# Patient Record
Sex: MALE | Race: WHITE
[De-identification: names, ages, dates, MRNs, and addresses within clinical notes are randomized per-mention and may not be internally consistent; named-entity substitution may affect disease eponyms.]

---

## 2017-01-12 ENCOUNTER — PRIOR ORIGINAL RECORDS (OUTPATIENT)
Dept: OTHER | Age: 72
End: 2017-01-12

## 2017-02-16 ENCOUNTER — PRIOR ORIGINAL RECORDS (OUTPATIENT)
Dept: OTHER | Age: 72
End: 2017-02-16

## 2017-07-06 ENCOUNTER — PRIOR ORIGINAL RECORDS (OUTPATIENT)
Dept: OTHER | Age: 72
End: 2017-07-06

## 2017-07-06 ENCOUNTER — LAB ENCOUNTER (OUTPATIENT)
Dept: LAB | Age: 72
End: 2017-07-06
Attending: INTERNAL MEDICINE
Payer: MEDICARE

## 2017-07-06 DIAGNOSIS — I72.4 ANEURYSM OF ARTERY OF LOWER EXTREMITY (HCC): ICD-10-CM

## 2017-07-06 DIAGNOSIS — I65.23 BILATERAL CAROTID ARTERY STENOSIS: ICD-10-CM

## 2017-07-06 DIAGNOSIS — R42 DIZZINESS AND GIDDINESS: ICD-10-CM

## 2017-07-06 DIAGNOSIS — I10 ESSENTIAL HYPERTENSION, MALIGNANT: Primary | ICD-10-CM

## 2017-07-06 LAB
ALT SERPL-CCNC: 24 U/L (ref 17–63)
AST SERPL-CCNC: 16 U/L (ref 15–41)
BUN BLD-MCNC: 21 MG/DL (ref 8–20)
CALCIUM BLD-MCNC: 8.7 MG/DL (ref 8.3–10.3)
CHLORIDE: 110 MMOL/L (ref 101–111)
CHOLEST SMN-MCNC: 144 MG/DL (ref ?–200)
CO2: 28 MMOL/L (ref 22–32)
CREAT BLD-MCNC: 1.2 MG/DL (ref 0.7–1.3)
GLUCOSE BLD-MCNC: 103 MG/DL (ref 70–99)
HDLC SERPL-MCNC: 60 MG/DL (ref 45–?)
HDLC SERPL: 2.4 {RATIO} (ref ?–4.97)
LDLC SERPL CALC-MCNC: 58 MG/DL (ref ?–130)
NONHDLC SERPL-MCNC: 84 MG/DL (ref ?–130)
POTASSIUM SERPL-SCNC: 3.6 MMOL/L (ref 3.6–5.1)
SODIUM SERPL-SCNC: 144 MMOL/L (ref 136–144)
TRIGLYCERIDES: 128 MG/DL (ref ?–150)
VLDL: 26 MG/DL (ref 5–40)

## 2017-07-06 PROCEDURE — 84450 TRANSFERASE (AST) (SGOT): CPT

## 2017-07-06 PROCEDURE — 84460 ALANINE AMINO (ALT) (SGPT): CPT

## 2017-07-06 PROCEDURE — 80048 BASIC METABOLIC PNL TOTAL CA: CPT

## 2017-07-06 PROCEDURE — 36415 COLL VENOUS BLD VENIPUNCTURE: CPT

## 2017-07-06 PROCEDURE — 80061 LIPID PANEL: CPT

## 2017-07-07 LAB
BUN: 21 MG/DL
CHOLESTEROL, TOTAL: 144 MG/DL
CREATININE, SERUM: 1.2 MG/DL
GLUCOSE: 103 MG/DL
HDL CHOLESTEROL: 60 MG/DL
LDL CHOLESTEROL: 58 MG/DL
POTASSIUM, SERUM: 3.6 MEQ/L
SGOT (AST): 16 IU/L
SGPT (ALT): 24 IU/L
SODIUM: 144 MEQ/L
TRIGLYCERIDES: 128 MG/DL

## 2017-07-13 ENCOUNTER — PRIOR ORIGINAL RECORDS (OUTPATIENT)
Dept: OTHER | Age: 72
End: 2017-07-13

## 2017-07-23 ENCOUNTER — OFFICE VISIT (OUTPATIENT)
Dept: FAMILY MEDICINE CLINIC | Facility: CLINIC | Age: 72
End: 2017-07-23

## 2017-07-23 VITALS
WEIGHT: 224 LBS | DIASTOLIC BLOOD PRESSURE: 64 MMHG | SYSTOLIC BLOOD PRESSURE: 122 MMHG | HEIGHT: 69 IN | RESPIRATION RATE: 16 BRPM | HEART RATE: 86 BPM | BODY MASS INDEX: 33.18 KG/M2 | TEMPERATURE: 98 F
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
## 2017-07-23 DIAGNOSIS — L08.9 SKIN INFECTION: ICD-10-CM

## 2017-07-23 PROCEDURE — 99202 OFFICE O/P NEW SF 15 MIN: CPT | Performed by: NURSE PRACTITIONER

## 2017-07-23 RX ORDER — ASPIRIN 325 MG
325 TABLET, DELAYED RELEASE (ENTERIC COATED) ORAL
COMMUNITY
Start: 2014-05-09 | End: 2017-08-22 | Stop reason: DRUGHIGH

## 2017-07-23 RX ORDER — LOSARTAN POTASSIUM 100 MG/1
100 TABLET ORAL EVERY MORNING
COMMUNITY

## 2017-07-23 RX ORDER — LOSARTAN POTASSIUM 100 MG/1
TABLET ORAL
COMMUNITY
Start: 2017-06-30 | End: 2017-07-23

## 2017-07-23 RX ORDER — HYDRALAZINE HYDROCHLORIDE 50 MG/1
TABLET, FILM COATED ORAL
COMMUNITY
Start: 2017-05-01 | End: 2017-08-22 | Stop reason: DRUGHIGH

## 2017-07-23 RX ORDER — MELATONIN 10 MG
CAPSULE ORAL
COMMUNITY
End: 2017-08-22 | Stop reason: DRUGHIGH

## 2017-07-23 RX ORDER — AMOXICILLIN AND CLAVULANATE POTASSIUM 875; 125 MG/1; MG/1
1 TABLET, FILM COATED ORAL 2 TIMES DAILY
Qty: 20 TABLET | Refills: 0 | Status: SHIPPED | OUTPATIENT
Start: 2017-07-23 | End: 2017-08-02

## 2017-07-23 RX ORDER — OMEGA-3/DHA/EPA/FISH OIL 60 MG-90MG
CAPSULE ORAL
COMMUNITY
End: 2017-08-22 | Stop reason: DRUGHIGH

## 2017-07-23 RX ORDER — HYDROCHLOROTHIAZIDE 25 MG/1
25 TABLET ORAL DAILY
COMMUNITY

## 2017-07-23 RX ORDER — DICLOFENAC SODIUM 75 MG/1
75 TABLET, DELAYED RELEASE ORAL
COMMUNITY
End: 2017-08-22 | Stop reason: ALTCHOICE

## 2017-07-23 RX ORDER — CARVEDILOL 12.5 MG/1
12.5 TABLET ORAL 2 TIMES DAILY WITH MEALS
COMMUNITY
Start: 2017-07-07

## 2017-07-23 RX ORDER — AMLODIPINE BESYLATE 5 MG/1
5 TABLET ORAL
COMMUNITY
End: 2017-08-22

## 2017-07-23 RX ORDER — METOCLOPRAMIDE 10 MG/1
10 TABLET ORAL
COMMUNITY
Start: 2014-05-09 | End: 2017-08-22 | Stop reason: ALTCHOICE

## 2017-07-23 RX ORDER — CEFADROXIL 500 MG/1
500 CAPSULE ORAL
COMMUNITY
Start: 2014-05-09 | End: 2017-08-22 | Stop reason: ALTCHOICE

## 2017-07-23 NOTE — PATIENT INSTRUCTIONS
Discharge Instructions for Cellulitis  You have been diagnosed with cellulitis. This is an infection in the deepest layer of the skin. In some cases, the infection also affects the muscle. Cellulitis is caused by bacteria.  The bacteria can enter the body Date Last Reviewed: 8/1/2016  © 4652-7608 The [address] [place] [address], [address] [place], [place] [address] [employer]. All rights reserved. This information is not intended as a substitute for professional medical care.  Always follow your healthcare professional'

## 2017-07-23 NOTE — PROGRESS NOTES
CHIEF COMPLAINT:   Patient presents with: Other: Right side chest, X 1 day, tender by touch, warm, pt had Cdiff 7-8 years ago         HPI:   Naheed Dumont is a 70year old male who presents for evaluation of a bug bite.  Per patient the other night he was sle tobacco: Never Used                      Alcohol use: No                  REVIEW OF SYSTEMS:   GENERAL: feels well otherwise, no fever, no chills. SKIN: Per HPI. No edema. No ulcerations. HEENT: Denies rhinorrhea, edema of the lips or swelling of throat. Visit:    Signed Prescriptions Disp Refills    Amoxicillin-Pot Clavulanate 875-125 MG Oral Tab 20 tablet 0      Sig: Take 1 tablet by mouth 2 (two) times daily. Risk and benefits of medication discussed.      The patient indicates understanding

## 2017-08-22 ENCOUNTER — OFFICE VISIT (OUTPATIENT)
Dept: NEUROLOGY | Facility: CLINIC | Age: 72
End: 2017-08-22

## 2017-08-22 VITALS
BODY MASS INDEX: 33 KG/M2 | HEART RATE: 60 BPM | WEIGHT: 221 LBS | DIASTOLIC BLOOD PRESSURE: 76 MMHG | RESPIRATION RATE: 16 BRPM | SYSTOLIC BLOOD PRESSURE: 118 MMHG

## 2017-08-22 DIAGNOSIS — R41.3 MEMORY DEFICIT: Primary | ICD-10-CM

## 2017-08-22 PROCEDURE — 99204 OFFICE O/P NEW MOD 45 MIN: CPT | Performed by: OTHER

## 2017-08-22 RX ORDER — MELATONIN
1000 WEEKLY
COMMUNITY

## 2017-08-22 RX ORDER — CHOLECALCIFEROL (VITAMIN D3) 125 MCG
5 CAPSULE ORAL NIGHTLY
COMMUNITY

## 2017-08-22 RX ORDER — MULTIVITAMIN WITH IRON
100 TABLET ORAL WEEKLY
COMMUNITY

## 2017-08-22 RX ORDER — MELATONIN
400 WEEKLY
COMMUNITY

## 2017-08-22 RX ORDER — CHLORAL HYDRATE 500 MG
1000 CAPSULE ORAL DAILY
COMMUNITY

## 2017-08-22 RX ORDER — HYDRALAZINE HYDROCHLORIDE 25 MG/1
25 TABLET, FILM COATED ORAL 2 TIMES DAILY
COMMUNITY

## 2017-08-22 RX ORDER — DOCUSATE SODIUM 100 MG/1
100 CAPSULE, LIQUID FILLED ORAL NIGHTLY
COMMUNITY

## 2017-08-22 RX ORDER — FEXOFENADINE HCL 180 MG/1
180 TABLET ORAL AS NEEDED
COMMUNITY

## 2017-08-22 RX ORDER — SIMVASTATIN 20 MG
20 TABLET ORAL NIGHTLY
COMMUNITY
End: 2021-08-16

## 2017-08-22 NOTE — PATIENT INSTRUCTIONS
Refill policies:    • Allow 2-3 business days for refills; controlled substances may take longer.   • Contact your pharmacy at least 5 days prior to running out of medication and have them send an electronic request or submit request through the [facility] have a procedure or additional testing performed. [facility]) will contact your insurance carrier to obtain pre-certification or prior authorization.     Unfortunately, [facility] has seen an increase in denial of payment even though the p

## 2017-08-22 NOTE — PROGRESS NOTES
[facility]SULTATION    Date of consult: 8/22/2017    CC: memory deficit    HPI: Rachid Lerner is a 70year old male with past medical history as listed below presents here for initial evaluation of memory deficit in the past 1-2 years, Rolando mcg by mouth daily. , Disp: , Rfl:   •  Vitamin B-12 1000 MCG Oral Tab, Take 1,000 mcg by mouth daily. , Disp: , Rfl:   •  Pyridoxine HCl (VITAMIN B-6) 100 MG Oral Tab, Take 100 mg by mouth daily. , Disp: , Rfl:   •  hydrochlorothiazide 25 MG Oral Tab, Take 2 symmetric  Plantar response: bilateral flexor  Coordination: Normal FTN  Sensory: symmetric  Gait: some shuffling gait noted  Romberg: nl  Neck: supple    Data Reviewed on 8/22/2017  Notes Reviewed on 8/22/2017  Labs Reviewed  on 8/22/2017    Assessment &

## 2017-08-22 NOTE — PROGRESS NOTES
Patient here for evaluation of feeling \"sluggish\" with movement and with speech for the last year. Here to discuss the next steps.

## 2017-08-29 ENCOUNTER — OFFICE VISIT (OUTPATIENT)
Dept: FAMILY MEDICINE CLINIC | Facility: CLINIC | Age: 72
End: 2017-08-29

## 2017-08-29 VITALS
SYSTOLIC BLOOD PRESSURE: 126 MMHG | RESPIRATION RATE: 16 BRPM | DIASTOLIC BLOOD PRESSURE: 74 MMHG | BODY MASS INDEX: 33.03 KG/M2 | WEIGHT: 223 LBS | HEART RATE: 70 BPM | TEMPERATURE: 98 F | HEIGHT: 69 IN

## 2017-08-29 DIAGNOSIS — J38.3 VOCAL CORD DYSFUNCTION: ICD-10-CM

## 2017-08-29 DIAGNOSIS — E56.9 MULTIPLE VITAMIN DEFICIENCY DISEASE: Primary | ICD-10-CM

## 2017-08-29 DIAGNOSIS — E66.9 OBESITY (BMI 30-39.9): ICD-10-CM

## 2017-08-29 DIAGNOSIS — I10 ESSENTIAL HYPERTENSION: ICD-10-CM

## 2017-08-29 DIAGNOSIS — H40.89 OTHER GLAUCOMA OF BOTH EYES: ICD-10-CM

## 2017-08-29 DIAGNOSIS — R41.3 MEMORY LOSS: ICD-10-CM

## 2017-08-29 DIAGNOSIS — N39.490 OVERFLOW INCONTINENCE OF URINE: ICD-10-CM

## 2017-08-29 DIAGNOSIS — E78.49 OTHER HYPERLIPIDEMIA: ICD-10-CM

## 2017-08-29 DIAGNOSIS — R41.3 MEMORY DEFICIT: ICD-10-CM

## 2017-08-29 DIAGNOSIS — C61 PROSTATE CA (HCC): ICD-10-CM

## 2017-08-29 DIAGNOSIS — G47.33 OSA (OBSTRUCTIVE SLEEP APNEA): ICD-10-CM

## 2017-08-29 PROCEDURE — G0439 PPPS, SUBSEQ VISIT: HCPCS | Performed by: INTERNAL MEDICINE

## 2017-08-29 PROCEDURE — 96160 PT-FOCUSED HLTH RISK ASSMT: CPT | Performed by: INTERNAL MEDICINE

## 2017-08-29 PROCEDURE — 99397 PER PM REEVAL EST PAT 65+ YR: CPT | Performed by: INTERNAL MEDICINE

## 2017-08-29 NOTE — PATIENT INSTRUCTIONS
Thank you for choosing Rolly Domingo MD at [place] [address]  To Do: Boyd Tobin  1. Have your tests  Have the EEG brain test, MRI test, and see the neuropsychologist for memory testing  Licensed Psychology/Neuropsychiatriing/Dementia Testing/c Test  1.  E example: CT scans, X rays, Ultrasound, MRI)  •Cardiac Testing in ER building Building A second floor Cardiac Testing [phone] (For example: Holter Monitor, Cardiac Stress tests,Event Monitor, or 2D Echocardiograms)  •[facility] call [phone] follow-up appointment. We cannot refill your maintenance medications at a preventative wellness visit. To best provide you care, patients receiving maintenance medications need to be seen at least twice a year.

## 2017-08-29 NOTE — PROGRESS NOTES
Soledadjoshua Alonso is a 70year old male who presents for a MA Supervisit.     Health Maintenance Topics with due status: Overdue       Topic Date Due    Annual Depression Screen 09/03/1945    Annual Physical 09/03/1947    FIT Colorectal Screening 09/03/1995    Col Influenza, Hepatitis B, Tetanus, or Pneumococcal?: No     Functional Ability     Bathing or Showering: Able without help    Toileting: Able without help    Dressing: Able without help    Eating: Able without help    Driving: Able without help    Preparing Assessment     What day of the week is this?: Correct    What month is it?: Correct    What year is it?: Correct    Recall \"Ball\": Correct    Recall \"Flag\": Correct    Recall \"Tree\": Correct         PREVENTATIVE SERVICES  INDICATIONS AND SCHEDULE Int Monitoring of Persistent     Medications (ACE/ARB, digoxin, diuretics)    Potassium  Annually Potassium (mmol/L)   Date Value   07/06/2017 3.6    No flowsheet data found.     Creatinine  Annually Creatinine (mg/dL)   Date Value   07/06/2017 1.20    No flows Disp:  Rfl:    Vitamin B-12 1000 MCG Oral Tab Take 1,000 mcg by mouth daily. Disp:  Rfl:    Pyridoxine HCl (VITAMIN B-6) 100 MG Oral Tab Take 100 mg by mouth daily. Disp:  Rfl:    hydrochlorothiazide 25 MG Oral Tab Take 25 mg by mouth daily.    Disp:  Rfl: Musculoskeletal: Negative for arthralgias and no gait problem. Skin: Negative for color change and rash. Neurological: Negative for tremors, weakness and numbness. +memory loss and shuffling gait  Hematological: Negative for adenopathy.  Does not brui CHRONIC CONDITIONS:   Hemant Myles is a 70year old male who presents for a Medicare Assessment. PLAN SUMMARY:   Patient/Caregiver Education: Patient/Caregiver Education: There are no barriers to learning. Medical education done.  Outcome: Patient [facility]/Psychiatric/Psychosocial Risk to Health we discussed as noted above and below.   Diet counseling perfomed  Exercise counseling perfomed  5 year plan includes:  Annual Depression Screen due on 09/03/1945  Annual Physical due on 09/03/1947  FIT Col

## 2017-09-01 ENCOUNTER — LAB ENCOUNTER (OUTPATIENT)
Dept: LAB | Age: 72
End: 2017-09-01
Attending: INTERNAL MEDICINE
Payer: MEDICARE

## 2017-09-01 DIAGNOSIS — C61 PROSTATE CA (HCC): ICD-10-CM

## 2017-09-01 DIAGNOSIS — E56.9 MULTIPLE VITAMIN DEFICIENCY DISEASE: ICD-10-CM

## 2017-09-01 DIAGNOSIS — R41.3 MEMORY LOSS: ICD-10-CM

## 2017-09-01 LAB
BASOPHILS # BLD AUTO: 0.01 X10(3) UL (ref 0–0.1)
BASOPHILS NFR BLD AUTO: 0.2 %
COMPLEXED PSA SERPL-MCNC: 0.01 NG/ML (ref 0.01–4)
EOSINOPHIL # BLD AUTO: 0.11 X10(3) UL (ref 0–0.3)
EOSINOPHIL NFR BLD AUTO: 2.1 %
ERYTHROCYTE [DISTWIDTH] IN BLOOD BY AUTOMATED COUNT: 12.4 % (ref 11.5–16)
FREE T4: 1 NG/DL (ref 0.9–1.8)
HAV AB SERPL IA-ACNC: 1000 PG/ML (ref 193–986)
HCT VFR BLD AUTO: 39 % (ref 37–53)
HGB BLD-MCNC: 12.9 G/DL (ref 13–17)
IMMATURE GRANULOCYTE COUNT: 0.01 X10(3) UL (ref 0–1)
IMMATURE GRANULOCYTE RATIO %: 0.2 %
LYMPHOCYTES # BLD AUTO: 1.27 X10(3) UL (ref 0.9–4)
LYMPHOCYTES NFR BLD AUTO: 24.3 %
MCH RBC QN AUTO: 32.5 PG (ref 27–33.2)
MCHC RBC AUTO-ENTMCNC: 33.1 G/DL (ref 31–37)
MCV RBC AUTO: 98.2 FL (ref 80–99)
MONOCYTES # BLD AUTO: 0.48 X10(3) UL (ref 0.1–0.6)
MONOCYTES NFR BLD AUTO: 9.2 %
NEUTROPHIL ABS PRELIM: 3.34 X10 (3) UL (ref 1.3–6.7)
NEUTROPHILS # BLD AUTO: 3.34 X10(3) UL (ref 1.3–6.7)
NEUTROPHILS NFR BLD AUTO: 64 %
PLATELET # BLD AUTO: 161 10(3)UL (ref 150–450)
RBC # BLD AUTO: 3.97 X10(6)UL (ref 3.8–5.8)
RED CELL DISTRIBUTION WIDTH-SD: 44.7 FL (ref 35.1–46.3)
SED RATE-ML: 11 MM/HR (ref 0–12)
T3FREE SERPL-MCNC: 2.86 PG/ML (ref 2.3–4.2)
TSI SER-ACNC: 1.16 MIU/ML (ref 0.35–5.5)
WBC # BLD AUTO: 5.2 X10(3) UL (ref 4–13)

## 2017-09-01 PROCEDURE — 82607 VITAMIN B-12: CPT

## 2017-09-01 PROCEDURE — 84425 ASSAY OF VITAMIN B-1: CPT

## 2017-09-01 PROCEDURE — 85652 RBC SED RATE AUTOMATED: CPT

## 2017-09-01 PROCEDURE — 84207 ASSAY OF VITAMIN B-6: CPT

## 2017-09-01 PROCEDURE — 36415 COLL VENOUS BLD VENIPUNCTURE: CPT

## 2017-09-01 PROCEDURE — 84481 FREE ASSAY (FT-3): CPT

## 2017-09-01 PROCEDURE — 84443 ASSAY THYROID STIM HORMONE: CPT

## 2017-09-01 PROCEDURE — 84439 ASSAY OF FREE THYROXINE: CPT

## 2017-09-01 PROCEDURE — 85025 COMPLETE CBC W/AUTO DIFF WBC: CPT

## 2017-09-05 ENCOUNTER — TELEPHONE (OUTPATIENT)
Dept: FAMILY MEDICINE CLINIC | Facility: CLINIC | Age: 72
End: 2017-09-05

## 2017-09-05 LAB
VITAMIN B1 (THIAMINE), WHOLE B: 95 NMOL/L
VITAMIN B6: 359.2 NMOL/L

## 2017-09-05 NOTE — TELEPHONE ENCOUNTER
[facility] med records on patient from [address] [place] [address] dated 8/5/15. Placed in MD bin for review and/or signature.

## 2017-09-15 ENCOUNTER — HOSPITAL ENCOUNTER (OUTPATIENT)
Dept: MRI IMAGING | Facility: HOSPITAL | Age: 72
Discharge: HOME | End: 2017-09-15
Attending: Other
Payer: MEDICARE

## 2017-09-15 ENCOUNTER — NURSE ONLY (OUTPATIENT)
Dept: ELECTROPHYSIOLOGY | Facility: HOSPITAL | Age: 72
End: 2017-09-15
Attending: Other
Payer: MEDICARE

## 2017-09-15 DIAGNOSIS — R41.3 MEMORY DEFICIT: ICD-10-CM

## 2017-09-15 PROCEDURE — 95816 EEG AWAKE AND DROWSY: CPT | Performed by: OTHER

## 2017-09-15 PROCEDURE — 70551 MRI BRAIN STEM W/O DYE: CPT | Performed by: OTHER

## 2017-09-18 NOTE — PROCEDURES
Date of Procedure: 9/15/2017    Procedure: EEG (ELECTROENCEPHALOGRAM)     DX: MEMORY DEFICIT  HX: PT IS A 73 YO MALE WHO PRESENTS WITH MEMORY DEFICIT IN THE PAST 1-2 YEARS.  PT STATES HE IS \"SLOW IN PROCESSING\" AND SOMETIMES HAS DIFFICULTY IN FINDING WORD

## 2017-10-03 ENCOUNTER — TELEPHONE (OUTPATIENT)
Dept: NEUROLOGY | Facility: CLINIC | Age: 72
End: 2017-10-03

## 2017-10-23 ENCOUNTER — OFFICE VISIT (OUTPATIENT)
Dept: FAMILY MEDICINE CLINIC | Facility: CLINIC | Age: 72
End: 2017-10-23

## 2017-10-23 VITALS
SYSTOLIC BLOOD PRESSURE: 156 MMHG | WEIGHT: 220 LBS | HEART RATE: 66 BPM | DIASTOLIC BLOOD PRESSURE: 100 MMHG | RESPIRATION RATE: 14 BRPM | TEMPERATURE: 98 F | BODY MASS INDEX: 32.96 KG/M2 | HEIGHT: 68.5 IN

## 2017-10-23 DIAGNOSIS — E66.9 OBESITY (BMI 30-39.9): ICD-10-CM

## 2017-10-23 DIAGNOSIS — R41.3 MEMORY DEFICIT: Primary | ICD-10-CM

## 2017-10-23 DIAGNOSIS — I10 ESSENTIAL HYPERTENSION: ICD-10-CM

## 2017-10-23 DIAGNOSIS — C61 PROSTATE CA (HCC): ICD-10-CM

## 2017-10-23 DIAGNOSIS — G47.33 OSA (OBSTRUCTIVE SLEEP APNEA): ICD-10-CM

## 2017-10-23 DIAGNOSIS — E78.49 OTHER HYPERLIPIDEMIA: ICD-10-CM

## 2017-10-23 PROCEDURE — 90686 IIV4 VACC NO PRSV 0.5 ML IM: CPT | Performed by: INTERNAL MEDICINE

## 2017-10-23 PROCEDURE — 99214 OFFICE O/P EST MOD 30 MIN: CPT | Performed by: INTERNAL MEDICINE

## 2017-10-23 PROCEDURE — G0008 ADMIN INFLUENZA VIRUS VAC: HCPCS | Performed by: INTERNAL MEDICINE

## 2017-10-23 NOTE — PROGRESS NOTES
HPI:    Patient ID: Erica Ramirez is a 67year old male. Patient presents with:  Blood Pressure  Memory Loss: follow up   Cholesterol      HPI     Patient is here for follow up visit.  Saw neurologist and had EEG and MRI which he was told was normal. Also garo Take 1,000 mg by mouth daily. Disp:  Rfl:    Sennosides (SENEXON OR) Take 2 tablets by mouth nightly. Disp:  Rfl:    docusate sodium 100 MG Oral Cap Take 100 mg by mouth nightly. Disp:  Rfl:    NON FORMULARY daily.  [employer] Prime Anti-Aging supplement Disp: (bmi 30-39. 9)   Start Time: 111  End Time: 136  Therefore more than 25 minutes were spent face to face with the patient in which over half of that time was spent coordinating and counseling on  htn  dyslipid  And memory loss      Memory loss- sp neuro and

## 2018-01-11 ENCOUNTER — PRIOR ORIGINAL RECORDS (OUTPATIENT)
Dept: OTHER | Age: 73
End: 2018-01-11

## 2018-01-23 ENCOUNTER — HOSPITAL ENCOUNTER (OUTPATIENT)
Dept: CV DIAGNOSTICS | Age: 73
Discharge: HOME | End: 2018-01-23
Attending: INTERNAL MEDICINE
Payer: MEDICARE

## 2018-01-23 ENCOUNTER — PRIOR ORIGINAL RECORDS (OUTPATIENT)
Dept: OTHER | Age: 73
End: 2018-01-23

## 2018-01-23 ENCOUNTER — LAB ENCOUNTER (OUTPATIENT)
Dept: LAB | Age: 73
End: 2018-01-23
Attending: INTERNAL MEDICINE
Payer: MEDICARE

## 2018-01-23 DIAGNOSIS — E78.00 PURE HYPERCHOLESTEROLEMIA: ICD-10-CM

## 2018-01-23 DIAGNOSIS — E78.00 PURE HYPERCHOLESTEROLEMIA: Primary | ICD-10-CM

## 2018-01-23 DIAGNOSIS — I10 ESSENTIAL HYPERTENSION, MALIGNANT: ICD-10-CM

## 2018-01-23 DIAGNOSIS — E78.00 HIGH CHOLESTEROL: ICD-10-CM

## 2018-01-23 DIAGNOSIS — I10 HTN (HYPERTENSION): ICD-10-CM

## 2018-01-23 LAB
ALT SERPL-CCNC: 26 U/L (ref 17–63)
AST SERPL-CCNC: 20 U/L (ref 15–41)
BUN BLD-MCNC: 26 MG/DL (ref 8–20)
CALCIUM BLD-MCNC: 9.1 MG/DL (ref 8.3–10.3)
CHLORIDE: 105 MMOL/L (ref 101–111)
CHOLEST SMN-MCNC: 152 MG/DL (ref ?–200)
CO2: 29 MMOL/L (ref 22–32)
CREAT BLD-MCNC: 1.45 MG/DL (ref 0.7–1.3)
GLUCOSE BLD-MCNC: 99 MG/DL (ref 70–99)
HDLC SERPL-MCNC: 59 MG/DL (ref 45–?)
HDLC SERPL: 2.58 {RATIO} (ref ?–4.97)
LDLC SERPL CALC-MCNC: 68 MG/DL (ref ?–130)
NONHDLC SERPL-MCNC: 93 MG/DL (ref ?–130)
POTASSIUM SERPL-SCNC: 3.7 MMOL/L (ref 3.6–5.1)
SODIUM SERPL-SCNC: 142 MMOL/L (ref 136–144)
TRIGL SERPL-MCNC: 123 MG/DL (ref ?–150)
VLDLC SERPL CALC-MCNC: 25 MG/DL (ref 5–40)

## 2018-01-23 PROCEDURE — 93017 CV STRESS TEST TRACING ONLY: CPT | Performed by: INTERNAL MEDICINE

## 2018-01-23 PROCEDURE — 36415 COLL VENOUS BLD VENIPUNCTURE: CPT

## 2018-01-23 PROCEDURE — 84460 ALANINE AMINO (ALT) (SGPT): CPT

## 2018-01-23 PROCEDURE — 93018 CV STRESS TEST I&R ONLY: CPT | Performed by: INTERNAL MEDICINE

## 2018-01-23 PROCEDURE — 80061 LIPID PANEL: CPT

## 2018-01-23 PROCEDURE — 80048 BASIC METABOLIC PNL TOTAL CA: CPT

## 2018-01-23 PROCEDURE — 84450 TRANSFERASE (AST) (SGOT): CPT

## 2018-01-23 NOTE — PROGRESS NOTES
Pt had regular treadmill test. He could only get his heart rate up to 59 percent of APMHR. I left a message with Dr Tom Cody nurse.

## 2018-01-24 LAB
ALT (SGPT): 26 U/L
AST (SGOT): 20 U/L
BUN: 26 MG/DL
CALCIUM: 9.1 MG/DL
CHLORIDE: 105 MEQ/L
CHOLESTEROL, TOTAL: 152 MG/DL
CREATININE, SERUM: 1.45 MG/DL
GLUCOSE: 99 MG/DL
GLUCOSE: 99 MG/DL
HDL CHOLESTEROL: 59 MG/DL
LDL CHOLESTEROL: 68 MG/DL
NON-HDL CHOLESTEROL: 93 MG/DL
POTASSIUM, SERUM: 3.7 MEQ/L
SGOT (AST): 20 IU/L
SGPT (ALT): 26 IU/L
SODIUM: 142 MEQ/L
TRIGLYCERIDES: 123 MG/DL

## 2018-01-26 ENCOUNTER — PRIOR ORIGINAL RECORDS (OUTPATIENT)
Dept: OTHER | Age: 73
End: 2018-01-26

## 2018-01-30 ENCOUNTER — PRIOR ORIGINAL RECORDS (OUTPATIENT)
Dept: OTHER | Age: 73
End: 2018-01-30

## 2018-02-06 ENCOUNTER — HOSPITAL ENCOUNTER (OUTPATIENT)
Dept: CV DIAGNOSTICS | Age: 73
Discharge: HOME | End: 2018-02-06
Attending: INTERNAL MEDICINE
Payer: MEDICARE

## 2018-02-06 ENCOUNTER — PRIOR ORIGINAL RECORDS (OUTPATIENT)
Dept: OTHER | Age: 73
End: 2018-02-06

## 2018-02-06 DIAGNOSIS — I65.23 BILATERAL CAROTID ARTERY STENOSIS: ICD-10-CM

## 2018-02-08 ENCOUNTER — OFFICE VISIT (OUTPATIENT)
Dept: FAMILY MEDICINE CLINIC | Facility: CLINIC | Age: 73
End: 2018-02-08

## 2018-02-08 VITALS
WEIGHT: 221 LBS | BODY MASS INDEX: 33.49 KG/M2 | SYSTOLIC BLOOD PRESSURE: 124 MMHG | HEIGHT: 68 IN | DIASTOLIC BLOOD PRESSURE: 72 MMHG | HEART RATE: 60 BPM

## 2018-02-08 DIAGNOSIS — C61 PROSTATE CA (HCC): Primary | ICD-10-CM

## 2018-02-08 DIAGNOSIS — H40.9 GLAUCOMA OF BOTH EYES, UNSPECIFIED GLAUCOMA TYPE: ICD-10-CM

## 2018-02-08 DIAGNOSIS — N39.490 OVERFLOW INCONTINENCE OF URINE: ICD-10-CM

## 2018-02-08 DIAGNOSIS — I10 ESSENTIAL HYPERTENSION: ICD-10-CM

## 2018-02-08 DIAGNOSIS — E66.09 CLASS 1 OBESITY DUE TO EXCESS CALORIES WITHOUT SERIOUS COMORBIDITY WITH BODY MASS INDEX (BMI) OF 33.0 TO 33.9 IN ADULT: ICD-10-CM

## 2018-02-08 DIAGNOSIS — G47.33 OSA ON CPAP: ICD-10-CM

## 2018-02-08 DIAGNOSIS — E78.00 HYPERCHOLESTEROLEMIA: ICD-10-CM

## 2018-02-08 DIAGNOSIS — I77.9 BILATERAL CAROTID ARTERY DISEASE (HCC): ICD-10-CM

## 2018-02-08 DIAGNOSIS — Z99.89 OSA ON CPAP: ICD-10-CM

## 2018-02-08 DIAGNOSIS — J38.3 VOCAL CORD DYSFUNCTION: ICD-10-CM

## 2018-02-08 DIAGNOSIS — Z23 NEED FOR VACCINATION: ICD-10-CM

## 2018-02-08 PROBLEM — H40.89 OTHER SPECIFIED GLAUCOMA: Status: RESOLVED | Noted: 2017-08-29 | Resolved: 2018-02-08

## 2018-02-08 PROBLEM — E66.9 OBESITY (BMI 30-39.9): Status: RESOLVED | Noted: 2017-08-29 | Resolved: 2018-02-08

## 2018-02-08 PROCEDURE — G0009 ADMIN PNEUMOCOCCAL VACCINE: HCPCS | Performed by: FAMILY MEDICINE

## 2018-02-08 PROCEDURE — 90670 PCV13 VACCINE IM: CPT | Performed by: FAMILY MEDICINE

## 2018-02-08 PROCEDURE — 99214 OFFICE O/P EST MOD 30 MIN: CPT | Performed by: FAMILY MEDICINE

## 2018-02-08 NOTE — PROGRESS NOTES
Live Montelongo is a 67year old male. HPI:     HTN:    Stable. Severity is moderate, patient is on 4 agents to control his hypertension. Pt has been taking medications as instructed, no medication side effects.    Diet: tries to watch sodium intake somew NON FORMULARY daily. Green Tea Complex; 500mg green tea 125mg EGCG Disp:  Rfl:    LATANOPROST OP Apply 1 drop to eye nightly. Both eyes Disp:  Rfl:    folic acid 356 MCG Oral Tab Take 400 mcg by mouth once a week.    Disp:  Rfl:    Vitamin B-12 1000 MCG O Maternal Grandfather      REVIEW OF SYSTEMS:   GENERAL HEALTH: overall feels well, no fever, no change in weight  SKIN: denies any unusual skin lesions or rashes   RESPIRATORY: Denies: PALOMO/DWYER/Cough/Wheeze/Orthopnea/PND  CARDIOVASCULAR: Denies CP/palpitati diagnosis)  Bilateral carotid artery disease (hcc)  Essential hypertension  Que on cpap  Hypercholesterolemia  Vocal cord dysfunction  Overflow incontinence of urine  Glaucoma of both eyes, unspecified glaucoma type  Class 1 obesity due to excess calories

## 2018-02-09 ENCOUNTER — PRIOR ORIGINAL RECORDS (OUTPATIENT)
Dept: OTHER | Age: 73
End: 2018-02-09

## 2018-02-11 PROBLEM — H40.9 GLAUCOMA OF BOTH EYES: Status: ACTIVE | Noted: 2018-02-11

## 2018-02-11 PROBLEM — I77.9 BILATERAL CAROTID ARTERY DISEASE: Status: ACTIVE | Noted: 2018-02-11

## 2018-02-11 PROBLEM — I72.4 ANEURYSM OF ARTERY OF LOWER EXTREMITY (HCC): Status: ACTIVE | Noted: 2018-02-11

## 2018-02-11 PROBLEM — I77.9 BILATERAL CAROTID ARTERY DISEASE (HCC): Status: ACTIVE | Noted: 2018-02-11

## 2018-02-11 PROBLEM — E66.09 CLASS 1 OBESITY DUE TO EXCESS CALORIES WITHOUT SERIOUS COMORBIDITY WITH BODY MASS INDEX (BMI) OF 33.0 TO 33.9 IN ADULT: Status: ACTIVE | Noted: 2018-02-11

## 2018-02-11 PROBLEM — E66.811 CLASS 1 OBESITY DUE TO EXCESS CALORIES WITHOUT SERIOUS COMORBIDITY WITH BODY MASS INDEX (BMI) OF 33.0 TO 33.9 IN ADULT: Status: ACTIVE | Noted: 2018-02-11

## 2018-02-11 PROBLEM — I72.4 ANEURYSM OF ARTERY OF LOWER EXTREMITY: Status: ACTIVE | Noted: 2018-02-11

## 2018-02-20 ENCOUNTER — PRIOR ORIGINAL RECORDS (OUTPATIENT)
Dept: OTHER | Age: 73
End: 2018-02-20

## 2018-02-20 ENCOUNTER — LAB ENCOUNTER (OUTPATIENT)
Dept: LAB | Age: 73
End: 2018-02-20
Attending: INTERNAL MEDICINE
Payer: MEDICARE

## 2018-02-20 DIAGNOSIS — R79.89 HYPOURICEMIA: Primary | ICD-10-CM

## 2018-02-20 DIAGNOSIS — I10 ESSENTIAL HYPERTENSION, MALIGNANT: ICD-10-CM

## 2018-02-20 LAB
BUN BLD-MCNC: 24 MG/DL (ref 8–20)
CALCIUM BLD-MCNC: 9.1 MG/DL (ref 8.3–10.3)
CHLORIDE: 105 MMOL/L (ref 101–111)
CO2: 31 MMOL/L (ref 22–32)
CREAT BLD-MCNC: 1.2 MG/DL (ref 0.7–1.3)
GLUCOSE BLD-MCNC: 97 MG/DL (ref 70–99)
POTASSIUM SERPL-SCNC: 3.4 MMOL/L (ref 3.6–5.1)
SODIUM SERPL-SCNC: 142 MMOL/L (ref 136–144)

## 2018-02-20 PROCEDURE — 36415 COLL VENOUS BLD VENIPUNCTURE: CPT

## 2018-02-20 PROCEDURE — 80048 BASIC METABOLIC PNL TOTAL CA: CPT

## 2018-02-28 ENCOUNTER — PRIOR ORIGINAL RECORDS (OUTPATIENT)
Dept: OTHER | Age: 73
End: 2018-02-28

## 2018-03-01 LAB
BUN: 24 MG/DL
CALCIUM: 9.1 MG/DL
CHLORIDE: 105 MEQ/L
CREATININE, SERUM: 1.2 MG/DL
GLUCOSE: 97 MG/DL
POTASSIUM, SERUM: 3.4 MEQ/L
SODIUM: 142 MEQ/L

## 2018-06-07 ENCOUNTER — OFFICE VISIT (OUTPATIENT)
Dept: FAMILY MEDICINE CLINIC | Facility: CLINIC | Age: 73
End: 2018-06-07

## 2018-06-07 VITALS
HEART RATE: 60 BPM | BODY MASS INDEX: 33.49 KG/M2 | HEIGHT: 68 IN | SYSTOLIC BLOOD PRESSURE: 108 MMHG | WEIGHT: 221 LBS | DIASTOLIC BLOOD PRESSURE: 64 MMHG

## 2018-06-07 DIAGNOSIS — E66.09 CLASS 1 OBESITY DUE TO EXCESS CALORIES WITHOUT SERIOUS COMORBIDITY WITH BODY MASS INDEX (BMI) OF 33.0 TO 33.9 IN ADULT: ICD-10-CM

## 2018-06-07 DIAGNOSIS — I77.9 BILATERAL CAROTID ARTERY DISEASE (HCC): ICD-10-CM

## 2018-06-07 DIAGNOSIS — Z00.00 MEDICARE ANNUAL WELLNESS VISIT, SUBSEQUENT: Primary | ICD-10-CM

## 2018-06-07 DIAGNOSIS — D48.5 NEOPLASM OF UNCERTAIN BEHAVIOR OF SKIN: ICD-10-CM

## 2018-06-07 DIAGNOSIS — Z99.89 OSA ON CPAP: ICD-10-CM

## 2018-06-07 DIAGNOSIS — I10 ESSENTIAL HYPERTENSION: ICD-10-CM

## 2018-06-07 DIAGNOSIS — Z11.59 NEED FOR HEPATITIS C SCREENING TEST: ICD-10-CM

## 2018-06-07 DIAGNOSIS — I72.4 ANEURYSM OF POPLITEAL ARTERY (HCC): ICD-10-CM

## 2018-06-07 DIAGNOSIS — I77.9 ARTERIAL DISEASE (HCC): ICD-10-CM

## 2018-06-07 DIAGNOSIS — I65.23 BILATERAL CAROTID ARTERY STENOSIS: ICD-10-CM

## 2018-06-07 DIAGNOSIS — C61 PROSTATE CA (HCC): ICD-10-CM

## 2018-06-07 DIAGNOSIS — G47.33 OSA ON CPAP: ICD-10-CM

## 2018-06-07 DIAGNOSIS — H40.1131 PRIMARY OPEN ANGLE GLAUCOMA (POAG) OF BOTH EYES, MILD STAGE: ICD-10-CM

## 2018-06-07 DIAGNOSIS — R41.9 COGNITIVE COMPLAINTS WITH NORMAL NEUROPSYCHOLOGICAL EXAM: ICD-10-CM

## 2018-06-07 DIAGNOSIS — E78.00 HYPERCHOLESTEROLEMIA: ICD-10-CM

## 2018-06-07 DIAGNOSIS — N39.490 OVERFLOW INCONTINENCE OF URINE: ICD-10-CM

## 2018-06-07 PROCEDURE — G0439 PPPS, SUBSEQ VISIT: HCPCS | Performed by: FAMILY MEDICINE

## 2018-06-07 PROCEDURE — 96160 PT-FOCUSED HLTH RISK ASSMT: CPT | Performed by: FAMILY MEDICINE

## 2018-06-07 PROCEDURE — 99397 PER PM REEVAL EST PAT 65+ YR: CPT | Performed by: FAMILY MEDICINE

## 2018-06-07 RX ORDER — POTASSIUM CHLORIDE 750 MG/1
1 TABLET, EXTENDED RELEASE ORAL DAILY
COMMUNITY
Start: 2018-03-30

## 2018-06-07 NOTE — PATIENT INSTRUCTIONS
Check your blood pressure once a day, vary the time of day you check it. Please bring your blood pressure monitor and your blood pressure log sheet with you to your next appointment.               Piyush Daniels's SCREENING SCHEDULE   Tests on this lis smoked more than 100 cigarettes in their lifetime   • Anyone with a family history    Colorectal Cancer Screening Covered up to Age 76     Colonoscopy Screen   Covered every 10 years- more often if abnormal Colonoscopy,10 Years due on 09/03/1995 Update Lina Toxoid- Only covered with a cut with metal- TD and TDaP Not covered by Medicare Part B) No orders found for this or any previous visit.  This may be covered with your prescription benefits, but Medicare does not cover unless Medically needed    Zoster (Not

## 2018-06-07 NOTE — PROGRESS NOTES
HPI:   Soledad Alonso is a 67year old male who presents for a MA (Medicare Advantage) Supervisit (Once per calendar year). Patient is accompanied by his wife who is pleasant and supportive.     Per wife, patient has had shuffling gate for a year and a h at all  PHQ-2 SCORE: 0     Advanced Directive:   He does have a Living Will but we do NOT have it on file in [place].    The patient has this document but we do not have it in [facility], and patient is instructed to get our office a copy of it for scanning into Epi : 221 lb  02/08/18 : 221 lb  10/23/17 : 220 lb     Last Cholesterol Labs:     Lab Results  Component Value Date   CHOLEST 152 01/23/2018   HDL 59 01/23/2018   LDL 68 01/23/2018   TRIG 123 01/23/2018          Last Chemistry Labs:     Lab Results  Component daily.     carvedilol 12.5 MG Oral Tab Take 12.5 mg by mouth 2 (two) times daily with meals. MEDICAL INFORMATION:   He  has a past medical history of Aneurysm of popliteal artery ([facility] (6/7/2018); Arterial disease ([facility] (6/7/2018);  Bilateral carotid shortness of breath with exertion  CARDIOVASCULAR: denies chest pain on exertion  GI: denies abdominal pain, denies heartburn  : 2 per night nocturia, no complaint of urinary incontinence  MUSCULOSKELETAL: denies back pain  NEURO: denies headaches  PSYCH (hcc)  Arterial disease (hcc)  Prostate ca (hcc)  Essential hypertension  Bilateral carotid artery stenosis  Hypercholesterolemia  Que on cpap  Primary open angle glaucoma (poag) of both eyes, mild stage  Neoplasm of uncertain behavior of skin  Overflow in with ophthalmologist as instructed. 11. Neoplasm of uncertain behavior of skin  Patient due for full body skin exam, patient to make appointment. - DERM - INTERNAL    12.  Overflow incontinence of urine  Patient to make appointment to see urologist. results found for: A1C    No flowsheet data found.     Fasting Blood Sugar (FSB)Annually   Glucose (mg/dL)   Date Value   02/20/2018 97   ----------    Cardiovascular Disease Screening     LDL Annually LDL Cholesterol (mg/dL)   Date Value   01/23/2018 68 your pharmacy  prescription benefits      SPECIFIC DISEASE MONITORING Internal Lab or Procedure External Lab or Procedure      Annual Monitoring of Persistent     Medications (ACE/ARB, digoxin diuretics, anticonvulsants.)    Potassium  Annually Potassium (

## 2018-07-12 ENCOUNTER — OFFICE VISIT (OUTPATIENT)
Dept: FAMILY MEDICINE CLINIC | Facility: CLINIC | Age: 73
End: 2018-07-12

## 2018-07-12 ENCOUNTER — PRIOR ORIGINAL RECORDS (OUTPATIENT)
Dept: OTHER | Age: 73
End: 2018-07-12

## 2018-07-12 VITALS
WEIGHT: 220.63 LBS | SYSTOLIC BLOOD PRESSURE: 132 MMHG | DIASTOLIC BLOOD PRESSURE: 64 MMHG | HEIGHT: 68 IN | HEART RATE: 53 BPM | BODY MASS INDEX: 33.44 KG/M2

## 2018-07-12 DIAGNOSIS — D69.6 THROMBOCYTOPENIA (HCC): ICD-10-CM

## 2018-07-12 DIAGNOSIS — R29.898 COGWHEEL RIGIDITY: ICD-10-CM

## 2018-07-12 DIAGNOSIS — R26.9 ABNORMAL GAIT: ICD-10-CM

## 2018-07-12 DIAGNOSIS — I10 ESSENTIAL HYPERTENSION: Primary | ICD-10-CM

## 2018-07-12 DIAGNOSIS — E67.8 HYPERVITAMINOSIS: ICD-10-CM

## 2018-07-12 PROCEDURE — 99214 OFFICE O/P EST MOD 30 MIN: CPT | Performed by: FAMILY MEDICINE

## 2018-07-12 NOTE — PROGRESS NOTES
Hemant Myles is a 67year old male. HPI:     Patient presents with his wife who is pleasant and supportive. HTN:    Stable. Severity is moderate, patient is on 4 agents for his hypertension.   Pt has been taking medications as instructed, no medicati Fexofenadine HCl (ALLEGRA ALLERGY) 180 MG Oral Tab Take 180 mg by mouth daily. Disp:  Rfl:    NON FORMULARY daily. Green Tea Complex; 500mg green tea 125mg EGCG Disp:  Rfl:    LATANOPROST OP Apply 1 drop to eye nightly.  Both eyes Disp:  Rfl:    folic aci Thrombocytopenia ([facility] [address] 7/14/2018   • Torn ligament     right   • Vocal cord dysfunction     errosion      Past Surgical History:  No date: ANKLE FRACTURE SURGERY  2006: HDR ELECT BRACHYTHERAPY  2014: KNEE REPLACEMENT SURGERY Left   Social History:    Tita masses  LUNGS: CTA A/P no wheezes/ronchi/rales/crackles  CARDIO: RRR, +S1/S2, no mm/S3/S4  VASCULAR: Radial pulses 2+ b/l.   Trace lower extremity edema  GI: normal bowel sounds, NT/ND, no pulsations, no r/r/g, no masses, no HSM  EXTREMITIES: no cyanosis or INTERNAL    Return in about 6 months (around 1/12/2019) for Chronic Conditions and as needed.

## 2018-07-13 ENCOUNTER — LAB ENCOUNTER (OUTPATIENT)
Dept: LAB | Age: 73
End: 2018-07-13
Attending: FAMILY MEDICINE
Payer: MEDICARE

## 2018-07-13 DIAGNOSIS — I10 ESSENTIAL HYPERTENSION: ICD-10-CM

## 2018-07-13 DIAGNOSIS — E78.00 HYPERCHOLESTEROLEMIA: ICD-10-CM

## 2018-07-13 DIAGNOSIS — D64.9 ANEMIA, UNSPECIFIED TYPE: ICD-10-CM

## 2018-07-13 DIAGNOSIS — Z11.59 NEED FOR HEPATITIS C SCREENING TEST: ICD-10-CM

## 2018-07-13 DIAGNOSIS — E67.8 HYPERVITAMINOSIS: ICD-10-CM

## 2018-07-13 LAB
ALBUMIN SERPL-MCNC: 3.7 G/DL (ref 3.5–4.8)
ALP LIVER SERPL-CCNC: 45 U/L (ref 45–117)
ALT SERPL-CCNC: 25 U/L (ref 17–63)
AST SERPL-CCNC: 16 U/L (ref 15–41)
BASOPHILS # BLD AUTO: 0.01 X10(3) UL (ref 0–0.1)
BASOPHILS NFR BLD AUTO: 0.2 %
BILIRUB SERPL-MCNC: 0.6 MG/DL (ref 0.1–2)
BUN BLD-MCNC: 23 MG/DL (ref 8–20)
CALCIUM BLD-MCNC: 9.3 MG/DL (ref 8.3–10.3)
CHLORIDE: 105 MMOL/L (ref 101–111)
CO2: 27 MMOL/L (ref 22–32)
CREAT BLD-MCNC: 1.25 MG/DL (ref 0.7–1.3)
EOSINOPHIL # BLD AUTO: 0.12 X10(3) UL (ref 0–0.3)
EOSINOPHIL NFR BLD AUTO: 2.4 %
ERYTHROCYTE [DISTWIDTH] IN BLOOD BY AUTOMATED COUNT: 12.1 % (ref 11.5–16)
FOLATE (FOLIC ACID), SERUM: 41.4 NG/ML (ref 8.7–24)
FREE T4: 1 NG/DL (ref 0.9–1.8)
GLUCOSE BLD-MCNC: 100 MG/DL (ref 70–99)
HAV AB SERPL IA-ACNC: 726 PG/ML (ref 193–986)
HCT VFR BLD AUTO: 38.7 % (ref 37–53)
HEPATITIS C VIRUS AB INTERPRETATION: NONREACTIVE
HGB BLD-MCNC: 12.6 G/DL (ref 13–17)
IMMATURE GRANULOCYTE COUNT: 0.01 X10(3) UL (ref 0–1)
IMMATURE GRANULOCYTE RATIO %: 0.2 %
LYMPHOCYTES # BLD AUTO: 1.34 X10(3) UL (ref 0.9–4)
LYMPHOCYTES NFR BLD AUTO: 27.1 %
M PROTEIN MFR SERPL ELPH: 7.4 G/DL (ref 6.1–8.3)
MCH RBC QN AUTO: 31.8 PG (ref 27–33.2)
MCHC RBC AUTO-ENTMCNC: 32.6 G/DL (ref 31–37)
MCV RBC AUTO: 97.7 FL (ref 80–99)
MONOCYTES # BLD AUTO: 0.47 X10(3) UL (ref 0.1–1)
MONOCYTES NFR BLD AUTO: 9.5 %
NEUTROPHIL ABS PRELIM: 2.99 X10 (3) UL (ref 1.3–6.7)
NEUTROPHILS # BLD AUTO: 2.99 X10(3) UL (ref 1.3–6.7)
NEUTROPHILS NFR BLD AUTO: 60.6 %
PLATELET # BLD AUTO: 144 10(3)UL (ref 150–450)
POTASSIUM SERPL-SCNC: 3.6 MMOL/L (ref 3.6–5.1)
RBC # BLD AUTO: 3.96 X10(6)UL (ref 3.8–5.8)
RED CELL DISTRIBUTION WIDTH-SD: 43.7 FL (ref 35.1–46.3)
SODIUM SERPL-SCNC: 143 MMOL/L (ref 136–144)
TSI SER-ACNC: 1.23 MIU/ML (ref 0.35–5.5)
WBC # BLD AUTO: 4.9 X10(3) UL (ref 4–13)

## 2018-07-13 PROCEDURE — 86803 HEPATITIS C AB TEST: CPT

## 2018-07-13 PROCEDURE — 83540 ASSAY OF IRON: CPT

## 2018-07-13 PROCEDURE — 84439 ASSAY OF FREE THYROXINE: CPT

## 2018-07-13 PROCEDURE — 83550 IRON BINDING TEST: CPT

## 2018-07-13 PROCEDURE — 82746 ASSAY OF FOLIC ACID SERUM: CPT

## 2018-07-13 PROCEDURE — 84443 ASSAY THYROID STIM HORMONE: CPT

## 2018-07-13 PROCEDURE — 85025 COMPLETE CBC W/AUTO DIFF WBC: CPT

## 2018-07-13 PROCEDURE — 82728 ASSAY OF FERRITIN: CPT

## 2018-07-13 PROCEDURE — 80053 COMPREHEN METABOLIC PANEL: CPT

## 2018-07-13 PROCEDURE — 84207 ASSAY OF VITAMIN B-6: CPT

## 2018-07-13 PROCEDURE — 82607 VITAMIN B-12: CPT

## 2018-07-13 PROCEDURE — 36415 COLL VENOUS BLD VENIPUNCTURE: CPT

## 2018-07-14 PROBLEM — D69.6 THROMBOCYTOPENIA (HCC): Status: ACTIVE | Noted: 2018-07-14

## 2018-07-14 PROBLEM — R26.9 ABNORMAL GAIT: Status: ACTIVE | Noted: 2018-07-14

## 2018-07-14 PROBLEM — E67.8 HYPERVITAMINOSIS: Status: ACTIVE | Noted: 2018-07-14

## 2018-07-14 PROBLEM — R29.898 COGWHEEL RIGIDITY: Status: ACTIVE | Noted: 2018-07-14

## 2018-07-14 PROBLEM — D69.6 THROMBOCYTOPENIA: Status: ACTIVE | Noted: 2018-07-14

## 2018-07-14 LAB
DEPRECATED HBV CORE AB SER IA-ACNC: 114 NG/ML (ref 30–530)
IRON SATURATION: 24 % (ref 20–50)
IRON: 82 UG/DL (ref 45–182)
TOTAL IRON BINDING CAPACITY: 335 UG/DL (ref 240–450)
TRANSFERRIN: 225 MG/DL (ref 200–360)

## 2018-07-15 NOTE — PATIENT INSTRUCTIONS
Thrombocytopenia  Thrombocytopenia occurs when there are fewer platelets in the blood than normal. Platelets (also called thrombocytes) are blood cells that are needed for clotting. They help stop or control bleeding when you have a cut or wound.  Thrombo · A complete blood cell count (CBC). This test measures the amounts of the different types of cells in the blood. This includes the number of platelets in the blood (platelet count). · A blood smear.  This test checks for the different types of blood cells Thrombocytopenia may be a short-term (acute) problem that has no lasting effects. Or it may be an ongoing (chronic) problem. If your condition is chronic, you may need specific treatments to manage it.  You may also need to take certain steps daily to reduc

## 2018-07-17 DIAGNOSIS — R73.01 IMPAIRED FASTING GLUCOSE: Primary | ICD-10-CM

## 2018-07-17 LAB — VITAMIN B6: 228.6 NMOL/L

## 2018-07-24 ENCOUNTER — HOSPITAL ENCOUNTER (OUTPATIENT)
Dept: CT IMAGING | Age: 73
Discharge: HOME | End: 2018-07-24
Attending: INTERNAL MEDICINE

## 2018-07-24 ENCOUNTER — PRIOR ORIGINAL RECORDS (OUTPATIENT)
Dept: OTHER | Age: 73
End: 2018-07-24

## 2018-07-24 DIAGNOSIS — Z13.9 ENCOUNTER FOR SCREENING: ICD-10-CM

## 2018-07-30 LAB — UFCT: 149.49 CA SCORE

## 2018-07-31 ENCOUNTER — OFFICE VISIT (OUTPATIENT)
Dept: NEUROLOGY | Facility: CLINIC | Age: 73
End: 2018-07-31
Payer: COMMERCIAL

## 2018-07-31 VITALS
WEIGHT: 220 LBS | SYSTOLIC BLOOD PRESSURE: 128 MMHG | RESPIRATION RATE: 16 BRPM | DIASTOLIC BLOOD PRESSURE: 72 MMHG | BODY MASS INDEX: 33 KG/M2 | HEART RATE: 60 BPM

## 2018-07-31 DIAGNOSIS — G20 PARKINSONIAN TREMOR (HCC): Primary | ICD-10-CM

## 2018-07-31 PROBLEM — G20.C PARKINSONIAN TREMOR: Status: ACTIVE | Noted: 2018-07-31

## 2018-07-31 PROBLEM — G20.C PARKINSONIAN TREMOR (HCC): Status: ACTIVE | Noted: 2018-07-31

## 2018-07-31 PROCEDURE — 99215 OFFICE O/P EST HI 40 MIN: CPT | Performed by: OTHER

## 2018-07-31 RX ORDER — OXYBUTYNIN CHLORIDE 10 MG/1
TABLET, EXTENDED RELEASE ORAL DAILY
Refills: 3 | COMMUNITY
Start: 2018-07-19 | End: 2020-06-02 | Stop reason: ALTCHOICE

## 2018-07-31 NOTE — PROGRESS NOTES
[facility] Neurology outpatient progress note  Date of service: 7/31/2018    Patient here to discuss shuffling in his gait and tremors in hands (right worse than left) over the last year.   I saw him about 1 year ago for memory change but he has not followed up si 400 MCG Oral Tab, Take 400 mcg by mouth once a week.  , Disp: , Rfl:   •  Vitamin B-12 1000 MCG Oral Tab, Take 1,000 mcg by mouth once a week.  , Disp: , Rfl:   •  Pyridoxine HCl (VITAMIN B-6) 100 MG Oral Tab, Take 100 mg by mouth once a week.  , Disp: , R History:  No date: ANKLE FRACTURE SURGERY  2006: HDR ELECT BRACHYTHERAPY  2014: KNEE REPLACEMENT SURGERY Left  Social History:     Smoking status: Former Smoker     Types: Cigars    Quit date: 2/8/1986    Smokeless tobacco: Never Used    Comment: 3-4 Cigar issues and agrees with the plan. Discussed with patient in detail regarding the adverse and side effects of the medications.   RTC 2 months    I spent a total of 40 minutes with patient, 25 minutes was spent counseling patient regarding all studies' result

## 2018-07-31 NOTE — TELEPHONE ENCOUNTER
This is a new medication for patient. 90 day supply is not appropriate.            Medication: Carbidopa- Levodopa 10-100mg     Date of last refill: 7/31/18  [place]     Last office visit: 7/31/2018  Due back to clinic pe

## 2018-07-31 NOTE — PROGRESS NOTES
Patient here to discuss shuffling in his gait and tremors in hands (right worse than left) over the last year. Here to discuss the next steps.

## 2018-07-31 NOTE — PATIENT INSTRUCTIONS
Refill policies:    • Allow 2-3 business days for refills; controlled substances may take longer.   • Contact your pharmacy at least 5 days prior to running out of medication and have them send an electronic request or submit request through the “request re entire amount billed. Precertification and Prior Authorizations: If your physician has recommended that you have a procedure or additional testing performed.   [facility]) will contact your insurance carrier to obtain pre-certi

## 2018-08-09 ENCOUNTER — PRIOR ORIGINAL RECORDS (OUTPATIENT)
Dept: OTHER | Age: 73
End: 2018-08-09

## 2018-08-13 ENCOUNTER — PRIOR ORIGINAL RECORDS (OUTPATIENT)
Dept: OTHER | Age: 73
End: 2018-08-13

## 2018-08-15 ENCOUNTER — PRIOR ORIGINAL RECORDS (OUTPATIENT)
Dept: OTHER | Age: 73
End: 2018-08-15

## 2018-08-29 ENCOUNTER — TELEPHONE (OUTPATIENT)
Dept: NEUROLOGY | Facility: CLINIC | Age: 73
End: 2018-08-29

## 2018-08-29 NOTE — TELEPHONE ENCOUNTER
Patient calling with a condition update. States his wife thinks the shuffling gait/walking has not improved but Patient thinks the tremors in his right hand has been noticeably reduced.      Patient is currently taking carbidopa-levodopa  MG Oral Tab-

## 2018-08-30 NOTE — TELEPHONE ENCOUNTER
Thanks for update, can try sinemet  tid a slightly increased dosage. Follow up with me as instructed.

## 2018-08-30 NOTE — TELEPHONE ENCOUNTER
Patient notified of Dr. Nury Trent recommendations, informed him medication was ordered to preferred pharmacy, he verbalized understanding and had no questions or concerns.

## 2018-08-31 NOTE — TELEPHONE ENCOUNTER
Received request from pharmacy for 90 day supply, medication is a new increase in dosage, 90 day supply is not appropriate at this time, rx refused and faxed back to pharmacy.

## 2018-09-08 ENCOUNTER — TELEPHONE (OUTPATIENT)
Dept: FAMILY MEDICINE CLINIC | Facility: CLINIC | Age: 73
End: 2018-09-08

## 2018-09-08 NOTE — TELEPHONE ENCOUNTER
Recd authorization and request from [employer] on behalf of [address] [facility] for records dated 1-1-2017 to 12-. Fax completed request to [phone]. Faxed to [employer] for processing.

## 2018-09-14 ENCOUNTER — MED REC SCAN ONLY (OUTPATIENT)
Dept: FAMILY MEDICINE CLINIC | Facility: CLINIC | Age: 73
End: 2018-09-14

## 2018-10-02 ENCOUNTER — OFFICE VISIT (OUTPATIENT)
Dept: NEUROLOGY | Facility: CLINIC | Age: 73
End: 2018-10-02
Payer: COMMERCIAL

## 2018-10-02 VITALS
DIASTOLIC BLOOD PRESSURE: 62 MMHG | BODY MASS INDEX: 33 KG/M2 | WEIGHT: 219 LBS | HEART RATE: 60 BPM | SYSTOLIC BLOOD PRESSURE: 110 MMHG

## 2018-10-02 DIAGNOSIS — G20 PD (PARKINSON'S DISEASE) (HCC): Primary | ICD-10-CM

## 2018-10-02 PROCEDURE — 99214 OFFICE O/P EST MOD 30 MIN: CPT | Performed by: OTHER

## 2018-10-02 NOTE — PATIENT INSTRUCTIONS
Refill policies:    • Allow 2-3 business days for refills; controlled substances may take longer.   • Contact your pharmacy at least 5 days prior to running out of medication and have them send an electronic request or submit request through the “request re entire amount billed. Precertification and Prior Authorizations: If your physician has recommended that you have a procedure or additional testing performed.   [facility]) will contact your insurance carrier to obtain pre-certi

## 2018-10-02 NOTE — PROGRESS NOTES
[facility] Neurology outpatient progress note  Date of service: 10/2/2018    Patient here to follow up re: his gait and tremors in hands (right worse than left), tremors and gait are improved with sinemet, no side effect reported, pt and wife are happy with it. Oral Tab, Take 400 mcg by mouth once a week.  , Disp: , Rfl:   •  Vitamin B-12 1000 MCG Oral Tab, Take 1,000 mcg by mouth once a week.  , Disp: , Rfl:   •  Pyridoxine HCl (VITAMIN B-6) 100 MG Oral Tab, Take 100 mg by mouth once a week.  , Disp: , Rfl:   • ANKLE FRACTURE SURGERY  2006: HDR ELECT BRACHYTHERAPY  2014: KNEE REPLACEMENT SURGERY; Left  Social History:  Social History    Tobacco Use      Smoking status: Former Smoker        Types: Cigars        Quit date: 1986        Years since quittin. 6 Parkinson disease eduction, fall precaution, management, treatment options and care plan.     Chandana Spivey MD  Neurology  [facility]  10/2/2018, 10:51 AM  Christiano Begum DO

## 2019-01-21 ENCOUNTER — OFFICE VISIT (OUTPATIENT)
Dept: FAMILY MEDICINE CLINIC | Facility: CLINIC | Age: 74
End: 2019-01-21
Payer: COMMERCIAL

## 2019-01-21 VITALS
HEART RATE: 65 BPM | BODY MASS INDEX: 33.65 KG/M2 | HEIGHT: 68 IN | SYSTOLIC BLOOD PRESSURE: 120 MMHG | WEIGHT: 222 LBS | DIASTOLIC BLOOD PRESSURE: 84 MMHG

## 2019-01-21 DIAGNOSIS — Z12.11 SCREENING FOR MALIGNANT NEOPLASM OF COLON: ICD-10-CM

## 2019-01-21 DIAGNOSIS — D69.6 THROMBOCYTOPENIA (HCC): ICD-10-CM

## 2019-01-21 DIAGNOSIS — I72.4 ANEURYSM OF POPLITEAL ARTERY (HCC): ICD-10-CM

## 2019-01-21 DIAGNOSIS — G20 PD (PARKINSON'S DISEASE) (HCC): ICD-10-CM

## 2019-01-21 DIAGNOSIS — I10 ESSENTIAL HYPERTENSION: Primary | ICD-10-CM

## 2019-01-21 PROBLEM — G20.A1 PD (PARKINSON'S DISEASE): Status: ACTIVE | Noted: 2019-01-21

## 2019-01-21 PROBLEM — G20.A1 PD (PARKINSON'S DISEASE) (HCC): Status: ACTIVE | Noted: 2019-01-21

## 2019-01-21 PROCEDURE — 99213 OFFICE O/P EST LOW 20 MIN: CPT | Performed by: FAMILY MEDICINE

## 2019-01-21 NOTE — PROGRESS NOTES
Jean Paul Martin is a 68year old male. HPI:     Patient presents with his wife who is pleasant and supportive. HTN:    Stable. Severity is moderate, patient is on 4 agents for his hypertension.   Pt has been taking medications as instructed, no medicati Take 100 mg by mouth nightly. Disp:  Rfl:    NON FORMULARY daily. Doctor kinetic Prime Anti-Aging supplement Disp:  Rfl:    Fexofenadine HCl (ALLEGRA ALLERGY) 180 MG Oral Tab Take 180 mg by mouth daily. Disp:  Rfl:    NON FORMULARY daily.  Green Tea Complex; 500mg 8/29/2017   • Primary open angle glaucoma (POAG) of both eyes, mild stage 6/7/2018   • [facility] ([facility]    • Thrombocytopenia ([facility] 7/14/2018   • Torn ligament     right   • Vocal cord dysfunction     errosion      Past Surgical History:   Procedure Later Wt 222 lb   BMI 33.75 kg/m²   GENERAL: NAD, pleasant frail elderly obese  male   SKIN: no visible rashes  HEAD: NCAT  NECK: supple, no adenopathy, no thyromegaly, no masses  LUNGS: CTA A/P no wheezes/ronchi/rales/crackles  CARDIO: RRR, +S1/S2, no hypertension  Stable. Follow-up with Dr. Cookie Bourne as instructed. - CARDIO - INTERNAL    2. PD (Parkinson's disease) ([facility]  Newer diagnosis. Patient under the care of Dr. Earnest Baker.   Patient encouraged to make follow-up appointment to see Dr. Earnest Baker.    - N

## 2019-01-31 ENCOUNTER — TELEPHONE (OUTPATIENT)
Dept: FAMILY MEDICINE CLINIC | Facility: CLINIC | Age: 74
End: 2019-01-31

## 2019-01-31 ENCOUNTER — PRIOR ORIGINAL RECORDS (OUTPATIENT)
Dept: OTHER | Age: 74
End: 2019-01-31

## 2019-01-31 ENCOUNTER — MYAURORA ACCOUNT LINK (OUTPATIENT)
Dept: OTHER | Age: 74
End: 2019-01-31

## 2019-01-31 DIAGNOSIS — Z12.11 SCREENING FOR MALIGNANT NEOPLASM OF COLON: ICD-10-CM

## 2019-01-31 NOTE — TELEPHONE ENCOUNTER
Lala from [facility] is calling to follow up on a Letter of medical necessity for [employer] CPap supplies, they will be faxing another order today, she will follow up  In 5 days, Please call Lala at [address] with any questions or concerns at [phone]

## 2019-02-12 PROCEDURE — 82274 ASSAY TEST FOR BLOOD FECAL: CPT | Performed by: FAMILY MEDICINE

## 2019-02-28 VITALS
WEIGHT: 221 LBS | BODY MASS INDEX: 33.49 KG/M2 | DIASTOLIC BLOOD PRESSURE: 78 MMHG | HEIGHT: 68 IN | HEART RATE: 60 BPM | SYSTOLIC BLOOD PRESSURE: 130 MMHG

## 2019-02-28 VITALS
HEIGHT: 68 IN | WEIGHT: 224 LBS | BODY MASS INDEX: 33.95 KG/M2 | DIASTOLIC BLOOD PRESSURE: 78 MMHG | SYSTOLIC BLOOD PRESSURE: 124 MMHG | HEART RATE: 58 BPM

## 2019-02-28 VITALS
HEART RATE: 58 BPM | WEIGHT: 219 LBS | BODY MASS INDEX: 33.19 KG/M2 | DIASTOLIC BLOOD PRESSURE: 60 MMHG | SYSTOLIC BLOOD PRESSURE: 102 MMHG | HEIGHT: 68 IN

## 2019-02-28 VITALS
HEART RATE: 66 BPM | HEIGHT: 68 IN | BODY MASS INDEX: 33.19 KG/M2 | DIASTOLIC BLOOD PRESSURE: 68 MMHG | WEIGHT: 219 LBS | SYSTOLIC BLOOD PRESSURE: 128 MMHG

## 2019-03-01 VITALS
HEART RATE: 60 BPM | WEIGHT: 226 LBS | BODY MASS INDEX: 34.25 KG/M2 | DIASTOLIC BLOOD PRESSURE: 68 MMHG | SYSTOLIC BLOOD PRESSURE: 122 MMHG | HEIGHT: 68 IN

## 2019-04-11 RX ORDER — SENNOSIDES A AND B 8.6 MG/1
2 TABLET, FILM COATED ORAL NIGHTLY
COMMUNITY
Start: 2016-11-23

## 2019-04-11 RX ORDER — HYDRALAZINE HYDROCHLORIDE 25 MG/1
1 TABLET, FILM COATED ORAL 2 TIMES DAILY
COMMUNITY
Start: 2018-07-30 | End: 2019-07-15 | Stop reason: SDUPTHER

## 2019-04-11 RX ORDER — CHLORAL HYDRATE 500 MG
1 CAPSULE ORAL DAILY
COMMUNITY
Start: 2016-11-23

## 2019-04-11 RX ORDER — DIPHENHYDRAMINE HCL 25 MG
1 TABLET,DISINTEGRATING ORAL DAILY
COMMUNITY
Start: 2016-11-23

## 2019-04-11 RX ORDER — FEXOFENADINE HCL 180 MG/1
1 TABLET ORAL DAILY
COMMUNITY
Start: 2016-11-23

## 2019-04-11 RX ORDER — ACETAMINOPHEN 160 MG
TABLET,DISINTEGRATING ORAL
COMMUNITY
Start: 2016-11-23

## 2019-04-11 RX ORDER — HYDROCHLOROTHIAZIDE 25 MG/1
1 TABLET ORAL DAILY
COMMUNITY
Start: 2018-05-08 | End: 2019-04-16 | Stop reason: SDUPTHER

## 2019-04-11 RX ORDER — POTASSIUM CHLORIDE 750 MG/1
1 TABLET, EXTENDED RELEASE ORAL DAILY
COMMUNITY
Start: 2018-02-28 | End: 2019-09-13 | Stop reason: SDUPTHER

## 2019-04-11 RX ORDER — OXYBUTYNIN CHLORIDE 10 MG/1
1 TABLET, EXTENDED RELEASE ORAL DAILY
COMMUNITY
Start: 2019-01-31 | End: 2019-08-29

## 2019-04-11 RX ORDER — SIMVASTATIN 20 MG
1 TABLET ORAL DAILY
COMMUNITY
Start: 2018-07-30 | End: 2019-04-16 | Stop reason: SDUPTHER

## 2019-04-11 RX ORDER — LATANOPROST 50 UG/ML
SOLUTION/ DROPS OPHTHALMIC
COMMUNITY
Start: 2016-11-23

## 2019-04-11 RX ORDER — CARVEDILOL 3.12 MG/1
1 TABLET ORAL 2 TIMES DAILY
COMMUNITY
Start: 2019-01-31 | End: 2019-08-29 | Stop reason: ALTCHOICE

## 2019-04-11 RX ORDER — LOSARTAN POTASSIUM 100 MG/1
1 TABLET ORAL DAILY
COMMUNITY
Start: 2018-10-05 | End: 2019-06-15 | Stop reason: SDUPTHER

## 2019-04-16 DIAGNOSIS — G20 PD (PARKINSON'S DISEASE) (HCC): Primary | ICD-10-CM

## 2019-04-16 RX ORDER — SIMVASTATIN 20 MG
TABLET ORAL
Qty: 90 TABLET | Refills: 0 | Status: SHIPPED | OUTPATIENT
Start: 2019-04-16 | End: 2019-07-15 | Stop reason: SDUPTHER

## 2019-04-16 RX ORDER — HYDROCHLOROTHIAZIDE 25 MG/1
TABLET ORAL DAILY
Qty: 90 TABLET | Refills: 0 | Status: SHIPPED | OUTPATIENT
Start: 2019-04-16 | End: 2019-07-15 | Stop reason: SDUPTHER

## 2019-04-16 NOTE — TELEPHONE ENCOUNTER
Medication: CARBIDOPA-LEVODOPA  MG Oral Tab    Date of last refill: 10/02/18 (#90/5)  [place]    Last office visit: 10/02/18  Due back to clinic per last office note:  6 months  Date next office visit scheduled

## 2019-06-17 ENCOUNTER — OFFICE VISIT (OUTPATIENT)
Dept: FAMILY MEDICINE CLINIC | Facility: CLINIC | Age: 74
End: 2019-06-17
Payer: COMMERCIAL

## 2019-06-17 VITALS
BODY MASS INDEX: 33.19 KG/M2 | WEIGHT: 219 LBS | SYSTOLIC BLOOD PRESSURE: 120 MMHG | DIASTOLIC BLOOD PRESSURE: 76 MMHG | HEART RATE: 59 BPM | HEIGHT: 68 IN

## 2019-06-17 DIAGNOSIS — N18.30 CKD (CHRONIC KIDNEY DISEASE) STAGE 3, GFR 30-59 ML/MIN (HCC): Chronic | ICD-10-CM

## 2019-06-17 DIAGNOSIS — D69.6 THROMBOCYTOPENIA (HCC): ICD-10-CM

## 2019-06-17 DIAGNOSIS — N39.490 OVERFLOW INCONTINENCE OF URINE: ICD-10-CM

## 2019-06-17 DIAGNOSIS — R41.9 COGNITIVE COMPLAINTS WITH NORMAL NEUROPSYCHOLOGICAL EXAM: ICD-10-CM

## 2019-06-17 DIAGNOSIS — I10 ESSENTIAL HYPERTENSION: ICD-10-CM

## 2019-06-17 DIAGNOSIS — Z00.00 MEDICARE ANNUAL WELLNESS VISIT, SUBSEQUENT: Primary | ICD-10-CM

## 2019-06-17 DIAGNOSIS — I72.4 ANEURYSM OF POPLITEAL ARTERY (HCC): ICD-10-CM

## 2019-06-17 DIAGNOSIS — E66.09 CLASS 1 OBESITY DUE TO EXCESS CALORIES WITH SERIOUS COMORBIDITY AND BODY MASS INDEX (BMI) OF 33.0 TO 33.9 IN ADULT: ICD-10-CM

## 2019-06-17 DIAGNOSIS — H40.1131 PRIMARY OPEN ANGLE GLAUCOMA (POAG) OF BOTH EYES, MILD STAGE: ICD-10-CM

## 2019-06-17 DIAGNOSIS — I65.23 BILATERAL CAROTID ARTERY STENOSIS: ICD-10-CM

## 2019-06-17 DIAGNOSIS — C61 PROSTATE CA (HCC): ICD-10-CM

## 2019-06-17 DIAGNOSIS — I77.9 BILATERAL CAROTID ARTERY DISEASE, UNSPECIFIED TYPE (HCC): ICD-10-CM

## 2019-06-17 DIAGNOSIS — E78.00 HYPERCHOLESTEROLEMIA: ICD-10-CM

## 2019-06-17 DIAGNOSIS — G47.33 OSA ON CPAP: ICD-10-CM

## 2019-06-17 DIAGNOSIS — Z99.89 OSA ON CPAP: ICD-10-CM

## 2019-06-17 DIAGNOSIS — Z91.81 AT RISK FOR FALLING: ICD-10-CM

## 2019-06-17 DIAGNOSIS — R26.9 ABNORMAL GAIT: ICD-10-CM

## 2019-06-17 DIAGNOSIS — G20 PD (PARKINSON'S DISEASE) (HCC): ICD-10-CM

## 2019-06-17 DIAGNOSIS — I77.9 ARTERIAL DISEASE (HCC): ICD-10-CM

## 2019-06-17 PROBLEM — E66.811 CLASS 1 OBESITY DUE TO EXCESS CALORIES WITH SERIOUS COMORBIDITY AND BODY MASS INDEX (BMI) OF 33.0 TO 33.9 IN ADULT: Status: ACTIVE | Noted: 2019-06-17

## 2019-06-17 PROBLEM — E67.8 HYPERVITAMINOSIS: Status: RESOLVED | Noted: 2018-07-14 | Resolved: 2019-06-17

## 2019-06-17 PROBLEM — G20.C PARKINSONIAN TREMOR: Status: RESOLVED | Noted: 2018-07-31 | Resolved: 2019-06-17

## 2019-06-17 PROBLEM — D48.5 NEOPLASM OF UNCERTAIN BEHAVIOR OF SKIN: Status: RESOLVED | Noted: 2018-06-07 | Resolved: 2019-06-17

## 2019-06-17 PROBLEM — G20.C PARKINSONIAN TREMOR (HCC): Status: RESOLVED | Noted: 2018-07-31 | Resolved: 2019-06-17

## 2019-06-17 PROBLEM — R29.898 COGWHEEL RIGIDITY: Status: RESOLVED | Noted: 2018-07-14 | Resolved: 2019-06-17

## 2019-06-17 PROCEDURE — 99397 PER PM REEVAL EST PAT 65+ YR: CPT | Performed by: FAMILY MEDICINE

## 2019-06-17 PROCEDURE — G0439 PPPS, SUBSEQ VISIT: HCPCS | Performed by: FAMILY MEDICINE

## 2019-06-17 PROCEDURE — 96160 PT-FOCUSED HLTH RISK ASSMT: CPT | Performed by: FAMILY MEDICINE

## 2019-06-17 RX ORDER — LOSARTAN POTASSIUM 100 MG/1
TABLET ORAL
Qty: 90 TABLET | Refills: 2 | Status: SHIPPED | OUTPATIENT
Start: 2019-06-17 | End: 2020-03-11 | Stop reason: SDUPTHER

## 2019-06-17 NOTE — PATIENT INSTRUCTIONS
Recommended Websites for Advanced Directives    [employer]. org/publications/Documents/personal_dec. pdf  An information packet, including necessary form from the [employer] website. http://www. [place]state. [place]public/books/adv

## 2019-06-17 NOTE — PROGRESS NOTES
HPI:   [place] is a 68year old male who presents for a MA (Medicare Advantage) Supervisit (Once per calendar year).          Fall/Risk Assessment   He has been screened for Falls and is low risk: Fall/Risk Scorin    Cognitive Assessment     MMSE S Consulting Physician (CARDIOLOGY)  Jill Crowe MD as Consulting Physician (NEUROLOGY)  Darrell Mejia MD as Consulting Physician (CARDIOLOGY)  Brijesh MASON as Consulting Physician (OPHTHALMOLOGY)    Patient Active Problem List:     Prostate CA [facility] tablet by mouth daily. simvastatin 20 MG Oral Tab Take 20 mg by mouth nightly. Specialty Vitamins Products ([employer]UL[employer]) Oral Tab Take by mouth daily. hydrALAzine HCl 25 MG Oral Tab Take 25 mg by mouth 2 (two) times daily.      Sharon Mendiola Myocarditis ([facility] ([facility]s), Neoplasm of uncertain behavior of skin (6/7/2018), Obesity (BMI 30-39.9) (8/29/2017), ISAIAS (obstructive sleep apnea), ISAIAS on CPAP, Other specified glaucoma (8/29/2017), Overflow incontinence of urine (8/29/2017), Parkinsonian trem lb   BMI 33.30 kg/m²   Estimated body mass index is 33.3 kg/m² as calculated from the following:    Height as of this encounter: 68\". Weight as of this encounter: 219 lb.     Medicare Hearing Assessment  (Required for AWV/SWV)    Finger Rub         BP 1 disease) (hcc)  Cognitive complaints with normal neuropsychological exam  Abnormal gait  At risk for falling  Que on cpap  Overflow incontinence of urine  Class 1 obesity due to excess calories with serious comorbidity and body mass index (bmi) of 33.0 to ([facility]  12. Cognitive complaints with normal neuropsychological exam  13. Abnormal gait  14. At risk for falling  Patient is followed by Dr. Flory Berry. Continue carbidopa–levodopa as prescribed by Dr. Flory Berry.   Patient encouraged to walk on treadmill and hold on Screening      HbgA1C   Annually No results found for: A1C    No flowsheet data found.     Fasting Blood Sugar (FSB)Annually Glucose (mg/dL)   Date Value   07/13/2018 100 (H)       Cardiovascular Disease Screening     LDL Annually LDL Cholesterol (mg/dL) prescription benefits, but Medicare does not cover unless Medically needed    Zoster   Not covered by Medicare Part B No vaccine history found This may be covered with your pharmacy  prescription benefits      [facility] Internal Lab or Pr

## 2019-06-19 ENCOUNTER — LAB ENCOUNTER (OUTPATIENT)
Dept: LAB | Age: 74
End: 2019-06-19
Attending: FAMILY MEDICINE
Payer: MEDICARE

## 2019-06-19 DIAGNOSIS — I65.23 BILATERAL CAROTID ARTERY STENOSIS: ICD-10-CM

## 2019-06-19 DIAGNOSIS — R73.01 IMPAIRED FASTING GLUCOSE: ICD-10-CM

## 2019-06-19 DIAGNOSIS — I77.9 ARTERIAL DISEASE (HCC): ICD-10-CM

## 2019-06-19 DIAGNOSIS — E78.00 HYPERCHOLESTEROLEMIA: ICD-10-CM

## 2019-06-19 DIAGNOSIS — D69.6 THROMBOCYTOPENIA (HCC): ICD-10-CM

## 2019-06-19 DIAGNOSIS — I77.9 BILATERAL CAROTID ARTERY DISEASE, UNSPECIFIED TYPE (HCC): ICD-10-CM

## 2019-06-19 DIAGNOSIS — N18.30 CKD (CHRONIC KIDNEY DISEASE) STAGE 3, GFR 30-59 ML/MIN (HCC): Chronic | ICD-10-CM

## 2019-06-19 DIAGNOSIS — I10 ESSENTIAL HYPERTENSION: ICD-10-CM

## 2019-06-19 PROCEDURE — 84439 ASSAY OF FREE THYROXINE: CPT

## 2019-06-19 PROCEDURE — 85025 COMPLETE CBC W/AUTO DIFF WBC: CPT

## 2019-06-19 PROCEDURE — 80061 LIPID PANEL: CPT

## 2019-06-19 PROCEDURE — 80053 COMPREHEN METABOLIC PANEL: CPT

## 2019-06-19 PROCEDURE — 36415 COLL VENOUS BLD VENIPUNCTURE: CPT

## 2019-06-19 PROCEDURE — 84443 ASSAY THYROID STIM HORMONE: CPT

## 2019-06-19 PROCEDURE — 83036 HEMOGLOBIN GLYCOSYLATED A1C: CPT

## 2019-07-03 ENCOUNTER — TELEPHONE (OUTPATIENT)
Dept: FAMILY MEDICINE CLINIC | Facility: CLINIC | Age: 74
End: 2019-07-03

## 2019-07-15 DIAGNOSIS — G20 PD (PARKINSON'S DISEASE) (HCC): ICD-10-CM

## 2019-07-15 NOTE — TELEPHONE ENCOUNTER
Medication: CARBIDOPA-LEVODOPA  MG    Date of last refill: 4/16/10 (#90/2)  [place]     Last office visit: 10/2/18  Due back to clinic per last office note:  6 months  Date next office visit scheduled:    Future Ap

## 2019-07-16 RX ORDER — HYDROCHLOROTHIAZIDE 25 MG/1
TABLET ORAL DAILY
Qty: 90 TABLET | Refills: 0 | Status: SHIPPED | OUTPATIENT
Start: 2019-07-16 | End: 2019-10-13 | Stop reason: SDUPTHER

## 2019-07-16 RX ORDER — HYDRALAZINE HYDROCHLORIDE 25 MG/1
TABLET, FILM COATED ORAL
Qty: 180 TABLET | Refills: 0 | Status: SHIPPED | OUTPATIENT
Start: 2019-07-16 | End: 2019-10-13 | Stop reason: SDUPTHER

## 2019-07-16 RX ORDER — SIMVASTATIN 20 MG
TABLET ORAL
Qty: 90 TABLET | Refills: 0 | Status: SHIPPED | OUTPATIENT
Start: 2019-07-16 | End: 2019-10-13 | Stop reason: SDUPTHER

## 2019-07-23 ENCOUNTER — OFFICE VISIT (OUTPATIENT)
Dept: NEUROLOGY | Facility: CLINIC | Age: 74
End: 2019-07-23
Payer: COMMERCIAL

## 2019-07-23 VITALS
SYSTOLIC BLOOD PRESSURE: 118 MMHG | BODY MASS INDEX: 33 KG/M2 | HEART RATE: 60 BPM | WEIGHT: 218 LBS | RESPIRATION RATE: 16 BRPM | DIASTOLIC BLOOD PRESSURE: 60 MMHG

## 2019-07-23 DIAGNOSIS — G20 PD (PARKINSON'S DISEASE) (HCC): ICD-10-CM

## 2019-07-23 PROCEDURE — 99215 OFFICE O/P EST HI 40 MIN: CPT | Performed by: OTHER

## 2019-07-23 RX ORDER — GLIMEPIRIDE 2 MG/1
1 TABLET ORAL DAILY
COMMUNITY
End: 2020-05-04

## 2019-07-23 NOTE — PROGRESS NOTES
[facility] Neurology outpatient progress note  Date of service: 7/23/2019    Patient here to follow up re: his gait and tremors in hands (right worse than left), tremors and gait are improved with sinemet, no side effect reported, pt and wife are happy with it. Rfl:   •  NON FORMULARY, daily. Green Tea Complex; 500mg green tea 125mg EGCG, Disp: , Rfl:   •  LATANOPROST OP, Apply 1 drop to eye nightly.  Both eyes, Disp: , Rfl:   •  folic acid 779 MCG Oral Tab, Take 400 mcg by mouth once a week.  , Disp: , Rfl:   • Overflow incontinence of urine 8/29/2017   • [facility] 7/31/2018   • Primary open angle glaucoma (POAG) of both eyes, mild stage 6/7/2018   • Prostate CA ([facility]    • Thrombocytopenia ([facility] 7/14/2018   • Torn ligament     right   • Vocal cord fair  Sensory: symmetric to PP and LT  Gait: fair, cautious gait  Romberg: deferred  Neck: supple     Test reviewed on 7/23/2019    A/P:   PD: overall stable, good response to sinemet at current dose     Plan:   Cont Sinemet 25 -100 tid  PCP follow up  [facility]

## 2019-08-29 ENCOUNTER — OFFICE VISIT (OUTPATIENT)
Dept: CARDIOLOGY | Age: 74
End: 2019-08-29

## 2019-08-29 VITALS
DIASTOLIC BLOOD PRESSURE: 68 MMHG | WEIGHT: 216 LBS | HEART RATE: 64 BPM | BODY MASS INDEX: 32.74 KG/M2 | HEIGHT: 68 IN | SYSTOLIC BLOOD PRESSURE: 110 MMHG

## 2019-08-29 DIAGNOSIS — I65.23 ASYMPTOMATIC CAROTID ARTERY STENOSIS, BILATERAL: ICD-10-CM

## 2019-08-29 DIAGNOSIS — I72.4 ANEURYSM OF POPLITEAL ARTERY (CMD): Primary | ICD-10-CM

## 2019-08-29 DIAGNOSIS — I10 ESSENTIAL HYPERTENSION, BENIGN: ICD-10-CM

## 2019-08-29 DIAGNOSIS — E78.00 PURE HYPERCHOLESTEROLEMIA: ICD-10-CM

## 2019-08-29 DIAGNOSIS — R93.1 ELEVATED CORONARY ARTERY CALCIUM SCORE: ICD-10-CM

## 2019-08-29 DIAGNOSIS — R06.09 DOE (DYSPNEA ON EXERTION): ICD-10-CM

## 2019-08-29 PROBLEM — I25.10 CORONARY ARTERY CALCIFICATION SEEN ON CT SCAN: Status: ACTIVE | Noted: 2019-01-31

## 2019-08-29 PROCEDURE — 99214 OFFICE O/P EST MOD 30 MIN: CPT | Performed by: INTERNAL MEDICINE

## 2019-08-29 PROCEDURE — 3078F DIAST BP <80 MM HG: CPT | Performed by: INTERNAL MEDICINE

## 2019-08-29 PROCEDURE — 3074F SYST BP LT 130 MM HG: CPT | Performed by: INTERNAL MEDICINE

## 2019-08-29 RX ORDER — CARVEDILOL 6.25 MG/1
6.25 TABLET ORAL 2 TIMES DAILY WITH MEALS
COMMUNITY
End: 2020-03-12 | Stop reason: ALTCHOICE

## 2019-08-29 SDOH — HEALTH STABILITY: MENTAL HEALTH: HOW OFTEN DO YOU HAVE A DRINK CONTAINING ALCOHOL?: NEVER

## 2019-09-13 RX ORDER — POTASSIUM CHLORIDE 750 MG/1
TABLET, EXTENDED RELEASE ORAL
Qty: 90 TABLET | Refills: 2 | Status: SHIPPED | OUTPATIENT
Start: 2019-09-13 | End: 2020-06-11 | Stop reason: SDUPTHER

## 2019-09-25 ENCOUNTER — TELEPHONE (OUTPATIENT)
Dept: CARDIOLOGY | Age: 74
End: 2019-09-25

## 2019-10-14 RX ORDER — HYDRALAZINE HYDROCHLORIDE 25 MG/1
TABLET, FILM COATED ORAL
Qty: 180 TABLET | Refills: 2 | Status: SHIPPED | OUTPATIENT
Start: 2019-10-14 | End: 2020-07-09

## 2019-10-14 RX ORDER — HYDROCHLOROTHIAZIDE 25 MG/1
TABLET ORAL DAILY
Qty: 90 TABLET | Refills: 2 | Status: SHIPPED | OUTPATIENT
Start: 2019-10-14 | End: 2020-07-09

## 2019-10-14 RX ORDER — SIMVASTATIN 20 MG
TABLET ORAL
Qty: 90 TABLET | Refills: 2 | Status: SHIPPED | OUTPATIENT
Start: 2019-10-14 | End: 2020-07-09

## 2019-11-30 NOTE — TELEPHONE ENCOUNTER
Patricia Hawkins is calling to get his labwork results that he had done about 2 weeks ago, please call Patricia Hawkins at [phone]
Results called to patient. He VU and is in agreement.
Calm

## 2020-01-13 RX ORDER — CARVEDILOL 3.12 MG/1
TABLET ORAL
Qty: 180 TABLET | Refills: 3 | Status: SHIPPED | OUTPATIENT
Start: 2020-01-13 | End: 2021-01-06

## 2020-02-10 ENCOUNTER — TELEPHONE (OUTPATIENT)
Dept: CARDIOLOGY | Age: 75
End: 2020-02-10

## 2020-03-03 ENCOUNTER — HOSPITAL ENCOUNTER (OUTPATIENT)
Dept: CV DIAGNOSTICS | Age: 75
Discharge: HOME | End: 2020-03-03
Attending: INTERNAL MEDICINE
Payer: MEDICARE

## 2020-03-03 DIAGNOSIS — I65.23 ASYMPTOMATIC CAROTID ARTERY STENOSIS, BILATERAL: ICD-10-CM

## 2020-03-03 DIAGNOSIS — I72.4 ANEURYSM OF POPLITEAL ARTERY (HCC): ICD-10-CM

## 2020-03-03 PROCEDURE — 93925 LOWER EXTREMITY STUDY: CPT | Performed by: INTERNAL MEDICINE

## 2020-03-03 PROCEDURE — 93880 EXTRACRANIAL BILAT STUDY: CPT | Performed by: INTERNAL MEDICINE

## 2020-03-05 ENCOUNTER — APPOINTMENT (OUTPATIENT)
Dept: CARDIOLOGY | Age: 75
End: 2020-03-05

## 2020-03-12 ENCOUNTER — TELEPHONE (OUTPATIENT)
Dept: CARDIOLOGY | Age: 75
End: 2020-03-12

## 2020-03-12 ENCOUNTER — OFFICE VISIT (OUTPATIENT)
Dept: CARDIOLOGY | Age: 75
End: 2020-03-12

## 2020-03-12 VITALS
WEIGHT: 204 LBS | HEART RATE: 64 BPM | HEIGHT: 68 IN | DIASTOLIC BLOOD PRESSURE: 70 MMHG | BODY MASS INDEX: 30.92 KG/M2 | SYSTOLIC BLOOD PRESSURE: 120 MMHG

## 2020-03-12 DIAGNOSIS — I65.23 ASYMPTOMATIC CAROTID ARTERY STENOSIS, BILATERAL: ICD-10-CM

## 2020-03-12 DIAGNOSIS — I10 ESSENTIAL HYPERTENSION, BENIGN: ICD-10-CM

## 2020-03-12 DIAGNOSIS — R06.09 DOE (DYSPNEA ON EXERTION): ICD-10-CM

## 2020-03-12 DIAGNOSIS — I72.4 ANEURYSM OF POPLITEAL ARTERY (CMD): ICD-10-CM

## 2020-03-12 DIAGNOSIS — R93.1 AGATSTON CAC SCORE 100-199: Primary | ICD-10-CM

## 2020-03-12 DIAGNOSIS — E78.00 PURE HYPERCHOLESTEROLEMIA: ICD-10-CM

## 2020-03-12 PROCEDURE — 3078F DIAST BP <80 MM HG: CPT | Performed by: INTERNAL MEDICINE

## 2020-03-12 PROCEDURE — 3074F SYST BP LT 130 MM HG: CPT | Performed by: INTERNAL MEDICINE

## 2020-03-12 PROCEDURE — 99214 OFFICE O/P EST MOD 30 MIN: CPT | Performed by: INTERNAL MEDICINE

## 2020-03-12 ASSESSMENT — PATIENT HEALTH QUESTIONNAIRE - PHQ9
1. LITTLE INTEREST OR PLEASURE IN DOING THINGS: NOT AT ALL
SUM OF ALL RESPONSES TO PHQ9 QUESTIONS 1 AND 2: 0
1. LITTLE INTEREST OR PLEASURE IN DOING THINGS: NOT AT ALL
SUM OF ALL RESPONSES TO PHQ9 QUESTIONS 1 AND 2: 0
2. FEELING DOWN, DEPRESSED OR HOPELESS: NOT AT ALL
SUM OF ALL RESPONSES TO PHQ9 QUESTIONS 1 AND 2: 0
2. FEELING DOWN, DEPRESSED OR HOPELESS: NOT AT ALL

## 2020-03-13 RX ORDER — LOSARTAN POTASSIUM 100 MG/1
TABLET ORAL
Qty: 90 TABLET | Refills: 3 | Status: SHIPPED | OUTPATIENT
Start: 2020-03-13 | End: 2021-01-06

## 2020-05-04 ENCOUNTER — TELEPHONE (OUTPATIENT)
Dept: FAMILY MEDICINE CLINIC | Facility: CLINIC | Age: 75
End: 2020-05-04

## 2020-05-04 ENCOUNTER — VIRTUAL PHONE E/M (OUTPATIENT)
Dept: FAMILY MEDICINE CLINIC | Facility: CLINIC | Age: 75
End: 2020-05-04
Payer: COMMERCIAL

## 2020-05-04 DIAGNOSIS — R19.8 GLOBUS SENSATION: Primary | ICD-10-CM

## 2020-05-04 DIAGNOSIS — G20 PD (PARKINSON'S DISEASE) (HCC): ICD-10-CM

## 2020-05-04 DIAGNOSIS — R13.10 DYSPHAGIA, UNSPECIFIED TYPE: ICD-10-CM

## 2020-05-04 DIAGNOSIS — I77.9 BILATERAL CAROTID ARTERY DISEASE, UNSPECIFIED TYPE (HCC): ICD-10-CM

## 2020-05-04 PROBLEM — R09.A2 GLOBUS SENSATION: Status: ACTIVE | Noted: 2020-05-04

## 2020-05-04 PROBLEM — R09.89 GLOBUS SENSATION: Status: ACTIVE | Noted: 2020-05-04

## 2020-05-04 PROBLEM — R93.1 AGATSTON CAC SCORE 100-199: Status: ACTIVE | Noted: 2019-01-31

## 2020-05-04 PROCEDURE — 99442 PHONE E/M BY PHYS 11-20 MIN: CPT | Performed by: FAMILY MEDICINE

## 2020-05-04 RX ORDER — LATANOPROST 50 UG/ML
SOLUTION/ DROPS OPHTHALMIC
COMMUNITY
Start: 2020-04-20 | End: 2020-05-04

## 2020-05-04 RX ORDER — TIMOLOL MALEATE 5 MG/ML
SOLUTION/ DROPS OPHTHALMIC NIGHTLY
COMMUNITY
Start: 2020-03-23

## 2020-05-04 NOTE — TELEPHONE ENCOUNTER
Patient reports difficulty with swallowing on and off since January. States it feels like a lump in the back of the throat but he cannot see it. Denies fever, N/V/D, swelling of throat/tongue and difficulty breathing.      States sensation comes and goes,

## 2020-05-04 NOTE — PATIENT INSTRUCTIONS
Please schedule the swallow study at your earliest convenience, then follow-up with Dr. Chandrika Chiu via phone visit. Ideally a virtual video visit would be best if you are able.

## 2020-05-04 NOTE — PROGRESS NOTES
Virtual/Telephone Check-In    Jessi Peer verbally consents to a Virtual/Telephone Check-In service on 05/04/20. Patient understands and accepts financial responsibility for any deductible, co-insurance and/or co-pays associated with this service. Chloride ER 10 MEQ Oral Tab CR Take 1 tablet by mouth daily. • simvastatin 20 MG Oral Tab Take 20 mg by mouth nightly. • Specialty Vitamins Products ([facility]) Oral Tab Take by mouth daily.      • hydrALAzine HCl 25 MG Oral Tab Ta Thrombocytopenia (HCC)     Abnormal gait     PD (Parkinson's disease) ([facility]     CKD (chronic kidney disease) stage 3, GFR 30-59 ml/min ([facility])     Class 1 obesity due to excess calories with serious comorbidity and body mass index (BMI) of 33.0 to 33.9 in ad 2-3 times a week      Comment: 2 servings a week    Drug use: No       REVIEW OF SYSTEMS:   GENERAL: Overall feels well otherwise  SKIN: Rash none.   EYES: Denies changes in vision  HEENT: Denies sore throat or other upper respiratory symptoms such as nasal

## 2020-05-21 ENCOUNTER — HOSPITAL ENCOUNTER (OUTPATIENT)
Dept: GENERAL RADIOLOGY | Facility: HOSPITAL | Age: 75
Discharge: HOME | End: 2020-05-21
Attending: FAMILY MEDICINE
Payer: MEDICARE

## 2020-05-21 DIAGNOSIS — G20 PD (PARKINSON'S DISEASE) (HCC): ICD-10-CM

## 2020-05-21 DIAGNOSIS — R13.10 DYSPHAGIA, UNSPECIFIED TYPE: ICD-10-CM

## 2020-05-21 DIAGNOSIS — R19.8 GLOBUS SENSATION: ICD-10-CM

## 2020-05-21 PROCEDURE — 92611 MOTION FLUOROSCOPY/SWALLOW: CPT

## 2020-05-21 PROCEDURE — 74230 X-RAY XM SWLNG FUNCJ C+: CPT | Performed by: FAMILY MEDICINE

## 2020-05-21 NOTE — PROGRESS NOTES
ADULT VIDEOFLUOROSCOPIC SWALLOWING STUDY    Admission Date: 5/21/2020  Evaluation Date: 05/21/20  Radiologist: Dr. Dwyer Embs: Regular  Diet Recommendations - Liquid:  Thin  Aspiration Precautions: Upright posit 6/7/2018   • Obesity (BMI 30-39. 9) 8/29/2017   • ISAIAS (obstructive sleep apnea)    • ISAIAS on CPAP     Dx 2009 cpap   • Other specified glaucoma 8/29/2017   • Overflow incontinence of urine 8/29/2017   • Parkinsonian tremor ([facility] 7/31/2018   • Primary open an larger volume thin liquids observed during the swallow however suspect to be functional. No evidence of tracheal aspiration observed within scope of this study.            EDUCATION/INSTRUCTION  Reviewed results and recommendations with patient/family/careg

## 2020-06-04 ENCOUNTER — OFFICE VISIT (OUTPATIENT)
Dept: FAMILY MEDICINE CLINIC | Facility: CLINIC | Age: 75
End: 2020-06-04
Payer: COMMERCIAL

## 2020-06-04 VITALS
HEART RATE: 60 BPM | TEMPERATURE: 98 F | DIASTOLIC BLOOD PRESSURE: 68 MMHG | BODY MASS INDEX: 31.22 KG/M2 | OXYGEN SATURATION: 100 % | HEIGHT: 68 IN | WEIGHT: 206 LBS | SYSTOLIC BLOOD PRESSURE: 120 MMHG

## 2020-06-04 DIAGNOSIS — N18.30 CKD (CHRONIC KIDNEY DISEASE) STAGE 3, GFR 30-59 ML/MIN (HCC): ICD-10-CM

## 2020-06-04 DIAGNOSIS — H40.1131 PRIMARY OPEN ANGLE GLAUCOMA (POAG) OF BOTH EYES, MILD STAGE: ICD-10-CM

## 2020-06-04 DIAGNOSIS — R19.8 GLOBUS SENSATION: ICD-10-CM

## 2020-06-04 DIAGNOSIS — Z91.81 AT RISK FOR FALLING: ICD-10-CM

## 2020-06-04 DIAGNOSIS — I10 ESSENTIAL HYPERTENSION: ICD-10-CM

## 2020-06-04 DIAGNOSIS — Z00.00 MEDICARE ANNUAL WELLNESS VISIT, SUBSEQUENT: Primary | ICD-10-CM

## 2020-06-04 DIAGNOSIS — I77.9 BILATERAL CAROTID ARTERY DISEASE, UNSPECIFIED TYPE (HCC): ICD-10-CM

## 2020-06-04 DIAGNOSIS — C61 PROSTATE CA (HCC): ICD-10-CM

## 2020-06-04 DIAGNOSIS — G47.33 OSA ON CPAP: ICD-10-CM

## 2020-06-04 DIAGNOSIS — Z99.89 OSA ON CPAP: ICD-10-CM

## 2020-06-04 DIAGNOSIS — D69.6 THROMBOCYTOPENIA (HCC): ICD-10-CM

## 2020-06-04 DIAGNOSIS — R73.9 HYPERGLYCEMIA: ICD-10-CM

## 2020-06-04 DIAGNOSIS — R26.9 ABNORMAL GAIT: ICD-10-CM

## 2020-06-04 DIAGNOSIS — I77.9 ARTERIAL DISEASE (HCC): ICD-10-CM

## 2020-06-04 DIAGNOSIS — G89.29 CHRONIC BILATERAL LOW BACK PAIN WITHOUT SCIATICA: ICD-10-CM

## 2020-06-04 DIAGNOSIS — M54.50 CHRONIC BILATERAL LOW BACK PAIN WITHOUT SCIATICA: ICD-10-CM

## 2020-06-04 DIAGNOSIS — E78.00 HYPERCHOLESTEROLEMIA: ICD-10-CM

## 2020-06-04 DIAGNOSIS — R13.10 DYSPHAGIA, UNSPECIFIED TYPE: ICD-10-CM

## 2020-06-04 DIAGNOSIS — R93.1 AGATSTON CAC SCORE 100-199: ICD-10-CM

## 2020-06-04 DIAGNOSIS — I72.4 ANEURYSM OF POPLITEAL ARTERY (HCC): ICD-10-CM

## 2020-06-04 DIAGNOSIS — G20 PD (PARKINSON'S DISEASE) (HCC): ICD-10-CM

## 2020-06-04 DIAGNOSIS — I65.23 BILATERAL CAROTID ARTERY STENOSIS: ICD-10-CM

## 2020-06-04 PROBLEM — E66.09 CLASS 1 OBESITY DUE TO EXCESS CALORIES WITH SERIOUS COMORBIDITY AND BODY MASS INDEX (BMI) OF 33.0 TO 33.9 IN ADULT: Status: RESOLVED | Noted: 2019-06-17 | Resolved: 2020-06-04

## 2020-06-04 PROBLEM — E66.811 CLASS 1 OBESITY DUE TO EXCESS CALORIES WITH SERIOUS COMORBIDITY AND BODY MASS INDEX (BMI) OF 33.0 TO 33.9 IN ADULT: Status: RESOLVED | Noted: 2019-06-17 | Resolved: 2020-06-04

## 2020-06-04 PROBLEM — R41.9 COGNITIVE COMPLAINTS WITH NORMAL NEUROPSYCHOLOGICAL EXAM: Status: RESOLVED | Noted: 2018-06-07 | Resolved: 2020-06-04

## 2020-06-04 PROCEDURE — 99397 PER PM REEVAL EST PAT 65+ YR: CPT | Performed by: FAMILY MEDICINE

## 2020-06-04 PROCEDURE — 96160 PT-FOCUSED HLTH RISK ASSMT: CPT | Performed by: FAMILY MEDICINE

## 2020-06-04 PROCEDURE — G0439 PPPS, SUBSEQ VISIT: HCPCS | Performed by: FAMILY MEDICINE

## 2020-06-04 NOTE — PROGRESS NOTES
HPI:   Jackie Barkley is a 76year old male who presents for a MA (Medicare Advantage) Supervisit (Once per calendar year). Fell 3 days ago. Denies bumping or hitting his head on anything.   The fall worsen his back pain but the back pain is improving available to patient in AVS         He smoked tobacco in the past but quit greater than 12 months ago.   Social History    Tobacco Use      Smoking status: Former Smoker        Types: Cigars        Quit date: 1986        Years since quittin.3 1.21 06/19/2019     (H) 06/19/2019        CBC  (most recent labs)   Lab Results   Component Value Date    WBC 4.3 06/19/2019    HGB 13.2 06/19/2019    .0 06/19/2019        ALLERGIES:   He is allergic to seasonal and zometa [zoledronic acid]. with serious comorbidity and body mass index (BMI) of 33.0 to 33.9 in adult (6/17/2019), Class 1 obesity due to excess calories without serious comorbidity with body mass index (BMI) of 33.0 to 33.9 in adult (2/11/2018), Cognitive complaints with normal ne or pins-and-needles sensation of lower extremities.   NEURO: denies headaches  PSYCH: denies depression or anxiety  ENDOCRINE: denies thyroid history      EXAM:   /68 (BP Location: Left arm, Patient Position: Sitting, Cuff Size: adult)   Pulse 60   Te Fluzone Vaccine Medicare () 09/16/2014, 10/06/2018   • Pneumococcal (Prevnar 13) 02/08/2018   • Pneumovax 23 10/08/2011        Component      Latest Ref Rng & Units 6/19/2019   WBC      4.0 - 11.0 x10(3) uL 4.3   RBC      3.80 - 5.80 x10(6)uL 4.06   H mg/dL 157   HDL Cholesterol      40 - 59 mg/dL 58   Triglycerides      30 - 149 mg/dL 149   LDL Cholesterol Calc      <100 mg/dL 69   VLDL      0 - 30 mg/dL 30   NON HDL CHOL      <130 mg/dL 99   HEMOGLOBIN A1c      <5.7 % 5.7 (H)   ESTIMATED AVERAGE GLUCO 100-199  Essential hypertension  Pd (parkinson's disease) (hcc)  Abnormal gait  At risk for falling  Dysphagia, unspecified type  Globus sensation  Que on cpap  Primary open angle glaucoma (poag) of both eyes, mild stage  Chronic bilateral low back pain wi by Dr. Earnest Baker.    - Linda Remy  - XR DEXA BONE DENSITOMETRY (CPT=77080); Future  - NEURO - INTERNAL    17. ISAIAS on CPAP  Patient will continue to use CPAP.     18. Primary op Internal Lab or Procedure External Lab or Procedure   Diabetes Screening      HbgA1C   Annually HgbA1C (%)   Date Value   06/19/2019 5.7 (H)       No flowsheet data found.     Fasting Blood Sugar (FSB)Annually Glucose (mg/dL)   Date Value   06/19/2019 110 ( by Medicare Part B) No vaccine history found This may be covered with your prescription benefits, but Medicare does not cover unless Medically needed    Zoster   Not covered by Medicare Part B No vaccine history found This may be covered with your pharmacy

## 2020-06-04 NOTE — PATIENT INSTRUCTIONS
Recommended Websites for Advanced Directives    [employer]. org/publications/Documents/personal_dec. pdf  An information packet, including necessary form from the [address] website. http://[email or web address] [place]state. [place]/public/books/adv

## 2020-06-05 ENCOUNTER — APPOINTMENT (OUTPATIENT)
Dept: LAB | Facility: HOSPITAL | Age: 75
End: 2020-06-05
Attending: FAMILY MEDICINE
Payer: MEDICARE

## 2020-06-05 DIAGNOSIS — Z12.11 SCREENING FOR MALIGNANT NEOPLASM OF COLON: ICD-10-CM

## 2020-06-09 ENCOUNTER — MA CHART PREP (OUTPATIENT)
Dept: FAMILY MEDICINE CLINIC | Facility: CLINIC | Age: 75
End: 2020-06-09

## 2020-06-09 PROBLEM — Z87.891 FORMER SMOKER: Status: ACTIVE | Noted: 2020-06-09

## 2020-06-09 PROBLEM — N39.490 OVERFLOW INCONTINENCE OF URINE: Status: RESOLVED | Noted: 2017-08-29 | Resolved: 2020-06-09

## 2020-06-10 ENCOUNTER — OFFICE VISIT (OUTPATIENT)
Dept: PHYSICAL THERAPY | Age: 75
End: 2020-06-10
Attending: FAMILY MEDICINE
Payer: MEDICARE

## 2020-06-10 DIAGNOSIS — M54.50 CHRONIC BILATERAL LOW BACK PAIN WITHOUT SCIATICA: ICD-10-CM

## 2020-06-10 DIAGNOSIS — R26.9 ABNORMAL GAIT: ICD-10-CM

## 2020-06-10 DIAGNOSIS — G89.29 CHRONIC BILATERAL LOW BACK PAIN WITHOUT SCIATICA: ICD-10-CM

## 2020-06-10 DIAGNOSIS — G20 PD (PARKINSON'S DISEASE) (HCC): ICD-10-CM

## 2020-06-10 DIAGNOSIS — Z91.81 AT RISK FOR FALLING: ICD-10-CM

## 2020-06-10 PROCEDURE — 97162 PT EVAL MOD COMPLEX 30 MIN: CPT

## 2020-06-10 NOTE — PROGRESS NOTES
PHYSICAL THERAPY EVALUATION:   Referring Physician: Dr. Bowers Solders  Diagnosis: Chronic JEIMY LBP without sciatica, Parkinson's disease   Date of Service: 6/10/2020     PATIENT SUMMARY   Daisy Clark is a 76year old male who presents to therapy today with low ba history was reviewed with Piyush Ordoñez.  Significant findings include has a past medical history of Aneurysm of popliteal artery ([facility] (6/7/2018), Arterial disease ([facility] (6/7/2018), Bilateral carotid artery disease ([facility] (2/11/2018), Bilateral carotid artery stenosi Hamstrings: R MAX; L MAX  Gastroc-soleus: R MOD; L MOD (L>R)  Piriformis: R MIN; L MOD    Tone: WNL JEIMY LE   UE and LE AROM: Grossly WNL   UE and LE MMT: Grossly WNL except: hip flex: L 4-; R 4;     Mobility / Transfers Level of Assistance   Sit --> Supi stand for at least 30 minutes with <2/10 pain to do the dishes.    Patient will be able to walk for 20 minutes with <2/10 pain so he is able to walk around the grocery store without need for seated rest.   Patient will improve hamstring flexibility to MIN t Date____________________    Certification From: 3/99/3358  To:9/8/2020

## 2020-06-11 ENCOUNTER — OFFICE VISIT (OUTPATIENT)
Dept: PHYSICAL THERAPY | Age: 75
End: 2020-06-11
Attending: FAMILY MEDICINE
Payer: MEDICARE

## 2020-06-11 PROCEDURE — 97110 THERAPEUTIC EXERCISES: CPT

## 2020-06-11 PROCEDURE — 97112 NEUROMUSCULAR REEDUCATION: CPT

## 2020-06-11 PROCEDURE — 97140 MANUAL THERAPY 1/> REGIONS: CPT

## 2020-06-11 RX ORDER — POTASSIUM CHLORIDE 750 MG/1
10 TABLET, EXTENDED RELEASE ORAL DAILY
Qty: 90 TABLET | Refills: 2 | Status: SHIPPED | OUTPATIENT
Start: 2020-06-11

## 2020-06-11 NOTE — PROGRESS NOTES
Dx: Chronic JEIMY LBP without sciatica, Parkinson's disease           Insurance (Authorized # of Visits):  Medicare           Authorizing Physician: Dr. Mery Giordano  Next MD visit: none scheduled  Fall Risk: High         Precautions: n/a             Subjective: bilaterally myobuddy x5 min   -manual x3 min  sidelying LTR gapping mobilizations  Gr III x2 min ea       HEP: floor to ceiling stretch, sustained side to side stretch, LTR, hip flexor and HS stretch standing    Charges: manual x1, therex x1, neuro re-ed x

## 2020-06-15 ENCOUNTER — OFFICE VISIT (OUTPATIENT)
Dept: PHYSICAL THERAPY | Age: 75
End: 2020-06-15
Attending: FAMILY MEDICINE
Payer: MEDICARE

## 2020-06-15 DIAGNOSIS — G20 PD (PARKINSON'S DISEASE) (HCC): ICD-10-CM

## 2020-06-15 DIAGNOSIS — G89.29 CHRONIC BILATERAL LOW BACK PAIN WITHOUT SCIATICA: ICD-10-CM

## 2020-06-15 DIAGNOSIS — M54.50 CHRONIC BILATERAL LOW BACK PAIN WITHOUT SCIATICA: ICD-10-CM

## 2020-06-15 DIAGNOSIS — Z91.81 AT RISK FOR FALLING: ICD-10-CM

## 2020-06-15 DIAGNOSIS — R26.9 ABNORMAL GAIT: ICD-10-CM

## 2020-06-15 PROCEDURE — 97140 MANUAL THERAPY 1/> REGIONS: CPT

## 2020-06-15 PROCEDURE — 97112 NEUROMUSCULAR REEDUCATION: CPT

## 2020-06-15 PROCEDURE — 97110 THERAPEUTIC EXERCISES: CPT

## 2020-06-15 NOTE — PROGRESS NOTES
Dx: Chronic JEIMY LBP without sciatica, Parkinson's disease           Insurance (Authorized # of Visits):  Medicare           Authorizing Physician: Dr. Elissa Redding  Next MD visit: none scheduled  Fall Risk: High         Precautions: n/a             Subjective: Therex  Sustained floor to ceiling reach x5  -sustained side to side reach x4 ea  BIG sit>stand x10  LTR x10  Standing HS stretch 2x30s ea  Standing hip flexor stretch 2x30s ea Therex  Sustained floor to ceiling reach x5  -sustained side to side reach x5

## 2020-06-16 ENCOUNTER — TELEPHONE (OUTPATIENT)
Dept: PHYSICAL THERAPY | Age: 75
End: 2020-06-16

## 2020-06-17 ENCOUNTER — TELEPHONE (OUTPATIENT)
Dept: FAMILY MEDICINE CLINIC | Facility: CLINIC | Age: 75
End: 2020-06-17

## 2020-06-17 ENCOUNTER — OFFICE VISIT (OUTPATIENT)
Dept: PHYSICAL THERAPY | Age: 75
End: 2020-06-17
Attending: FAMILY MEDICINE
Payer: MEDICARE

## 2020-06-17 DIAGNOSIS — M54.50 CHRONIC BILATERAL LOW BACK PAIN WITHOUT SCIATICA: ICD-10-CM

## 2020-06-17 DIAGNOSIS — G89.29 CHRONIC BILATERAL LOW BACK PAIN WITHOUT SCIATICA: ICD-10-CM

## 2020-06-17 DIAGNOSIS — Z91.81 AT RISK FOR FALLING: ICD-10-CM

## 2020-06-17 DIAGNOSIS — Z12.11 SCREENING FOR MALIGNANT NEOPLASM OF COLON: Primary | ICD-10-CM

## 2020-06-17 DIAGNOSIS — R26.9 ABNORMAL GAIT: ICD-10-CM

## 2020-06-17 DIAGNOSIS — G20 PD (PARKINSON'S DISEASE) (HCC): ICD-10-CM

## 2020-06-17 PROCEDURE — 97140 MANUAL THERAPY 1/> REGIONS: CPT

## 2020-06-17 PROCEDURE — 97112 NEUROMUSCULAR REEDUCATION: CPT

## 2020-06-17 PROCEDURE — 97110 THERAPEUTIC EXERCISES: CPT

## 2020-06-17 NOTE — TELEPHONE ENCOUNTER
The lab called to notify Dr. Lia Mullins that her patient dropped of stool cards, but there wasn't an order in Epic. Per Bernie Eduardo D.O., ok to place order for stool cards for a diagnosis of screening for malignant neoplasm of colon.

## 2020-06-17 NOTE — PROGRESS NOTES
Dx: Chronic JEIMY LBP without sciatica, Parkinson's disease           Insurance (Authorized # of Visits):  Medicare           Authorizing Physician: Dr. Taty Flores  Next MD visit: none scheduled  Fall Risk: High         Precautions: n/a             Subjective: -progress  Patient will be able to walk for 20 minutes with <2/10 pain so he is able to walk around the grocery store without need for seated rest. -progress  Patient will improve hamstring flexibility to MIN tightness and  Functional strength with 30 seco paraspinals and QL bilaterally x7 min   Manual Therapy  Lumbar P-As were performed from L1-L5 x30 sec x2  STM of lumbar paraspinals and QL bilaterally x7 min     HEP: floor to ceiling stretch, sustained side to side stretch, LTR, hip flexor and HS stretch

## 2020-06-22 ENCOUNTER — OFFICE VISIT (OUTPATIENT)
Dept: PHYSICAL THERAPY | Age: 75
End: 2020-06-22
Attending: FAMILY MEDICINE
Payer: MEDICARE

## 2020-06-22 DIAGNOSIS — G89.29 CHRONIC BILATERAL LOW BACK PAIN WITHOUT SCIATICA: ICD-10-CM

## 2020-06-22 DIAGNOSIS — Z91.81 AT RISK FOR FALLING: ICD-10-CM

## 2020-06-22 DIAGNOSIS — R26.9 ABNORMAL GAIT: ICD-10-CM

## 2020-06-22 DIAGNOSIS — M54.50 CHRONIC BILATERAL LOW BACK PAIN WITHOUT SCIATICA: ICD-10-CM

## 2020-06-22 DIAGNOSIS — G20 PD (PARKINSON'S DISEASE) (HCC): ICD-10-CM

## 2020-06-22 PROCEDURE — 97140 MANUAL THERAPY 1/> REGIONS: CPT

## 2020-06-22 PROCEDURE — 97112 NEUROMUSCULAR REEDUCATION: CPT

## 2020-06-22 PROCEDURE — 97110 THERAPEUTIC EXERCISES: CPT

## 2020-06-22 NOTE — PROGRESS NOTES
Dx: Chronic JEIMY LBP without sciatica, Parkinson's disease           Insurance (Authorized # of Visits):  Medicare           Authorizing Physician: Dr. Bisi Joyner  Next MD visit: none scheduled  Fall Risk: High         Precautions: n/a             Subjective: without need for seated rest. -progress  Patient will improve hamstring flexibility to MIN tightness and  Functional strength with 30 second sit to stand score to 12 reps to be able to perform household transfers without LBP -progress  Patient will improve turns  -x2 with cognitive task (count down by 2)   Normal walking 200ft x1  Fast walking 200ft x1    Manual Therapy  Lumbar P-As were performed from L1-L5 x30 sec x2  STM of lumbar paraspinals and QL bilaterally myobuddy x5 min   -manual x3 min  sidelying

## 2020-06-23 ENCOUNTER — TELEPHONE (OUTPATIENT)
Dept: NEUROLOGY | Facility: CLINIC | Age: 75
End: 2020-06-23

## 2020-06-23 ENCOUNTER — OFFICE VISIT (OUTPATIENT)
Dept: NEUROLOGY | Facility: CLINIC | Age: 75
End: 2020-06-23
Payer: COMMERCIAL

## 2020-06-23 VITALS — HEART RATE: 60 BPM | RESPIRATION RATE: 16 BRPM | SYSTOLIC BLOOD PRESSURE: 118 MMHG | DIASTOLIC BLOOD PRESSURE: 76 MMHG

## 2020-06-23 DIAGNOSIS — G20 PD (PARKINSON'S DISEASE) (HCC): Primary | ICD-10-CM

## 2020-06-23 DIAGNOSIS — G20 PD (PARKINSON'S DISEASE) (HCC): ICD-10-CM

## 2020-06-23 DIAGNOSIS — K11.7 SIALORRHEA: Primary | ICD-10-CM

## 2020-06-23 PROCEDURE — 99214 OFFICE O/P EST MOD 30 MIN: CPT | Performed by: OTHER

## 2020-06-23 NOTE — PROGRESS NOTES
[facility] Neurology outpatient progress note  Date of service: 6/23/2020    Patient here to follow up re: his gait and tremors in hands (right worse than left), tremors and gait are improved with sinemet, no side effect reported, pt and wife reported drooling to 400 MCG Oral Tab, Take 400 mcg by mouth once a week.  , Disp: , Rfl:   •  Vitamin B-12 1000 MCG Oral Tab, Take 1,000 mcg by mouth once a week.  , Disp: , Rfl:   •  Pyridoxine HCl (VITAMIN B-6) 100 MG Oral Tab, Take 100 mg by mouth once a week.  , Disp: , R 8/29/2017   • Overflow incontinence of urine 8/29/2017   • [facility] 7/31/2018   • Primary open angle glaucoma (POAG) of both eyes, mild stage 6/7/2018   • Prostate CA ([facility]    • Thrombocytopenia ([facility] 7/14/2018   • Torn ligament     right precaution  See orders and medications filed with this encounter. The patient indicates understanding of these issues and agrees with the plan. Discussed with patient in detail regarding the adverse and side effects of the medications.   RTC botox injectio

## 2020-06-23 NOTE — TELEPHONE ENCOUNTER
Per Dr. Tom Agent, please proceed with 100 units of Botox for drooling. To be done in the [place] office only. Referral placed.

## 2020-06-23 NOTE — PROGRESS NOTES
Patient here to follow up regarding Parkinson's and balance. Overall a little worse, also notes drooling.

## 2020-06-24 ENCOUNTER — OFFICE VISIT (OUTPATIENT)
Dept: PHYSICAL THERAPY | Age: 75
End: 2020-06-24
Attending: FAMILY MEDICINE
Payer: MEDICARE

## 2020-06-24 DIAGNOSIS — R26.9 ABNORMAL GAIT: ICD-10-CM

## 2020-06-24 DIAGNOSIS — M54.50 CHRONIC BILATERAL LOW BACK PAIN WITHOUT SCIATICA: ICD-10-CM

## 2020-06-24 DIAGNOSIS — Z91.81 AT RISK FOR FALLING: ICD-10-CM

## 2020-06-24 DIAGNOSIS — G20 PD (PARKINSON'S DISEASE) (HCC): ICD-10-CM

## 2020-06-24 DIAGNOSIS — G89.29 CHRONIC BILATERAL LOW BACK PAIN WITHOUT SCIATICA: ICD-10-CM

## 2020-06-24 PROCEDURE — 97112 NEUROMUSCULAR REEDUCATION: CPT

## 2020-06-24 PROCEDURE — 97110 THERAPEUTIC EXERCISES: CPT

## 2020-06-24 PROCEDURE — 97140 MANUAL THERAPY 1/> REGIONS: CPT

## 2020-06-24 NOTE — PROGRESS NOTES
Dx: Chronic JEIMY LBP without sciatica, Parkinson's disease           Insurance (Authorized # of Visits):  Medicare           Authorizing Physician: Dr. Chandrika Chiu  Next MD visit: none scheduled  Fall Risk: High         Precautions: n/a             Subjective: around the grocery store without need for seated rest. -progress  Patient will improve hamstring flexibility to MIN tightness and  Functional strength with 30 second sit to stand score to 12 reps to be able to perform household transfers without LBP -progr walking 200 ft x3 Gait Training  BIG walking 200 ft x3  -x1 with head turns   Normal walking 200ft x1 Gait Training  BIG walking 200 ft x3  -x2 with cognitive task (count down by 3, count down by 2)   Normal walking 200ft x1 Gait Training  BIG walking 200

## 2020-06-29 ENCOUNTER — OFFICE VISIT (OUTPATIENT)
Dept: PHYSICAL THERAPY | Age: 75
End: 2020-06-29
Attending: FAMILY MEDICINE
Payer: MEDICARE

## 2020-06-29 DIAGNOSIS — R26.9 ABNORMAL GAIT: ICD-10-CM

## 2020-06-29 DIAGNOSIS — G20 PD (PARKINSON'S DISEASE) (HCC): ICD-10-CM

## 2020-06-29 DIAGNOSIS — Z91.81 AT RISK FOR FALLING: ICD-10-CM

## 2020-06-29 DIAGNOSIS — G89.29 CHRONIC BILATERAL LOW BACK PAIN WITHOUT SCIATICA: ICD-10-CM

## 2020-06-29 DIAGNOSIS — M54.50 CHRONIC BILATERAL LOW BACK PAIN WITHOUT SCIATICA: ICD-10-CM

## 2020-06-29 PROCEDURE — 97140 MANUAL THERAPY 1/> REGIONS: CPT

## 2020-06-29 PROCEDURE — 97110 THERAPEUTIC EXERCISES: CPT

## 2020-06-29 PROCEDURE — 97112 NEUROMUSCULAR REEDUCATION: CPT

## 2020-06-29 NOTE — PROGRESS NOTES
Dx: Chronic JEIMY LBP without sciatica, Parkinson's disease           Insurance (Authorized # of Visits):  Medicare           Authorizing Physician: Dr. Taty Flores  Next MD visit: none scheduled  Fall Risk: High         Precautions: n/a             Subjective: mobility with lumbar PA form L1-L5 with decreased mobility in the L4-L5 region. Pt continues to have increased tension in the R lumbar paraspinals and QL.      Goals: (to be met in 16 visits)  Patient will be able to stand for at least 30 minutes with <2/10 side to side reach x5 ea  BIG sit>stand 2 x10  LTR x10   Neuro Re-ed  Rock and reach FWD x20 ea  -LAT x20 Neuro Re-ed  [employer] and reach FWD x20 ea  -LAT x20 Neuro Re-ed  [employer] and reach FWD x20 ea  -LAT x20 Neuro Re-ed  [employer] and reach FWD x20 ea  -LAT x20  Na x2  STM of lumbar paraspinals and QL bilaterally x7 min Manual Therapy  Lumbar P-As were performed from L1-L5 x30 sec x2  STM of lumbar paraspinals and QL bilaterally x7 min   HEP: floor to ceiling stretch, sustained side to side stretch, LTR, hip flexor a

## 2020-07-01 ENCOUNTER — OFFICE VISIT (OUTPATIENT)
Dept: PHYSICAL THERAPY | Age: 75
End: 2020-07-01
Attending: FAMILY MEDICINE
Payer: MEDICARE

## 2020-07-01 DIAGNOSIS — R26.9 ABNORMAL GAIT: ICD-10-CM

## 2020-07-01 DIAGNOSIS — Z91.81 AT RISK FOR FALLING: ICD-10-CM

## 2020-07-01 DIAGNOSIS — G89.29 CHRONIC BILATERAL LOW BACK PAIN WITHOUT SCIATICA: ICD-10-CM

## 2020-07-01 DIAGNOSIS — G20 PD (PARKINSON'S DISEASE) (HCC): ICD-10-CM

## 2020-07-01 DIAGNOSIS — M54.50 CHRONIC BILATERAL LOW BACK PAIN WITHOUT SCIATICA: ICD-10-CM

## 2020-07-01 PROCEDURE — 97112 NEUROMUSCULAR REEDUCATION: CPT

## 2020-07-01 PROCEDURE — 97116 GAIT TRAINING THERAPY: CPT

## 2020-07-01 PROCEDURE — 97110 THERAPEUTIC EXERCISES: CPT

## 2020-07-01 NOTE — PROGRESS NOTES
Dx: Chronic JEIMY LBP without sciatica, Parkinson's disease           Insurance (Authorized # of Visits):  Medicare           Authorizing Physician: Dr. Zehra Bee  Next MD visit: none scheduled  Fall Risk: High         Precautions: n/a             Subjective: improve TUG time to 9.2 seconds to decrease fall risk with household ambulation -MET  Patient will independent and adhere to comprehensive HEP.  -progress    Plan: cone taps while walking, progress TA exercise, squatting to  object off the floor  [place] (count down by 2)   Normal walking 200ft x1  Fast walking 200ft x1 Gait Training  BIG walking 200 ft x2  -x2 with head turns  -x3 with cognitive task (girls names, boys names, fruits)  Fast walking 200ft x1 Gait Training  BIG walking 200 ft x1  -x2 with he

## 2020-07-02 ENCOUNTER — TELEPHONE (OUTPATIENT)
Dept: FAMILY MEDICINE CLINIC | Facility: CLINIC | Age: 75
End: 2020-07-02

## 2020-07-02 ENCOUNTER — TELEPHONE (OUTPATIENT)
Dept: SPEECH THERAPY | Facility: HOSPITAL | Age: 75
End: 2020-07-02

## 2020-07-02 ENCOUNTER — OFFICE VISIT (OUTPATIENT)
Dept: SPEECH THERAPY | Facility: HOSPITAL | Age: 75
End: 2020-07-02
Attending: FAMILY MEDICINE
Payer: MEDICARE

## 2020-07-02 DIAGNOSIS — R13.10 DYSPHAGIA, UNSPECIFIED TYPE: ICD-10-CM

## 2020-07-02 DIAGNOSIS — R49.8 HYPOPHONIA: Primary | ICD-10-CM

## 2020-07-02 DIAGNOSIS — G20 PD (PARKINSON'S DISEASE) (HCC): ICD-10-CM

## 2020-07-02 DIAGNOSIS — R19.8 GLOBUS SENSATION: ICD-10-CM

## 2020-07-02 DIAGNOSIS — R49.0 DYSPHONIA: ICD-10-CM

## 2020-07-02 PROCEDURE — 92524 BEHAVRAL QUALIT ANALYS VOICE: CPT

## 2020-07-02 NOTE — TELEPHONE ENCOUNTER
Patient has a referral to see speech therapy, Enriqueta from speech therapy called asking for a order for his speech. Patient had a swallow test done in-patient and came back normal. They are requesting a new order for Speech instead of swallow.      Dx code

## 2020-07-02 NOTE — PROGRESS NOTES
[facility] VOICE THERAPY (LSVT) EVALUATION:   Referring Physician: Dr. Yonas Julian  Diagnosis: PD (Parkinson's disease) ([facility] (G20); Hypophonia (R49.8); Dysphonia (R49.0) Date of Service: 7/2/2020   LSVT Loud evaluation completed per MD order.   [facility] 2/11/2018   • Bilateral carotid artery stenosis 6/7/2018   • Class 1 obesity due to excess calories with serious comorbidity and body mass index (BMI) of 33.0 to 33.9 in adult 6/17/2019    Associated with HTN, Carotid Artery Disease, and Hype mouth daily. , Disp: , Rfl:   Sennosides (SENEXON OR), Take 2 tablets by mouth nightly., Disp: , Rfl:   docusate sodium 100 MG Oral Cap, Take 100 mg by mouth nightly., Disp: , Rfl:   Fexofenadine HCl (ALLEGRA ALLERGY) 180 MG Oral Tab, Take 180 mg by mouth d was approximately 70% intelligible in a quiet setting sitting directly across from the clinician. Patient describes prior level as dysfunctional, and that symptoms seem to be progressing.   Goals include learning strategies to improve/maintain vocal qualit imprecise accuracy  SMRs: irregular rhythm, slow rate, precise accuracy     Stimulability Testing: Testing was completed to determine patient's stimulability for the LSVT LOUD ([facility]) program.  With stimulation to \"increase loudnes limitation: None  Rehab Potential: good given patient motivation, regular attendance, and compliance with HEP.      Patient/Family/Caregiver was advised of these findings, precautions, and treatment options and has agreed to actively participate in planning

## 2020-07-02 NOTE — TELEPHONE ENCOUNTER
Speech therapy called back. Patient has appt today at 4pm and needs referral placed. Referral placed and provider informed. Advised therapist approval may not be given by appt time.  She verbalized understanding

## 2020-07-02 NOTE — TELEPHONE ENCOUNTER
Contacted MD office to have patient order updated as current order for swallow is NOT correct. Spoke with ADELA Sterling and asked for order to indicate Parkinson's Disease (Kate Jorge) and dysphonia/hypophonia (R49.0 and R49.8).   Left VM for patient on both home and

## 2020-07-06 ENCOUNTER — OFFICE VISIT (OUTPATIENT)
Dept: PHYSICAL THERAPY | Age: 75
End: 2020-07-06
Attending: FAMILY MEDICINE
Payer: MEDICARE

## 2020-07-06 ENCOUNTER — TELEPHONE (OUTPATIENT)
Dept: SPEECH THERAPY | Facility: HOSPITAL | Age: 75
End: 2020-07-06

## 2020-07-06 PROCEDURE — 97116 GAIT TRAINING THERAPY: CPT

## 2020-07-06 PROCEDURE — 97110 THERAPEUTIC EXERCISES: CPT

## 2020-07-06 PROCEDURE — 97112 NEUROMUSCULAR REEDUCATION: CPT

## 2020-07-06 NOTE — PROGRESS NOTES
Dx: Chronic JEIMY LBP without sciatica, Parkinson's disease           Insurance (Authorized # of Visits):  Medicare           Authorizing Physician: Dr. Conchis Vines  Next MD visit: none scheduled  Fall Risk: High         Precautions: n/a             Subjective: minutes with <2/10 pain so he is able to walk around the grocery store without need for seated rest. -progress  Patient will improve hamstring flexibility to MIN tightness and  Functional strength with 30 second sit to stand score to 12 reps to be able to reach FWD x20 ea  -LAT x20  Semi-tandem on Airex 2x30 sec ea  -gorge x10 ea  Rebounder with green med ball narrow NINOSKA 2x10  Neuro Re-ed  Rock and reach FWD x20 ea  -LAT x20  Standing alternating cone taps 2x10 ea  Neuro Re-ed  [employer] and reach [facility] ea x5 min Manual Therapy  Lumbar P-As were performed from L1-L5 x30 sec x2  STM of lumbar paraspinals and QL bilaterally x5 min   HEP: floor to ceiling stretch, sustained side to side stretch, LTR, hip flexor and HS stretch standing, supine TA contraction wit

## 2020-07-06 NOTE — TELEPHONE ENCOUNTER
Spoke to patient regarding speech therapy. Patient stated that ENT appointment for clearance to begin LSVT Loud has been scheduled for 7/8/20.   Assumed he is given clearance, patient must be scheduled for LSVT Loud at 4x/week for 4 consecutive weeks for a

## 2020-07-08 ENCOUNTER — OFFICE VISIT (OUTPATIENT)
Dept: PHYSICAL THERAPY | Age: 75
End: 2020-07-08
Attending: FAMILY MEDICINE
Payer: MEDICARE

## 2020-07-08 DIAGNOSIS — G20 PD (PARKINSON'S DISEASE) (HCC): ICD-10-CM

## 2020-07-08 DIAGNOSIS — G89.29 CHRONIC BILATERAL LOW BACK PAIN WITHOUT SCIATICA: ICD-10-CM

## 2020-07-08 DIAGNOSIS — Z91.81 AT RISK FOR FALLING: ICD-10-CM

## 2020-07-08 DIAGNOSIS — R26.9 ABNORMAL GAIT: ICD-10-CM

## 2020-07-08 DIAGNOSIS — M54.50 CHRONIC BILATERAL LOW BACK PAIN WITHOUT SCIATICA: ICD-10-CM

## 2020-07-08 PROCEDURE — 97112 NEUROMUSCULAR REEDUCATION: CPT

## 2020-07-08 PROCEDURE — 97110 THERAPEUTIC EXERCISES: CPT

## 2020-07-08 NOTE — PROGRESS NOTES
ProgressSummary  Pt has attended 10 visits in Physical Therapy.    Dx: Chronic JEIMY LBP without sciatica, Parkinson's disease           Insurance (Authorized # of Visits):  Medicare           Authorizing Physician: Dr. Jimena Agarwal  Next MD visit: none schedule - R WNL, L WNL    Functional Testing (20)  30 sec Sit to Stand: 10 repetitions without UE (9 repetitions at IE)  TU sec without AD (13 sec at IE)    SLS: R 15 sec; L 2 sec (20)      Goals: (to be met in 16 visits)  Patient will be able to stand reach x5  -sustained side to side reach x5 ea  BIG sit>stand 2 x10  LTR x10 Therex  Sustained floor to ceiling reach x5  -sustained side to side reach x5 ea  BIG sit>stand 2 x10  LTR x10 Therex  Sustained floor to ceiling reach x5  -sustained side to side Gait Training  BIG walking 200 ft x1  -x2 with head turns  -x3 with cognitive task (mens names, womens names, count up by 4)  Normal walking x100 ft Gait Training  BIG walking 200 ft x1  -x3 with cognitive task (types of food, steps to baking a cake, types

## 2020-07-09 ENCOUNTER — APPOINTMENT (OUTPATIENT)
Dept: SPEECH THERAPY | Facility: HOSPITAL | Age: 75
End: 2020-07-09
Attending: FAMILY MEDICINE
Payer: MEDICARE

## 2020-07-09 RX ORDER — HYDRALAZINE HYDROCHLORIDE 25 MG/1
TABLET, FILM COATED ORAL
Qty: 180 TABLET | Refills: 2 | Status: SHIPPED | OUTPATIENT
Start: 2020-07-09

## 2020-07-09 RX ORDER — SIMVASTATIN 20 MG
TABLET ORAL
Qty: 90 TABLET | Refills: 2 | Status: SHIPPED | OUTPATIENT
Start: 2020-07-09

## 2020-07-09 RX ORDER — HYDROCHLOROTHIAZIDE 25 MG/1
TABLET ORAL DAILY
Qty: 90 TABLET | Refills: 2 | Status: SHIPPED | OUTPATIENT
Start: 2020-07-09

## 2020-07-13 ENCOUNTER — OFFICE VISIT (OUTPATIENT)
Dept: PHYSICAL THERAPY | Age: 75
End: 2020-07-13
Attending: FAMILY MEDICINE
Payer: MEDICARE

## 2020-07-13 PROCEDURE — 97112 NEUROMUSCULAR REEDUCATION: CPT

## 2020-07-13 PROCEDURE — 97110 THERAPEUTIC EXERCISES: CPT

## 2020-07-13 NOTE — PROGRESS NOTES
Dx: Chronic JEIMY LBP without sciatica, Parkinson's disease           Insurance (Authorized # of Visits):  Medicare           Authorizing Physician: Dr. Hemalatha Joyner  Next MD visit: none scheduled  Fall Risk: High         Precautions: n/a             Subjective: -progress  Patient will improve hamstring flexibility to MIN tightness and  Functional strength with 30 second sit to stand score to 12 reps to be able to perform household transfers without LBP -part MET, need to improve 30 sec sit to stand still  Patient Re-ed  Rock and reach FWD x20 ea  -LAT x20  Standing alternating cone taps 2x10 ea  Neuro Re-ed  Rock and reach FWD x20 ea  -LAT x20  5 hurdles in the gym x2 laps     Step up and over (2 Airex pads, 4\"step, and 6\" step) x 2 laps  Neuro Re-ed  [facility] and re

## 2020-07-15 ENCOUNTER — APPOINTMENT (OUTPATIENT)
Dept: SPEECH THERAPY | Facility: HOSPITAL | Age: 75
End: 2020-07-15
Attending: FAMILY MEDICINE
Payer: MEDICARE

## 2020-07-15 ENCOUNTER — OFFICE VISIT (OUTPATIENT)
Dept: PHYSICAL THERAPY | Age: 75
End: 2020-07-15
Attending: FAMILY MEDICINE
Payer: MEDICARE

## 2020-07-15 PROCEDURE — 97110 THERAPEUTIC EXERCISES: CPT

## 2020-07-15 PROCEDURE — 97112 NEUROMUSCULAR REEDUCATION: CPT

## 2020-07-15 NOTE — PROGRESS NOTES
Dx: Chronic JEIMY LBP without sciatica, Parkinson's disease           Insurance (Authorized # of Visits):  Medicare           Authorizing Physician: Dr. Jermaine Morgan  Next MD visit: none scheduled  Fall Risk: High         Precautions: n/a             Subjective: without need for seated rest. -progress  Patient will improve hamstring flexibility to MIN tightness and  Functional strength with 30 second sit to stand score to 12 reps to be able to perform household transfers without LBP -part MET, need to improve 30 s side to side reach x5 ea  BIG sit>stand 2 x10  Supine TA contraction with marching x10 ea R/L   -with SLR x10ea   -with deadbug x10 ea   Single leg bridge x8 ea   Neuro Re-ed  Rock and reach FWD x20 ea  -LAT x20  Semi-tandem on Airex 2x30 sec graeme hood walking x100 ft Gait Training    BIG walking 200 ft x1  -x3 with cognitive task (count up by 5, alphabet backwards)  -x1 with carry left hand  -x1 with carry right hand    Normal walking x100 ft   HEP: floor to ceiling stretch, sustained side to side stret

## 2020-07-16 ENCOUNTER — APPOINTMENT (OUTPATIENT)
Dept: SPEECH THERAPY | Facility: HOSPITAL | Age: 75
End: 2020-07-16
Attending: FAMILY MEDICINE
Payer: MEDICARE

## 2020-07-17 ENCOUNTER — APPOINTMENT (OUTPATIENT)
Dept: SPEECH THERAPY | Facility: HOSPITAL | Age: 75
End: 2020-07-17
Attending: FAMILY MEDICINE
Payer: MEDICARE

## 2020-07-20 ENCOUNTER — APPOINTMENT (OUTPATIENT)
Dept: SPEECH THERAPY | Facility: HOSPITAL | Age: 75
End: 2020-07-20
Attending: FAMILY MEDICINE
Payer: MEDICARE

## 2020-07-21 ENCOUNTER — APPOINTMENT (OUTPATIENT)
Dept: SPEECH THERAPY | Facility: HOSPITAL | Age: 75
End: 2020-07-21
Attending: FAMILY MEDICINE
Payer: MEDICARE

## 2020-07-22 ENCOUNTER — APPOINTMENT (OUTPATIENT)
Dept: SPEECH THERAPY | Facility: HOSPITAL | Age: 75
End: 2020-07-22
Attending: FAMILY MEDICINE
Payer: MEDICARE

## 2020-07-22 ENCOUNTER — OFFICE VISIT (OUTPATIENT)
Dept: PHYSICAL THERAPY | Age: 75
End: 2020-07-22
Attending: FAMILY MEDICINE
Payer: MEDICARE

## 2020-07-22 PROCEDURE — 97140 MANUAL THERAPY 1/> REGIONS: CPT

## 2020-07-22 PROCEDURE — 97112 NEUROMUSCULAR REEDUCATION: CPT

## 2020-07-22 PROCEDURE — 97110 THERAPEUTIC EXERCISES: CPT

## 2020-07-22 NOTE — PROGRESS NOTES
Dx: Chronic JEIMY LBP without sciatica, Parkinson's disease           Insurance (Authorized # of Visits):  Medicare           Authorizing Physician: Dr. Jimena Agarwal  Next MD visit: none scheduled  Fall Risk: High         Precautions: n/a             Subjective: -progress  Patient will improve hamstring flexibility to MIN tightness and  Functional strength with 30 second sit to stand score to 12 reps to be able to perform household transfers without LBP -MET  Patient will improve TUG time to 9.2 seconds to decreas Re-ed  Rock and reach FWD x20 ea  -LAT x20  5 hurdles in the gym x2 laps     Step up and over (2 Airex pads, 4\"step, and 6\" step) x 2 laps  Neuro Re-ed  Rock and reach FWD x20 ea  -LAT x20  Cone taps with 6 cones while walking x2 laps Neuro Re-ed  [employer] a knee fall out and marches    Charges: therex x1 neuro re-ed x1, manual x1     Total Timed Treatment: 45 min  Total Treatment Time: 45 min

## 2020-07-23 ENCOUNTER — OFFICE VISIT (OUTPATIENT)
Dept: PHYSICAL THERAPY | Age: 75
End: 2020-07-23
Attending: FAMILY MEDICINE
Payer: MEDICARE

## 2020-07-23 ENCOUNTER — APPOINTMENT (OUTPATIENT)
Dept: SPEECH THERAPY | Facility: HOSPITAL | Age: 75
End: 2020-07-23
Attending: FAMILY MEDICINE
Payer: MEDICARE

## 2020-07-23 PROCEDURE — 97112 NEUROMUSCULAR REEDUCATION: CPT

## 2020-07-23 PROCEDURE — 97110 THERAPEUTIC EXERCISES: CPT

## 2020-07-23 PROCEDURE — 97140 MANUAL THERAPY 1/> REGIONS: CPT

## 2020-07-23 NOTE — PROGRESS NOTES
Dx: Chronic JEIMY LBP without sciatica, Parkinson's disease           Insurance (Authorized # of Visits):  Medicare           Authorizing Physician: Dr. Jimena Agarwal  Next MD visit: none scheduled  Fall Risk: High         Precautions: n/a             Subjective: 7/15/2020  Tx#: 12/16 Date: 7/22/2020  Tx#: 13/16 Date: 7/23/2020  Tx#: 14/16   Therex  Sustained floor to ceiling reach x5  -sustained side to side reach x5 ea  BIG sit>stand 2 x10  Supine TA contraction with 5 s hold x10  - with hip abduction fall out x5 x10 (SBA) Neuro Re-ed  Airex march with 2# DB curls 2x20 (SBA)    airex stance with small ball low to high reach x10 (SBA)   Gait Training  BIG walking 200 ft x1  -x3 with cognitive task (types of food, steps to baking a cake, types of animals)  Normal wal

## 2020-07-27 ENCOUNTER — OFFICE VISIT (OUTPATIENT)
Dept: PHYSICAL THERAPY | Age: 75
End: 2020-07-27
Attending: FAMILY MEDICINE
Payer: MEDICARE

## 2020-07-27 ENCOUNTER — APPOINTMENT (OUTPATIENT)
Dept: SPEECH THERAPY | Facility: HOSPITAL | Age: 75
End: 2020-07-27
Attending: FAMILY MEDICINE
Payer: MEDICARE

## 2020-07-27 PROCEDURE — 97112 NEUROMUSCULAR REEDUCATION: CPT

## 2020-07-27 PROCEDURE — 97110 THERAPEUTIC EXERCISES: CPT

## 2020-07-27 PROCEDURE — 97140 MANUAL THERAPY 1/> REGIONS: CPT

## 2020-07-27 NOTE — PROGRESS NOTES
Dx: Chronic JEIMY LBP without sciatica, Parkinson's disease           Insurance (Authorized # of Visits):  Medicare           Authorizing Physician: Dr. Lacie Hernandez  Next MD visit: none scheduled  Fall Risk: High         Precautions: n/a             Subjective: will improve TUG time to 9.2 seconds to decrease fall risk with household ambulation -MET  Patient will independent and adhere to comprehensive HEP. -MET    Plan: maximize manual therapy and D/C next visit  Date: 7/8/2020  Tx#: 10/16 Date: 7/13/2020  Tx#: ea Neuro Re-ed  Rock and reach FWD x20 ea  -LAT x20  Rebounder with red med ball in semi-tandem 2x10ea  Neuro Re-ed  Rock and reach FWD x20 ea  -LAT x20  Spot taps forward, lateral and backward x10 ea direction and R/L   Step up from Airex to 8\" step x10 with 5 sec hold with knee fall out and marches, standing HS stretch, standing hip flexor stretch     Charges: therex x1 neuro re-ed x1, manual x1     Total Timed Treatment: 45 min  Total Treatment Time: 45 min

## 2020-07-29 ENCOUNTER — OFFICE VISIT (OUTPATIENT)
Dept: PHYSICAL THERAPY | Age: 75
End: 2020-07-29
Attending: FAMILY MEDICINE
Payer: MEDICARE

## 2020-07-29 PROCEDURE — 97112 NEUROMUSCULAR REEDUCATION: CPT

## 2020-07-29 PROCEDURE — 97110 THERAPEUTIC EXERCISES: CPT

## 2020-07-29 NOTE — PROGRESS NOTES
Chris  Pt has attended 16 visits in Physical Therapy.    Dx: Chronic JEIMY LBP without sciatica, Parkinson's disease           Insurance (Authorized # of Visits):  Medicare           Authorizing Physician: Dr. Joan Chen MD visit: none razaul able to walk around the grocery store without need for seated rest. -MET  Patient will improve hamstring flexibility to MIN tightness and  Functional strength with 30 second sit to stand score to 12 reps to be able to perform household transfers without LB Therex  Sustained floor to ceiling reach x5  -sustained side to side reach x5 ea  BIG sit>stand x10  6 inch step up with UE wide reach x10 ea  -Lat step up 2x10 ea (light >no UE; unable to coordinate UE curl)  5# crate floor lift and lower x8  Squats to pi (count up by 3, candy bars, months backwards)  Normal walking x100 ft Gait Training    BIG walking 200 ft x1  -x3 with cognitive task (count up by 5, alphabet backwards)  -x1 with carry left hand  -x1 with carry right hand    Normal walking x100 ft Gait Tr

## 2020-07-30 ENCOUNTER — APPOINTMENT (OUTPATIENT)
Dept: SPEECH THERAPY | Facility: HOSPITAL | Age: 75
End: 2020-07-30
Attending: FAMILY MEDICINE
Payer: MEDICARE

## 2020-07-31 ENCOUNTER — APPOINTMENT (OUTPATIENT)
Dept: SPEECH THERAPY | Facility: HOSPITAL | Age: 75
End: 2020-07-31
Attending: FAMILY MEDICINE
Payer: MEDICARE

## 2020-08-03 ENCOUNTER — OFFICE VISIT (OUTPATIENT)
Dept: SPEECH THERAPY | Facility: HOSPITAL | Age: 75
End: 2020-08-03
Attending: FAMILY MEDICINE
Payer: MEDICARE

## 2020-08-03 PROCEDURE — 92507 TX SP LANG VOICE COMM INDIV: CPT

## 2020-08-03 NOTE — PROGRESS NOTES
Treatment #2 (Medicare 2/10; POC thru 9/30/20)  Treatment Time: 60 minutes  Precautions: none     Charges: 1 billed ([id number])   Pain: 0/10      Diagnosis: PD [facility]'s [facility] (G[facility]); Hypophonia (R49.8);  Dysphonia (R49.0)                Subjective:

## 2020-08-05 ENCOUNTER — OFFICE VISIT (OUTPATIENT)
Dept: SPEECH THERAPY | Facility: HOSPITAL | Age: 75
End: 2020-08-05
Attending: FAMILY MEDICINE
Payer: MEDICARE

## 2020-08-05 PROCEDURE — 92507 TX SP LANG VOICE COMM INDIV: CPT

## 2020-08-05 NOTE — PROGRESS NOTES
Treatment #3 (Medicare 3/10; POC thru 9/30/20)  Treatment Time: 60 minutes  Precautions: none     Charges: 1 billed ([id number])   Pain: 0/10      Diagnosis: [facility] ([facility]'s disease) ([facility] (G20); Hypophonia (R49.8);  Dysphonia (R49.0)                Subjective:

## 2020-08-06 ENCOUNTER — OFFICE VISIT (OUTPATIENT)
Dept: SPEECH THERAPY | Facility: HOSPITAL | Age: 75
End: 2020-08-06
Attending: FAMILY MEDICINE
Payer: MEDICARE

## 2020-08-06 PROCEDURE — 92507 TX SP LANG VOICE COMM INDIV: CPT

## 2020-08-06 NOTE — PROGRESS NOTES
Treatment #4 (Medicare 4/10; POC thru 9/30/20)  Treatment Time: 60 minutes  Precautions: none     Charges: 1 billed ([id number])   Pain: 0/10      Diagnosis: [facility] (G[facility]); Hypophonia (R49.8);  Dysphonia (R49.0)                Subjective:

## 2020-08-07 ENCOUNTER — OFFICE VISIT (OUTPATIENT)
Dept: SPEECH THERAPY | Facility: HOSPITAL | Age: 75
End: 2020-08-07
Attending: FAMILY MEDICINE
Payer: MEDICARE

## 2020-08-07 PROCEDURE — 92507 TX SP LANG VOICE COMM INDIV: CPT

## 2020-08-07 NOTE — PROGRESS NOTES
Treatment #5 (Medicare 5/10; POC thru 9/30/20)  Treatment Time: 60 minutes  Precautions: none     Charges: 1 billed ([id number])   Pain: 0/10      Diagnosis: PD (Park[facility]'s [facility]) [facility] (G20); Hypophonia (R49.8);  Dysphonia (R49.0)                Subjective:

## 2020-08-10 ENCOUNTER — APPOINTMENT (OUTPATIENT)
Dept: SPEECH THERAPY | Facility: HOSPITAL | Age: 75
End: 2020-08-10
Attending: FAMILY MEDICINE
Payer: MEDICARE

## 2020-08-12 ENCOUNTER — OFFICE VISIT (OUTPATIENT)
Dept: SPEECH THERAPY | Facility: HOSPITAL | Age: 75
End: 2020-08-12
Attending: FAMILY MEDICINE
Payer: MEDICARE

## 2020-08-12 PROCEDURE — 92507 TX SP LANG VOICE COMM INDIV: CPT

## 2020-08-12 NOTE — PROGRESS NOTES
Treatment #6 (Medicare 6/10; POC thru 9/30/20)  Treatment Time: 60 minutes  Precautions: none     Charges: 1 billed ([id number])   Pain: 0/10      Diagnosis: PD [facility]'s [facility] (G[facility]); Hypophonia (R49.8);  Dysphonia (R49.0)                Subjective:

## 2020-08-13 ENCOUNTER — OFFICE VISIT (OUTPATIENT)
Dept: SPEECH THERAPY | Facility: HOSPITAL | Age: 75
End: 2020-08-13
Attending: FAMILY MEDICINE
Payer: MEDICARE

## 2020-08-13 PROCEDURE — 92507 TX SP LANG VOICE COMM INDIV: CPT

## 2020-08-13 NOTE — PROGRESS NOTES
Treatment #7 (Medicare 7/10; POC thru 9/30/20)  Treatment Time: 60 minutes  Precautions: none     Charges: 1 billed ([id number])   Pain: 0/10      Diagnosis: PD ([facility]'s [facility]) ([facility] (G[facility]); Hypophonia (R49.8);  Dysphonia (R49.0)                Subjective:

## 2020-08-14 ENCOUNTER — OFFICE VISIT (OUTPATIENT)
Dept: SPEECH THERAPY | Facility: HOSPITAL | Age: 75
End: 2020-08-14
Attending: FAMILY MEDICINE
Payer: MEDICARE

## 2020-08-14 PROCEDURE — 92507 TX SP LANG VOICE COMM INDIV: CPT

## 2020-08-14 NOTE — PROGRESS NOTES
Treatment #8 (Medicare 8/10; POC thru 9/30/20)  Treatment Time: 60 minutes  Precautions: none     Charges: 1 billed ([id number])   Pain: 0/10      Diagnosis: PD (Park[facility]'s disease) ([facility] (G[facility]); Hypophonia (R49.8);  Dysphonia (R49.0)                Subjective:

## 2020-08-17 ENCOUNTER — OFFICE VISIT (OUTPATIENT)
Dept: SPEECH THERAPY | Facility: HOSPITAL | Age: 75
End: 2020-08-17
Attending: FAMILY MEDICINE
Payer: MEDICARE

## 2020-08-17 PROCEDURE — 92507 TX SP LANG VOICE COMM INDIV: CPT

## 2020-08-17 NOTE — PROGRESS NOTES
Treatment #9 (Medicare 9/10; POC thru 9/30/20)  Treatment Time: 60 minutes  Precautions: none     Charges: 1 billed ([id number])   Pain: 0/10      Diagnosis: PD (Parkinson's disease) ([email or web address] (G20); Hypophonia (R49.8);  Dysphonia (R49.0)                Subjective:

## 2020-08-19 ENCOUNTER — OFFICE VISIT (OUTPATIENT)
Dept: SPEECH THERAPY | Facility: HOSPITAL | Age: 75
End: 2020-08-19
Attending: FAMILY MEDICINE
Payer: MEDICARE

## 2020-08-19 PROCEDURE — 92507 TX SP LANG VOICE COMM INDIV: CPT

## 2020-08-19 NOTE — PROGRESS NOTES
Treatment #10 (Medicare 10/10; POC thru 9/30/20)  Treatment Time: 60 minutes  Precautions: none      Charges: 1 billed ([id number])   Pain: 0/10       Diagnosis: PD (Parkinson's disease) ([facility] (G20); Hypophonia (R49.8);  Dysphonia (R49.0)          Progress Summa functional communication. Current Status: Continue. MPT: 77 dB independently. High: 387 Hz at 79 dB given min verbal/visual cues. Low:  122 Hz at 70 dB given mod verbal/visual cues.     STG 2: Patient will increase vocal intensity to reach a SPL of 70 d

## 2020-08-20 ENCOUNTER — OFFICE VISIT (OUTPATIENT)
Dept: SPEECH THERAPY | Facility: HOSPITAL | Age: 75
End: 2020-08-20
Attending: FAMILY MEDICINE
Payer: MEDICARE

## 2020-08-20 PROCEDURE — 92507 TX SP LANG VOICE COMM INDIV: CPT

## 2020-08-20 NOTE — PROGRESS NOTES
Treatment #11 (Medicare 1/10; POC thru 9/30/20)  Treatment Time: 60 minutes  Precautions: none      Charges: 1 billed ([id number])   Pain: 0/10       Diagnosis: [facility] (G[facility]); Hypophonia (R49.8);  Dysphonia (R49.0)                Subjectiv

## 2020-08-21 ENCOUNTER — OFFICE VISIT (OUTPATIENT)
Dept: SPEECH THERAPY | Facility: HOSPITAL | Age: 75
End: 2020-08-21
Attending: FAMILY MEDICINE
Payer: MEDICARE

## 2020-08-21 PROCEDURE — 92507 TX SP LANG VOICE COMM INDIV: CPT

## 2020-08-21 NOTE — PROGRESS NOTES
Treatment #12 (Medicare 2/10; POC thru 9/30/20)  Treatment Time: 60 minutes  Precautions: none      Charges: 1 billed ([id number])   Pain: 0/10       Diagnosis: PD (Parkinson's disease) ([email or web address] (G20); Hypophonia (R49.8);  Dysphonia (R49.0)                Subjectiv

## 2020-08-24 ENCOUNTER — OFFICE VISIT (OUTPATIENT)
Dept: SPEECH THERAPY | Facility: HOSPITAL | Age: 75
End: 2020-08-24
Attending: FAMILY MEDICINE
Payer: MEDICARE

## 2020-08-24 PROCEDURE — 92507 TX SP LANG VOICE COMM INDIV: CPT

## 2020-08-24 NOTE — PROGRESS NOTES
Treatment #13 (Medicare 3/10; POC thru 9/30/20)  Treatment Time: 60 minutes  Precautions: none      Charges: 1 billed ([id number])   Pain: 0/10       Diagnosis: PD (Parkinson's disease) ([facility] (G[facility]); Hypophonia (R49.8);  Dysphonia (R49.0)                Subjectiv

## 2020-08-26 ENCOUNTER — OFFICE VISIT (OUTPATIENT)
Dept: SPEECH THERAPY | Facility: HOSPITAL | Age: 75
End: 2020-08-26
Attending: FAMILY MEDICINE
Payer: MEDICARE

## 2020-08-26 PROCEDURE — 92507 TX SP LANG VOICE COMM INDIV: CPT

## 2020-08-26 NOTE — PROGRESS NOTES
Treatment #14 (Medicare 4/10; POC thru 9/30/20)  Treatment Time: 60 minutes  Precautions: none      Charges: 1 billed ([id number])   Pain: 0/10       Diagnosis: PD (Parkinson's disease) ([facility] (G20); Hypophonia (R49.8);  Dysphonia (R49.0)                Subjectiv

## 2020-08-27 ENCOUNTER — OFFICE VISIT (OUTPATIENT)
Dept: SPEECH THERAPY | Facility: HOSPITAL | Age: 75
End: 2020-08-27
Attending: FAMILY MEDICINE
Payer: MEDICARE

## 2020-08-27 PROCEDURE — 92507 TX SP LANG VOICE COMM INDIV: CPT

## 2020-08-27 NOTE — PROGRESS NOTES
Treatment #16 (Medicare 5/10; POC thru 9/30/20)  Treatment Time: 60 minutes  Precautions: none      Charges: 1 billed ([id number])   Pain: 0/10       Diagnosis: PD (Parkinson's disease) ([facility] (G[facility]); Hypophonia (R49.8);  Dysphonia (R49.0)                Subjectiv

## 2020-08-28 ENCOUNTER — OFFICE VISIT (OUTPATIENT)
Dept: SPEECH THERAPY | Facility: HOSPITAL | Age: 75
End: 2020-08-28
Attending: FAMILY MEDICINE
Payer: MEDICARE

## 2020-08-28 PROCEDURE — 92507 TX SP LANG VOICE COMM INDIV: CPT

## 2020-08-28 NOTE — PROGRESS NOTES
Treatment #16 (Medicare 6/10; POC thru 9/30/20)  Treatment Time: 60 minutes  Precautions: none      Charges: 1 billed ([id number])   Pain: 0/10       Diagnosis: PD (Parkinson's disease) ([facility] (G20); Hypophonia (R49.8);  Dysphonia (R49.0)                Subjectiv

## 2020-09-01 ENCOUNTER — OFFICE VISIT (OUTPATIENT)
Dept: SPEECH THERAPY | Facility: HOSPITAL | Age: 75
End: 2020-09-01
Attending: FAMILY MEDICINE
Payer: MEDICARE

## 2020-09-01 PROCEDURE — 92507 TX SP LANG VOICE COMM INDIV: CPT

## 2020-09-01 NOTE — PROGRESS NOTES
Treatment #17 (Medicare 7/10; POC thru 9/30/20)  Treatment Time: 60 minutes  Precautions: none      Charges: 1 billed ([id number])   Pain: 0/10       Diagnosis: PD (Parkinson's disease) ([facility] (G20); Hypophonia (R49.8);  Dysphonia (R49.0)          Discharge Summa for increasing loudness, which will help increase vocal respiratory support required for functional communication. Discharge Status: GOAL MET. Patient reaches 70+ dB during MPT and pitch glides given min-mod verbal/visual cues.     STG 2: Patient will inc

## 2020-09-06 ENCOUNTER — HOSPITAL ENCOUNTER (EMERGENCY)
Age: 75
Discharge: HOME | End: 2020-09-06
Attending: EMERGENCY MEDICINE
Payer: MEDICARE

## 2020-09-06 VITALS
SYSTOLIC BLOOD PRESSURE: 184 MMHG | HEIGHT: 69 IN | TEMPERATURE: 99 F | WEIGHT: 210 LBS | OXYGEN SATURATION: 100 % | RESPIRATION RATE: 18 BRPM | HEART RATE: 63 BPM | DIASTOLIC BLOOD PRESSURE: 99 MMHG | BODY MASS INDEX: 31.1 KG/M2

## 2020-09-06 DIAGNOSIS — S61.213A LACERATION OF LEFT MIDDLE FINGER WITHOUT FOREIGN BODY WITHOUT DAMAGE TO NAIL, INITIAL ENCOUNTER: Primary | ICD-10-CM

## 2020-09-06 PROCEDURE — 12001 RPR S/N/AX/GEN/TRNK 2.5CM/<: CPT

## 2020-09-06 PROCEDURE — 99283 EMERGENCY DEPT VISIT LOW MDM: CPT

## 2020-09-06 PROCEDURE — 90471 IMMUNIZATION ADMIN: CPT

## 2020-09-06 RX ORDER — TETANUS AND DIPHTHERIA TOXOIDS ADSORBED 2; 2 [LF]/.5ML; [LF]/.5ML
0.5 INJECTION INTRAMUSCULAR ONCE
Status: COMPLETED | OUTPATIENT
Start: 2020-09-06 | End: 2020-09-06

## 2020-09-06 RX ORDER — LIDOCAINE HYDROCHLORIDE 10 MG/ML
INJECTION, SOLUTION INFILTRATION; PERINEURAL
Status: COMPLETED
Start: 2020-09-06 | End: 2020-09-06

## 2020-09-07 NOTE — ED PROVIDER NOTES
Patient Seen in: THE [facility] Emergency Department In [place]      History   Patient presents with:  Laceration Abrasion    Stated Complaint: FINGER LAC    HPI    Patient is a 44-year-old male comes to emergency room for evaluation of left third digit lacer Procedure Laterality Date   • ANKLE FRACTURE SURGERY     • HDR ELECT BRACHYTHERAPY  2006   • KNEE REPLACEMENT SURGERY Left 2014   • OTHER      Botox injections into bladder                    Social History    Tobacco Use      Smoking status: Former [employer] evaluation of left third digit laceration. Patient was advised to have sutures removed in 10 days. Keep wound clean and dry. Patient advised to watch for fever, redness, or discharge to wound. Place Neosporin or bacitracin to wound at home.         Patient

## 2020-09-16 ENCOUNTER — HOSPITAL ENCOUNTER (EMERGENCY)
Age: 75
Discharge: HOME | End: 2020-09-16
Payer: MEDICARE

## 2020-09-16 VITALS
HEIGHT: 69 IN | TEMPERATURE: 99 F | HEART RATE: 70 BPM | OXYGEN SATURATION: 98 % | BODY MASS INDEX: 31.1 KG/M2 | RESPIRATION RATE: 16 BRPM | WEIGHT: 210 LBS | DIASTOLIC BLOOD PRESSURE: 67 MMHG | SYSTOLIC BLOOD PRESSURE: 118 MMHG

## 2020-09-16 DIAGNOSIS — Z48.02 ENCOUNTER FOR REMOVAL OF SUTURES: Primary | ICD-10-CM

## 2020-09-16 NOTE — ED PROVIDER NOTES
Patient Seen in: THE [facility] Emergency Department In [place]      History   Patient presents with:  Sut Stap Foreign    Stated Complaint: suture removal to left third placed sept 5th    HPI    Pleasant 45-year-old male.   Arrives for suture removal from SURGERY     • HDR ELECT BRACHYTHERAPY  2006   • KNEE REPLACEMENT SURGERY Left 2014   • OTHER      Botox injections into bladder                    Social History    Tobacco Use      Smoking status: Former Smoker        Types: Cigars        Quit date: 2/8/1

## 2020-10-01 ENCOUNTER — OFFICE VISIT (OUTPATIENT)
Dept: CARDIOLOGY | Age: 75
End: 2020-10-01

## 2020-10-01 VITALS
HEART RATE: 64 BPM | HEIGHT: 68 IN | BODY MASS INDEX: 31.67 KG/M2 | SYSTOLIC BLOOD PRESSURE: 104 MMHG | DIASTOLIC BLOOD PRESSURE: 64 MMHG | WEIGHT: 209 LBS

## 2020-10-01 DIAGNOSIS — M79.606 PAIN OF LOWER EXTREMITY, UNSPECIFIED LATERALITY: ICD-10-CM

## 2020-10-01 DIAGNOSIS — E78.00 PURE HYPERCHOLESTEROLEMIA: ICD-10-CM

## 2020-10-01 DIAGNOSIS — R06.02 SHORTNESS OF BREATH: ICD-10-CM

## 2020-10-01 DIAGNOSIS — I72.4 ANEURYSM OF POPLITEAL ARTERY (CMD): ICD-10-CM

## 2020-10-01 DIAGNOSIS — I65.23 ASYMPTOMATIC CAROTID ARTERY STENOSIS, BILATERAL: ICD-10-CM

## 2020-10-01 DIAGNOSIS — I10 ESSENTIAL HYPERTENSION, BENIGN: ICD-10-CM

## 2020-10-01 DIAGNOSIS — R93.1 AGATSTON CAC SCORE 100-199: Primary | ICD-10-CM

## 2020-10-01 PROCEDURE — 3074F SYST BP LT 130 MM HG: CPT | Performed by: INTERNAL MEDICINE

## 2020-10-01 PROCEDURE — 99204 OFFICE O/P NEW MOD 45 MIN: CPT | Performed by: INTERNAL MEDICINE

## 2020-10-01 PROCEDURE — 3078F DIAST BP <80 MM HG: CPT | Performed by: INTERNAL MEDICINE

## 2020-10-01 SDOH — HEALTH STABILITY: PHYSICAL HEALTH: ON AVERAGE, HOW MANY MINUTES DO YOU ENGAGE IN EXERCISE AT THIS LEVEL?: 0 MIN

## 2020-10-01 SDOH — HEALTH STABILITY: PHYSICAL HEALTH: ON AVERAGE, HOW MANY DAYS PER WEEK DO YOU ENGAGE IN MODERATE TO STRENUOUS EXERCISE (LIKE A BRISK WALK)?: 0 DAYS

## 2020-10-01 SDOH — HEALTH STABILITY: MENTAL HEALTH: HOW OFTEN DO YOU HAVE A DRINK CONTAINING ALCOHOL?: NEVER

## 2020-10-01 ASSESSMENT — PATIENT HEALTH QUESTIONNAIRE - PHQ9
CLINICAL INTERPRETATION OF PHQ2 SCORE: NO FURTHER SCREENING NEEDED
2. FEELING DOWN, DEPRESSED OR HOPELESS: NOT AT ALL
SUM OF ALL RESPONSES TO PHQ9 QUESTIONS 1 AND 2: 0
1. LITTLE INTEREST OR PLEASURE IN DOING THINGS: NOT AT ALL
CLINICAL INTERPRETATION OF PHQ9 SCORE: NO FURTHER SCREENING NEEDED
SUM OF ALL RESPONSES TO PHQ9 QUESTIONS 1 AND 2: 0

## 2020-10-22 ENCOUNTER — HOSPITAL ENCOUNTER (OUTPATIENT)
Dept: CV DIAGNOSTICS | Age: 75
Discharge: HOME | End: 2020-10-22
Attending: INTERNAL MEDICINE
Payer: MEDICARE

## 2020-10-22 DIAGNOSIS — R93.1 AGATSTON CAC SCORE 100-199: ICD-10-CM

## 2020-10-22 DIAGNOSIS — I65.23 ASYMPTOMATIC CAROTID ARTERY STENOSIS, BILATERAL: ICD-10-CM

## 2020-10-22 DIAGNOSIS — E78.00 PURE HYPERCHOLESTEROLEMIA: ICD-10-CM

## 2020-10-22 DIAGNOSIS — R06.02 SHORTNESS OF BREATH: ICD-10-CM

## 2020-10-22 DIAGNOSIS — I10 ESSENTIAL HYPERTENSION, BENIGN: ICD-10-CM

## 2020-10-22 PROCEDURE — 93306 TTE W/DOPPLER COMPLETE: CPT | Performed by: INTERNAL MEDICINE

## 2020-10-30 ENCOUNTER — TELEPHONE (OUTPATIENT)
Dept: CARDIOLOGY | Age: 75
End: 2020-10-30

## 2021-01-06 RX ORDER — LOSARTAN POTASSIUM 100 MG/1
TABLET ORAL
Qty: 90 TABLET | Refills: 0 | Status: SHIPPED | OUTPATIENT
Start: 2021-01-06 | End: 2021-04-02

## 2021-01-06 RX ORDER — CARVEDILOL 3.12 MG/1
TABLET ORAL
Qty: 180 TABLET | Refills: 0 | Status: SHIPPED | OUTPATIENT
Start: 2021-01-06 | End: 2021-04-02

## 2021-01-27 DIAGNOSIS — Z23 NEED FOR VACCINATION: ICD-10-CM

## 2021-02-02 ENCOUNTER — IMMUNIZATION (OUTPATIENT)
Dept: LAB | Age: 76
End: 2021-02-02
Attending: HOSPITALIST
Payer: MEDICARE

## 2021-02-02 DIAGNOSIS — Z23 NEED FOR VACCINATION: Primary | ICD-10-CM

## 2021-02-02 PROCEDURE — 0001A SARSCOV2 VAC 30MCG/0.3ML IM: CPT

## 2021-02-23 ENCOUNTER — IMMUNIZATION (OUTPATIENT)
Dept: LAB | Age: 76
End: 2021-02-23
Attending: HOSPITALIST
Payer: MEDICARE

## 2021-02-23 DIAGNOSIS — Z23 NEED FOR VACCINATION: Primary | ICD-10-CM

## 2021-02-23 PROCEDURE — 0002A SARSCOV2 VAC 30MCG/0.3ML IM: CPT

## 2021-03-18 ENCOUNTER — OFFICE VISIT (OUTPATIENT)
Dept: FAMILY MEDICINE CLINIC | Facility: CLINIC | Age: 76
End: 2021-03-18
Payer: COMMERCIAL

## 2021-03-18 VITALS
HEART RATE: 88 BPM | TEMPERATURE: 97 F | WEIGHT: 210 LBS | SYSTOLIC BLOOD PRESSURE: 118 MMHG | DIASTOLIC BLOOD PRESSURE: 68 MMHG | OXYGEN SATURATION: 100 % | BODY MASS INDEX: 31.1 KG/M2 | HEIGHT: 69 IN

## 2021-03-18 DIAGNOSIS — M79.605 PAIN IN BOTH LOWER EXTREMITIES: ICD-10-CM

## 2021-03-18 DIAGNOSIS — G89.29 CHRONIC BILATERAL LOW BACK PAIN, UNSPECIFIED WHETHER SCIATICA PRESENT: Primary | ICD-10-CM

## 2021-03-18 DIAGNOSIS — M79.604 PAIN IN BOTH LOWER EXTREMITIES: ICD-10-CM

## 2021-03-18 DIAGNOSIS — G20 PD (PARKINSON'S DISEASE) (HCC): ICD-10-CM

## 2021-03-18 DIAGNOSIS — M54.50 CHRONIC BILATERAL LOW BACK PAIN, UNSPECIFIED WHETHER SCIATICA PRESENT: Primary | ICD-10-CM

## 2021-03-18 PROCEDURE — 3074F SYST BP LT 130 MM HG: CPT | Performed by: FAMILY MEDICINE

## 2021-03-18 PROCEDURE — 3078F DIAST BP <80 MM HG: CPT | Performed by: FAMILY MEDICINE

## 2021-03-18 PROCEDURE — 99214 OFFICE O/P EST MOD 30 MIN: CPT | Performed by: FAMILY MEDICINE

## 2021-03-18 PROCEDURE — 3008F BODY MASS INDEX DOCD: CPT | Performed by: FAMILY MEDICINE

## 2021-03-18 NOTE — PROGRESS NOTES
De Miquel is a 76year old male. HPI:     Patient is accompanied by his wife who is pleasant and supportive, patient's wife is sitting in a wheelchair. Low back pain:  Recurrent and worsening. Started worsening around August 2020.   Worse after s Oral Tab Take 1,000 mcg by mouth once a week. • Pyridoxine HCl (VITAMIN B-6) 100 MG Oral Tab Take 100 mg by mouth once a week. • hydrochlorothiazide 25 MG Oral Tab Take 25 mg by mouth daily.        • Cholecalciferol (D 2000) 2000 units Oral Tab dysfunction     errosion      Past Surgical History:   Procedure Laterality Date   • ANKLE FRACTURE SURGERY     • HDR ELECT BRACHYTHERAPY  2006   • KNEE REPLACEMENT SURGERY Left 2014   • OTHER      Botox injections into bladder      Social History:    Soci 10/01/2010  10/08/2011      Td, Preserv Free      09/06/2020      Zoster Vaccine Live (Zostavax)                          03/14/2011      EXAM:   /68 (BP Location: Left arm, Patient Position: Sitting, Cuff Size: adult)   Pulse 88   Temp 96.8 ° REHAB  - XR LUMBAR SPINE (MIN 4 VIEWS) (CPT=72110); Future    3.  PD (Parkinson's disease) ([facility]  - OP REFERRAL TO [facility]            Imaging & Consults:  OP REFERRAL TO [facility]  XR LUMBAR SPINE (MIN 4 VIEWS)

## 2021-03-18 NOTE — PATIENT INSTRUCTIONS
-Please do your fasting labs at your earliest convenience. -If symptoms do not improve or resolve with physical therapy, please contact our office, would recommend referring you to a specialist next.

## 2021-03-23 ENCOUNTER — HOSPITAL ENCOUNTER (OUTPATIENT)
Dept: CV DIAGNOSTICS | Age: 76
Discharge: HOME | End: 2021-03-23
Attending: INTERNAL MEDICINE
Payer: MEDICARE

## 2021-03-23 DIAGNOSIS — I10 ESSENTIAL HYPERTENSION, BENIGN: ICD-10-CM

## 2021-03-23 DIAGNOSIS — I72.4 ANEURYSM OF POPLITEAL ARTERY (HCC): ICD-10-CM

## 2021-03-23 DIAGNOSIS — E78.00 PURE HYPERCHOLESTEROLEMIA: ICD-10-CM

## 2021-03-23 DIAGNOSIS — M79.606 PAIN OF LOWER EXTREMITY, UNSPECIFIED LATERALITY: ICD-10-CM

## 2021-03-23 PROCEDURE — 93306 TTE W/DOPPLER COMPLETE: CPT | Performed by: INTERNAL MEDICINE

## 2021-03-23 PROCEDURE — 93925 LOWER EXTREMITY STUDY: CPT | Performed by: INTERNAL MEDICINE

## 2021-03-25 ENCOUNTER — LAB ENCOUNTER (OUTPATIENT)
Dept: LAB | Age: 76
End: 2021-03-25
Attending: FAMILY MEDICINE
Payer: MEDICARE

## 2021-03-25 DIAGNOSIS — N18.30 CKD (CHRONIC KIDNEY DISEASE) STAGE 3, GFR 30-59 ML/MIN (HCC): ICD-10-CM

## 2021-03-25 DIAGNOSIS — D69.6 THROMBOCYTOPENIA (HCC): ICD-10-CM

## 2021-03-25 DIAGNOSIS — E78.00 HYPERCHOLESTEROLEMIA: ICD-10-CM

## 2021-03-25 DIAGNOSIS — I65.23 BILATERAL CAROTID ARTERY STENOSIS: ICD-10-CM

## 2021-03-25 DIAGNOSIS — R73.9 HYPERGLYCEMIA: ICD-10-CM

## 2021-03-25 DIAGNOSIS — I77.9 BILATERAL CAROTID ARTERY DISEASE, UNSPECIFIED TYPE (HCC): ICD-10-CM

## 2021-03-25 DIAGNOSIS — I77.9 ARTERIAL DISEASE (HCC): ICD-10-CM

## 2021-03-25 DIAGNOSIS — I10 ESSENTIAL HYPERTENSION: ICD-10-CM

## 2021-03-25 LAB
ALBUMIN SERPL-MCNC: 3.7 G/DL (ref 3.4–5)
ALBUMIN/GLOB SERPL: 1.1 {RATIO} (ref 1–2)
ALP LIVER SERPL-CCNC: 46 U/L
ALT SERPL-CCNC: 14 U/L
ANION GAP SERPL CALC-SCNC: 2 MMOL/L (ref 0–18)
AST SERPL-CCNC: 16 U/L (ref 15–37)
BASOPHILS # BLD AUTO: 0.01 X10(3) UL (ref 0–0.2)
BASOPHILS NFR BLD AUTO: 0.2 %
BILIRUB SERPL-MCNC: 0.7 MG/DL (ref 0.1–2)
BUN BLD-MCNC: 26 MG/DL (ref 7–18)
BUN/CREAT SERPL: 21.7 (ref 10–20)
CALCIUM BLD-MCNC: 9.3 MG/DL (ref 8.5–10.1)
CHLORIDE SERPL-SCNC: 109 MMOL/L (ref 98–112)
CHOLEST SMN-MCNC: 159 MG/DL (ref ?–200)
CO2 SERPL-SCNC: 30 MMOL/L (ref 21–32)
CREAT BLD-MCNC: 1.2 MG/DL
DEPRECATED RDW RBC AUTO: 42.9 FL (ref 35.1–46.3)
EOSINOPHIL # BLD AUTO: 0.1 X10(3) UL (ref 0–0.7)
EOSINOPHIL NFR BLD AUTO: 2.3 %
ERYTHROCYTE [DISTWIDTH] IN BLOOD BY AUTOMATED COUNT: 12 % (ref 11–15)
EST. AVERAGE GLUCOSE BLD GHB EST-MCNC: 120 MG/DL (ref 68–126)
GLOBULIN PLAS-MCNC: 3.4 G/DL (ref 2.8–4.4)
GLUCOSE BLD-MCNC: 97 MG/DL (ref 70–99)
HBA1C MFR BLD HPLC: 5.8 % (ref ?–5.7)
HCT VFR BLD AUTO: 37.6 %
HDLC SERPL-MCNC: 58 MG/DL (ref 40–59)
HGB BLD-MCNC: 12.7 G/DL
IMM GRANULOCYTES # BLD AUTO: 0.01 X10(3) UL (ref 0–1)
IMM GRANULOCYTES NFR BLD: 0.2 %
LDLC SERPL CALC-MCNC: 73 MG/DL (ref ?–100)
LYMPHOCYTES # BLD AUTO: 1.2 X10(3) UL (ref 1–4)
LYMPHOCYTES NFR BLD AUTO: 27.9 %
M PROTEIN MFR SERPL ELPH: 7.1 G/DL (ref 6.4–8.2)
MCH RBC QN AUTO: 33.2 PG (ref 26–34)
MCHC RBC AUTO-ENTMCNC: 33.8 G/DL (ref 31–37)
MCV RBC AUTO: 98.2 FL
MONOCYTES # BLD AUTO: 0.41 X10(3) UL (ref 0.1–1)
MONOCYTES NFR BLD AUTO: 9.5 %
NEUTROPHILS # BLD AUTO: 2.57 X10 (3) UL (ref 1.5–7.7)
NEUTROPHILS # BLD AUTO: 2.57 X10(3) UL (ref 1.5–7.7)
NEUTROPHILS NFR BLD AUTO: 59.9 %
NONHDLC SERPL-MCNC: 101 MG/DL (ref ?–130)
OSMOLALITY SERPL CALC.SUM OF ELEC: 297 MOSM/KG (ref 275–295)
PATIENT FASTING Y/N/NP: YES
PATIENT FASTING Y/N/NP: YES
PLATELET # BLD AUTO: 143 10(3)UL (ref 150–450)
POTASSIUM SERPL-SCNC: 3.8 MMOL/L (ref 3.5–5.1)
RBC # BLD AUTO: 3.83 X10(6)UL
SODIUM SERPL-SCNC: 141 MMOL/L (ref 136–145)
T4 FREE SERPL-MCNC: 1 NG/DL (ref 0.8–1.7)
TRIGL SERPL-MCNC: 139 MG/DL (ref 30–149)
TSI SER-ACNC: 1.25 MIU/ML (ref 0.36–3.74)
VLDLC SERPL CALC-MCNC: 28 MG/DL (ref 0–30)
WBC # BLD AUTO: 4.3 X10(3) UL (ref 4–11)

## 2021-03-25 PROCEDURE — 84443 ASSAY THYROID STIM HORMONE: CPT

## 2021-03-25 PROCEDURE — 80061 LIPID PANEL: CPT

## 2021-03-25 PROCEDURE — 85025 COMPLETE CBC W/AUTO DIFF WBC: CPT

## 2021-03-25 PROCEDURE — 83036 HEMOGLOBIN GLYCOSYLATED A1C: CPT

## 2021-03-25 PROCEDURE — 36415 COLL VENOUS BLD VENIPUNCTURE: CPT

## 2021-03-25 PROCEDURE — 84439 ASSAY OF FREE THYROXINE: CPT

## 2021-03-25 PROCEDURE — 80053 COMPREHEN METABOLIC PANEL: CPT

## 2021-03-26 ENCOUNTER — HOSPITAL ENCOUNTER (OUTPATIENT)
Dept: BONE DENSITY | Age: 76
Discharge: HOME | End: 2021-03-26
Attending: FAMILY MEDICINE
Payer: MEDICARE

## 2021-03-26 ENCOUNTER — HOSPITAL ENCOUNTER (OUTPATIENT)
Dept: GENERAL RADIOLOGY | Age: 76
Discharge: HOME | End: 2021-03-26
Attending: FAMILY MEDICINE
Payer: MEDICARE

## 2021-03-26 DIAGNOSIS — G20 PD (PARKINSON'S DISEASE) (HCC): ICD-10-CM

## 2021-03-26 DIAGNOSIS — M79.604 PAIN IN BOTH LOWER EXTREMITIES: ICD-10-CM

## 2021-03-26 DIAGNOSIS — R26.9 ABNORMAL GAIT: ICD-10-CM

## 2021-03-26 DIAGNOSIS — M54.50 CHRONIC BILATERAL LOW BACK PAIN, UNSPECIFIED WHETHER SCIATICA PRESENT: ICD-10-CM

## 2021-03-26 DIAGNOSIS — G89.29 CHRONIC BILATERAL LOW BACK PAIN, UNSPECIFIED WHETHER SCIATICA PRESENT: ICD-10-CM

## 2021-03-26 DIAGNOSIS — M79.605 PAIN IN BOTH LOWER EXTREMITIES: ICD-10-CM

## 2021-03-26 DIAGNOSIS — Z91.81 AT RISK FOR FALLING: ICD-10-CM

## 2021-03-26 PROCEDURE — 72110 X-RAY EXAM L-2 SPINE 4/>VWS: CPT | Performed by: FAMILY MEDICINE

## 2021-03-26 PROCEDURE — 77080 DXA BONE DENSITY AXIAL: CPT | Performed by: FAMILY MEDICINE

## 2021-03-29 ENCOUNTER — TELEPHONE (OUTPATIENT)
Dept: CARDIOLOGY | Age: 76
End: 2021-03-29

## 2021-03-30 ENCOUNTER — TELEPHONE (OUTPATIENT)
Dept: FAMILY MEDICINE CLINIC | Facility: CLINIC | Age: 76
End: 2021-03-30

## 2021-03-30 DIAGNOSIS — R59.0 INGUINAL LYMPHADENOPATHY: Primary | ICD-10-CM

## 2021-03-30 NOTE — TELEPHONE ENCOUNTER
Patient states Dr. Shakeel Hernandez office said something about a lymph node. Noted on [facility] Ex from 3/23/21.      Impression:   1.  Minimally ectatic left popliteal artery measuring 1.1 x 1.0 cm and right popliteal artery measuring 1.0 cm x 0.9 cm.    2.  N

## 2021-03-30 NOTE — TELEPHONE ENCOUNTER
Pt states he got a call from Dr. Jennifer Barroso office regarding his test results and was advised to follow up with Dr. Billie SUN, no available appt until 4/23, pt wants to be seen sooner per other providers request.

## 2021-03-31 NOTE — TELEPHONE ENCOUNTER
Recommend CT of abdomen and pelvis to further evaluate the reported enlarged inguinal lymph node and possible other lymph nodes of the abdomen and pelvis. The order has been placed. Further recommendations and next steps pending results of the CT scan.

## 2021-04-01 ENCOUNTER — APPOINTMENT (OUTPATIENT)
Dept: CARDIOLOGY | Age: 76
End: 2021-04-01

## 2021-04-01 ENCOUNTER — TELEPHONE (OUTPATIENT)
Dept: FAMILY MEDICINE CLINIC | Facility: CLINIC | Age: 76
End: 2021-04-01

## 2021-04-01 DIAGNOSIS — R59.0 INGUINAL LYMPHADENOPATHY: Primary | ICD-10-CM

## 2021-04-01 NOTE — TELEPHONE ENCOUNTER
CT department called regarding order for CT ABD Pelvis. State they recommend order for CT ABD/Pelvis with contrast for dx lymphadenopathy. They will use oral and IV contrast for this test.  Pended the test they recommended. Please advise.

## 2021-04-01 NOTE — TELEPHONE ENCOUNTER
Yes, because they say they would have to scan without contrast, then have him drink the oral, and wait for 90 min and then scan again with the oral and IV contrast.  And, they really do not need the \"without\" scan.

## 2021-04-02 ENCOUNTER — TELEPHONE (OUTPATIENT)
Dept: NEUROLOGY | Facility: CLINIC | Age: 76
End: 2021-04-02

## 2021-04-02 DIAGNOSIS — G20 PD (PARKINSON'S DISEASE) (HCC): ICD-10-CM

## 2021-04-02 RX ORDER — LOSARTAN POTASSIUM 100 MG/1
TABLET ORAL
Qty: 90 TABLET | Refills: 3 | Status: SHIPPED | OUTPATIENT
Start: 2021-04-02

## 2021-04-02 RX ORDER — CARVEDILOL 3.12 MG/1
TABLET ORAL
Qty: 180 TABLET | Refills: 3 | Status: SHIPPED | OUTPATIENT
Start: 2021-04-02

## 2021-04-02 NOTE — TELEPHONE ENCOUNTER
Spoke with  Faustino Pizano at [facility] who states patient got a 90 day supply on 6/23/2020 and then another 90 day supply in July 2020. Spoke with patient who states he has plenty of Carbidopa Levadopa at home and does not need refill at this time.

## 2021-04-02 NOTE — TELEPHONE ENCOUNTER
[facility] of [place] CT informing them. Also, phoned central scheduling to change scheduled exam to the CT abd/pelvis contrast only.

## 2021-04-06 ENCOUNTER — OFFICE VISIT (OUTPATIENT)
Dept: NEUROLOGY | Facility: CLINIC | Age: 76
End: 2021-04-06
Payer: COMMERCIAL

## 2021-04-06 VITALS — RESPIRATION RATE: 16 BRPM | SYSTOLIC BLOOD PRESSURE: 128 MMHG | HEART RATE: 70 BPM | DIASTOLIC BLOOD PRESSURE: 64 MMHG

## 2021-04-06 DIAGNOSIS — H53.2 DIPLOPIA: Primary | ICD-10-CM

## 2021-04-06 DIAGNOSIS — G20 PD (PARKINSON'S DISEASE) (HCC): ICD-10-CM

## 2021-04-06 DIAGNOSIS — R41.3 MEMORY LOSS: ICD-10-CM

## 2021-04-06 PROCEDURE — 3078F DIAST BP <80 MM HG: CPT | Performed by: OTHER

## 2021-04-06 PROCEDURE — 99214 OFFICE O/P EST MOD 30 MIN: CPT | Performed by: OTHER

## 2021-04-06 PROCEDURE — 3074F SYST BP LT 130 MM HG: CPT | Performed by: OTHER

## 2021-04-06 NOTE — PROGRESS NOTES
[facility] Neurology outpatient progress note  Date of service: 4/6/2021    Patient here to follow up re: his gait and tremors in hands (right worse than left), tremors and gait are improved with sinemet, no side effect reported, pt reported intermittent horizont Pyridoxine HCl (VITAMIN B-6) 100 MG Oral Tab, Take 100 mg by mouth once a week.  , Disp: , Rfl:   •  hydrochlorothiazide 25 MG Oral Tab, Take 25 mg by mouth daily.   , Disp: , Rfl:   •  Cholecalciferol (D 2000) 2000 units Oral Tab, Take 2,000 Units by mouth Prostate CA [facility])    • Thrombocytopenia ([facility] 7/14/2018   • Torn ligament     right   • Vocal cord dysfunction     errosion     Past Surgical History:   Procedure Laterality Date   • ANKLE FRACTURE SURGERY     • HDR ELECT BRACHYTHERAPY  2006   • KNEE REPLA plan.  Discussed with patient in detail regarding the adverse and side effects of the medications.   RTC 3 -4 months (after neuropsych test, may start aricept if dementia is confirmed)  Pt should go ER for any new or worsening symptoms and contact office

## 2021-04-09 ENCOUNTER — HOSPITAL ENCOUNTER (OUTPATIENT)
Dept: CT IMAGING | Age: 76
End: 2021-04-09
Attending: FAMILY MEDICINE
Payer: MEDICARE

## 2021-04-09 ENCOUNTER — HOSPITAL ENCOUNTER (OUTPATIENT)
Dept: CT IMAGING | Age: 76
Discharge: HOME | End: 2021-04-09
Attending: FAMILY MEDICINE
Payer: MEDICARE

## 2021-04-09 DIAGNOSIS — R59.0 INGUINAL LYMPHADENOPATHY: ICD-10-CM

## 2021-04-09 PROCEDURE — 74177 CT ABD & PELVIS W/CONTRAST: CPT | Performed by: FAMILY MEDICINE

## 2021-06-10 ENCOUNTER — OFFICE VISIT (OUTPATIENT)
Dept: FAMILY MEDICINE CLINIC | Facility: CLINIC | Age: 76
End: 2021-06-10
Payer: COMMERCIAL

## 2021-06-10 VITALS
BODY MASS INDEX: 30.81 KG/M2 | WEIGHT: 208 LBS | DIASTOLIC BLOOD PRESSURE: 70 MMHG | HEIGHT: 69 IN | HEART RATE: 61 BPM | SYSTOLIC BLOOD PRESSURE: 122 MMHG | TEMPERATURE: 99 F

## 2021-06-10 DIAGNOSIS — I65.23 BILATERAL CAROTID ARTERY STENOSIS: ICD-10-CM

## 2021-06-10 DIAGNOSIS — R13.10 DYSPHAGIA, UNSPECIFIED TYPE: ICD-10-CM

## 2021-06-10 DIAGNOSIS — R93.1 AGATSTON CAC SCORE 100-199: ICD-10-CM

## 2021-06-10 DIAGNOSIS — Z85.46 HISTORY OF PROSTATE CANCER: ICD-10-CM

## 2021-06-10 DIAGNOSIS — G20 PD (PARKINSON'S DISEASE) (HCC): ICD-10-CM

## 2021-06-10 DIAGNOSIS — I72.4 ANEURYSM OF POPLITEAL ARTERY (HCC): ICD-10-CM

## 2021-06-10 DIAGNOSIS — Z91.81 AT RISK FOR FALLING: ICD-10-CM

## 2021-06-10 DIAGNOSIS — K11.7 SIALORRHEA: ICD-10-CM

## 2021-06-10 DIAGNOSIS — Z99.89 OSA ON CPAP: ICD-10-CM

## 2021-06-10 DIAGNOSIS — I10 ESSENTIAL HYPERTENSION: ICD-10-CM

## 2021-06-10 DIAGNOSIS — I77.9 ARTERIAL DISEASE (HCC): ICD-10-CM

## 2021-06-10 DIAGNOSIS — M54.50 CHRONIC BILATERAL LOW BACK PAIN WITHOUT SCIATICA: ICD-10-CM

## 2021-06-10 DIAGNOSIS — Z00.00 MEDICARE ANNUAL WELLNESS VISIT, SUBSEQUENT: Primary | ICD-10-CM

## 2021-06-10 DIAGNOSIS — G47.33 OSA ON CPAP: ICD-10-CM

## 2021-06-10 DIAGNOSIS — E66.09 CLASS 1 OBESITY DUE TO EXCESS CALORIES WITH SERIOUS COMORBIDITY AND BODY MASS INDEX (BMI) OF 30.0 TO 30.9 IN ADULT: ICD-10-CM

## 2021-06-10 DIAGNOSIS — I77.9 BILATERAL CAROTID ARTERY DISEASE, UNSPECIFIED TYPE (HCC): ICD-10-CM

## 2021-06-10 DIAGNOSIS — Z87.891 FORMER SMOKER: ICD-10-CM

## 2021-06-10 DIAGNOSIS — G89.29 CHRONIC BILATERAL LOW BACK PAIN WITHOUT SCIATICA: ICD-10-CM

## 2021-06-10 DIAGNOSIS — N18.31 STAGE 3A CHRONIC KIDNEY DISEASE (HCC): ICD-10-CM

## 2021-06-10 DIAGNOSIS — R73.9 HYPERGLYCEMIA: ICD-10-CM

## 2021-06-10 DIAGNOSIS — D69.6 THROMBOCYTOPENIA (HCC): ICD-10-CM

## 2021-06-10 DIAGNOSIS — H40.1131 PRIMARY OPEN ANGLE GLAUCOMA (POAG) OF BOTH EYES, MILD STAGE: ICD-10-CM

## 2021-06-10 DIAGNOSIS — E78.00 HYPERCHOLESTEROLEMIA: ICD-10-CM

## 2021-06-10 DIAGNOSIS — R73.03 PREDIABETES: ICD-10-CM

## 2021-06-10 DIAGNOSIS — R26.9 ABNORMAL GAIT: ICD-10-CM

## 2021-06-10 DIAGNOSIS — R19.8 GLOBUS SENSATION: ICD-10-CM

## 2021-06-10 DIAGNOSIS — D64.9 ANEMIA, UNSPECIFIED TYPE: ICD-10-CM

## 2021-06-10 PROBLEM — N18.30 CKD (CHRONIC KIDNEY DISEASE) STAGE 3, GFR 30-59 ML/MIN (HCC): Chronic | Status: RESOLVED | Noted: 2019-06-17 | Resolved: 2021-06-10

## 2021-06-10 PROCEDURE — 96160 PT-FOCUSED HLTH RISK ASSMT: CPT | Performed by: FAMILY MEDICINE

## 2021-06-10 PROCEDURE — G0439 PPPS, SUBSEQ VISIT: HCPCS | Performed by: FAMILY MEDICINE

## 2021-06-10 PROCEDURE — 3078F DIAST BP <80 MM HG: CPT | Performed by: FAMILY MEDICINE

## 2021-06-10 PROCEDURE — 3008F BODY MASS INDEX DOCD: CPT | Performed by: FAMILY MEDICINE

## 2021-06-10 PROCEDURE — 99397 PER PM REEVAL EST PAT 65+ YR: CPT | Performed by: FAMILY MEDICINE

## 2021-06-10 PROCEDURE — 3074F SYST BP LT 130 MM HG: CPT | Performed by: FAMILY MEDICINE

## 2021-06-10 RX ORDER — OMEPRAZOLE 40 MG/1
40 CAPSULE, DELAYED RELEASE ORAL DAILY
COMMUNITY
Start: 2021-03-29

## 2021-06-10 NOTE — PATIENT INSTRUCTIONS
-Additional labs ordered due to mild anemia, please fast 8 hours and drink plenty of water including the morning of the blood draw.     -Referrals placed for tracking purposes.    -Please make a sooner appointment to see Dr. Lucie Villegas Colonoscopy   Covered every 10 years    Covered every 2 years if patient is at high risk or previous colonoscopy was abnormal -    Colonoscopy Never done    Flexible Sigmoidoscopy   Covered every 4 years -    Fecal Occult Blood Test Covered annually -   Pr site has a lot of good information including definitions of the different types of Advance Directives.  It also has the State forms available on it's website for anyone to review and print using their home computer and printer. (the forms are also available Islander  Diagnosing prediabetes  Prediabetes may have no symptoms. Or you may have some of the symptoms of diabetes (see below).  The diagnosis is made with a blood test. You may have 1 or more of these blood tests:    · Fasting glucose test. Blood is take prediabetes can turn into diabetes. This is a serious health condition. Take steps to stop this from happening. Follow the treatment plan you have been given. You may have your blood glucose tested again in about 12 to 18 months.    Diabetes symptoms   Let

## 2021-06-10 NOTE — PROGRESS NOTES
HPI:   Skye Cross is a 76year old male who presents for a MA (Medicare Advantage) Supervisit (Once per calendar year). Has appt with uro in approx 2 weeks. Wt Readings from Last 6 Encounters:  06/10/21 : 208 lb (94.3 kg)  03/18/21 : 210 lb (95. planning including the explanation and discussion of advance directives standard forms performed Face to Face with patient and Family/surrogate (if present), and forms available to patient in AVS       He smoked tobacco in the past but quit greater than 12 Encounters:  06/10/21 : 208 lb (94.3 kg)  03/18/21 : 210 lb (95.3 kg)  09/16/20 : 210 lb (95.3 kg)     Last Cholesterol Labs:   Lab Results   Component Value Date    CHOLEST 159 03/25/2021    HDL 58 03/25/2021    LDL 73 03/25/2021    TRIG 139 03/25/2021 (D 2000) 2000 units Oral Tab, Take 2,000 Units by mouth.  losartan 100 MG Oral Tab, Take 100 mg by mouth every morning. carvedilol 12.5 MG Oral Tab, Take 12.5 mg by mouth 2 (two) times daily with meals.          MEDICAL INFORMATION:   He  has a past medi smokeless tobacco. He reports current alcohol use. He reports that he does not use drugs.      REVIEW OF SYSTEMS:   GENERAL: feels well  overallotherwise  SKIN: denies any unusual skin lesions  EYES: denies blurred vision or double vision  HEENT: denies yandel normal, no murmur, rub or gallop   Abdomen:   Soft, no masses, no organomegaly           Extremities: Extremities normal, atraumatic, no cyanosis or edema           Lymph nodes: No cervical or supraclavicular adenopathy.    Neurologic: Alert and oriented x3 wellness visit, subsequent  Patient provided info on prevention, healthcare power of , and living will. (Cardiovascular)  2. Arterial disease ([facility]  3. Agatston CAC score 100-199  4. Bilateral carotid artery disease, unspecified type ([facility]  5.  B obesity due to excess calories with serious comorbidity and body mass index (BMI) of 30.0 to 30.9 in adult  Recommend weight loss. Recommend healthy diet and regular exercise if able.       Orders Placed This Encounter      Iron And Tibc [E]      Ferritin understanding of these issues and agrees to the plan. Reinforced healthy diet, lifestyle, and exercise. Return in about 1 year (around 6/10/2022) for Medicare wellness visit/super visit. Sooner if needed. Shivani Reed DO, 6/10/2021     Edita Arndt family history -     Colorectal Cancer Screening  Covered for ages 52-80; only need ONE of the following:    Colonoscopy   Covered every 10 years    Covered every 2 years if patient is at high risk or previous colonoscopy was abnormal 03/04/2014    Keven

## 2021-06-12 PROBLEM — Z85.46 HISTORY OF PROSTATE CANCER: Status: ACTIVE | Noted: 2021-06-12

## 2021-06-12 PROBLEM — R73.03 PREDIABETES: Status: ACTIVE | Noted: 2021-06-12

## 2021-06-12 PROBLEM — D64.9 ANEMIA: Status: ACTIVE | Noted: 2021-06-12

## 2021-06-12 PROBLEM — E66.09 CLASS 1 OBESITY DUE TO EXCESS CALORIES WITH SERIOUS COMORBIDITY AND BODY MASS INDEX (BMI) OF 30.0 TO 30.9 IN ADULT: Status: ACTIVE | Noted: 2021-06-12

## 2021-06-12 PROBLEM — E66.811 CLASS 1 OBESITY DUE TO EXCESS CALORIES WITH SERIOUS COMORBIDITY AND BODY MASS INDEX (BMI) OF 30.0 TO 30.9 IN ADULT: Status: ACTIVE | Noted: 2021-06-12

## 2021-06-16 ENCOUNTER — LAB ENCOUNTER (OUTPATIENT)
Dept: LAB | Age: 76
End: 2021-06-16
Attending: FAMILY MEDICINE
Payer: MEDICARE

## 2021-06-16 DIAGNOSIS — D64.9 ANEMIA, UNSPECIFIED TYPE: ICD-10-CM

## 2021-06-16 PROCEDURE — 83540 ASSAY OF IRON: CPT

## 2021-06-16 PROCEDURE — 83550 IRON BINDING TEST: CPT

## 2021-06-16 PROCEDURE — 36415 COLL VENOUS BLD VENIPUNCTURE: CPT

## 2021-06-16 PROCEDURE — 82607 VITAMIN B-12: CPT

## 2021-06-16 PROCEDURE — 82728 ASSAY OF FERRITIN: CPT

## 2021-06-16 PROCEDURE — 82746 ASSAY OF FOLIC ACID SERUM: CPT

## 2021-07-14 ENCOUNTER — TELEPHONE (OUTPATIENT)
Dept: CARDIOLOGY | Age: 76
End: 2021-07-14

## 2021-07-19 ENCOUNTER — TELEPHONE (OUTPATIENT)
Dept: CARDIOLOGY | Age: 76
End: 2021-07-19

## 2021-07-19 ENCOUNTER — TELEPHONE (OUTPATIENT)
Dept: NEUROLOGY | Facility: CLINIC | Age: 76
End: 2021-07-19

## 2021-07-19 NOTE — TELEPHONE ENCOUNTER
Please relay above to pt, if pt ok, we can wean off sinemet to bid x 1 week, then daily one week before completely stop, other option is limited but there are some may help PD. Will discuss next office visit.

## 2021-07-19 NOTE — TELEPHONE ENCOUNTER
Spoke with patient and relayed message from Dr. Cristopher Paul. Pt agreeable to weaning off medication. Instructions provided. Pt does not currently have a f/u appt scheduled. Pt preferred to schedule an appt after Sept 1. Appt scheduled on 9/3/21.   Advised pa

## 2021-07-19 NOTE — TELEPHONE ENCOUNTER
Rec'd fax from [facility] with notification of a potential clinical concern of drug-disease interaction between glaucoma and carbidopa-levodopa.     \"The use of carbidopa-levodopa is contraindicated in patients with narrow-angle glaucoma (closed-ang

## 2021-07-29 ENCOUNTER — TELEPHONE (OUTPATIENT)
Dept: NEUROLOGY | Facility: CLINIC | Age: 76
End: 2021-07-29

## 2021-08-16 ENCOUNTER — TELEPHONE (OUTPATIENT)
Dept: FAMILY MEDICINE CLINIC | Facility: CLINIC | Age: 76
End: 2021-08-16

## 2021-08-16 RX ORDER — SIMVASTATIN 20 MG
20 TABLET ORAL NIGHTLY
Qty: 90 TABLET | Refills: 0 | Status: SHIPPED | OUTPATIENT
Start: 2021-08-16 | End: 2021-11-04

## 2021-08-16 NOTE — TELEPHONE ENCOUNTER
Reached patient for medication adherence consult. Patient is past due for refills on losartan and simvastatin; last filled 4/2/21 for 90 day supply.  Patient confirms that he is running low on both of these and did consent for me to refill at local phar

## 2021-08-16 NOTE — TELEPHONE ENCOUNTER
Reached patient and let him know the losartan and simvastatin were sent to the pharmacy and should be ready for  later this afternoon.      Did also remind patient his future refills on losartan and simvastatin should come from cardiologist, Dr Merary Andrade

## 2021-08-16 NOTE — TELEPHONE ENCOUNTER
Please inform patient that the simvastatin and losartan was refilled by our office as a courtesy, patient should obtain future refills from his cardiologist, Dr. Francesca Ortez.     Also, please update patient's medication list for the refill on the losartan, I do n

## 2021-08-16 NOTE — TELEPHONE ENCOUNTER
Call to [facility] to request refill on losartan and simvastatin; losartan refilled and simvastatin needs a new prescription sent over. Will pend for PCP review.

## 2021-09-03 ENCOUNTER — OFFICE VISIT (OUTPATIENT)
Dept: NEUROLOGY | Facility: CLINIC | Age: 76
End: 2021-09-03
Payer: COMMERCIAL

## 2021-09-03 VITALS
RESPIRATION RATE: 18 BRPM | DIASTOLIC BLOOD PRESSURE: 62 MMHG | HEART RATE: 72 BPM | SYSTOLIC BLOOD PRESSURE: 116 MMHG | WEIGHT: 207 LBS | HEIGHT: 69 IN | BODY MASS INDEX: 30.66 KG/M2

## 2021-09-03 DIAGNOSIS — G20 PD (PARKINSON'S DISEASE) (HCC): ICD-10-CM

## 2021-09-03 PROCEDURE — 3074F SYST BP LT 130 MM HG: CPT | Performed by: OTHER

## 2021-09-03 PROCEDURE — 3008F BODY MASS INDEX DOCD: CPT | Performed by: OTHER

## 2021-09-03 PROCEDURE — 99214 OFFICE O/P EST MOD 30 MIN: CPT | Performed by: OTHER

## 2021-09-03 PROCEDURE — 3078F DIAST BP <80 MM HG: CPT | Performed by: OTHER

## 2021-09-03 RX ORDER — DONEPEZIL HYDROCHLORIDE 5 MG/1
TABLET, FILM COATED ORAL
Qty: 90 TABLET | Refills: 0 | OUTPATIENT
Start: 2021-09-03

## 2021-09-03 RX ORDER — DONEPEZIL HYDROCHLORIDE 5 MG/1
5 TABLET, FILM COATED ORAL NIGHTLY
Qty: 30 TABLET | Refills: 1 | Status: SHIPPED | OUTPATIENT
Start: 2021-09-03

## 2021-09-03 NOTE — TELEPHONE ENCOUNTER
Pharmacy requesting a 90-day supply. This is a new medication, and 90 day supply is not appropriate at this time. Request refused.

## 2021-09-03 NOTE — PROGRESS NOTES
[facility] Neurology Outpatient Progress Note  Date of service: 9/3/2021    Patient here for a follow-up visit for PD and memory loss. Since last visit he seems overall stable. Patient present today with wife.  Wife states the patient is still experiencing tremor Disp: , Rfl:   •  folic acid 822 MCG Oral Tab, Take 400 mcg by mouth once a week.  , Disp: , Rfl:   •  Vitamin B-12 1000 MCG Oral Tab, Take 1,000 mcg by mouth once a week.  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 The patient indicates understanding of these issues and agrees with the plan.   Discussed with carl

## 2021-09-03 NOTE — PROGRESS NOTES
LOV 4/6/21 Parkinson's f/u- Patient present today with wife. Wife states the patient is still experiencing tremors, forgetfulness, & confusion. Patient is good at routines but if there is change in routine he has difficulty.  Patient states he has trouble w

## 2021-11-04 RX ORDER — SIMVASTATIN 20 MG
TABLET ORAL
Qty: 90 TABLET | Refills: 1 | Status: SHIPPED | OUTPATIENT
Start: 2021-11-04

## 2021-11-04 NOTE — TELEPHONE ENCOUNTER
Requested Prescriptions     Signed Prescriptions Disp Refills   • SIMVASTATIN 20 MG Oral Tab 90 tablet 1     Sig: TAKE 1 TABLET(20 MG) BY MOUTH EVERY NIGHT     Authorizing Provider: Yanni Colindres     Ordering User: Nicholas Argueta     Met protocol.  Refill

## 2022-01-01 LAB — AMB EXT COVID-19 RESULT: DETECTED

## 2022-01-06 ENCOUNTER — TELEPHONE (OUTPATIENT)
Dept: FAMILY MEDICINE CLINIC | Facility: CLINIC | Age: 77
End: 2022-01-06

## 2022-01-06 NOTE — TELEPHONE ENCOUNTER
The patient stated that his wife developed a \"cold\" around Thanksgiving and then he developed a cough and body aches on 12/31/21.   He had a positive home COVID test that he picked up form [facility] on 1/1/22 and 1/2/22 and then he also got tested at a [facility]

## 2022-01-06 NOTE — TELEPHONE ENCOUNTER
Spoke to patient. Advised he can take musinex for his cough. Rest, and drink plenty of fluids. He EDUARDO.

## 2022-01-18 ENCOUNTER — TELEPHONE (OUTPATIENT)
Dept: NEUROLOGY | Facility: CLINIC | Age: 77
End: 2022-01-18

## 2022-01-25 RX ORDER — DONEPEZIL HYDROCHLORIDE 5 MG/1
TABLET, FILM COATED ORAL
Qty: 30 TABLET | Refills: 1 | OUTPATIENT
Start: 2022-01-25

## 2022-01-25 NOTE — TELEPHONE ENCOUNTER
Per Epic review, no return call regarding medication. Also per [facility] medication report in Epic appears to be prescribed by Dr. Alysha Street most recently.

## 2022-02-15 ENCOUNTER — LAB ENCOUNTER (OUTPATIENT)
Dept: LAB | Age: 77
End: 2022-02-15
Attending: FAMILY MEDICINE
Payer: MEDICARE

## 2022-02-15 ENCOUNTER — HOSPITAL ENCOUNTER (OUTPATIENT)
Dept: CV DIAGNOSTICS | Age: 77
Discharge: HOME | End: 2022-02-15
Attending: INTERNAL MEDICINE
Payer: MEDICARE

## 2022-02-15 DIAGNOSIS — I10 HTN (HYPERTENSION): ICD-10-CM

## 2022-02-15 DIAGNOSIS — R93.1 AGATSTON CORONARY ARTERY CALCIUM SCORE BETWEEN 100 AND 199: ICD-10-CM

## 2022-02-15 DIAGNOSIS — R06.09 OTHER FORMS OF DYSPNEA: ICD-10-CM

## 2022-02-15 DIAGNOSIS — E78.00 PURE HYPERCHOLESTEROLEMIA: ICD-10-CM

## 2022-02-15 DIAGNOSIS — R06.09 PLATYPNEA-ORTHODEOXIA SYNDROME: Primary | ICD-10-CM

## 2022-02-15 DIAGNOSIS — I10 ESSENTIAL HYPERTENSION, MALIGNANT: ICD-10-CM

## 2022-02-15 DIAGNOSIS — Z86.79 HISTORY OF PERICARDITIS: ICD-10-CM

## 2022-02-15 LAB
ALBUMIN SERPL-MCNC: 4 G/DL (ref 3.4–5)
ALBUMIN/GLOB SERPL: 1.3 {RATIO} (ref 1–2)
ALP LIVER SERPL-CCNC: 61 U/L
ALT SERPL-CCNC: 23 U/L
ANION GAP SERPL CALC-SCNC: 5 MMOL/L (ref 0–18)
AST SERPL-CCNC: 21 U/L (ref 15–37)
BASOPHILS # BLD AUTO: 0.01 X10(3) UL (ref 0–0.2)
BASOPHILS NFR BLD AUTO: 0.2 %
BILIRUB SERPL-MCNC: 0.8 MG/DL (ref 0.1–2)
BUN BLD-MCNC: 32 MG/DL (ref 7–18)
CALCIUM BLD-MCNC: 9.5 MG/DL (ref 8.5–10.1)
CHLORIDE SERPL-SCNC: 107 MMOL/L (ref 98–112)
CHOLEST SERPL-MCNC: 157 MG/DL (ref ?–200)
CO2 SERPL-SCNC: 31 MMOL/L (ref 21–32)
CREAT BLD-MCNC: 1.38 MG/DL
EOSINOPHIL # BLD AUTO: 0.07 X10(3) UL (ref 0–0.7)
EOSINOPHIL NFR BLD AUTO: 1.3 %
ERYTHROCYTE [DISTWIDTH] IN BLOOD BY AUTOMATED COUNT: 12.2 %
FASTING PATIENT LIPID ANSWER: YES
FASTING STATUS PATIENT QL REPORTED: YES
GLOBULIN PLAS-MCNC: 3.2 G/DL (ref 2.8–4.4)
GLUCOSE BLD-MCNC: 101 MG/DL (ref 70–99)
HCT VFR BLD AUTO: 38.9 %
HDLC SERPL-MCNC: 60 MG/DL (ref 40–59)
HGB BLD-MCNC: 12.9 G/DL
IMM GRANULOCYTES # BLD AUTO: 0.01 X10(3) UL (ref 0–1)
IMM GRANULOCYTES NFR BLD: 0.2 %
LDLC SERPL CALC-MCNC: 74 MG/DL (ref ?–100)
LYMPHOCYTES # BLD AUTO: 1.38 X10(3) UL (ref 1–4)
LYMPHOCYTES NFR BLD AUTO: 24.6 %
MCH RBC QN AUTO: 32.5 PG (ref 26–34)
MCHC RBC AUTO-ENTMCNC: 33.2 G/DL (ref 31–37)
MCV RBC AUTO: 98 FL
MONOCYTES # BLD AUTO: 0.48 X10(3) UL (ref 0.1–1)
NEUTROPHILS # BLD AUTO: 3.65 X10 (3) UL (ref 1.5–7.7)
NEUTROPHILS # BLD AUTO: 3.65 X10(3) UL (ref 1.5–7.7)
NEUTROPHILS NFR BLD AUTO: 65.1 %
NONHDLC SERPL-MCNC: 97 MG/DL (ref ?–130)
NT-PROBNP SERPL-MCNC: 117 PG/ML (ref ?–450)
OSMOLALITY SERPL CALC.SUM OF ELEC: 303 MOSM/KG (ref 275–295)
PLATELET # BLD AUTO: 168 10(3)UL (ref 150–450)
POTASSIUM SERPL-SCNC: 4 MMOL/L (ref 3.5–5.1)
PROT SERPL-MCNC: 7.2 G/DL (ref 6.4–8.2)
RBC # BLD AUTO: 3.97 X10(6)UL
SODIUM SERPL-SCNC: 143 MMOL/L (ref 136–145)
TRIGL SERPL-MCNC: 135 MG/DL (ref 30–149)
TSI SER-ACNC: 1.13 MIU/ML (ref 0.36–3.74)
VLDLC SERPL CALC-MCNC: 21 MG/DL (ref 0–30)
WBC # BLD AUTO: 5.6 X10(3) UL (ref 4–11)

## 2022-02-15 PROCEDURE — 93306 TTE W/DOPPLER COMPLETE: CPT | Performed by: INTERNAL MEDICINE

## 2022-02-15 PROCEDURE — 80061 LIPID PANEL: CPT

## 2022-02-15 PROCEDURE — 36415 COLL VENOUS BLD VENIPUNCTURE: CPT

## 2022-02-15 PROCEDURE — 80053 COMPREHEN METABOLIC PANEL: CPT

## 2022-02-15 PROCEDURE — 85025 COMPLETE CBC W/AUTO DIFF WBC: CPT

## 2022-02-15 PROCEDURE — 84443 ASSAY THYROID STIM HORMONE: CPT

## 2022-02-15 PROCEDURE — 83880 ASSAY OF NATRIURETIC PEPTIDE: CPT

## 2022-03-03 ENCOUNTER — HOSPITAL ENCOUNTER (OUTPATIENT)
Dept: ULTRASOUND IMAGING | Age: 77
Discharge: HOME | End: 2022-03-03
Attending: INTERNAL MEDICINE
Payer: MEDICARE

## 2022-03-03 DIAGNOSIS — I72.4 ANEURYSM OF POPLITEAL ARTERY (HCC): ICD-10-CM

## 2022-03-03 PROCEDURE — 76770 US EXAM ABDO BACK WALL COMP: CPT | Performed by: INTERNAL MEDICINE

## 2022-04-05 ENCOUNTER — HOSPITAL ENCOUNTER (OUTPATIENT)
Age: 77
Discharge: ANOTHER HOSPITAL | End: 2022-04-05
Payer: MEDICARE

## 2022-04-05 ENCOUNTER — HOSPITAL ENCOUNTER (EMERGENCY)
Facility: HOSPITAL | Age: 77
Discharge: HOME | End: 2022-04-05
Attending: EMERGENCY MEDICINE
Payer: MEDICARE

## 2022-04-05 ENCOUNTER — APPOINTMENT (OUTPATIENT)
Dept: CT IMAGING | Facility: HOSPITAL | Age: 77
End: 2022-04-05
Attending: EMERGENCY MEDICINE
Payer: MEDICARE

## 2022-04-05 ENCOUNTER — TELEPHONE (OUTPATIENT)
Dept: FAMILY MEDICINE CLINIC | Facility: CLINIC | Age: 77
End: 2022-04-05

## 2022-04-05 VITALS
RESPIRATION RATE: 16 BRPM | HEART RATE: 63 BPM | OXYGEN SATURATION: 98 % | TEMPERATURE: 98 F | SYSTOLIC BLOOD PRESSURE: 184 MMHG | DIASTOLIC BLOOD PRESSURE: 99 MMHG

## 2022-04-05 VITALS
SYSTOLIC BLOOD PRESSURE: 115 MMHG | BODY MASS INDEX: 30.07 KG/M2 | HEIGHT: 69 IN | WEIGHT: 203 LBS | RESPIRATION RATE: 18 BRPM | HEART RATE: 78 BPM | TEMPERATURE: 98 F | OXYGEN SATURATION: 98 % | DIASTOLIC BLOOD PRESSURE: 81 MMHG

## 2022-04-05 DIAGNOSIS — L02.11 NECK ABSCESS: Primary | ICD-10-CM

## 2022-04-05 DIAGNOSIS — L03.221 CELLULITIS OF NECK: ICD-10-CM

## 2022-04-05 DIAGNOSIS — L02.11 ABSCESS OF NECK: Primary | ICD-10-CM

## 2022-04-05 LAB
ALBUMIN SERPL-MCNC: 3.7 G/DL (ref 3.4–5)
ALBUMIN/GLOB SERPL: 0.9 {RATIO} (ref 1–2)
ALP LIVER SERPL-CCNC: 66 U/L
ALT SERPL-CCNC: 13 U/L
ANION GAP SERPL CALC-SCNC: 5 MMOL/L (ref 0–18)
AST SERPL-CCNC: 19 U/L (ref 15–37)
BASOPHILS # BLD AUTO: 0.02 X10(3) UL (ref 0–0.2)
BASOPHILS NFR BLD AUTO: 0.2 %
BILIRUB SERPL-MCNC: 0.6 MG/DL (ref 0.1–2)
BUN BLD-MCNC: 27 MG/DL (ref 7–18)
CALCIUM BLD-MCNC: 9.3 MG/DL (ref 8.5–10.1)
CHLORIDE SERPL-SCNC: 105 MMOL/L (ref 98–112)
CO2 SERPL-SCNC: 30 MMOL/L (ref 21–32)
CREAT BLD-MCNC: 1.06 MG/DL
EOSINOPHIL # BLD AUTO: 0.13 X10(3) UL (ref 0–0.7)
EOSINOPHIL NFR BLD AUTO: 1.6 %
ERYTHROCYTE [DISTWIDTH] IN BLOOD BY AUTOMATED COUNT: 12 %
GLOBULIN PLAS-MCNC: 4.1 G/DL (ref 2.8–4.4)
GLUCOSE BLD-MCNC: 125 MG/DL (ref 70–99)
HCT VFR BLD AUTO: 38.9 %
HGB BLD-MCNC: 12.8 G/DL
IMM GRANULOCYTES # BLD AUTO: 0.01 X10(3) UL (ref 0–1)
IMM GRANULOCYTES NFR BLD: 0.1 %
LYMPHOCYTES # BLD AUTO: 1.46 X10(3) UL (ref 1–4)
LYMPHOCYTES NFR BLD AUTO: 17.9 %
MCH RBC QN AUTO: 32.2 PG (ref 26–34)
MCHC RBC AUTO-ENTMCNC: 32.9 G/DL (ref 31–37)
MCV RBC AUTO: 98 FL
MONOCYTES # BLD AUTO: 0.79 X10(3) UL (ref 0.1–1)
MONOCYTES NFR BLD AUTO: 9.7 %
NEUTROPHILS # BLD AUTO: 5.74 X10 (3) UL (ref 1.5–7.7)
NEUTROPHILS # BLD AUTO: 5.74 X10(3) UL (ref 1.5–7.7)
NEUTROPHILS NFR BLD AUTO: 70.5 %
OSMOLALITY SERPL CALC.SUM OF ELEC: 297 MOSM/KG (ref 275–295)
PLATELET # BLD AUTO: 163 10(3)UL (ref 150–450)
POTASSIUM SERPL-SCNC: 3.3 MMOL/L (ref 3.5–5.1)
PROT SERPL-MCNC: 7.8 G/DL (ref 6.4–8.2)
RBC # BLD AUTO: 3.97 X10(6)UL
SARS-COV-2 RNA RESP QL NAA+PROBE: NOT DETECTED
SODIUM SERPL-SCNC: 140 MMOL/L (ref 136–145)
WBC # BLD AUTO: 8.2 X10(3) UL (ref 4–11)

## 2022-04-05 PROCEDURE — 87070 CULTURE OTHR SPECIMN AEROBIC: CPT | Performed by: EMERGENCY MEDICINE

## 2022-04-05 PROCEDURE — 10061 I&D ABSCESS COMP/MULTIPLE: CPT | Performed by: EMERGENCY MEDICINE

## 2022-04-05 PROCEDURE — 87077 CULTURE AEROBIC IDENTIFY: CPT | Performed by: EMERGENCY MEDICINE

## 2022-04-05 PROCEDURE — 87205 SMEAR GRAM STAIN: CPT | Performed by: EMERGENCY MEDICINE

## 2022-04-05 PROCEDURE — 96365 THER/PROPH/DIAG IV INF INIT: CPT | Performed by: EMERGENCY MEDICINE

## 2022-04-05 PROCEDURE — 99212 OFFICE O/P EST SF 10 MIN: CPT

## 2022-04-05 PROCEDURE — 99284 EMERGENCY DEPT VISIT MOD MDM: CPT | Performed by: EMERGENCY MEDICINE

## 2022-04-05 PROCEDURE — 80053 COMPREHEN METABOLIC PANEL: CPT | Performed by: EMERGENCY MEDICINE

## 2022-04-05 PROCEDURE — 87040 BLOOD CULTURE FOR BACTERIA: CPT | Performed by: EMERGENCY MEDICINE

## 2022-04-05 PROCEDURE — 70491 CT SOFT TISSUE NECK W/DYE: CPT | Performed by: EMERGENCY MEDICINE

## 2022-04-05 PROCEDURE — 99285 EMERGENCY DEPT VISIT HI MDM: CPT | Performed by: EMERGENCY MEDICINE

## 2022-04-05 PROCEDURE — 85025 COMPLETE CBC W/AUTO DIFF WBC: CPT | Performed by: EMERGENCY MEDICINE

## 2022-04-05 PROCEDURE — 96375 TX/PRO/DX INJ NEW DRUG ADDON: CPT | Performed by: EMERGENCY MEDICINE

## 2022-04-05 RX ORDER — IOHEXOL 350 MG/ML
50 INJECTION, SOLUTION INTRAVENOUS
Status: COMPLETED | OUTPATIENT
Start: 2022-04-05 | End: 2022-04-05

## 2022-04-05 RX ORDER — MORPHINE SULFATE 4 MG/ML
4 INJECTION, SOLUTION INTRAMUSCULAR; INTRAVENOUS ONCE
Status: COMPLETED | OUTPATIENT
Start: 2022-04-05 | End: 2022-04-05

## 2022-04-05 RX ORDER — DOXYCYCLINE HYCLATE 100 MG/1
100 CAPSULE ORAL 2 TIMES DAILY
Qty: 20 CAPSULE | Refills: 0 | Status: SHIPPED | OUTPATIENT
Start: 2022-04-05 | End: 2022-04-15

## 2022-04-05 NOTE — TELEPHONE ENCOUNTER
Pt called and said he noticed on Saturday something was developing on his neck and since Saturday it's been getting larger. He said it's large and painful. Dr. Zunilda Pacheco has no openings.

## 2022-04-05 NOTE — ED INITIAL ASSESSMENT (HPI)
Pt arrived to ED via walk-in with c/o abscess on left side of neck. Pt sts the cyst is new and rates the pain 5/10. Cyst is tender and red.

## 2022-04-05 NOTE — ED INITIAL ASSESSMENT (HPI)
Cyst to left side of neck, noticed it last Saturday,per wife it has minimal yellow drainage. Theres redness around the cyst. Patient denies any fevers at home.

## 2022-04-05 NOTE — TELEPHONE ENCOUNTER
Spoke to patient. States he noticed a bite type red spot on Saturday. Since then has grown in size. States red area to neck measures about 4-5 inches and the boil/cyst is about 1-2 inches. Painful. I advised patient to proceed to [address] [place] [address] for evaluation and treatment. He VU.

## 2022-04-06 NOTE — TELEPHONE ENCOUNTER
Please call patient and inform him that after review of his emergency department visit on 4/5/2022 I recommend a consultation with ENT, Dr. Wayne Payan, regarding right thyroid lobe extending substernally with areas of calcification. Please see contact information below, referral placed for tracking purposes.     Provider Address Phone   Allen Campbell MD [facility] [address] [phone]

## 2022-04-07 ENCOUNTER — OFFICE VISIT (OUTPATIENT)
Dept: SURGERY | Facility: CLINIC | Age: 77
End: 2022-04-07
Payer: COMMERCIAL

## 2022-04-07 VITALS — TEMPERATURE: 97 F

## 2022-04-07 DIAGNOSIS — L02.11 NECK ABSCESS: Primary | ICD-10-CM

## 2022-04-07 RX ORDER — AMOXICILLIN AND CLAVULANATE POTASSIUM 875; 125 MG/1; MG/1
1 TABLET, FILM COATED ORAL 2 TIMES DAILY
Qty: 20 TABLET | Refills: 0 | Status: SHIPPED | OUTPATIENT
Start: 2022-04-07 | End: 2022-04-17

## 2022-04-11 ENCOUNTER — OFFICE VISIT (OUTPATIENT)
Dept: SURGERY | Facility: CLINIC | Age: 77
End: 2022-04-11

## 2022-04-11 VITALS
BODY MASS INDEX: 30.07 KG/M2 | HEART RATE: 52 BPM | WEIGHT: 203 LBS | SYSTOLIC BLOOD PRESSURE: 160 MMHG | TEMPERATURE: 97 F | DIASTOLIC BLOOD PRESSURE: 91 MMHG | HEIGHT: 69 IN

## 2022-04-11 DIAGNOSIS — L02.11 ABSCESS OF SKIN OF NECK: Primary | ICD-10-CM

## 2022-04-11 DIAGNOSIS — Z09 FOLLOW-UP EXAM: ICD-10-CM

## 2022-04-11 PROCEDURE — 3077F SYST BP >= 140 MM HG: CPT | Performed by: PHYSICIAN ASSISTANT

## 2022-04-11 PROCEDURE — 3008F BODY MASS INDEX DOCD: CPT | Performed by: PHYSICIAN ASSISTANT

## 2022-04-11 PROCEDURE — 99024 POSTOP FOLLOW-UP VISIT: CPT | Performed by: PHYSICIAN ASSISTANT

## 2022-04-11 PROCEDURE — 3080F DIAST BP >= 90 MM HG: CPT | Performed by: PHYSICIAN ASSISTANT

## 2022-04-12 ENCOUNTER — OFFICE VISIT (OUTPATIENT)
Dept: SURGERY | Facility: CLINIC | Age: 77
End: 2022-04-12
Payer: COMMERCIAL

## 2022-04-12 VITALS
BODY MASS INDEX: 30.07 KG/M2 | DIASTOLIC BLOOD PRESSURE: 83 MMHG | WEIGHT: 203 LBS | HEIGHT: 69 IN | TEMPERATURE: 97 F | SYSTOLIC BLOOD PRESSURE: 167 MMHG

## 2022-04-12 DIAGNOSIS — L02.11 ABSCESS OF SKIN OF NECK: Primary | ICD-10-CM

## 2022-04-12 PROCEDURE — 3008F BODY MASS INDEX DOCD: CPT | Performed by: SURGERY

## 2022-04-12 PROCEDURE — 3077F SYST BP >= 140 MM HG: CPT | Performed by: SURGERY

## 2022-04-12 PROCEDURE — 3079F DIAST BP 80-89 MM HG: CPT | Performed by: SURGERY

## 2022-04-12 PROCEDURE — 99213 OFFICE O/P EST LOW 20 MIN: CPT | Performed by: SURGERY

## 2022-04-21 ENCOUNTER — TELEPHONE (OUTPATIENT)
Dept: FAMILY MEDICINE CLINIC | Facility: CLINIC | Age: 77
End: 2022-04-21

## 2022-04-21 NOTE — TELEPHONE ENCOUNTER
Called patient to reschedule  Upcoming  appointment   Patient very upset with the care and states this is the 3rd time rescheduling     Patient states he does have issues he would like to discuss with doctor no other information given

## 2022-04-21 NOTE — TELEPHONE ENCOUNTER
Spoke with patient. States he just wanted to discuss his labs and discuss his neck abscess. I did advise him Dr. Esdras Cochran was aware of his neck and he said he figured she was aware. He says it is healing nicely. I advised if he has any concerns to please call and I would route to Dr. Esdras Cochran. He VU.

## 2022-04-27 ENCOUNTER — HOSPITAL ENCOUNTER (OUTPATIENT)
Dept: ULTRASOUND IMAGING | Age: 77
Discharge: HOME | End: 2022-04-27
Attending: OTOLARYNGOLOGY
Payer: MEDICARE

## 2022-04-27 DIAGNOSIS — E04.2 NONTOXIC MULTINODULAR GOITER: ICD-10-CM

## 2022-04-27 PROCEDURE — 76536 US EXAM OF HEAD AND NECK: CPT | Performed by: OTOLARYNGOLOGY

## 2022-05-04 ENCOUNTER — ORDER TRANSCRIPTION (OUTPATIENT)
Dept: ADMINISTRATIVE | Facility: HOSPITAL | Age: 77
End: 2022-05-04

## 2022-05-06 ENCOUNTER — LAB ENCOUNTER (OUTPATIENT)
Dept: LAB | Age: 77
End: 2022-05-06
Attending: OTOLARYNGOLOGY
Payer: MEDICARE

## 2022-05-06 DIAGNOSIS — Z01.818 PREPROCEDURAL EXAMINATION: ICD-10-CM

## 2022-05-06 DIAGNOSIS — Z11.59 ENCOUNTER FOR SCREENING FOR OTHER VIRAL DISEASES: ICD-10-CM

## 2022-05-07 LAB — SARS-COV-2 RNA RESP QL NAA+PROBE: NOT DETECTED

## 2022-05-09 ENCOUNTER — HOSPITAL ENCOUNTER (OUTPATIENT)
Dept: ULTRASOUND IMAGING | Facility: HOSPITAL | Age: 77
Discharge: HOME | End: 2022-05-09
Attending: OTOLARYNGOLOGY
Payer: MEDICARE

## 2022-05-09 DIAGNOSIS — E04.2 NONTOXIC MULTINODULAR GOITER: ICD-10-CM

## 2022-05-09 PROCEDURE — 10006 FNA BX W/US GDN EA ADDL: CPT | Performed by: OTOLARYNGOLOGY

## 2022-05-09 PROCEDURE — 88173 CYTOPATH EVAL FNA REPORT: CPT | Performed by: OTOLARYNGOLOGY

## 2022-05-09 PROCEDURE — 10005 FNA BX W/US GDN 1ST LES: CPT | Performed by: OTOLARYNGOLOGY

## 2022-05-11 LAB
AMB EXT BILIRUBIN URINE: NEGATIVE
AMB EXT BLOOD URINE: NEGATIVE
AMB EXT GLUCOSE URINE: NEGATIVE
AMB EXT KETONES URINE: NEGATIVE
AMB EXT LEUKOCYTE ESTERASE URINE: NEGATIVE
AMB EXT NITRITE URINE: NEGATIVE
AMB EXT PH URINE: 5.5
AMB EXT PROTEIN URINE: NEGATIVE
AMB EXT URINE CLARITY: CLEAR
AMB EXT URINE COLOR: YELLOW
AMB EXT URINE SPECIFIC GRAVITY: 1.02
AMB EXT UROBILINOGEN URINE: 0.2

## 2022-05-17 RX ORDER — SIMVASTATIN 20 MG
TABLET ORAL
Qty: 90 TABLET | Refills: 2 | Status: SHIPPED | OUTPATIENT
Start: 2022-05-17

## 2022-05-20 ENCOUNTER — OFFICE VISIT (OUTPATIENT)
Dept: FAMILY MEDICINE CLINIC | Facility: CLINIC | Age: 77
End: 2022-05-20
Payer: COMMERCIAL

## 2022-05-20 VITALS
TEMPERATURE: 97 F | HEART RATE: 53 BPM | DIASTOLIC BLOOD PRESSURE: 80 MMHG | WEIGHT: 202.19 LBS | HEIGHT: 69 IN | RESPIRATION RATE: 18 BRPM | OXYGEN SATURATION: 99 % | BODY MASS INDEX: 29.95 KG/M2 | SYSTOLIC BLOOD PRESSURE: 122 MMHG

## 2022-05-20 DIAGNOSIS — N18.31 STAGE 3A CHRONIC KIDNEY DISEASE (HCC): ICD-10-CM

## 2022-05-20 DIAGNOSIS — Z12.5 SCREENING FOR MALIGNANT NEOPLASM OF PROSTATE: ICD-10-CM

## 2022-05-20 DIAGNOSIS — I44.0 AV BLOCK, 1ST DEGREE: ICD-10-CM

## 2022-05-20 DIAGNOSIS — R00.1 BRADYCARDIA: Primary | ICD-10-CM

## 2022-05-20 DIAGNOSIS — G20 PD (PARKINSON'S DISEASE) (HCC): ICD-10-CM

## 2022-05-20 PROBLEM — G24.9 DYSKINESIA DUE TO PARKINSON'S DISEASE (HCC): Status: ACTIVE | Noted: 2022-02-03

## 2022-05-20 PROBLEM — G20.B1 DYSKINESIA DUE TO PARKINSON'S DISEASE: Status: ACTIVE | Noted: 2022-02-03

## 2022-05-20 PROBLEM — G20.B1 DYSKINESIA DUE TO PARKINSON'S DISEASE (HCC): Status: ACTIVE | Noted: 2022-02-03

## 2022-05-20 PROBLEM — G47.52 RBD (REM BEHAVIORAL DISORDER): Status: ACTIVE | Noted: 2022-03-02

## 2022-05-20 PROCEDURE — 3074F SYST BP LT 130 MM HG: CPT | Performed by: FAMILY MEDICINE

## 2022-05-20 PROCEDURE — 3079F DIAST BP 80-89 MM HG: CPT | Performed by: FAMILY MEDICINE

## 2022-05-20 PROCEDURE — 99214 OFFICE O/P EST MOD 30 MIN: CPT | Performed by: FAMILY MEDICINE

## 2022-05-20 PROCEDURE — 93000 ELECTROCARDIOGRAM COMPLETE: CPT | Performed by: FAMILY MEDICINE

## 2022-05-20 PROCEDURE — 3008F BODY MASS INDEX DOCD: CPT | Performed by: FAMILY MEDICINE

## 2022-05-21 PROBLEM — R00.1 BRADYCARDIA: Status: ACTIVE | Noted: 2022-05-21

## 2022-05-21 PROBLEM — I44.0 AV BLOCK, 1ST DEGREE: Status: ACTIVE | Noted: 2022-05-21

## 2022-06-01 ENCOUNTER — OFFICE VISIT (OUTPATIENT)
Dept: HEMATOLOGY/ONCOLOGY | Facility: HOSPITAL | Age: 77
End: 2022-06-01
Attending: THORACIC SURGERY (CARDIOTHORACIC VASCULAR SURGERY)
Payer: MEDICARE

## 2022-06-01 VITALS
WEIGHT: 200.63 LBS | OXYGEN SATURATION: 100 % | HEART RATE: 58 BPM | BODY MASS INDEX: 30 KG/M2 | DIASTOLIC BLOOD PRESSURE: 79 MMHG | RESPIRATION RATE: 18 BRPM | SYSTOLIC BLOOD PRESSURE: 152 MMHG | TEMPERATURE: 98 F

## 2022-06-01 DIAGNOSIS — E04.1 THYROID NODULE: Primary | ICD-10-CM

## 2022-06-01 PROCEDURE — 99211 OFF/OP EST MAY X REQ PHY/QHP: CPT

## 2022-06-23 NOTE — H&P
[facility]    PATIENT'S NAME: Florencio Pinon   ATTENDING PHYSICIAN: Jodie Connell M.D. PATIENT ACCOUNT#:   [de-identified]    LOCATION:    MEDICAL RECORD #:   [id number]       YOB: 1945  ADMISSION DATE:       06/27/2022    HISTORY AND PHYSICAL EXAMINATION    HISTORY OF PRESENT ILLNESS:  He is noted to have multinodular goiter with 2 large substernal thyroid nodules noted lower right and lower left. They were both needle biopsy benign. The patient is presenting for total thyroidectomy with possible assistance from thoracic surgeon, Dr. Candido Pinon. The patient was found to have this after he had an infection in the neck, a neck abscess, and they did a CT scan on this. Once the neck abscess had subsided, we worked him up for the thyroid. PAST SURGICAL HISTORY:  [place] teeth. MEDICATIONS:  He is on multiple medications; carbidopa, potassium chloride, carvedilol, simvastatin, hydralazine, melatonin, baby aspirin, omega-3, Allegra, folic acid, T59, vitamin B6, hydrochlorothiazide, vitamin D, losartan. ALLERGIES:  No known allergies to medications. SOCIAL HISTORY:  Nonsmoker. PHYSICAL EXAMINATION:    GENERAL:  He is a well-developed, well-nourished white male. HEENT:  Head normocephalic. Pupils equal, round, reactive to light with accommodation. Extraocular muscles intact. Ears normal.  Nasal exam clear. Cavity exam negative   NECK:  He has poor neck extension. Enlarged thyroid is noted. LUNGS:  Chest clear. HEART:  Regular rate and rhythm. ABDOMEN:  Benign. EXTREMITIES:  No clubbing, cyanosis, or edema. ASSESSMENT:  The nodules in question, 3.4 cm right lower and 3.2 cm left lower. Ultrasound biopsy benign but substernal.    PLAN:  Total thyroidectomy with mediastinal dissection either through the neck or by sternotomy with Dr. Candido Pinon. Date of surgery 06/27/2022. Risks and complications discussed.      Dictated By Jodie Connell M.D.  d: 06/22/2022 17:01:00  t: 06/22/2022 20:14:17  [facility] [id number]  [facility]/

## 2022-06-24 ENCOUNTER — ANESTHESIA EVENT (OUTPATIENT)
Dept: SURGERY | Facility: HOSPITAL | Age: 77
End: 2022-06-24
Payer: MEDICARE

## 2022-06-24 ENCOUNTER — LAB ENCOUNTER (OUTPATIENT)
Dept: LAB | Age: 77
End: 2022-06-24
Attending: OTOLARYNGOLOGY
Payer: MEDICARE

## 2022-06-24 ENCOUNTER — LABORATORY ENCOUNTER (OUTPATIENT)
Dept: LAB | Age: 77
End: 2022-06-24
Attending: OTOLARYNGOLOGY
Payer: MEDICARE

## 2022-06-24 ENCOUNTER — LABORATORY ENCOUNTER (OUTPATIENT)
Dept: LAB | Age: 77
End: 2022-06-24
Payer: MEDICARE

## 2022-06-24 DIAGNOSIS — Z12.5 SCREENING FOR MALIGNANT NEOPLASM OF PROSTATE: Primary | ICD-10-CM

## 2022-06-24 DIAGNOSIS — Z01.818 PRE-OP TESTING: ICD-10-CM

## 2022-06-24 DIAGNOSIS — E04.2 NONTOXIC MULTINODULAR GOITER: ICD-10-CM

## 2022-06-24 LAB
ANION GAP SERPL CALC-SCNC: 4 MMOL/L (ref 0–18)
BUN BLD-MCNC: 25 MG/DL (ref 7–18)
CALCIUM BLD-MCNC: 9.2 MG/DL (ref 8.5–10.1)
CHLORIDE SERPL-SCNC: 107 MMOL/L (ref 98–112)
CO2 SERPL-SCNC: 30 MMOL/L (ref 21–32)
CREAT BLD-MCNC: 1.21 MG/DL
ERYTHROCYTE [DISTWIDTH] IN BLOOD BY AUTOMATED COUNT: 12.1 %
FASTING STATUS PATIENT QL REPORTED: YES
GLUCOSE BLD-MCNC: 98 MG/DL (ref 70–99)
HCT VFR BLD AUTO: 39.2 %
HGB BLD-MCNC: 13.1 G/DL
MCH RBC QN AUTO: 32.8 PG (ref 26–34)
MCHC RBC AUTO-ENTMCNC: 33.4 G/DL (ref 31–37)
MCV RBC AUTO: 98.2 FL
OSMOLALITY SERPL CALC.SUM OF ELEC: 296 MOSM/KG (ref 275–295)
PLATELET # BLD AUTO: 168 10(3)UL (ref 150–450)
POTASSIUM SERPL-SCNC: 4.1 MMOL/L (ref 3.5–5.1)
PSA SERPL-MCNC: <0.01 NG/ML (ref ?–4)
RBC # BLD AUTO: 3.99 X10(6)UL
SODIUM SERPL-SCNC: 141 MMOL/L (ref 136–145)
WBC # BLD AUTO: 5 X10(3) UL (ref 4–11)

## 2022-06-24 PROCEDURE — 85027 COMPLETE CBC AUTOMATED: CPT

## 2022-06-24 PROCEDURE — 80048 BASIC METABOLIC PNL TOTAL CA: CPT

## 2022-06-24 PROCEDURE — 84153 ASSAY OF PSA TOTAL: CPT

## 2022-06-24 PROCEDURE — 36415 COLL VENOUS BLD VENIPUNCTURE: CPT

## 2022-06-25 LAB — SARS-COV-2 RNA RESP QL NAA+PROBE: NOT DETECTED

## 2022-06-27 ENCOUNTER — HOSPITAL ENCOUNTER (OUTPATIENT)
Facility: HOSPITAL | Age: 77
Discharge: HOME | End: 2022-06-28
Attending: OTOLARYNGOLOGY | Admitting: OTOLARYNGOLOGY
Payer: MEDICARE

## 2022-06-27 ENCOUNTER — ANESTHESIA (OUTPATIENT)
Dept: SURGERY | Facility: HOSPITAL | Age: 77
End: 2022-06-27
Payer: MEDICARE

## 2022-06-27 DIAGNOSIS — Z01.818 PRE-OP TESTING: Primary | ICD-10-CM

## 2022-06-27 PROCEDURE — 99204 OFFICE O/P NEW MOD 45 MIN: CPT | Performed by: HOSPITALIST

## 2022-06-27 PROCEDURE — 0GTK0ZZ RESECTION OF THYROID GLAND, OPEN APPROACH: ICD-10-PCS | Performed by: OTOLARYNGOLOGY

## 2022-06-27 RX ORDER — CALCIUM CARBONATE 500(1250)
1000 TABLET ORAL
Status: DISCONTINUED | OUTPATIENT
Start: 2022-06-27 | End: 2022-06-28

## 2022-06-27 RX ORDER — OXYCODONE HYDROCHLORIDE 5 MG/1
2.5 TABLET ORAL EVERY 4 HOURS PRN
Status: DISCONTINUED | OUTPATIENT
Start: 2022-06-27 | End: 2022-06-28

## 2022-06-27 RX ORDER — DEXAMETHASONE SODIUM PHOSPHATE 4 MG/ML
VIAL (ML) INJECTION AS NEEDED
Status: DISCONTINUED | OUTPATIENT
Start: 2022-06-27 | End: 2022-06-27 | Stop reason: SURG

## 2022-06-27 RX ORDER — HYDROMORPHONE HYDROCHLORIDE 1 MG/ML
0.4 INJECTION, SOLUTION INTRAMUSCULAR; INTRAVENOUS; SUBCUTANEOUS EVERY 5 MIN PRN
Status: DISCONTINUED | OUTPATIENT
Start: 2022-06-27 | End: 2022-06-27 | Stop reason: HOSPADM

## 2022-06-27 RX ORDER — HYDROMORPHONE HYDROCHLORIDE 1 MG/ML
0.2 INJECTION, SOLUTION INTRAMUSCULAR; INTRAVENOUS; SUBCUTANEOUS EVERY 2 HOUR PRN
Status: DISCONTINUED | OUTPATIENT
Start: 2022-06-27 | End: 2022-06-28

## 2022-06-27 RX ORDER — CALCITRIOL 0.25 UG/1
0.25 CAPSULE, LIQUID FILLED ORAL ONCE
Status: COMPLETED | OUTPATIENT
Start: 2022-06-27 | End: 2022-06-27

## 2022-06-27 RX ORDER — METOCLOPRAMIDE HYDROCHLORIDE 5 MG/ML
10 INJECTION INTRAMUSCULAR; INTRAVENOUS EVERY 8 HOURS PRN
Status: DISCONTINUED | OUTPATIENT
Start: 2022-06-27 | End: 2022-06-27 | Stop reason: HOSPADM

## 2022-06-27 RX ORDER — LIDOCAINE HYDROCHLORIDE AND EPINEPHRINE 10; 10 MG/ML; UG/ML
INJECTION, SOLUTION INFILTRATION; PERINEURAL AS NEEDED
Status: DISCONTINUED | OUTPATIENT
Start: 2022-06-27 | End: 2022-06-27 | Stop reason: HOSPADM

## 2022-06-27 RX ORDER — HYDROCODONE BITARTRATE AND ACETAMINOPHEN 10; 325 MG/1; MG/1
2 TABLET ORAL ONCE AS NEEDED
Status: DISCONTINUED | OUTPATIENT
Start: 2022-06-27 | End: 2022-06-27 | Stop reason: HOSPADM

## 2022-06-27 RX ORDER — MEPERIDINE HYDROCHLORIDE 25 MG/ML
12.5 INJECTION INTRAMUSCULAR; INTRAVENOUS; SUBCUTANEOUS AS NEEDED
Status: DISCONTINUED | OUTPATIENT
Start: 2022-06-27 | End: 2022-06-27 | Stop reason: HOSPADM

## 2022-06-27 RX ORDER — DOCUSATE SODIUM 100 MG/1
100 CAPSULE, LIQUID FILLED ORAL NIGHTLY
Status: DISCONTINUED | OUTPATIENT
Start: 2022-06-27 | End: 2022-06-28

## 2022-06-27 RX ORDER — CALCIUM CARBONATE 500(1250)
TABLET ORAL
Status: COMPLETED
Start: 2022-06-27 | End: 2022-06-27

## 2022-06-27 RX ORDER — SIMVASTATIN 20 MG
20 TABLET ORAL NIGHTLY
COMMUNITY

## 2022-06-27 RX ORDER — ONDANSETRON 2 MG/ML
4 INJECTION INTRAMUSCULAR; INTRAVENOUS EVERY 6 HOURS PRN
Status: DISCONTINUED | OUTPATIENT
Start: 2022-06-27 | End: 2022-06-28

## 2022-06-27 RX ORDER — ONDANSETRON 2 MG/ML
INJECTION INTRAMUSCULAR; INTRAVENOUS AS NEEDED
Status: DISCONTINUED | OUTPATIENT
Start: 2022-06-27 | End: 2022-06-27 | Stop reason: SURG

## 2022-06-27 RX ORDER — TIMOLOL MALEATE 5 MG/ML
1 SOLUTION/ DROPS OPHTHALMIC NIGHTLY
Status: DISCONTINUED | OUTPATIENT
Start: 2022-06-27 | End: 2022-06-28

## 2022-06-27 RX ORDER — HYDROCODONE BITARTRATE AND ACETAMINOPHEN 10; 325 MG/1; MG/1
1 TABLET ORAL ONCE AS NEEDED
Status: DISCONTINUED | OUTPATIENT
Start: 2022-06-27 | End: 2022-06-27 | Stop reason: HOSPADM

## 2022-06-27 RX ORDER — DONEPEZIL HYDROCHLORIDE 5 MG/1
5 TABLET, FILM COATED ORAL NIGHTLY
Status: DISCONTINUED | OUTPATIENT
Start: 2022-06-27 | End: 2022-06-28

## 2022-06-27 RX ORDER — PHENYLEPHRINE HCL 10 MG/ML
VIAL (ML) INJECTION AS NEEDED
Status: DISCONTINUED | OUTPATIENT
Start: 2022-06-27 | End: 2022-06-27 | Stop reason: SURG

## 2022-06-27 RX ORDER — ONDANSETRON 2 MG/ML
4 INJECTION INTRAMUSCULAR; INTRAVENOUS EVERY 6 HOURS PRN
Status: DISCONTINUED | OUTPATIENT
Start: 2022-06-27 | End: 2022-06-27 | Stop reason: HOSPADM

## 2022-06-27 RX ORDER — METOCLOPRAMIDE HYDROCHLORIDE 5 MG/ML
10 INJECTION INTRAMUSCULAR; INTRAVENOUS EVERY 6 HOURS PRN
Status: DISCONTINUED | OUTPATIENT
Start: 2022-06-27 | End: 2022-06-28

## 2022-06-27 RX ORDER — MIDAZOLAM HYDROCHLORIDE 1 MG/ML
1 INJECTION INTRAMUSCULAR; INTRAVENOUS EVERY 5 MIN PRN
Status: DISCONTINUED | OUTPATIENT
Start: 2022-06-27 | End: 2022-06-27 | Stop reason: HOSPADM

## 2022-06-27 RX ORDER — LOSARTAN POTASSIUM 100 MG/1
100 TABLET ORAL EVERY MORNING
Status: DISCONTINUED | OUTPATIENT
Start: 2022-06-27 | End: 2022-06-28

## 2022-06-27 RX ORDER — ENOXAPARIN SODIUM 100 MG/ML
40 INJECTION SUBCUTANEOUS DAILY
Status: DISCONTINUED | OUTPATIENT
Start: 2022-06-27 | End: 2022-06-28

## 2022-06-27 RX ORDER — LIDOCAINE HYDROCHLORIDE 10 MG/ML
INJECTION, SOLUTION EPIDURAL; INFILTRATION; INTRACAUDAL; PERINEURAL AS NEEDED
Status: DISCONTINUED | OUTPATIENT
Start: 2022-06-27 | End: 2022-06-27 | Stop reason: SURG

## 2022-06-27 RX ORDER — CEFAZOLIN SODIUM/WATER 2 G/20 ML
SYRINGE (ML) INTRAVENOUS AS NEEDED
Status: DISCONTINUED | OUTPATIENT
Start: 2022-06-27 | End: 2022-06-27 | Stop reason: SURG

## 2022-06-27 RX ORDER — ACETAMINOPHEN 500 MG
1000 TABLET ORAL ONCE AS NEEDED
Status: DISCONTINUED | OUTPATIENT
Start: 2022-06-27 | End: 2022-06-27 | Stop reason: HOSPADM

## 2022-06-27 RX ORDER — HYDROMORPHONE HYDROCHLORIDE 1 MG/ML
0.6 INJECTION, SOLUTION INTRAMUSCULAR; INTRAVENOUS; SUBCUTANEOUS EVERY 5 MIN PRN
Status: DISCONTINUED | OUTPATIENT
Start: 2022-06-27 | End: 2022-06-27 | Stop reason: HOSPADM

## 2022-06-27 RX ORDER — LATANOPROST 50 UG/ML
1 SOLUTION/ DROPS OPHTHALMIC NIGHTLY
Status: DISCONTINUED | OUTPATIENT
Start: 2022-06-27 | End: 2022-06-28

## 2022-06-27 RX ORDER — GLYCOPYRROLATE 0.2 MG/ML
INJECTION, SOLUTION INTRAMUSCULAR; INTRAVENOUS AS NEEDED
Status: DISCONTINUED | OUTPATIENT
Start: 2022-06-27 | End: 2022-06-27 | Stop reason: SURG

## 2022-06-27 RX ORDER — ACETAMINOPHEN 500 MG
1000 TABLET ORAL ONCE
Status: DISCONTINUED | OUTPATIENT
Start: 2022-06-27 | End: 2022-06-27 | Stop reason: HOSPADM

## 2022-06-27 RX ORDER — SODIUM CHLORIDE, SODIUM LACTATE, POTASSIUM CHLORIDE, CALCIUM CHLORIDE 600; 310; 30; 20 MG/100ML; MG/100ML; MG/100ML; MG/100ML
INJECTION, SOLUTION INTRAVENOUS CONTINUOUS
Status: DISCONTINUED | OUTPATIENT
Start: 2022-06-27 | End: 2022-06-27 | Stop reason: HOSPADM

## 2022-06-27 RX ORDER — HYDROMORPHONE HYDROCHLORIDE 1 MG/ML
0.4 INJECTION, SOLUTION INTRAMUSCULAR; INTRAVENOUS; SUBCUTANEOUS EVERY 2 HOUR PRN
Status: DISCONTINUED | OUTPATIENT
Start: 2022-06-27 | End: 2022-06-28

## 2022-06-27 RX ORDER — LABETALOL HYDROCHLORIDE 5 MG/ML
5 INJECTION, SOLUTION INTRAVENOUS EVERY 5 MIN PRN
Status: DISCONTINUED | OUTPATIENT
Start: 2022-06-27 | End: 2022-06-27 | Stop reason: HOSPADM

## 2022-06-27 RX ORDER — CALCIUM CARBONATE 500(1250)
1000 TABLET ORAL ONCE
Status: COMPLETED | OUTPATIENT
Start: 2022-06-27 | End: 2022-06-27

## 2022-06-27 RX ORDER — POTASSIUM CHLORIDE 750 MG/1
10 TABLET, FILM COATED, EXTENDED RELEASE ORAL DAILY
COMMUNITY
Start: 2022-06-23

## 2022-06-27 RX ORDER — MELATONIN
1000 WEEKLY
Status: DISCONTINUED | OUTPATIENT
Start: 2022-06-27 | End: 2022-06-28

## 2022-06-27 RX ORDER — ZOLPIDEM TARTRATE 5 MG/1
5 TABLET ORAL NIGHTLY PRN
Status: DISCONTINUED | OUTPATIENT
Start: 2022-06-27 | End: 2022-06-28

## 2022-06-27 RX ORDER — LATANOPROST 50 UG/ML
1 SOLUTION/ DROPS OPHTHALMIC NIGHTLY
COMMUNITY
Start: 2022-06-09

## 2022-06-27 RX ORDER — CALCITRIOL 0.25 UG/1
0.25 CAPSULE, LIQUID FILLED ORAL DAILY
Status: DISCONTINUED | OUTPATIENT
Start: 2022-06-27 | End: 2022-06-28

## 2022-06-27 RX ORDER — HYDROCHLOROTHIAZIDE 25 MG/1
25 TABLET ORAL DAILY
Status: DISCONTINUED | OUTPATIENT
Start: 2022-06-27 | End: 2022-06-28

## 2022-06-27 RX ORDER — MELATONIN
100 WEEKLY
Status: DISCONTINUED | OUTPATIENT
Start: 2022-06-27 | End: 2022-06-28

## 2022-06-27 RX ORDER — OXYCODONE HYDROCHLORIDE 5 MG/1
5 TABLET ORAL EVERY 4 HOURS PRN
Status: DISCONTINUED | OUTPATIENT
Start: 2022-06-27 | End: 2022-06-28

## 2022-06-27 RX ORDER — POTASSIUM CHLORIDE 750 MG/1
10 TABLET, EXTENDED RELEASE ORAL DAILY
Status: DISCONTINUED | OUTPATIENT
Start: 2022-06-27 | End: 2022-06-28

## 2022-06-27 RX ORDER — HYDRALAZINE HYDROCHLORIDE 20 MG/ML
10 INJECTION INTRAMUSCULAR; INTRAVENOUS EVERY 4 HOURS PRN
Status: DISCONTINUED | OUTPATIENT
Start: 2022-06-27 | End: 2022-06-28

## 2022-06-27 RX ORDER — HYDROMORPHONE HYDROCHLORIDE 1 MG/ML
0.2 INJECTION, SOLUTION INTRAMUSCULAR; INTRAVENOUS; SUBCUTANEOUS EVERY 5 MIN PRN
Status: DISCONTINUED | OUTPATIENT
Start: 2022-06-27 | End: 2022-06-27 | Stop reason: HOSPADM

## 2022-06-27 RX ORDER — CALCITRIOL 0.25 UG/1
CAPSULE, LIQUID FILLED ORAL
Status: COMPLETED
Start: 2022-06-27 | End: 2022-06-27

## 2022-06-27 RX ORDER — DEXTROSE, SODIUM CHLORIDE, SODIUM LACTATE, POTASSIUM CHLORIDE, AND CALCIUM CHLORIDE 5; .6; .31; .03; .02 G/100ML; G/100ML; G/100ML; G/100ML; G/100ML
INJECTION, SOLUTION INTRAVENOUS CONTINUOUS
Status: DISCONTINUED | OUTPATIENT
Start: 2022-06-27 | End: 2022-06-28

## 2022-06-27 RX ORDER — NALOXONE HYDROCHLORIDE 0.4 MG/ML
80 INJECTION, SOLUTION INTRAMUSCULAR; INTRAVENOUS; SUBCUTANEOUS AS NEEDED
Status: DISCONTINUED | OUTPATIENT
Start: 2022-06-27 | End: 2022-06-27 | Stop reason: HOSPADM

## 2022-06-27 RX ORDER — CHLORAL HYDRATE 500 MG
1000 CAPSULE ORAL DAILY
Status: DISCONTINUED | OUTPATIENT
Start: 2022-06-27 | End: 2022-06-27 | Stop reason: RX

## 2022-06-27 RX ORDER — HYDRALAZINE HYDROCHLORIDE 25 MG/1
25 TABLET, FILM COATED ORAL 2 TIMES DAILY
Status: DISCONTINUED | OUTPATIENT
Start: 2022-06-27 | End: 2022-06-28

## 2022-06-27 RX ORDER — SODIUM CHLORIDE, SODIUM LACTATE, POTASSIUM CHLORIDE, CALCIUM CHLORIDE 600; 310; 30; 20 MG/100ML; MG/100ML; MG/100ML; MG/100ML
INJECTION, SOLUTION INTRAVENOUS CONTINUOUS
Status: DISCONTINUED | OUTPATIENT
Start: 2022-06-27 | End: 2022-06-27

## 2022-06-27 RX ADMIN — DEXAMETHASONE SODIUM PHOSPHATE 8 MG: 4 MG/ML VIAL (ML) INJECTION at 07:41:00

## 2022-06-27 RX ADMIN — CEFAZOLIN SODIUM/WATER 2 G: 2 G/20 ML SYRINGE (ML) INTRAVENOUS at 07:56:00

## 2022-06-27 RX ADMIN — LIDOCAINE HYDROCHLORIDE 50 MG: 10 INJECTION, SOLUTION EPIDURAL; INFILTRATION; INTRACAUDAL; PERINEURAL at 07:41:00

## 2022-06-27 RX ADMIN — GLYCOPYRROLATE 0.2 MG: 0.2 INJECTION, SOLUTION INTRAMUSCULAR; INTRAVENOUS at 08:16:00

## 2022-06-27 RX ADMIN — ONDANSETRON 4 MG: 2 INJECTION INTRAMUSCULAR; INTRAVENOUS at 10:23:00

## 2022-06-27 RX ADMIN — SODIUM CHLORIDE, SODIUM LACTATE, POTASSIUM CHLORIDE, CALCIUM CHLORIDE: 600; 310; 30; 20 INJECTION, SOLUTION INTRAVENOUS at 10:49:00

## 2022-06-27 RX ADMIN — PHENYLEPHRINE HCL 100 MCG: 10 MG/ML VIAL (ML) INJECTION at 07:50:00

## 2022-06-27 RX ADMIN — SODIUM CHLORIDE, SODIUM LACTATE, POTASSIUM CHLORIDE, CALCIUM CHLORIDE: 600; 310; 30; 20 INJECTION, SOLUTION INTRAVENOUS at 08:59:00

## 2022-06-27 NOTE — INTERVAL H&P NOTE
The above referenced H&P was reviewed by Marylee Hoff, MD on 6/27/2022, the patient was examined and no significant changes have occurred in the patient's condition since the H&P was performed. Risks and benefits were discussed, proceed with procedure as planned. Pre-op Diagnosis: THYROID NODULES-SUBSTERNAL    The above referenced H&P was reviewed by Marylee Hoff, MD on 6/27/2022, the patient was examined and no significant changes have occurred in the patient's condition since the H&P was performed. I discussed with the patient and/or legal representative the potential benefits, risks and side effects of this procedure; the likelihood of the patient achieving goals; and potential problems that might occur during recuperation. I discussed reasonable alternatives to the procedure, including risks, benefits and side effects related to the alternatives and risks related to not receiving this procedure. We will proceed with procedure as planned.

## 2022-06-27 NOTE — BRIEF OP NOTE
Pre-Operative Diagnosis: THYROID NODULES-SUBSTERNAL     Post-Operative Diagnosis: THYROID NODULES-SUBSTERNAL      Procedure Performed:   BILATERAL THYROIDECTOMY WITH SUBSTERNAL THYROID MEDIASTINAL DISSECTION CERVICAL APPROACH    Surgeon(s) and Role:     * Lisa Hernandez MD - Primary     * Mary Freeman MD - Assisting Surgeon    Assistant(s):        Surgical Findings: bilateral sub sternal thyroid nodules     Specimen: total thyroid     Estimated Blood Loss: Blood Output: 25 mL (6/27/2022 10:35 AM)      Dictation Number:  1    Gilda Koch MD  6/27/2022  10:51 AM

## 2022-06-27 NOTE — INTERVAL H&P NOTE
Pre-op Diagnosis: THYROID NODULES-SUBSTERNAL    The above referenced H&P was reviewed by River Lacy MD on 6/27/2022, the patient was examined and no significant changes have occurred in the patient's condition since the H&P was performed. I discussed with the patient and/or legal representative the potential benefits, risks and side effects of this procedure; the likelihood of the patient achieving goals; and potential problems that might occur during recuperation. I discussed reasonable alternatives to the procedure, including risks, benefits and side effects related to the alternatives and risks related to not receiving this procedure. We will proceed with procedure as planned.

## 2022-06-27 NOTE — ANESTHESIA PROCEDURE NOTES
Airway  Date/Time: 6/27/2022 7:43 AM  Urgency: elective      General Information and Staff    Patient location during procedure: OR  Anesthesiologist: Rebeca Bangura MD  Performed: anesthesiologist     Indications and Patient Condition  Indications for airway management: anesthesia  Sedation level: deep  Preoxygenated: yes  Patient position: sniffing  Mask difficulty assessment: 1 - vent by mask    Final Airway Details  Final airway type: endotracheal airway      Successful airway: ETT and NIM tube  Cuffed: yes   Successful intubation technique: direct laryngoscopy  Endotracheal tube insertion site: oral  Blade: GlideScope  Blade size: #4  ETT size (mm): 8.0    Cormack-Lehane Classification: grade I - full view of glottis  Placement verified by: chest auscultation and capnometry   Measured from: lips  Number of attempts at approach: 1

## 2022-06-28 VITALS
SYSTOLIC BLOOD PRESSURE: 177 MMHG | OXYGEN SATURATION: 98 % | DIASTOLIC BLOOD PRESSURE: 80 MMHG | WEIGHT: 206 LBS | TEMPERATURE: 98 F | HEART RATE: 64 BPM | RESPIRATION RATE: 16 BRPM | HEIGHT: 69 IN | BODY MASS INDEX: 30.51 KG/M2

## 2022-06-28 LAB — CALCIUM BLD-MCNC: 9.4 MG/DL (ref 8.5–10.1)

## 2022-06-28 PROCEDURE — 99213 OFFICE O/P EST LOW 20 MIN: CPT | Performed by: HOSPITALIST

## 2022-06-28 RX ORDER — HYDRALAZINE HYDROCHLORIDE 25 MG/1
25 TABLET, FILM COATED ORAL EVERY 8 HOURS SCHEDULED
Status: DISCONTINUED | OUTPATIENT
Start: 2022-06-28 | End: 2022-06-28

## 2022-06-28 RX ORDER — HYDRALAZINE HYDROCHLORIDE 25 MG/1
25 TABLET, FILM COATED ORAL ONCE
Status: DISCONTINUED | OUTPATIENT
Start: 2022-06-28 | End: 2022-06-28

## 2022-06-28 NOTE — PROGRESS NOTES
Paged for - patient asymptomatic and wants to go home.    Partially with pain and anxious to leave  Pt took all home meds today  IV is out  I asked to give additional hydralazine 25mg oral which he takes at home and to check BP mulitple times per day at home  He is to f/u PCP this week and to call if BP remains elevated

## 2022-06-28 NOTE — OPERATIVE REPORT
[facility]    PATIENT'S NAME: Kevin Martinez   ATTENDING PHYSICIAN: Jarvis Diana M.D. OPERATING PHYSICIAN: Jarvsi Diana M.D. PATIENT ACCOUNT#:   [de-identified]    LOCATION:  [address] [place] [address]  MEDICAL RECORD #:   [id number]       YOB: 1945  ADMISSION DATE:       06/27/2022      OPERATION DATE:  06/27/2022    OPERATIVE REPORT    PREOPERATIVE DIAGNOSIS:  Multinodular goiter, substernal, bilaterally. POSTOPERATIVE DIAGNOSIS:  Multinodular goiter, substernal, bilaterally. PROCEDURE:  Total thyroidectomy, substernal; mediastinal dissection, cervical approach with Thoracic Surgery standby. ASSISTANT SURGEON:  Juliet Barajas. Miryam Yoo MD    ANESTHESIA:  General anesthesia. OPERATIVE TECHNIQUE:  Patient was brought into the operating room, placed in a supine position, brought down to a satisfactory level of anesthesia, intubated,  prepped, and draped. He was intubated with a special endotracheal tube for monitoring the recurrent laryngeal nerve. The findings had bilateral substernal thyroid in the 3 to 4 cm range, right side greater than left. He had poor extension of the neck due to arthritis. Standard horizontal incision was made and carried down through skin, subcutaneous tissue, and platysma muscle. Subplatysmal flaps were then elevated. Dissection down the median raphe of the strap muscles to the thyroid isthmus. The strap muscles were then dissected off the thyroid bilaterally. We started on the left side. First, we established the trachea, followed it inferiorly. We were able to then dissect the mediastinum and were able to deliver the inferior portion by release of the trachea pulling it superiorly. We were able to adequately get underneath the mass in the mediastinum and then deliver it out of the neck, at which point we dissected, finding the recurrent laryngeal nerve, followed it superiorly.   While doing so, we encountered an inferior parathyroid and saved it with blood supply. Went to the superior pole, ligated the blood vessels, found a superior pole parathyroid, saved it with blood supply. Then dissected the remainder of the thyroid gland on the left side under the direct visualization of the recurrent laryngeal nerve. We then dissected it off the trachea including the isthmus. In a similar fashion to that on the right side, although this was a bigger side and was a little more lateral, we were able to go inferiorly, superiorly, pulling it up with the trachea, and delivered the mass out of the mediastinum. Again, we found the inferior parathyroid and saved it with blood supply, identified the recurrent laryngeal nerve, followed it superiorly, reflecting the gland off the nerve, and superior pole was ligated. Superior pole parathyroid was preserved, and the remainder of the thyroid gland was removed in its entirety under direct visualization of the recurrent laryngeal nerve. Both nerves were tested and noted to be functional.  Hemostasis and irrigation were performed. Layered closure was performed, closing the strap muscles with interrupted 3-0 Vicryl, the platysmal layer with interrupted 3-0 Vicryl, and the skin with running subcuticular 4-0 Prolene suture. The pathology was sent in formalin with right upper lobe marked. There were no complications and 25 mL blood loss. We did not require Thoracic Surgery, although they were on standby through the entire procedure.      Dictated By Sofi Talavera M.D.  d: 06/27/2022 12:10:39  t: 06/27/2022 19:35:50  [facility] [id number]  [facility]/

## 2022-06-28 NOTE — PROGRESS NOTES
NURSING DISCHARGE NOTE    Discharged Home via Wheelchair. Accompanied by Family member and Support staff  Belongings Taken by patient/family. Discussed discharge instructions with patient and family . Answered all questions . Verbalized understanding . bp was 177/80 , asymptomatic , notified MD , additional  Dose of hydralazine given per order .  Ask patient to check BP at home

## 2022-06-28 NOTE — PROGRESS NOTES
Post op day 1  Patient doing well. Tolerating diet. Voice WNL. Wound no hematoma. Calcium 9.4. Discharge to home with pain meds as tolerated, po calcium. Follow up 1 week for suture removal.  Patient was given post op instruction sheet. Instructed to call for any problems per the instruction sheet.     Hue Johnston MD  6/28/2022  9:19 AM

## 2022-06-28 NOTE — PLAN OF CARE
Upon assessment pt is a&o x4, can be forgetful at times. VSS, afebrile. Pt denies calf pain, chest pain and PALOMO. RA, . HOB at 30 degrees. Tele- NSR. On Lovenox subQ. SCDs on. Pt tolerating regular diet, denies n/v. Voids. Up with SBA and walker. Pt denies pain at this time. IV SL. Incision to anterior neck with gauze and tegaderm - C/D/I. Pt resting in bed with call light within reach and safety precautions in place. Pt's wife at bedside. Will continue to monitor.     Problem: Patient/Family Goals  Goal: Patient/Family Long Term Goal  Description: Patient's Long Term Goal: discharge home    Interventions:  - manage pain  - tolerate diet  - monitor labs  - See additional Care Plan goals for specific interventions  Outcome: Progressing  Goal: Patient/Family Short Term Goal  Description: Patient's Short Term Goal: comfort    Interventions:   - prn pain meds  - prn antiemetics  - ice  - See additional Care Plan goals for specific interventions  Outcome: Progressing     Problem: PAIN - ADULT  Goal: Verbalizes/displays adequate comfort level or patient's stated pain goal  Description: INTERVENTIONS:  - Encourage pt to monitor pain and request assistance  - Assess pain using appropriate pain scale  - Administer analgesics based on type and severity of pain and evaluate response  - Implement non-pharmacological measures as appropriate and evaluate response  - Consider cultural and social influences on pain and pain management  - Manage/alleviate anxiety  - Utilize distraction and/or relaxation techniques  - Monitor for opioid side effects  - Notify MD/LIP if interventions unsuccessful or patient reports new pain  - Anticipate increased pain with activity and pre-medicate as appropriate  Outcome: Progressing     Problem: RISK FOR INFECTION - ADULT  Goal: Absence of fever/infection during anticipated neutropenic period  Description: INTERVENTIONS  - Monitor WBC  - Administer growth factors as ordered  - Implement neutropenic guidelines  Outcome: Progressing     Problem: SAFETY ADULT - FALL  Goal: Free from fall injury  Description: INTERVENTIONS:  - Assess pt frequently for physical needs  - Identify cognitive and physical deficits and behaviors that affect risk of falls.   - [place] fall precautions as indicated by assessment.  - Educate pt/family on patient safety including physical limitations  - Instruct pt to call for assistance with activity based on assessment  - Modify environment to reduce risk of injury  - Provide assistive devices as appropriate  - Consider OT/PT consult to assist with strengthening/mobility  - Encourage toileting schedule  Outcome: Progressing     Problem: DISCHARGE PLANNING  Goal: Discharge to home or other facility with appropriate resources  Description: INTERVENTIONS:  - Identify barriers to discharge w/pt and caregiver  - Include patient/family/discharge partner in discharge planning  - Arrange for needed discharge resources and transportation as appropriate  - Identify discharge learning needs (meds, wound care, etc)  - Arrange for interpreters to assist at discharge as needed  - Consider post-discharge preferences of patient/family/discharge partner  - Complete POLST form as appropriate  - Assess patient's ability to be responsible for managing their own health  - Refer to Case Management Department for coordinating discharge planning if the patient needs post-hospital services based on physician/LIP order or complex needs related to functional status, cognitive ability or social support system  Outcome: Progressing

## 2022-06-28 NOTE — PLAN OF CARE
Problem: Patient/Family Goals  Goal: Patient/Family Long Term Goal  Description: Patient's Long Term Goal: discharge home    Interventions:  - manage pain  - tolerate diet  - monitor labs  - See additional Care Plan goals for specific interventions  Outcome: Adequate for Discharge  Goal: Patient/Family Short Term Goal  Description: Patient's Short Term Goal: comfort    Interventions:   - prn pain meds  - prn antiemetics  - ice  - See additional Care Plan goals for specific interventions  Outcome: Adequate for Discharge     Problem: PAIN - ADULT  Goal: Verbalizes/displays adequate comfort level or patient's stated pain goal  Description: INTERVENTIONS:  - Encourage pt to monitor pain and request assistance  - Assess pain using appropriate pain scale  - Administer analgesics based on type and severity of pain and evaluate response  - Implement non-pharmacological measures as appropriate and evaluate response  - Consider cultural and social influences on pain and pain management  - Manage/alleviate anxiety  - Utilize distraction and/or relaxation techniques  - Monitor for opioid side effects  - Notify MD/LIP if interventions unsuccessful or patient reports new pain  - Anticipate increased pain with activity and pre-medicate as appropriate  Outcome: Adequate for Discharge     Problem: RISK FOR INFECTION - ADULT  Goal: Absence of fever/infection during anticipated neutropenic period  Description: INTERVENTIONS  - Monitor WBC  - Administer growth factors as ordered  - Implement neutropenic guidelines  Outcome: Adequate for Discharge     Problem: SAFETY ADULT - FALL  Goal: Free from fall injury  Description: INTERVENTIONS:  - Assess pt frequently for physical needs  - Identify cognitive and physical deficits and behaviors that affect risk of falls.   - [place] fall precautions as indicated by assessment.  - Educate pt/family on patient safety including physical limitations  - Instruct pt to call for assistance with activity based on assessment  - Modify environment to reduce risk of injury  - Provide assistive devices as appropriate  - Consider OT/PT consult to assist with strengthening/mobility  - Encourage toileting schedule  Outcome: Adequate for Discharge     Problem: DISCHARGE PLANNING  Goal: Discharge to home or other facility with appropriate resources  Description: INTERVENTIONS:  - Identify barriers to discharge w/pt and caregiver  - Include patient/family/discharge partner in discharge planning  - Arrange for needed discharge resources and transportation as appropriate  - Identify discharge learning needs (meds, wound care, etc)  - Arrange for interpreters to assist at discharge as needed  - Consider post-discharge preferences of patient/family/discharge partner  - Complete POLST form as appropriate  - Assess patient's ability to be responsible for managing their own health  - Refer to Case Management Department for coordinating discharge planning if the patient needs post-hospital services based on physician/LIP order or complex needs related to functional status, cognitive ability or social support system  Outcome: Adequate for Discharge   Alert and orient x 4 , on room air , vitals stable . On tele with sr . Denies any sob or chest pain . Ant neck incision dry and intact . Voids well . Tolerated diet well . Plan of care discussed , answered all questions . Verbalized understanding .  Will continue to monitor

## 2022-07-08 ENCOUNTER — TELEPHONE (OUTPATIENT)
Dept: FAMILY MEDICINE CLINIC | Facility: CLINIC | Age: 77
End: 2022-07-08

## 2022-07-08 NOTE — TELEPHONE ENCOUNTER
Patient had thyroidectomy on 6/27/22. Post op day 1 6/28/22 Per Dr. Vince Bautista:  Patient doing well. Tolerating diet. Voice WNL. Wound no hematoma. Calcium 9.4. Discharge to home with pain meds as tolerated, po calcium. Also on 6/28/22, SBP was 177, hospital nurse instructed patient Nicolasa Purdy additional hydralazine 25mg oral which he takes at home and to check BP mulitple times per day at home  He is to f/u PCP this week and to call if BP remains elevated. \"    Patient stated he is taking Calcium Carbonate 500mg. His BP has been averaging 130/80 and has not needed to take the extra Hydralazine. Appt with CY on 7/14/22. Patient instructed medications will be reviewed at the appt. Requested refill on Potassium. Patient instructed to contact [facility] for this because this was originally prescribed by that office. Verbalized understanding of above.

## 2022-07-08 NOTE — TELEPHONE ENCOUNTER
Patient calling states he had his thyroid removed last week at [address] [place] [address] to know if any changes with medication or can it wait until his appt 7/14

## 2022-07-14 ENCOUNTER — OFFICE VISIT (OUTPATIENT)
Dept: FAMILY MEDICINE CLINIC | Facility: CLINIC | Age: 77
End: 2022-07-14
Payer: COMMERCIAL

## 2022-07-14 VITALS
OXYGEN SATURATION: 97 % | DIASTOLIC BLOOD PRESSURE: 58 MMHG | SYSTOLIC BLOOD PRESSURE: 80 MMHG | BODY MASS INDEX: 30.82 KG/M2 | HEIGHT: 67.52 IN | TEMPERATURE: 97 F | HEART RATE: 60 BPM | WEIGHT: 201 LBS

## 2022-07-14 DIAGNOSIS — H40.1131 PRIMARY OPEN ANGLE GLAUCOMA (POAG) OF BOTH EYES, MILD STAGE: ICD-10-CM

## 2022-07-14 DIAGNOSIS — I44.0 AV BLOCK, 1ST DEGREE: ICD-10-CM

## 2022-07-14 DIAGNOSIS — I77.9 BILATERAL CAROTID ARTERY DISEASE, UNSPECIFIED TYPE (HCC): ICD-10-CM

## 2022-07-14 DIAGNOSIS — Z91.81 AT RISK FOR FALLING: ICD-10-CM

## 2022-07-14 DIAGNOSIS — R13.10 DYSPHAGIA, UNSPECIFIED TYPE: ICD-10-CM

## 2022-07-14 DIAGNOSIS — E78.00 HYPERCHOLESTEROLEMIA: ICD-10-CM

## 2022-07-14 DIAGNOSIS — G20 PD (PARKINSON'S DISEASE) (HCC): ICD-10-CM

## 2022-07-14 DIAGNOSIS — R93.1 AGATSTON CAC SCORE 100-199: ICD-10-CM

## 2022-07-14 DIAGNOSIS — G24.9 DYSKINESIA DUE TO PARKINSON'S DISEASE (HCC): ICD-10-CM

## 2022-07-14 DIAGNOSIS — D69.6 THROMBOCYTOPENIA (HCC): ICD-10-CM

## 2022-07-14 DIAGNOSIS — I95.9 HYPOTENSION, UNSPECIFIED HYPOTENSION TYPE: ICD-10-CM

## 2022-07-14 DIAGNOSIS — E66.09 CLASS 1 OBESITY DUE TO EXCESS CALORIES WITH SERIOUS COMORBIDITY AND BODY MASS INDEX (BMI) OF 31.0 TO 31.9 IN ADULT: ICD-10-CM

## 2022-07-14 DIAGNOSIS — R09.89 GLOBUS SENSATION: ICD-10-CM

## 2022-07-14 DIAGNOSIS — I72.4 ANEURYSM OF POPLITEAL ARTERY (HCC): ICD-10-CM

## 2022-07-14 DIAGNOSIS — E89.0 POSTSURGICAL HYPOTHYROIDISM: ICD-10-CM

## 2022-07-14 DIAGNOSIS — N18.31 STAGE 3A CHRONIC KIDNEY DISEASE (HCC): ICD-10-CM

## 2022-07-14 DIAGNOSIS — R00.1 BRADYCARDIA: ICD-10-CM

## 2022-07-14 DIAGNOSIS — I65.23 BILATERAL CAROTID ARTERY STENOSIS: ICD-10-CM

## 2022-07-14 DIAGNOSIS — R73.9 HYPERGLYCEMIA: ICD-10-CM

## 2022-07-14 DIAGNOSIS — I10 ESSENTIAL HYPERTENSION: ICD-10-CM

## 2022-07-14 DIAGNOSIS — R26.9 ABNORMAL GAIT: ICD-10-CM

## 2022-07-14 DIAGNOSIS — Z00.00 MEDICARE ANNUAL WELLNESS VISIT, SUBSEQUENT: Primary | ICD-10-CM

## 2022-07-14 DIAGNOSIS — E87.6 HYPOKALEMIA: ICD-10-CM

## 2022-07-14 DIAGNOSIS — G20 DYSKINESIA DUE TO PARKINSON'S DISEASE (HCC): ICD-10-CM

## 2022-07-14 DIAGNOSIS — R73.03 PREDIABETES: ICD-10-CM

## 2022-07-14 DIAGNOSIS — I77.9 ARTERIAL DISEASE (HCC): ICD-10-CM

## 2022-07-14 DIAGNOSIS — G89.29 CHRONIC BILATERAL LOW BACK PAIN WITHOUT SCIATICA: ICD-10-CM

## 2022-07-14 DIAGNOSIS — G47.52 RBD (REM BEHAVIORAL DISORDER): ICD-10-CM

## 2022-07-14 DIAGNOSIS — D63.8 ANEMIA OF CHRONIC DISEASE: ICD-10-CM

## 2022-07-14 DIAGNOSIS — M54.50 CHRONIC BILATERAL LOW BACK PAIN WITHOUT SCIATICA: ICD-10-CM

## 2022-07-14 DIAGNOSIS — Z85.46 HISTORY OF PROSTATE CANCER: ICD-10-CM

## 2022-07-14 PROBLEM — Z87.891 FORMER SMOKER: Status: RESOLVED | Noted: 2020-06-09 | Resolved: 2022-07-14

## 2022-07-14 PROBLEM — K11.7 SIALORRHEA: Status: RESOLVED | Noted: 2020-06-23 | Resolved: 2022-07-14

## 2022-07-14 PROBLEM — D64.9 ANEMIA: Status: RESOLVED | Noted: 2021-06-12 | Resolved: 2022-07-14

## 2022-07-14 PROBLEM — Z01.818 PRE-OP TESTING: Status: RESOLVED | Noted: 2022-06-27 | Resolved: 2022-07-14

## 2022-07-14 PROBLEM — E66.811 CLASS 1 OBESITY DUE TO EXCESS CALORIES WITH SERIOUS COMORBIDITY AND BODY MASS INDEX (BMI) OF 30.0 TO 30.9 IN ADULT: Status: RESOLVED | Noted: 2021-06-12 | Resolved: 2022-07-14

## 2022-07-14 PROBLEM — E66.811 CLASS 1 OBESITY DUE TO EXCESS CALORIES WITH SERIOUS COMORBIDITY AND BODY MASS INDEX (BMI) OF 31.0 TO 31.9 IN ADULT: Status: ACTIVE | Noted: 2022-07-14

## 2022-07-14 PROCEDURE — 99397 PER PM REEVAL EST PAT 65+ YR: CPT | Performed by: FAMILY MEDICINE

## 2022-07-14 PROCEDURE — 1111F DSCHRG MED/CURRENT MED MERGE: CPT | Performed by: FAMILY MEDICINE

## 2022-07-14 PROCEDURE — G0439 PPPS, SUBSEQ VISIT: HCPCS | Performed by: FAMILY MEDICINE

## 2022-07-14 PROCEDURE — 1125F AMNT PAIN NOTED PAIN PRSNT: CPT | Performed by: FAMILY MEDICINE

## 2022-07-14 PROCEDURE — 96160 PT-FOCUSED HLTH RISK ASSMT: CPT | Performed by: FAMILY MEDICINE

## 2022-07-14 PROCEDURE — 3074F SYST BP LT 130 MM HG: CPT | Performed by: FAMILY MEDICINE

## 2022-07-14 PROCEDURE — 3008F BODY MASS INDEX DOCD: CPT | Performed by: FAMILY MEDICINE

## 2022-07-14 PROCEDURE — 3078F DIAST BP <80 MM HG: CPT | Performed by: FAMILY MEDICINE

## 2022-07-14 RX ORDER — LEVOTHYROXINE SODIUM 137 UG/1
137 TABLET ORAL
Qty: 30 TABLET | Refills: 2 | Status: SHIPPED | OUTPATIENT
Start: 2022-07-14

## 2022-07-14 RX ORDER — CALCIUM CARBONATE/VITAMIN D3 500MG-5MCG
1 TABLET ORAL 3 TIMES DAILY
COMMUNITY
Start: 2022-06-28 | End: 2022-07-15

## 2022-07-14 RX ORDER — POTASSIUM CHLORIDE 750 MG/1
10 TABLET, FILM COATED, EXTENDED RELEASE ORAL DAILY
Qty: 90 TABLET | Refills: 0 | Status: SHIPPED | OUTPATIENT
Start: 2022-07-14

## 2022-07-15 RX ORDER — CALCIUM CARBONATE/VITAMIN D3 500MG-5MCG
1 TABLET ORAL 3 TIMES DAILY
Qty: 270 TABLET | Refills: 0 | Status: SHIPPED | OUTPATIENT
Start: 2022-07-15

## 2022-07-15 NOTE — TELEPHONE ENCOUNTER
Patient requesting refill for 500-200 mg of Oysco with D     [facility] [address]

## 2022-08-24 ENCOUNTER — LAB ENCOUNTER (OUTPATIENT)
Dept: LAB | Age: 77
End: 2022-08-24
Attending: FAMILY MEDICINE
Payer: MEDICARE

## 2022-08-24 LAB
ANION GAP SERPL CALC-SCNC: <0 MMOL/L (ref 0–18)
BUN BLD-MCNC: 27 MG/DL (ref 7–18)
CALCIUM BLD-MCNC: 10.3 MG/DL (ref 8.5–10.1)
CHLORIDE SERPL-SCNC: 110 MMOL/L (ref 98–112)
CO2 SERPL-SCNC: 29 MMOL/L (ref 21–32)
CREAT BLD-MCNC: 1.23 MG/DL
FASTING STATUS PATIENT QL REPORTED: YES
GFR SERPLBLD BASED ON 1.73 SQ M-ARVRAT: 61 ML/MIN/1.73M2 (ref 60–?)
GLUCOSE BLD-MCNC: 109 MG/DL (ref 70–99)
OSMOLALITY SERPL CALC.SUM OF ELEC: 292 MOSM/KG (ref 275–295)
POTASSIUM SERPL-SCNC: 4.2 MMOL/L (ref 3.5–5.1)
SODIUM SERPL-SCNC: 138 MMOL/L (ref 136–145)
T4 FREE SERPL-MCNC: 1.5 NG/DL (ref 0.8–1.7)
TSI SER-ACNC: 0.25 MIU/ML (ref 0.36–3.74)

## 2022-08-24 PROCEDURE — 80048 BASIC METABOLIC PNL TOTAL CA: CPT | Performed by: FAMILY MEDICINE

## 2022-08-24 PROCEDURE — 36415 COLL VENOUS BLD VENIPUNCTURE: CPT | Performed by: FAMILY MEDICINE

## 2022-08-24 PROCEDURE — 84439 ASSAY OF FREE THYROXINE: CPT | Performed by: FAMILY MEDICINE

## 2022-08-24 PROCEDURE — 84443 ASSAY THYROID STIM HORMONE: CPT | Performed by: FAMILY MEDICINE

## 2022-09-07 ENCOUNTER — TELEPHONE (OUTPATIENT)
Dept: FAMILY MEDICINE CLINIC | Facility: CLINIC | Age: 77
End: 2022-09-07

## 2022-09-07 ENCOUNTER — OFFICE VISIT (OUTPATIENT)
Dept: FAMILY MEDICINE CLINIC | Facility: CLINIC | Age: 77
End: 2022-09-07
Payer: COMMERCIAL

## 2022-09-07 VITALS
HEIGHT: 67 IN | BODY MASS INDEX: 31.36 KG/M2 | OXYGEN SATURATION: 97 % | SYSTOLIC BLOOD PRESSURE: 120 MMHG | WEIGHT: 199.81 LBS | TEMPERATURE: 97 F | DIASTOLIC BLOOD PRESSURE: 60 MMHG | HEART RATE: 57 BPM

## 2022-09-07 DIAGNOSIS — I10 ESSENTIAL HYPERTENSION: ICD-10-CM

## 2022-09-07 DIAGNOSIS — E89.0 POSTSURGICAL HYPOTHYROIDISM: Primary | ICD-10-CM

## 2022-09-07 DIAGNOSIS — I95.9 HYPOTENSION, UNSPECIFIED HYPOTENSION TYPE: ICD-10-CM

## 2022-09-07 PROCEDURE — 1126F AMNT PAIN NOTED NONE PRSNT: CPT | Performed by: FAMILY MEDICINE

## 2022-09-07 PROCEDURE — 3078F DIAST BP <80 MM HG: CPT | Performed by: FAMILY MEDICINE

## 2022-09-07 PROCEDURE — 3008F BODY MASS INDEX DOCD: CPT | Performed by: FAMILY MEDICINE

## 2022-09-07 PROCEDURE — 99214 OFFICE O/P EST MOD 30 MIN: CPT | Performed by: FAMILY MEDICINE

## 2022-09-07 PROCEDURE — 3074F SYST BP LT 130 MM HG: CPT | Performed by: FAMILY MEDICINE

## 2022-09-07 RX ORDER — DORZOLAMIDE HYDROCHLORIDE AND TIMOLOL MALEATE 20; 5 MG/ML; MG/ML
1 SOLUTION/ DROPS OPHTHALMIC 2 TIMES DAILY
COMMUNITY
Start: 2022-08-22

## 2022-09-07 RX ORDER — LEVOTHYROXINE SODIUM 0.12 MG/1
125 TABLET ORAL
Qty: 30 TABLET | Refills: 2 | Status: SHIPPED | OUTPATIENT
Start: 2022-09-07

## 2022-09-07 NOTE — PATIENT INSTRUCTIONS
-Please do your blood test for the thyroid function test on or shortly after 11/2/2022, after that if needed we will adjust the dose of your levothyroxine.

## 2022-09-07 NOTE — TELEPHONE ENCOUNTER
Received fax from [phone] [facility] requesting \"if appt is required please contact patient\" He is having Botox into bladder 09/28/2022   Contacted  patient and he states he has had this procedure done before and did not require preop for this   Placed fax on MA's desk  No preop appt made

## 2022-10-09 DIAGNOSIS — E89.0 POSTSURGICAL HYPOTHYROIDISM: ICD-10-CM

## 2022-10-10 RX ORDER — LEVOTHYROXINE SODIUM 137 UG/1
TABLET ORAL
Qty: 30 TABLET | Refills: 2 | OUTPATIENT
Start: 2022-10-10

## 2022-11-08 ENCOUNTER — TELEPHONE (OUTPATIENT)
Dept: FAMILY MEDICINE CLINIC | Facility: CLINIC | Age: 77
End: 2022-11-08

## 2022-11-09 LAB
T4, FREE: 1.4 NG/DL (ref 0.8–1.8)
TSH: 1.55 MIU/L (ref 0.4–4.5)

## 2022-11-17 ENCOUNTER — OFFICE VISIT (OUTPATIENT)
Dept: FAMILY MEDICINE CLINIC | Facility: CLINIC | Age: 77
End: 2022-11-17
Payer: COMMERCIAL

## 2022-11-17 VITALS
RESPIRATION RATE: 22 BRPM | OXYGEN SATURATION: 99 % | BODY MASS INDEX: 30.95 KG/M2 | SYSTOLIC BLOOD PRESSURE: 120 MMHG | HEIGHT: 67 IN | WEIGHT: 197.19 LBS | DIASTOLIC BLOOD PRESSURE: 72 MMHG | TEMPERATURE: 96 F | HEART RATE: 52 BPM

## 2022-11-17 DIAGNOSIS — E78.00 HYPERCHOLESTEROLEMIA: ICD-10-CM

## 2022-11-17 DIAGNOSIS — S91.019A: ICD-10-CM

## 2022-11-17 DIAGNOSIS — Z79.899 ENCOUNTER FOR LONG-TERM CURRENT USE OF MEDICATION: ICD-10-CM

## 2022-11-17 DIAGNOSIS — E89.0 POSTSURGICAL HYPOTHYROIDISM: Primary | ICD-10-CM

## 2022-11-17 DIAGNOSIS — Z12.5 SCREENING FOR PROSTATE CANCER: ICD-10-CM

## 2022-11-17 DIAGNOSIS — I10 ESSENTIAL HYPERTENSION: ICD-10-CM

## 2022-11-17 PROCEDURE — 3074F SYST BP LT 130 MM HG: CPT | Performed by: FAMILY MEDICINE

## 2022-11-17 PROCEDURE — 3078F DIAST BP <80 MM HG: CPT | Performed by: FAMILY MEDICINE

## 2022-11-17 PROCEDURE — 99214 OFFICE O/P EST MOD 30 MIN: CPT | Performed by: FAMILY MEDICINE

## 2022-11-17 PROCEDURE — 3008F BODY MASS INDEX DOCD: CPT | Performed by: FAMILY MEDICINE

## 2022-11-17 PROCEDURE — 1126F AMNT PAIN NOTED NONE PRSNT: CPT | Performed by: FAMILY MEDICINE

## 2022-11-17 RX ORDER — LEVOTHYROXINE SODIUM 0.12 MG/1
125 TABLET ORAL
Qty: 90 TABLET | Refills: 1 | Status: SHIPPED | OUTPATIENT
Start: 2022-11-17

## 2022-11-17 RX ORDER — ERYTHROMYCIN 5 MG/G
OINTMENT OPHTHALMIC
COMMUNITY
Start: 2022-11-03

## 2022-12-16 RX ORDER — CALCIUM CARBONATE/VITAMIN D3 500MG-5MCG
TABLET ORAL
Qty: 270 TABLET | Refills: 0 | Status: SHIPPED | OUTPATIENT
Start: 2022-12-16

## 2023-02-21 NOTE — TELEPHONE ENCOUNTER
DOS: 2/21/23    Pt seen in consultation for Bridget Carmona, presents with Hx sinusitis for years, tends to occur from Oct. - Dec. Persistent sinus infections with severe symptoms: nasal congestion, worse at night, + headaches and facial pain. Has been diagnosed with deviated septum. + nasal discharge, + PND, no cough. No previous nasal injury or surgery. ? Allergies, never tested. + asthma, well-controlled. No DM. 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 + family Hx sinus problems and deviated septum. Pt has been in Flonase, multiple courses of ABx, pt has been intermittently doing sinus flushes, pt has also been on prednisone. No radiological studies yet. No other aggravating or alleviating factors noted.    Past medical, family, and social history reviewed per electronic health record on today's date of service, and there were no changes.    Review of systems including hematologic/lymphatic, respiratory, cardiovascular, constitutional, musculoskeletal, mood, endocrine, eyes, gastrointestinal, genitourinary, integumentary, and neurological systems was negative.    2/21/2023    Vitals:    02/21/23 1341   BP: 122/81   Pulse: 85   Resp: 16       On exam, patient is alert, oriented x3, well-developed, well-nourished, in no apparent distress.  Patient is attentive and responds to questions appropriately.  Voice is not hoarse.    Overall appearance of the head and neck is normal.  Facial symmetry and movement are within normal limits bilaterally, and skin is warm and dry.    Extraocular movement is preserved.    Ears:  Normal pinnae, ear canals, and tympanic membranes bilaterally.  Normal motion of the TM (tympanic membrane) on pneumatic otoscopy.  No TMJ (temporomandibular joint) tenderness.  No mastoid process tenderness.  Speech reception within normal limits.    Nose:  R deviated septum, normal bridge, and mildly inflamed nasal mucosa with crusting.   LMTCB and verify how the patient is taking the medication. If the patient started 10 MG at bedtime.      Medication: DONEPEZIL 5 MG Oral Tab    Date of last refill: 09/03/2021 (#30/1)  [place]    Last office visit: 09 No sinus tenderness on percussion.    Oral cavity:  Normal lips, gums, floor of mouth, buccal mucosa, tongue, and hard palate.    Oropharynx:  Normal soft palate, posterior pharyngeal wall, tonsillar fossae.    Neck:  No abnormal masses, thyroid masses or enlargement, lymphadenopathy, crepitus or tenderness.  Trachea midline. 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    Chest:  No respiratory distress, stridor, or wheezing.    Cardiovascular:  No peripheral edema, normal peripheral perfusion, no pallor.    Neurologic:  Cranial nerve function grossly within normal limits.    Assessment and Plan: chronic sinusitis, will order CT sinus to investigate, pt may benefit from Allergy consult, prolonged course of ABx, or sinus surgery.

## 2023-03-08 ENCOUNTER — OFFICE VISIT (OUTPATIENT)
Dept: FAMILY MEDICINE CLINIC | Facility: CLINIC | Age: 78
End: 2023-03-08
Payer: MEDICARE

## 2023-03-08 VITALS
RESPIRATION RATE: 20 BRPM | SYSTOLIC BLOOD PRESSURE: 126 MMHG | HEIGHT: 67 IN | TEMPERATURE: 97 F | HEART RATE: 66 BPM | OXYGEN SATURATION: 99 % | BODY MASS INDEX: 31.39 KG/M2 | DIASTOLIC BLOOD PRESSURE: 70 MMHG | WEIGHT: 200 LBS

## 2023-03-08 DIAGNOSIS — E89.0 POSTSURGICAL HYPOTHYROIDISM: ICD-10-CM

## 2023-03-08 DIAGNOSIS — R22.30 MASS OF SHOULDER REGION: ICD-10-CM

## 2023-03-08 DIAGNOSIS — H02.403 PTOSIS OF BOTH EYELIDS: ICD-10-CM

## 2023-03-08 DIAGNOSIS — G20 DYSKINESIA DUE TO PARKINSON'S DISEASE (HCC): Primary | ICD-10-CM

## 2023-03-08 DIAGNOSIS — G24.9 DYSKINESIA DUE TO PARKINSON'S DISEASE (HCC): Primary | ICD-10-CM

## 2023-03-08 DIAGNOSIS — R22.2 MASS ON BACK: ICD-10-CM

## 2023-03-08 PROCEDURE — 99215 OFFICE O/P EST HI 40 MIN: CPT | Performed by: FAMILY MEDICINE

## 2023-03-08 PROCEDURE — 3078F DIAST BP <80 MM HG: CPT | Performed by: FAMILY MEDICINE

## 2023-03-08 PROCEDURE — 1126F AMNT PAIN NOTED NONE PRSNT: CPT | Performed by: FAMILY MEDICINE

## 2023-03-08 PROCEDURE — 3008F BODY MASS INDEX DOCD: CPT | Performed by: FAMILY MEDICINE

## 2023-03-08 PROCEDURE — 3074F SYST BP LT 130 MM HG: CPT | Performed by: FAMILY MEDICINE

## 2023-03-08 RX ORDER — TRAZODONE HYDROCHLORIDE 50 MG/1
1 TABLET ORAL AS NEEDED
COMMUNITY
Start: 2023-03-06

## 2023-03-08 RX ORDER — DONEPEZIL HYDROCHLORIDE 10 MG/1
10 TABLET, FILM COATED ORAL DAILY
COMMUNITY
Start: 2023-02-10

## 2023-03-08 RX ORDER — AMOXICILLIN 500 MG/1
2000 CAPSULE ORAL AS DIRECTED
COMMUNITY
Start: 2023-01-17

## 2023-03-08 NOTE — PATIENT INSTRUCTIONS
-Please make an appointment to see your neurologist, Dr. Keith Vásquez, for a sooner appointment due to increasing weakness and drooping of eyelids.      -Please get your labs done through [facility] as soon as possible, we need to make sure that your thyroid function tests are in normal range. Ask [facility] to do all of the labs except the PSA which has not due until 6/25/2023.

## 2023-03-18 ENCOUNTER — TELEPHONE (OUTPATIENT)
Dept: FAMILY MEDICINE CLINIC | Facility: CLINIC | Age: 78
End: 2023-03-18

## 2023-03-18 PROBLEM — R22.2 MASS ON BACK: Status: ACTIVE | Noted: 2023-03-18

## 2023-03-18 PROBLEM — H02.403 PTOSIS OF BOTH EYELIDS: Status: ACTIVE | Noted: 2023-03-18

## 2023-03-18 PROBLEM — R22.30 MASS OF SHOULDER REGION: Status: ACTIVE | Noted: 2023-03-18

## 2023-03-18 PROBLEM — M25.811 MASS OF JOINT OF RIGHT SHOULDER: Status: ACTIVE | Noted: 2023-03-18

## 2023-03-18 NOTE — TELEPHONE ENCOUNTER
Please call patient and instruct him to complete TSH and free T4 at [facility] at his earliest convenience.

## 2023-03-21 LAB
ABSOLUTE BASOPHILS: 11 CELLS/UL (ref 0–200)
ABSOLUTE EOSINOPHILS: 68 CELLS/UL (ref 15–500)
ABSOLUTE LYMPHOCYTES: 1505 CELLS/UL (ref 850–3900)
ABSOLUTE MONOCYTES: 399 CELLS/UL (ref 200–950)
ABSOLUTE NEUTROPHILS: 3716 CELLS/UL (ref 1500–7800)
ALBUMIN/GLOBULIN RATIO: 1.8 (CALC) (ref 1–2.5)
ALBUMIN: 4.2 G/DL (ref 3.6–5.1)
ALKALINE PHOSPHATASE: 47 U/L (ref 35–144)
ALT: 7 U/L (ref 9–46)
AST: 19 U/L (ref 10–35)
BASOPHILS: 0.2 %
BILIRUBIN, TOTAL: 0.6 MG/DL (ref 0.2–1.2)
BUN/CREATININE RATIO: 22 (CALC) (ref 6–22)
BUN: 28 MG/DL (ref 7–25)
CALCIUM: 9.5 MG/DL (ref 8.6–10.3)
CARBON DIOXIDE: 32 MMOL/L (ref 20–32)
CHLORIDE: 104 MMOL/L (ref 98–110)
CHOL/HDLC RATIO: 2.4 (CALC)
CHOLESTEROL, TOTAL: 160 MG/DL
CREATININE: 1.25 MG/DL (ref 0.7–1.28)
EGFR: 59 ML/MIN/1.73M2
EOSINOPHILS: 1.2 %
GLOBULIN: 2.3 G/DL (CALC) (ref 1.9–3.7)
GLUCOSE: 103 MG/DL (ref 65–99)
HDL CHOLESTEROL: 67 MG/DL
HEMATOCRIT: 36 % (ref 38.5–50)
HEMOGLOBIN: 11.9 G/DL (ref 13.2–17.1)
LDL-CHOLESTEROL: 72 MG/DL (CALC)
LYMPHOCYTES: 26.4 %
MCH: 32.9 PG (ref 27–33)
MCHC: 33.1 G/DL (ref 32–36)
MCV: 99.4 FL (ref 80–100)
MONOCYTES: 7 %
MPV: 11 FL (ref 7.5–12.5)
NEUTROPHILS: 65.2 %
NON-HDL CHOLESTEROL: 93 MG/DL (CALC)
PLATELET COUNT: 168 THOUSAND/UL (ref 140–400)
POTASSIUM: 3.8 MMOL/L (ref 3.5–5.3)
PROTEIN, TOTAL: 6.5 G/DL (ref 6.1–8.1)
RDW: 11.6 % (ref 11–15)
RED BLOOD CELL COUNT: 3.62 MILLION/UL (ref 4.2–5.8)
SODIUM: 144 MMOL/L (ref 135–146)
T4, FREE: 1.5 NG/DL (ref 0.8–1.8)
TRIGLYCERIDES: 119 MG/DL
TSH: 4.33 MIU/L (ref 0.4–4.5)
WHITE BLOOD CELL COUNT: 5.7 THOUSAND/UL (ref 3.8–10.8)

## 2023-03-23 ENCOUNTER — HOSPITAL ENCOUNTER (OUTPATIENT)
Dept: ULTRASOUND IMAGING | Age: 78
Discharge: HOME | End: 2023-03-23
Attending: FAMILY MEDICINE
Payer: MEDICARE

## 2023-03-23 DIAGNOSIS — R22.2 MASS ON BACK: ICD-10-CM

## 2023-03-23 PROCEDURE — 76604 US EXAM CHEST: CPT | Performed by: FAMILY MEDICINE

## 2023-04-03 ENCOUNTER — TELEPHONE (OUTPATIENT)
Dept: FAMILY MEDICINE CLINIC | Facility: CLINIC | Age: 78
End: 2023-04-03

## 2023-04-03 DIAGNOSIS — R22.2 MASS ON BACK: Primary | ICD-10-CM

## 2023-04-04 NOTE — TELEPHONE ENCOUNTER
Recommend patient see Dr. Nomi Perry, general surgeon, or one of his colleagues for possible biopsy.     Vinicius Zheng, [address] [place],[address] [address] [phone]

## 2023-04-05 NOTE — TELEPHONE ENCOUNTER
Left msg for pt on vm with results/instructions and gave contact information for Dr Funmi Portillo. Asked pt after reviewing message to call office with any questions.

## 2023-04-18 DIAGNOSIS — Z79.899 ENCOUNTER FOR LONG-TERM CURRENT USE OF MEDICATION: ICD-10-CM

## 2023-04-18 DIAGNOSIS — E89.0 POSTSURGICAL HYPOTHYROIDISM: ICD-10-CM

## 2023-04-18 RX ORDER — SIMVASTATIN 20 MG
TABLET ORAL
Qty: 90 TABLET | Refills: 3 | Status: SHIPPED | OUTPATIENT
Start: 2023-04-18

## 2023-04-18 RX ORDER — LEVOTHYROXINE SODIUM 0.12 MG/1
TABLET ORAL
Qty: 90 TABLET | Refills: 3 | Status: SHIPPED | OUTPATIENT
Start: 2023-04-18

## 2023-04-18 RX ORDER — CALCIUM CARBONATE/VITAMIN D3 500MG-5MCG
TABLET ORAL
Qty: 270 TABLET | Refills: 0 | Status: SHIPPED | OUTPATIENT
Start: 2023-04-18

## 2023-04-24 ENCOUNTER — TELEPHONE (OUTPATIENT)
Dept: FAMILY MEDICINE CLINIC | Facility: CLINIC | Age: 78
End: 2023-04-24

## 2023-04-24 NOTE — TELEPHONE ENCOUNTER
Patient called requesting ultrasound results/ lab test results. Patient can be called on mobile number.    Thank you

## 2023-05-22 ENCOUNTER — OFFICE VISIT (OUTPATIENT)
Facility: LOCATION | Age: 78
End: 2023-05-22
Payer: MEDICARE

## 2023-05-22 VITALS — TEMPERATURE: 97 F | HEART RATE: 69 BPM

## 2023-05-22 DIAGNOSIS — D17.1 LIPOMA OF BACK: Primary | ICD-10-CM

## 2023-05-22 DIAGNOSIS — L72.3 SEBACEOUS CYST: ICD-10-CM

## 2023-05-22 DIAGNOSIS — R22.2 MASS ON BACK: ICD-10-CM

## 2023-05-22 DIAGNOSIS — N18.31 STAGE 3A CHRONIC KIDNEY DISEASE (HCC): ICD-10-CM

## 2023-05-22 DIAGNOSIS — I10 ESSENTIAL HYPERTENSION: ICD-10-CM

## 2023-05-22 DIAGNOSIS — R73.03 PREDIABETES: ICD-10-CM

## 2023-05-22 DIAGNOSIS — R22.30 MASS OF SHOULDER REGION: ICD-10-CM

## 2023-05-22 DIAGNOSIS — G20 PD (PARKINSON'S DISEASE) (HCC): ICD-10-CM

## 2023-05-22 DIAGNOSIS — Z85.46 HISTORY OF PROSTATE CANCER: ICD-10-CM

## 2023-05-22 RX ORDER — POLYETHYLENE GLYCOL 3350, SODIUM CHLORIDE, SODIUM BICARBONATE, POTASSIUM CHLORIDE 420; 11.2; 5.72; 1.48 G/4L; G/4L; G/4L; G/4L
POWDER, FOR SOLUTION ORAL
Qty: 1 EACH | Refills: 0 | Status: SHIPPED | OUTPATIENT
Start: 2023-05-22

## 2023-05-26 PROBLEM — L72.3 SEBACEOUS CYST: Status: ACTIVE | Noted: 2023-05-26

## 2023-05-26 PROBLEM — D17.1 LIPOMA OF BACK: Status: ACTIVE | Noted: 2023-05-26

## 2023-05-26 NOTE — PATIENT INSTRUCTIONS
This patient presents with several skin and subcutaneous nodules. 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 It has been growing. He denies any eruption or drainage. It is soft. He is aware of it. It causes him some symptoms at that site. Most likely it has been growing considerably. He has 3 separate sebaceous cysts. One on the right anterior chest wall near the right clavicle that is 2 cm in length. He has 2 on the right side of the back with a 3.5 cm lesion on the top of the right shoulder blade, and a 4.0 cm lesion on the right mid back at approximately the level of the T12 vertebrae. All of the lesions are getting larger. Currently none are in pain, red, or draining. The patient has no other skin lesions that he is concerned about. The patient's chart states that he has thrombocytopenia. The very last CBC draw was on March 20, 2023. He white blood cell count 5.7, hemoglobin 11.9, hematocrit 36.0, platelets 382,620. No evidence of thrombocytopenia on the last draw. Patient has never had heart attack, stroke, heart murmur, heart valve replacement, or cardiac arrhythmia. He does have some significant peripheral vascular disease, hypertension, Parkinson's disease. Clinical exam of the back reveals this greater than 12 cm lipoma in the mid back that goes between T2 and T4 between the shoulder blades. It is soft. It is oriented horizontally with the width being greater than the height. There are no signs of eruption. It is very consistent with a lipoma. It is fixed to the overlying skin and the underlying fascia. Also on the back are to sebaceous cyst appearing lesions, one above the right scapula 3.5 cm, 1 to the right of the midline in the mid back at approximately the T12 vertebrae that is 4.0 cm in greatest dimension. None show current infection or signs of eruption recently.   There are raised above skin level. They are firm. On the anterior chest wall near the right clavicle and its distal aspect is a 2 cm sebaceous cyst again raised off of the skin level, no eruption, no erythema, no current drainage. This patient will require general anesthesia for removal of these lesions. We would start supine on the patient's cart and do the one on the chest wall first.    He will then be flipped onto his abdomen where we will take care of the other 3 lesions. I went over all of this in detail with the patient and his wife. All risks, benefits, complications and alternatives to the proposed procedure(s) were fully discussed with the patient. All questions from the patient were answered in detail. A description of the procedure(s) and possible outcomes was fully discussed. The patient seemed to understand the conversation and its details. Consent for the procedure(s) was confirmed with the patient.

## 2023-06-01 ENCOUNTER — TELEPHONE (OUTPATIENT)
Facility: LOCATION | Age: 78
End: 2023-06-01

## 2023-06-01 NOTE — TELEPHONE ENCOUNTER
Pt received Trilyte prep, he is wondering why he received it.  He wasn't discussing a colonoscopy with the Dr.     Please advise  Best callback number is [phone]

## 2023-06-01 NOTE — TELEPHONE ENCOUNTER
Reviewed chart, no colonoscopy noted in ov and or scheduled. .    Order removed and pt updated on error

## 2023-06-14 ENCOUNTER — TELEPHONE (OUTPATIENT)
Dept: FAMILY MEDICINE CLINIC | Facility: CLINIC | Age: 78
End: 2023-06-14

## 2023-06-14 NOTE — TELEPHONE ENCOUNTER
Patient has a scheduled apt on 7/17 would like to get lab orders     Per pt Please fax to [employer] and a copy to patient in mail

## 2023-06-15 NOTE — TELEPHONE ENCOUNTER
Patient does not need labs. Patient will need labs until around March 2024, unless for some other reason.

## 2023-07-17 ENCOUNTER — OFFICE VISIT (OUTPATIENT)
Dept: FAMILY MEDICINE CLINIC | Facility: CLINIC | Age: 78
End: 2023-07-17
Payer: MEDICARE

## 2023-07-17 VITALS
HEIGHT: 68 IN | OXYGEN SATURATION: 95 % | WEIGHT: 197.63 LBS | BODY MASS INDEX: 29.95 KG/M2 | SYSTOLIC BLOOD PRESSURE: 130 MMHG | RESPIRATION RATE: 18 BRPM | DIASTOLIC BLOOD PRESSURE: 70 MMHG

## 2023-07-17 DIAGNOSIS — I72.4 ANEURYSM OF POPLITEAL ARTERY (HCC): ICD-10-CM

## 2023-07-17 DIAGNOSIS — R93.1 AGATSTON CAC SCORE 100-199: ICD-10-CM

## 2023-07-17 DIAGNOSIS — D17.1 LIPOMA OF BACK: ICD-10-CM

## 2023-07-17 DIAGNOSIS — I77.9 BILATERAL CAROTID ARTERY DISEASE, UNSPECIFIED TYPE (HCC): ICD-10-CM

## 2023-07-17 DIAGNOSIS — E87.6 HYPOKALEMIA: ICD-10-CM

## 2023-07-17 DIAGNOSIS — R00.1 BRADYCARDIA: ICD-10-CM

## 2023-07-17 DIAGNOSIS — R22.30 MASS OF SHOULDER REGION: ICD-10-CM

## 2023-07-17 DIAGNOSIS — I65.23 BILATERAL CAROTID ARTERY STENOSIS: ICD-10-CM

## 2023-07-17 DIAGNOSIS — R73.03 PREDIABETES: ICD-10-CM

## 2023-07-17 DIAGNOSIS — G47.52 RBD (REM BEHAVIORAL DISORDER): ICD-10-CM

## 2023-07-17 DIAGNOSIS — M54.50 CHRONIC BILATERAL LOW BACK PAIN WITHOUT SCIATICA: ICD-10-CM

## 2023-07-17 DIAGNOSIS — I10 ESSENTIAL HYPERTENSION: ICD-10-CM

## 2023-07-17 DIAGNOSIS — G20 PD (PARKINSON'S DISEASE) (HCC): ICD-10-CM

## 2023-07-17 DIAGNOSIS — E66.09 CLASS 1 OBESITY DUE TO EXCESS CALORIES WITH SERIOUS COMORBIDITY AND BODY MASS INDEX (BMI) OF 30.0 TO 30.9 IN ADULT: ICD-10-CM

## 2023-07-17 DIAGNOSIS — I44.0 AV BLOCK, 1ST DEGREE: ICD-10-CM

## 2023-07-17 DIAGNOSIS — H40.1131 PRIMARY OPEN ANGLE GLAUCOMA (POAG) OF BOTH EYES, MILD STAGE: ICD-10-CM

## 2023-07-17 DIAGNOSIS — H02.403 PTOSIS OF BOTH EYELIDS: ICD-10-CM

## 2023-07-17 DIAGNOSIS — R26.9 ABNORMAL GAIT: ICD-10-CM

## 2023-07-17 DIAGNOSIS — Z85.46 HISTORY OF PROSTATE CANCER: ICD-10-CM

## 2023-07-17 DIAGNOSIS — G20 DYSKINESIA DUE TO PARKINSON'S DISEASE (HCC): ICD-10-CM

## 2023-07-17 DIAGNOSIS — Z00.00 ROUTINE GENERAL MEDICAL EXAMINATION AT A HEALTH CARE FACILITY: Primary | ICD-10-CM

## 2023-07-17 DIAGNOSIS — E89.0 POSTSURGICAL HYPOTHYROIDISM: ICD-10-CM

## 2023-07-17 DIAGNOSIS — G24.9 DYSKINESIA DUE TO PARKINSON'S DISEASE (HCC): ICD-10-CM

## 2023-07-17 DIAGNOSIS — E78.00 HYPERCHOLESTEROLEMIA: ICD-10-CM

## 2023-07-17 DIAGNOSIS — R73.9 HYPERGLYCEMIA: ICD-10-CM

## 2023-07-17 DIAGNOSIS — Z12.11 SCREEN FOR COLON CANCER: ICD-10-CM

## 2023-07-17 DIAGNOSIS — G89.29 CHRONIC BILATERAL LOW BACK PAIN WITHOUT SCIATICA: ICD-10-CM

## 2023-07-17 DIAGNOSIS — N18.31 STAGE 3A CHRONIC KIDNEY DISEASE (HCC): ICD-10-CM

## 2023-07-17 DIAGNOSIS — R22.2 MASS ON BACK: ICD-10-CM

## 2023-07-17 DIAGNOSIS — Z91.81 AT RISK FOR FALLING: ICD-10-CM

## 2023-07-17 DIAGNOSIS — D69.6 THROMBOCYTOPENIA (HCC): ICD-10-CM

## 2023-07-17 DIAGNOSIS — I77.9 ARTERIAL DISEASE (HCC): ICD-10-CM

## 2023-07-17 DIAGNOSIS — L72.3 SEBACEOUS CYST: ICD-10-CM

## 2023-07-17 PROBLEM — I95.9 HYPOTENSION: Status: RESOLVED | Noted: 2022-07-14 | Resolved: 2023-07-17

## 2023-07-17 PROBLEM — R09.89 GLOBUS SENSATION: Status: RESOLVED | Noted: 2020-05-04 | Resolved: 2023-07-17

## 2023-07-17 PROBLEM — E66.811 CLASS 1 OBESITY DUE TO EXCESS CALORIES WITH SERIOUS COMORBIDITY AND BODY MASS INDEX (BMI) OF 30.0 TO 30.9 IN ADULT: Status: ACTIVE | Noted: 2023-07-17

## 2023-07-17 PROBLEM — E66.811 CLASS 1 OBESITY DUE TO EXCESS CALORIES WITH SERIOUS COMORBIDITY AND BODY MASS INDEX (BMI) OF 31.0 TO 31.9 IN ADULT: Status: RESOLVED | Noted: 2022-07-14 | Resolved: 2023-07-17

## 2023-07-17 PROBLEM — R09.A2 GLOBUS SENSATION: Status: RESOLVED | Noted: 2020-05-04 | Resolved: 2023-07-17

## 2023-07-17 PROBLEM — R13.10 DYSPHAGIA: Status: RESOLVED | Noted: 2020-05-04 | Resolved: 2023-07-17

## 2023-07-17 NOTE — PATIENT INSTRUCTIONS
[facility] SCREENING SCHEDULE   Tests on this list are recommended by your physician but may not be covered, or covered at this frequency, by your insurer. Please check with your insurance carrier before scheduling to verify coverage.    PREVENTATIVE SERVICES FREQUENCY &  COVERAGE DETAILS LAST COMPLETION DATE   Diabetes Screening    Fasting Blood Sugar / Glucose    One screening every 12 months if never tested or if previously tested but not diagnosed with pre-diabetes   One screening every 6 months if diagnosed with pre-diabetes Lab Results   Component Value Date     (H) 03/20/2023        Cardiovascular Disease Screening    Lipid Panel  Cholesterol  Lipoprotein (HDL)  Triglycerides Covered every 5 years for all Medicare beneficiaries without apparent signs or symptoms of cardiovascular disease Lab Results   Component Value Date    CHOLEST 160 03/20/2023    HDL 67 03/20/2023    LDL 72 03/20/2023    TRIG 119 03/20/2023         Electrocardiogram (EKG)   Covered if needed at Welcome to Medicare, and non-screening if indicated for medical reasons 05/27/2022      Ultrasound Screening for Abdominal Aortic Aneurysm (AAA) Covered once in a lifetime for one of the following risk factors    Men who are 73-68 years old and have ever smoked    Anyone with a family history -     Colorectal Cancer Screening  Covered for ages 52-80; only need ONE of the following:    Colonoscopy   Covered every 10 years    Covered every 2 years if patient is at high risk or previous colonoscopy was abnormal 03/04/2014    No recommendations at this time    Flexible Sigmoidoscopy   Covered every 4 years -    Fecal Occult Blood Test Covered annually -   Prostate Cancer Screening    Prostate-Specific Antigen (PSA) Annually Lab Results   Component Value Date    PSA <0.01 06/24/2022     PSA due on 06/24/2023   Immunizations    Influenza Covered once per flu season  Please get every year 09/19/2022  No recommendations at this time Pneumococcal Each vaccine (Yunwrch67 & Lkzltjzuj42) covered once after 65 Prevnar 13: 02/08/2018    Jdlvjgkei37: 10/08/2011     No recommendations at this time    Hepatitis B One screening covered for patients with certain risk factors   -  No recommendations at this time    Tetanus Toxoid Not covered by Medicare Part B unless medically necessary (cut with metal); may be covered with your pharmacy prescription benefits 09/06/2020    Tetanus, Diptheria and Pertusis TD and TDaP Not covered by Medicare Part B -  No recommendations at this time    Zoster Not covered by Medicare Part B; may be covered with your pharmacy  prescription benefits 03/14/2011  Zoster Vaccines(2 of 3) due on 05/09/2011     Annual Monitoring of Persistent Medications (ACE/ARB, digoxin diuretics, anticonvulsants)    Potassium Annually Lab Results   Component Value Date    K 3.8 03/20/2023         Creatinine   Annually Lab Results   Component Value Date    CREATSERUM 1.25 03/20/2023         BUN Annually Lab Results   Component Value Date    BUN 28 (H) 03/20/2023       Drug Serum Conc Annually No results found for: DIGOXIN, DIG, VALP                       -Please complete your blood test prior to your next appointment, make an appointment for follow-up to be seen in January or February 2024. -Wait until after the first of the year to do your blood tests.

## 2023-07-21 ENCOUNTER — LABORATORY ENCOUNTER (OUTPATIENT)
Dept: LAB | Age: 78
End: 2023-07-21
Attending: COLON & RECTAL SURGERY
Payer: MEDICARE

## 2023-07-21 ENCOUNTER — EKG ENCOUNTER (OUTPATIENT)
Dept: LAB | Age: 78
End: 2023-07-21
Attending: COLON & RECTAL SURGERY
Payer: MEDICARE

## 2023-07-21 DIAGNOSIS — D17.1 LIPOMA OF BACK: ICD-10-CM

## 2023-07-21 LAB
ANION GAP SERPL CALC-SCNC: 5 MMOL/L (ref 0–18)
ATRIAL RATE: 47 BPM
BUN BLD-MCNC: 24 MG/DL (ref 7–18)
CALCIUM BLD-MCNC: 10.2 MG/DL (ref 8.5–10.1)
CHLORIDE SERPL-SCNC: 106 MMOL/L (ref 98–112)
CO2 SERPL-SCNC: 29 MMOL/L (ref 21–32)
CREAT BLD-MCNC: 1.38 MG/DL
EGFRCR SERPLBLD CKD-EPI 2021: 53 ML/MIN/1.73M2 (ref 60–?)
FASTING STATUS PATIENT QL REPORTED: YES
GLUCOSE BLD-MCNC: 101 MG/DL (ref 70–99)
OSMOLALITY SERPL CALC.SUM OF ELEC: 294 MOSM/KG (ref 275–295)
P AXIS: 114 DEGREES
P-R INTERVAL: 226 MS
POTASSIUM SERPL-SCNC: 4.1 MMOL/L (ref 3.5–5.1)
Q-T INTERVAL: 432 MS
QRS DURATION: 92 MS
QTC CALCULATION (BEZET): 382 MS
R AXIS: -14 DEGREES
SODIUM SERPL-SCNC: 140 MMOL/L (ref 136–145)
T AXIS: 224 DEGREES
VENTRICULAR RATE: 47 BPM

## 2023-07-21 PROCEDURE — 93010 ELECTROCARDIOGRAM REPORT: CPT | Performed by: INTERNAL MEDICINE

## 2023-07-21 PROCEDURE — 36415 COLL VENOUS BLD VENIPUNCTURE: CPT

## 2023-07-21 PROCEDURE — 80048 BASIC METABOLIC PNL TOTAL CA: CPT

## 2023-07-21 PROCEDURE — 93005 ELECTROCARDIOGRAM TRACING: CPT

## 2023-07-22 RX ORDER — CALCIUM CARBONATE/VITAMIN D3 500MG-5MCG
1 TABLET ORAL 3 TIMES DAILY
Qty: 270 TABLET | Refills: 0 | Status: SHIPPED | OUTPATIENT
Start: 2023-07-22

## 2023-07-25 ENCOUNTER — TELEPHONE (OUTPATIENT)
Facility: LOCATION | Age: 78
End: 2023-07-25

## 2023-07-25 ENCOUNTER — TELEPHONE (OUTPATIENT)
Dept: FAMILY MEDICINE CLINIC | Facility: CLINIC | Age: 78
End: 2023-07-25

## 2023-07-25 NOTE — TELEPHONE ENCOUNTER
Kmi Loja From [facility] is needing medical clearance for patients surgery on 7/28/23 Faxed to [phone]  She faxed over forms. Please advise.

## 2023-07-25 NOTE — TELEPHONE ENCOUNTER
ransaction ID: [id number] ID: [id number] Date: 2023-07-25  Francisca Fitzpatrick Patient  Member ID  [id number]    Date of Birth  [id number]    Gender  Male    Eligibility Status  Active Coverage    Group Number  [id number]    Plan / Coverage Date  2023-01-01    Transaction Type  Outpatient Authorization    Organization  [employer] (COMMERCIAL & MEDICARE)    Judy Rodríguez logo     Certificate Information  Reference Number  [id number]    Status  NO ACTION REQUIRED    Member Information  Patient Name  Francisca Fitzpatrick    Patient Date of Birth  [id number]    Patient Gender  Male    Member ID  [id number]    Relationship to [address] Name  Francisca Fitzpatrick    Requesting Provider     Name  [facility] [address], [address]  [id number]    Provider Role  Provider    Address  [address] [facility], [address] [place] [address], [address] [place] [address] Rd    Phone  [phone]  Contact Name  Saurabh Scott    Service Information  Service Type  -    Place of Service  22 - On [address] [place] [address]    Diagnosis Code 1  L723 - Sebaceous cyst    Diagnosis Code 2  D171 - Benign lipomatous neoplasm of skin subcu of trunk    Procedure Code 1 (CPT/HCPCS)  19106 - EXC BACK LES SC 3 CM/>    Quantity  1 Units    Procedure From - To Date  2023-07-28    Status  NO ACTION REQUIRED    Message  NO PRECERT REQUIRED PLEASE REFER TO THE PROVIDER CODE SEARCH TOOL ON [employer] THE REQUESTED SERVICE MAY NOT BE ELIGIBLE FOR COVERAGE REFER TO ONLINE CLINICAL POLICY BULLETINS USING [address] AND CONTACT PROVIDER SERVICES    Procedure Code 2 (CPT/HCPCS)  19324 - EXC TR-EXT B9+NICOLE 2.1-3CM    Quantity  1 Units    Procedure From - To Date  2023-07-28    Status  NO ACTION REQUIRED    Message  NO PRECERT REQUIRED PLEASE REFER TO THE PROVIDER CODE SEARCH TOOL ON [employer] WEBSITE THE REQUESTED SERVICE MAY NOT BE ELIGIBLE FOR COVERAGE REFER TO ONLINE CLINICAL POLICY BULLETINS USING [address] AND CONTACT PROVIDER SERVICES    Procedure Code 3 (CPT/HCPCS)  53701 - EXC TR-EXT B9+NICOLE 3.1-4 CM    Quantity  2 Units    Procedure From - To Date  2023-07-28    Status  NO ACTION REQUIRED    Message  NO PRECERT REQUIRED PLEASE REFER TO THE PROVIDER CODE SEARCH TOOL ON [employer] WEBSITE THE REQUESTED SERVICE MAY NOT BE ELIGIBLE FOR COVERAGE REFER TO ONLINE CLINICAL POLICY BULLETINS USING [address]    Rendering Provider/Facility     Provider 1  Name  [facility] [address], [address]  [id number]    Provider Role  Attending    Provider 2  Name  [facility]  [id number]    Provider Role  Facility

## 2023-07-25 NOTE — TELEPHONE ENCOUNTER
Forms received. Did speak to Dr Gerlean Ganser about this patient.  Did then also speak to Dr. Niecy Cuba office surgical scheduler and they are going to reschedule this pt's surgery as pt needs cardiac clearance and clearance from Neuro first

## 2023-07-26 ENCOUNTER — TELEPHONE (OUTPATIENT)
Facility: LOCATION | Age: 78
End: 2023-07-26

## 2023-07-26 NOTE — TELEPHONE ENCOUNTER
LVM that surgery has to be canceled and he will need cardiac and neuro clearance prior to surgery. Letter for clearance sent to Bryanna Reich MD and DR. Misha Luna MD

## 2023-07-28 NOTE — TELEPHONE ENCOUNTER
Pt calling to let Dr. Fer Fitzpatrick know his surgery was cancelled due to needing cardiology and neurology clearance. Pt wanted to know what are his next steps. Pt has upcoming appt with Dr. Kyle Villasenor- Cardiology on 8/11/23. Advised pt to call his Neurologist to make an appt to receive clearance. Dr. Jamal Hudson for Neurological Care  [address] [place] [address]  Phone: [phone]  Fax: [phone]    I told pt I would make Dr. Fer Fitzpatrick aware. Pt voiced understanding.

## 2023-07-31 ENCOUNTER — TELEPHONE (OUTPATIENT)
Dept: FAMILY MEDICINE CLINIC | Facility: CLINIC | Age: 78
End: 2023-07-31

## 2023-07-31 ENCOUNTER — TELEPHONE (OUTPATIENT)
Facility: LOCATION | Age: 78
End: 2023-07-31

## 2023-07-31 DIAGNOSIS — E04.9 SUBSTERNAL THYROID GOITER: ICD-10-CM

## 2023-07-31 DIAGNOSIS — I44.0 AV BLOCK, 1ST DEGREE: Primary | ICD-10-CM

## 2023-07-31 DIAGNOSIS — R00.1 BRADYCARDIA: ICD-10-CM

## 2023-07-31 NOTE — TELEPHONE ENCOUNTER
Pt's sx was canceled last week. He doesn't know where to go from here as to what to do from here forward to be cleared for surgery.      Please advise  Best callback number is [phone]

## 2023-07-31 NOTE — TELEPHONE ENCOUNTER
Patient stated surgery canceled, needs clearance from card and neuro. Asking why surgery was cancelled. Unable to find reason for cancellation of surgery, advised to f/u with Dr. Amanda Willis office, verbalized understanding.

## 2023-07-31 NOTE — TELEPHONE ENCOUNTER
Patient states had a scheduled surgery with Dr. Funmi Portillo that has been canceled. States he received a letter stating needs a authorization from neurology and cardiac.  Please advise

## 2023-08-01 ENCOUNTER — TELEPHONE (OUTPATIENT)
Facility: LOCATION | Age: 78
End: 2023-08-01

## 2023-08-01 NOTE — TELEPHONE ENCOUNTER
S/w patient to clarify that Dr. Fer Fitzpatrick is requesting clearance from his cardiologist and neurologist before giving medical clearance for the surgery planned by Dr. Shelly Pyle. Verbalized understanding.     Future Appointments   Date Time Provider Isabella Singh   1/17/2024 10:00 AM Peace Campo DO [facility] [place]

## 2023-08-11 ENCOUNTER — OFFICE VISIT (OUTPATIENT)
Dept: FAMILY MEDICINE CLINIC | Facility: CLINIC | Age: 78
End: 2023-08-11
Payer: MEDICARE

## 2023-08-11 VITALS
RESPIRATION RATE: 18 BRPM | HEART RATE: 103 BPM | BODY MASS INDEX: 30.16 KG/M2 | OXYGEN SATURATION: 100 % | TEMPERATURE: 98 F | SYSTOLIC BLOOD PRESSURE: 146 MMHG | WEIGHT: 199 LBS | DIASTOLIC BLOOD PRESSURE: 76 MMHG | HEIGHT: 68 IN

## 2023-08-11 DIAGNOSIS — I44.0 AV BLOCK, 1ST DEGREE: ICD-10-CM

## 2023-08-11 DIAGNOSIS — N18.31 STAGE 3A CHRONIC KIDNEY DISEASE (HCC): ICD-10-CM

## 2023-08-11 DIAGNOSIS — G20 PD (PARKINSON'S DISEASE) (HCC): ICD-10-CM

## 2023-08-11 DIAGNOSIS — L72.3 SEBACEOUS CYST: ICD-10-CM

## 2023-08-11 DIAGNOSIS — R22.30 MASS OF SHOULDER REGION: ICD-10-CM

## 2023-08-11 DIAGNOSIS — D17.1 LIPOMA OF BACK: ICD-10-CM

## 2023-08-11 DIAGNOSIS — I10 ESSENTIAL HYPERTENSION: ICD-10-CM

## 2023-08-11 DIAGNOSIS — Z01.818 PREOP EXAMINATION: Primary | ICD-10-CM

## 2023-08-11 DIAGNOSIS — R22.2 MASS ON BACK: ICD-10-CM

## 2023-08-11 LAB
AMB EXT COLOGUARD RESULT: NEGATIVE
ATRIAL RATE: 47 BPM
P AXIS: 114 DEGREES
P-R INTERVAL: 226 MS
Q-T INTERVAL: 432 MS
QRS DURATION: 92 MS
QTC CALCULATION (BEZET): 382 MS
R AXIS: -14 DEGREES
T AXIS: 224 DEGREES
VENTRICULAR RATE: 47 BPM

## 2023-08-11 PROCEDURE — 3077F SYST BP >= 140 MM HG: CPT | Performed by: FAMILY MEDICINE

## 2023-08-11 PROCEDURE — 1160F RVW MEDS BY RX/DR IN RCRD: CPT | Performed by: FAMILY MEDICINE

## 2023-08-11 PROCEDURE — 3078F DIAST BP <80 MM HG: CPT | Performed by: FAMILY MEDICINE

## 2023-08-11 PROCEDURE — 99215 OFFICE O/P EST HI 40 MIN: CPT | Performed by: FAMILY MEDICINE

## 2023-08-11 PROCEDURE — 3008F BODY MASS INDEX DOCD: CPT | Performed by: FAMILY MEDICINE

## 2023-08-11 PROCEDURE — 1170F FXNL STATUS ASSESSED: CPT | Performed by: FAMILY MEDICINE

## 2023-08-11 PROCEDURE — 1159F MED LIST DOCD IN RCRD: CPT | Performed by: FAMILY MEDICINE

## 2023-08-11 NOTE — PATIENT INSTRUCTIONS
-Please ask Dr. Parris Meneses to fax us cardiac clearance/cardiac risk assessment as well as to your surgeon's office.    -Please ask your neurologist's to fax their clearance to our office as well as your surgeons office.    -We will try reaching out to your surgeon's office to get more information as to who will be doing your surgery and if they want to see you in the office a again for an appointment with a new surgeon.      -Dr. Amalia Hicks will be able to clear you for surgery once we receive notes from the cardiologist and the neurologist.      -Remember to complete your labs after the first of the year but prior to your appointment that you have scheduled for 1/17/2024.

## 2023-08-13 ENCOUNTER — TELEPHONE (OUTPATIENT)
Dept: FAMILY MEDICINE CLINIC | Facility: CLINIC | Age: 78
End: 2023-08-13

## 2023-08-13 NOTE — TELEPHONE ENCOUNTER
This patient had surgery scheduled with Dr. Tamika Del Real who is no longer with [facility], the patient has been informed of this. Patient is asking for our assistance with finding out who his new surgeon will be and when his surgery can take place. Please call [address] [place] [address] General surgery office to find out about his new surgeon and when his surgery can take place. Please call patient's cardiologist's office, Dr. Oswaldo Suresh, and request cardiac risk assessment/cardiac clearance. Please call patient's neurologist's office and request neurology risk assessment/neurology clearance.   (I reviewed care everywhere and there appears to be miscommunication with the neurologist's office, we do need a neurology risk assessment/neurology clearance.)

## 2023-08-14 NOTE — TELEPHONE ENCOUNTER
Surgery originally scheduled for 7/28/2023 with Dr. Xiomara Motta. Surgery canceled due to patient needing cardiac and neuro clearance. (See TE dated 7/26/2023.)  Called surgery center. All the surgeons can remove lipomas; however, Dr. Bernabe Neither and Dr. Deuce Jarrett do them more often. Regarding neuro clearance, see TE dated 8/10/2023. Patient does not require surgical clearance from neuro. Discussed with PCP. Patient needs neuro clearance. Will need to call neuro for clearance. Called [facility]. Spoke with Chon Orellana. Patient cleared for surgery on 8/11/2023 by CHINYERE Huntley. Triage: Will need to contact neuro for clearance.  Will need to place referral for new surgeon for lipoma removal.

## 2023-08-15 NOTE — TELEPHONE ENCOUNTER
Surgery referral pended, Dr. Joan Andrews and Dr. Xenia Melgar remove lipomas most often. Re: neuro  clearance, Dr. Kirby Mota is on maternity leave but a request was made to her office on 8/4/23. On 8/10/23, Lor Mcmullen cancelled that request because the surgery was cancelled. Telephone Encounter - Alvaro Ngo RN - 08/04/2023 11:57 AM CDT  Formatting of this note might be different from the original.  Spoke with pt's wife Keya Mora. Pt's general surgery team is requiring surgical clearance from his PCP, neurologist, and cardiologist for the removal of his lipoma on his back. Pt's spouse notified Dr. Kirby Mota remains on maternity leave and covering provider will be notified once medical clearance form is received. Pt's spouse provided the fax number ([phone]) and states she will contact surgery to send the form  Electronically signed by Alvaro Ngo RN at 08/04/2023 12:16 PM CDT     Telephone Encounter - Alvaro Ngo RN - 08/10/2023 1:00 PM CDT  Formatting of this note might be different from the original.  Spoke with Anthony at [facility] regarding surgical clearance form received. She states pt's does not need surgical clearance for his lipomas removal and disregard forms. Electronically signed by Alvaro Ngo RN at 08/10/2023 1:39 PM CDT   __________________________________________________________________    Jay luna in pre-op testing, stated patients are advised to call Dr. Khadijah Brown office for guidance on plan for surgery.

## 2023-10-06 RX ORDER — OMEPRAZOLE 40 MG/1
40 CAPSULE, DELAYED RELEASE ORAL DAILY
Qty: 90 CAPSULE | Refills: 3 | Status: SHIPPED | OUTPATIENT
Start: 2023-10-06

## 2023-10-18 RX ORDER — CALCIUM CARBONATE/VITAMIN D3 500MG-5MCG
1 TABLET ORAL 3 TIMES DAILY
Qty: 270 TABLET | Refills: 0 | Status: SHIPPED | OUTPATIENT
Start: 2023-10-18

## 2023-10-18 NOTE — TELEPHONE ENCOUNTER
Medication(s) to Refill:   Requested Prescriptions     Pending Prescriptions Disp Refills    OYSCO 500+D 500-5 MG-MCG Oral Tab [Pharmacy Med Name: OYSCO 500/D TABLETS] 270 tablet 0     Sig: TAKE 1 TABLET BY MOUTH THREE TIMES DAILY     Last Time Medication was Filled:  7/22/23    Recent Visits  Date Type Provider Dept   08/11/23 Office Visit DO Milo Spencer     Future Appointments  Date Type Provider Dept   01/17/24 Appointment DO Milo Spencer

## 2024-01-17 ENCOUNTER — OFFICE VISIT (OUTPATIENT)
Dept: FAMILY MEDICINE CLINIC | Facility: CLINIC | Age: 79
End: 2024-01-17
Payer: MEDICARE

## 2024-01-17 VITALS
BODY MASS INDEX: 30.59 KG/M2 | OXYGEN SATURATION: 99 % | HEART RATE: 65 BPM | WEIGHT: 204.19 LBS | TEMPERATURE: 97 F | DIASTOLIC BLOOD PRESSURE: 70 MMHG | SYSTOLIC BLOOD PRESSURE: 118 MMHG | HEIGHT: 68.4 IN

## 2024-01-17 DIAGNOSIS — D69.6 THROMBOCYTOPENIA (HCC): ICD-10-CM

## 2024-01-17 DIAGNOSIS — I77.9 BILATERAL CAROTID ARTERY DISEASE, UNSPECIFIED TYPE (HCC): ICD-10-CM

## 2024-01-17 DIAGNOSIS — G47.52 RBD (REM BEHAVIORAL DISORDER): ICD-10-CM

## 2024-01-17 DIAGNOSIS — Z12.5 SCREENING FOR PROSTATE CANCER: ICD-10-CM

## 2024-01-17 DIAGNOSIS — G20.B1 PARKINSON'S DISEASE WITH DYSKINESIA, UNSPECIFIED WHETHER MANIFESTATIONS FLUCTUATE: ICD-10-CM

## 2024-01-17 DIAGNOSIS — Z85.46 HISTORY OF PROSTATE CANCER: ICD-10-CM

## 2024-01-17 DIAGNOSIS — I10 ESSENTIAL HYPERTENSION: ICD-10-CM

## 2024-01-17 DIAGNOSIS — E66.09 CLASS 1 OBESITY DUE TO EXCESS CALORIES WITH SERIOUS COMORBIDITY AND BODY MASS INDEX (BMI) OF 30.0 TO 30.9 IN ADULT: ICD-10-CM

## 2024-01-17 DIAGNOSIS — L72.3 SEBACEOUS CYST: ICD-10-CM

## 2024-01-17 DIAGNOSIS — Z91.81 AT RISK FOR FALLING: ICD-10-CM

## 2024-01-17 DIAGNOSIS — E89.0 POSTSURGICAL HYPOTHYROIDISM: ICD-10-CM

## 2024-01-17 DIAGNOSIS — R26.9 ABNORMAL GAIT: ICD-10-CM

## 2024-01-17 DIAGNOSIS — Z00.00 MEDICARE ANNUAL WELLNESS VISIT, SUBSEQUENT: Primary | ICD-10-CM

## 2024-01-17 DIAGNOSIS — N18.31 STAGE 3A CHRONIC KIDNEY DISEASE (HCC): ICD-10-CM

## 2024-01-17 DIAGNOSIS — I77.9 ARTERIAL DISEASE (HCC): ICD-10-CM

## 2024-01-17 DIAGNOSIS — I65.23 BILATERAL CAROTID ARTERY STENOSIS: ICD-10-CM

## 2024-01-17 DIAGNOSIS — R73.03 PREDIABETES: ICD-10-CM

## 2024-01-17 DIAGNOSIS — Z79.899 ENCOUNTER FOR LONG-TERM CURRENT USE OF MEDICATION: ICD-10-CM

## 2024-01-17 DIAGNOSIS — D63.8 ANEMIA OF CHRONIC DISEASE: ICD-10-CM

## 2024-01-17 DIAGNOSIS — I72.4 ANEURYSM OF POPLITEAL ARTERY (HCC): ICD-10-CM

## 2024-01-17 DIAGNOSIS — E78.00 HYPERCHOLESTEROLEMIA: ICD-10-CM

## 2024-01-17 DIAGNOSIS — I44.0 AV BLOCK, 1ST DEGREE: ICD-10-CM

## 2024-01-17 DIAGNOSIS — M54.50 CHRONIC BILATERAL LOW BACK PAIN WITHOUT SCIATICA: ICD-10-CM

## 2024-01-17 DIAGNOSIS — H40.1131 PRIMARY OPEN ANGLE GLAUCOMA (POAG) OF BOTH EYES, MILD STAGE: ICD-10-CM

## 2024-01-17 DIAGNOSIS — H02.403 PTOSIS OF BOTH EYELIDS: ICD-10-CM

## 2024-01-17 DIAGNOSIS — G89.29 CHRONIC BILATERAL LOW BACK PAIN WITHOUT SCIATICA: ICD-10-CM

## 2024-01-17 DIAGNOSIS — R93.1 AGATSTON CAC SCORE 100-199: ICD-10-CM

## 2024-01-17 DIAGNOSIS — D17.1 LIPOMA OF BACK: ICD-10-CM

## 2024-01-17 DIAGNOSIS — R22.30 MASS OF SHOULDER REGION: ICD-10-CM

## 2024-01-17 DIAGNOSIS — R73.9 HYPERGLYCEMIA: ICD-10-CM

## 2024-01-17 DIAGNOSIS — E87.6 HYPOKALEMIA: ICD-10-CM

## 2024-01-17 DIAGNOSIS — R22.2 MASS ON BACK: ICD-10-CM

## 2024-01-17 PROBLEM — R00.1 BRADYCARDIA: Status: RESOLVED | Noted: 2022-05-21 | Resolved: 2024-01-17

## 2024-01-17 PROCEDURE — 3074F SYST BP LT 130 MM HG: CPT | Performed by: FAMILY MEDICINE

## 2024-01-17 PROCEDURE — G0439 PPPS, SUBSEQ VISIT: HCPCS | Performed by: FAMILY MEDICINE

## 2024-01-17 PROCEDURE — 99499 UNLISTED E&M SERVICE: CPT | Performed by: FAMILY MEDICINE

## 2024-01-17 PROCEDURE — 96160 PT-FOCUSED HLTH RISK ASSMT: CPT | Performed by: FAMILY MEDICINE

## 2024-01-17 PROCEDURE — 99214 OFFICE O/P EST MOD 30 MIN: CPT | Performed by: FAMILY MEDICINE

## 2024-01-17 PROCEDURE — 3078F DIAST BP <80 MM HG: CPT | Performed by: FAMILY MEDICINE

## 2024-01-17 PROCEDURE — 3008F BODY MASS INDEX DOCD: CPT | Performed by: FAMILY MEDICINE

## 2024-01-17 RX ORDER — CALCIUM CARBONATE/VITAMIN D3 500MG-5MCG
1 TABLET ORAL 3 TIMES DAILY
Qty: 270 TABLET | Refills: 1 | Status: SHIPPED | OUTPATIENT
Start: 2024-01-17

## 2024-01-17 RX ORDER — AMOXICILLIN 500 MG/1
2000 CAPSULE ORAL ONCE
COMMUNITY
Start: 2023-12-15

## 2024-01-17 NOTE — PROGRESS NOTES
Subjective:   Lawrence Daniels is a 78 year old male who presents for a MA (Medicare Advantage) Supervisit (Once per calendar year) and scheduled follow up of multiple significant but stable problems.     Patient is accompanied by his wife who is very pleasant and supportive.      History/Other:   Fall Risk Assessment:   He has been screened for Falls and is High Risk. Fall Prevention information provided to patient in After Visit Summary.    Do you feel unsteady when standing or walking?: Yes  Do you worry about falling?: Yes  Have you fallen in the past year?: Yes  How many times have you fallen?: 3  Were you injured?: No     Cognitive Assessment:   Abnormal  What day of the week is this?: Correct  What month is it?: Correct  What year is it?: Correct  Recall \"Ball\": Incorrect  Recall \"Flag\": Incorrect  Recall \"Tree\": Correct    Functional Ability/Status:   Lawrence Daniels has some abnormal functions as listed below:  He has Dressing and/or Bathing issues based on screening of functional status.  Difficulty dressing or bathing?: No  Bathing or Showering: Able without help  Dressing: Need some help  He has Driving difficulties based on screening of functional status. He has Meal Preparation difficulties based on screening of functional status.He has difficulties Shopping for Groceries based on screening of functional status. He has difficulties Taking Meds as Rx'd based on screening of functional status. He has problems with Memory based on screening of functional status.       Depression Screening (PHQ-2/PHQ-9): PHQ-2 SCORE: 0  , done 1/17/2024          Advanced Directives:   He does NOT have a Living Will. [Do you have a living will?: Yes]    He does NOT have a Power of  for Health Care. [Do you have a healthcare power of ?: Yes]    Patient has Advance Care Planning documents but we do not have a copy in EMR. Discussed Advanced Care Planning with patient and instructed patient to get our office a copy to be  scanned into EMR.      Patient Active Problem List   Diagnosis    Essential hypertension    Bilateral carotid artery disease (HCC)    Primary open angle glaucoma (POAG) of both eyes, mild stage    Hypercholesterolemia    Bilateral carotid artery stenosis    Arterial disease (HCC)    Aneurysm of popliteal artery (HCC)    Thrombocytopenia (HCC)    Abnormal gait    At risk for falling    Agatston CAC score 100-199    Chronic bilateral low back pain without sciatica    Hyperglycemia    Stage 3a chronic kidney disease (HCC)    Prediabetes    History of prostate cancer    RBD (REM behavioral disorder)    Parkinson's disease with dyskinesia    AV block, 1st degree    Anemia of chronic disease    Postsurgical hypothyroidism    Hypokalemia    Ptosis of both eyelids    Mass on back    Mass of shoulder region    Lipoma of back    Sebaceous cyst    Class 1 obesity due to excess calories with serious comorbidity and body mass index (BMI) of 30.0 to 30.9 in adult    Parkinson's disease (HCC)    Encounter for long-term current use of medication     Allergies:  He is allergic to zoledronic acid.    Current Medications:  Outpatient Medications Marked as Taking for the 1/17/24 encounter (Office Visit) with Jennifer Chandra, DO   Medication Sig    simvastatin 20 MG Oral Tab Take 1 tablet (20 mg total) by mouth nightly.    UNITHROID 125 MCG Oral Tab Take 1 tablet (125 mcg total) by mouth every morning. Nothing to eat or drink other than water for 30 to 60 minutes after taking.  Do not take any vitamins or supplements for 4 hours after taking.    amoxicillin 500 MG Oral Cap Take 4 capsules (2,000 mg total) by mouth one time.    OYSCO 500+D 500-5 MG-MCG Oral Tab Take 1 tablet by mouth 3 (three) times daily.    Omeprazole 40 MG Oral Capsule Delayed Release Take 1 capsule (40 mg total) by mouth daily.    LEVOTHYROXINE 125 MCG Oral Tab TAKE 1 TABLET(125 MCG) BY MOUTH BEFORE BREAKFAST    donepezil 10 MG Oral Tab Take 1 tablet (10 mg total)  by mouth daily.    Potassium Chloride ER 10 MEQ Oral Tab CR Take 1 tablet (10 mEq total) by mouth daily.    latanoprost 0.005 % Ophthalmic Solution Place 1 drop into both eyes nightly.    carbidopa-levodopa  MG Oral Tab Take by mouth 3 (three) times daily. Taking 1 1/2 tablets 3 times daily    Timolol Maleate 0.5 % Ophthalmic Solution Place 1 drop into both eyes nightly.    hydrALAzine HCl 25 MG Oral Tab Take 1 tablet (25 mg total) by mouth 2 (two) times daily.    Melatonin 10 MG Oral Tab Take 10 mg by mouth nightly.    omega-3 fatty acids 1000 MG Oral Cap Take 1,000 mg by mouth daily.    Sennosides (SENEXON OR) Take 2 tablets by mouth nightly.    docusate sodium 100 MG Oral Cap Take 1 capsule (100 mg total) by mouth nightly.    folic acid 400 MCG Oral Tab Take 1 tablet (400 mcg total) by mouth once a week.    Vitamin B-12 1000 MCG Oral Tab Take 1 tablet (1,000 mcg total) by mouth once a week.    Pyridoxine HCl (VITAMIN B-6) 100 MG Oral Tab Take 1 tablet (100 mg total) by mouth once a week.    hydrochlorothiazide 25 MG Oral Tab Take 1 tablet (25 mg total) by mouth daily.    Cholecalciferol 50 MCG (2000 UT) Oral Tab Take 1 tablet (2,000 Units total) by mouth at bedtime.    losartan 100 MG Oral Tab Take 1 tablet (100 mg total) by mouth every morning.       Medical History:  He  has a past medical history of Anemia of chronic disease (07/14/2022), Aneurysm of popliteal artery ([facility]) (06/07/2018), Arterial disease ([facility]) (06/07/2018), Back problem, Bilateral carotid artery disease ([facility]) (02/11/2018), Bilateral carotid artery stenosis (06/07/2018), Bradycardia, Class 1 obesity due to excess calories with serious comorbidity and body mass index (BMI) of 31.0 to 31.9 in adult (07/14/2022), Class 1 obesity due to excess calories with serious comorbidity and body mass index (BMI) of 33.0 to 33.9 in adult (06/17/2019), Class 1 obesity due to excess calories without serious comorbidity with body mass index (BMI) of 33.0 to  33.9 in adult (02/11/2018), Cognitive complaints with normal neuropsychological exam (06/07/2018), Cogwheel rigidity (07/14/2018), COVID (02/2022), Disorder of thyroid, Dyskinesia due to Parkinson's disease (02/03/2022), Dysphagia (05/04/2020), Esophageal reflux, Essential hypertension, Exposure to medical diagnostic radiation, Former smoker (06/09/2020), Glaucoma, Glaucoma of both eyes (02/11/2018), Globus sensation (05/04/2020), High blood pressure, High cholesterol, Hypercholesterolemia (06/07/2018), Hyperlipidemia, Hypervitaminosis (07/14/2018), Hypotension (07/14/2022), Multiple thyroid nodules (05/2022), 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), Neoplasm of uncertain behavior of skin (06/07/2018), Obesity (BMI 30-39.9) (08/29/2017), ISAIAS (obstructive sleep apnea), ISAIAS on CPAP, Osteoarthritis, Other specified glaucoma (08/29/2017), Overflow incontinence of urine (08/29/2017), 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  Surgical History:  He  has a past surgical history that includes knee replacement surgery (Left, 2014); ankle fracture surgery; hdr elect brachytherapy (2006); other; Incision and Drainage (Left, 04/05/2022); and thyroidectomy (Bilateral, 06/27/2022).   Family History:  His family history includes Cancer in his paternal grandfather; Colon Cancer in his mother; Heart Attack in his father, maternal grandfather, and paternal grandmother; Heart Disease in his maternal grandfather, maternal grandmother, and paternal grandmother.  Social History:  He  reports that he has quit smoking. His smoking use included cigars. He has never used smokeless tobacco. He reports current  alcohol use of about 2.0 standard drinks of alcohol per week. He reports that he does not use drugs.    Tobacco:  He smoked tobacco in the past but quit greater than 12 months ago.  Social History    Tobacco Use      Smoking status: Former        Types: Cigars      Smokeless tobacco: Never         CAGE Alcohol Screen:   CAGE screening score of 0 on 1/17/2024, showing low risk of alcohol abuse.      Patient Care Team:  Jennifer Chandra DO as PCP - General (Family Practice)  German Herndon MD as Consulting Physician (NEUROLOGY)  Kerline Vargas PT as Physical Therapist (Physical Therapy)  Enriqueta Kennedy, SLP (Speech Therapist)  Curtis Anthony MD (PULMONARY DISEASES)  Byron Oseguera MD as Consulting Physician (UROLOGY)  Cheri Barrow MD as Consulting Physician (Cardiovascular Diseases)  Ricco Christensen (OPHTHALMOLOGY)  Rohini Morgan (NEUROLOGY)    Review of Systems  GENERAL: feels well overall otherwise  SKIN: denies any unusual skin lesions  EYES: denies blurred vision or double vision  HEENT: denies nasal congestion, sinus pain or ST  LUNGS: denies shortness of breath with exertion  CARDIOVASCULAR: denies chest pain on exertion  GI: denies abdominal pain, denies heartburn  : 1 per night nocturia, no complaint of urinary incontinence  MUSCULOSKELETAL: denies back pain  NEURO: denies headaches  PSYCH: denies depression or anxiety      Objective:   Physical Exam  General Appearance:  Very pleasant frail elderly  male.  Alert, cooperative, no distress, appears stated age   Head:  Normocephalic, without obvious abnormality, atraumatic   Eyes:  PERRL, conjunctiva/corneas clear, EOM's intact, both eyes   Ears:  Hearing grossly normal   Nose: No nasal discharge   Throat: MMM.  Posterior OP normal without erythema or exudate   Neck: Supple, symmetrical, trachea midline, no adenopathy, thyroid: not enlarged, symmetric, no tenderness/mass/nodules       Lungs:   Clear to auscultation bilaterally,  respirations unlabored       Heart:  Regular rate and rhythm, S1, S2 normal, no murmur   Abdomen:   Soft, non-tender, no masses, no organomegaly           Extremities: Trace bilateral lower extremity edema       BACK: Masses of back again noted.   Lymph nodes: No cervical or supraclavicular adenopathy   Neurologic: Alert and oriented.  Flat facies     /70 (BP Location: Right arm, Patient Position: Sitting, Cuff Size: adult)   Pulse 65   Temp 97 °F (36.1 °C)   Ht 5' 8.4\" (1.737 m)   Wt 204 lb 3.2 oz (92.6 kg)   SpO2 99%   BMI 30.69 kg/m²  Estimated body mass index is 30.69 kg/m² as calculated from the following:    Height as of this encounter: 5' 8.4\" (1.737 m).    Weight as of this encounter: 204 lb 3.2 oz (92.6 kg).    Medicare Hearing Assessment:   Hearing Screening    Time taken: 1/17/2024 10:42 AM  Screening Method: Finger Rub  Finger Rub Result: Pass               DATA:      Diabetes:    Lab Results   Component Value Date    A1C 5.8 (H) 03/25/2021    A1C 5.7 (H) 06/19/2019     03/25/2021     06/19/2019     Lab Results   Component Value Date    CHOLEST 160 03/20/2023    CHOLEST 157 02/15/2022    CHOLEST 159 03/25/2021     Lab Results   Component Value Date    HDL 67 03/20/2023    HDL 60 (H) 02/15/2022    HDL 58 03/25/2021     Lab Results   Component Value Date    LDL 72 03/20/2023    LDL 74 02/15/2022    LDL 73 03/25/2021     Lab Results   Component Value Date    TRIG 119 03/20/2023    TRIG 135 02/15/2022    TRIG 139 03/25/2021     Lab Results   Component Value Date    AST 19 03/20/2023    AST 19 04/05/2022    AST 21 02/15/2022     Lab Results   Component Value Date    ALT 7 (L) 03/20/2023    ALT 13 (L) 04/05/2022    ALT 23 02/15/2022     Thyroid:    Lab Results   Component Value Date    TSH 4.33 03/20/2023    TSH 1.55 11/08/2022    TSH 0.252 (L) 08/24/2022    T4F 1.5 03/20/2023    T4F 1.4 11/08/2022    T4F 1.5 08/24/2022           Assessment & Plan:   Lawrence Daniels is a 78 year old male  who presents for a Medicare Assessment.     Encounter Diagnoses   Name Primary?    Medicare annual wellness visit, subsequent Yes    Bilateral carotid artery disease, unspecified type (HCC)     Arterial disease (HCC)     Aneurysm of popliteal artery (HCC)     Hypercholesterolemia     AV block, 1st degree     Agatston CAC score 100-199     Bilateral carotid artery stenosis     Essential hypertension     Stage 3a chronic kidney disease (HCC)     Hypokalemia     Postsurgical hypothyroidism     Encounter for long-term current use of medication     Parkinson's disease with dyskinesia, unspecified whether manifestations fluctuate     Abnormal gait     At risk for falling     Ptosis of both eyelids     Thrombocytopenia (HCC)     Anemia of chronic disease     History of prostate cancer     Prediabetes     Hyperglycemia     Class 1 obesity due to excess calories with serious comorbidity and body mass index (BMI) of 30.0 to 30.9 in adult     RBD (REM behavioral disorder)     Primary open angle glaucoma (POAG) of both eyes, mild stage     Chronic bilateral low back pain without sciatica     Mass of shoulder region     Mass on back     Lipoma of back     Sebaceous cyst     Screening for prostate cancer          1. Medicare annual wellness visit, subsequent  Patient provided info on prevention, healthcare power of , and living will.    (Cardio/cardiovascular)  2. Bilateral carotid artery disease, unspecified type (HCC)  3. Arterial disease (HCC)  4. Aneurysm of popliteal artery (HCC)  5. Hypercholesterolemia  6. AV block, 1st degree  7. Agatston CAC score 100-199  8. Bilateral carotid artery stenosis  9. Essential hypertension  Lipids have been to goal for at least the last 3 years.  Recommend patient continue simvastatin 20 mg nightly.    Hypertension is well-controlled, recommend continue hydralazine, hydrochlorothiazide, and losartan as prescribed by your cardiologist.  Patient to follow-up with his cardiologist as  planned.    - simvastatin 20 MG Oral Tab; Take 1 tablet (20 mg total) by mouth nightly.  Dispense: 90 tablet; Refill: 3    - CBC With Differential With Platelet  - Comp Metabolic Panel (14)  - Lipid Panel      12. Postsurgical hypothyroidism  Recommend reevaluate thyroid function test for stability.  Recommend continue thyroid hormone replacement as below, we will adjust dose in the future as indicated.    - Assay, Thyroid Stim Hormone  - Free T4, (Free Thyroxine)  - UNITHROID 125 MCG Oral Tab; Take 1 tablet (125 mcg total) by mouth every morning. Nothing to eat or drink other than water for 30 to 60 minutes after taking.  Do not take any vitamins or supplements for 4 hours after taking.  Dispense: 90 tablet; Refill: 3    13. Encounter for long-term current use of medication  Medication use, risks, benefits, side effects and precautions discussed, patient verbalizes understanding. Questions encouraged and answered to patient's satisfaction.    - simvastatin 20 MG Oral Tab; Take 1 tablet (20 mg total) by mouth nightly.  Dispense: 90 tablet; Refill: 3  - UNITHROID 125 MCG Oral Tab; Take 1 tablet (125 mcg total) by mouth every morning. Nothing to eat or drink other than water for 30 to 60 minutes after taking.  Do not take any vitamins or supplements for 4 hours after taking.  Dispense: 90 tablet; Refill: 3    (Neuro)  14. Parkinson's disease with dyskinesia, unspecified whether manifestations fluctuate  15. Abnormal gait  16. At risk for falling  17. Ptosis of both eyelids  Patient to follow-up with his neurologist as planned.    18. Thrombocytopenia (HCC)  Platelet levels have fluctuated.  Recommend reevaluate, labs ordered and pending, recommendations pending results.    - CBC With Differential With Platelet    19. Anemia of chronic disease  Anemia of chronic disease, chronic kidney disease, chronic inflammation secondary to chronic cardiovascular disease and obesity.  Iron studies, B12 level, and folic acid levels  normal.    20. History of prostate cancer  Patient asymptomatic.    21. Prediabetes  22. Hyperglycemia  Recommend reevaluate fasting plasma glucose, we will order A1c again if fasting plasma glucose is significantly elevated.  - Comp Metabolic Panel (14)    23. Class 1 obesity due to excess calories with serious comorbidity and body mass index (BMI) of 30.0 to 30.9 in adult  Recommend healthy diet such as Mediterranean diet.  Recommend stationary bike for exercise.    24. RBD (REM behavioral disorder)  Patient to follow-up with sleep specialist as planned.    25. Primary open angle glaucoma (POAG) of both eyes, mild stage  Patient to follow-up with his ophthalmologist as planned.    26. Chronic bilateral low back pain without sciatica  Okay to take Tylenol when having flares of back pain.    (Masses)  27. Mass of shoulder region  28. Mass on back  29. Lipoma of back  30. Sebaceous cyst  Patient was recommended to contact general surgeon's office to set up appointment with new surgeon and surgical date.    31. Screening for prostate cancer  - PSA, TOTAL W REFLEX TO PSA, FREE [[id number]][Q]              Orders Placed This Encounter   Procedures    CBC With Differential With Platelet    Comp Metabolic Panel (14)    Lipid Panel    Assay, Thyroid Stim Hormone    Free T4, (Free Thyroxine)    PSA, TOTAL W REFLEX TO PSA, FREE [[id number]][Q]       Meds & Refills for this Visit:  Requested Prescriptions     Signed Prescriptions Disp Refills    OYSCO 500+D 500-5 MG-MCG Oral Tab 270 tablet 1     Sig: Take 1 tablet by mouth 3 (three) times daily.    simvastatin 20 MG Oral Tab 90 tablet 3     Sig: Take 1 tablet (20 mg total) by mouth nightly.    UNITHROID 125 MCG Oral Tab 90 tablet 3     Sig: Take 1 tablet (125 mcg total) by mouth every morning. Nothing to eat or drink other than water for 30 to 60 minutes after taking.  Do not take any vitamins or supplements for 4 hours after taking.           1. Medicare annual wellness visit,  subsequent (Primary)  2. Bilateral carotid artery disease, unspecified type (HCC)  -     Lipid Panel  -     Simvastatin; Take 1 tablet (20 mg total) by mouth nightly.  Dispense: 90 tablet; Refill: 3  3. Arterial disease (HCC)  Overview:  MRI 9/15/2017: Mild chronic microvascular ischemic changes in the cerebral white matter.    Orders:  -     Lipid Panel  -     Simvastatin; Take 1 tablet (20 mg total) by mouth nightly.  Dispense: 90 tablet; Refill: 3  4. Aneurysm of popliteal artery (HCC)  5. Hypercholesterolemia  -     Lipid Panel  -     Simvastatin; Take 1 tablet (20 mg total) by mouth nightly.  Dispense: 90 tablet; Refill: 3  6. AV block, 1st degree  7. Agatston CAC score 100-199  Overview:  2018 149  Orders:  -     Lipid Panel  8. Bilateral carotid artery stenosis  -     Lipid Panel  -     Simvastatin; Take 1 tablet (20 mg total) by mouth nightly.  Dispense: 90 tablet; Refill: 3  9. Essential hypertension  -     CBC With Differential With Platelet  -     Comp Metabolic Panel (14)  -     Lipid Panel  10. Stage 3a chronic kidney disease (HCC)  -     Comp Metabolic Panel (14)  11. Hypokalemia  -     Comp Metabolic Panel (14)  12. Postsurgical hypothyroidism  Overview:  6/27/2022 total thyroidectomy with Dr. Perry Soriano.  Orders:  -     Assay, Thyroid Stim Hormone  -     Free T4, (Free Thyroxine)  -     Unithroid; Take 1 tablet (125 mcg total) by mouth every morning. Nothing to eat or drink other than water for 30 to 60 minutes after taking.  Do not take any vitamins or supplements for 4 hours after taking.  Dispense: 90 tablet; Refill: 3  13. Encounter for long-term current use of medication  -     Simvastatin; Take 1 tablet (20 mg total) by mouth nightly.  Dispense: 90 tablet; Refill: 3  -     Unithroid; Take 1 tablet (125 mcg total) by mouth every morning. Nothing to eat or drink other than water for 30 to 60 minutes after taking.  Do not take any vitamins or supplements for 4 hours after taking.  Dispense: 90  tablet; Refill: 3  14. Parkinson's disease with dyskinesia, unspecified whether manifestations fluctuate  15. Abnormal gait  16. At risk for falling  17. Ptosis of both eyelids  Overview:  Greatly improved status post Botox injection by neuro  18. Thrombocytopenia (HCC)  -     CBC With Differential With Platelet  19. Anemia of chronic disease  Overview:  Iron studies, B12 level, and folic acid levels normal.  20. History of prostate cancer  21. Prediabetes  -     Comp Metabolic Panel (14)  22. Hyperglycemia  -     Comp Metabolic Panel (14)  23. Class 1 obesity due to excess calories with serious comorbidity and body mass index (BMI) of 30.0 to 30.9 in adult  Overview:  Associated with hypertension, hyperlipidemia, and mild hyperglycemia  24. RBD (REM behavioral disorder)  Overview:  Last Assessment & Plan:   Formatting of this note might be different from the original.  Only diagnosed with RBD, but behavior does seem classic for it.  He is taking melatonin 10 mg nightly so it is possible he is treating his RBD.  We will do an in lab diagnostic PSG with extra EMG leads on arms to assess for REM sleep without atonia.  25. Primary open angle glaucoma (POAG) of both eyes, mild stage  26. Chronic bilateral low back pain without sciatica  27. Mass of shoulder region  Overview:  Area of mid trapezius  28. Mass on back  Overview:  Right lower thoracic    29. Lipoma of back  30. Sebaceous cyst  31. Screening for prostate cancer  -     PSA, TOTAL W REFLEX TO PSA, FREE [[id number]][Q]  Other orders  -     Oysco 500+D; Take 1 tablet by mouth 3 (three) times daily.  Dispense: 270 tablet; Refill: 1    The patient indicates understanding of these issues and agrees to the plan.  Reinforced healthy diet, lifestyle, and exercise.      Return in about 6 months (around 7/17/2024) for Hypertension, hyperlipidemia, and hypothyroidism.  Sooner if needed..     Jennifer Chandra DO, 1/17/2024     Supplementary Documentation:   General Health:  In  the past six months, have you lost more than 10 pounds without trying?: 2 - No  Has your appetite been poor?: No  Type of Diet: Balanced  How does the patient maintain a good energy level?: Other  How would you describe your daily physical activity?: Light  How would you describe your current health state?: Fair  How do you maintain positive mental well-being?: Visiting Friends;Visiting Family  On a scale of 0 to 10, with 0 being no pain and 10 being severe pain, what is your pain level?: 1 - (Mild)  In the past six months, have you experienced urine leakage?: 1-Yes  At any time do you feel concerned for the safety/well-being of yourself and/or your children, in your home or elsewhere?: No  Have you had any immunizations at another office such as Influenza, Hepatitis B, Tetanus, or Pneumococcal?: Yes          Lawrence Daniels's SCREENING SCHEDULE   Tests on this list are recommended by your physician but may not be covered, or covered at this frequency, by your insurer.   Please check with your insurance carrier before scheduling to verify coverage.   PREVENTATIVE SERVICES FREQUENCY &  COVERAGE DETAILS LAST COMPLETION DATE   Diabetes Screening    Fasting Blood Sugar / Glucose    One screening every 12 months if never tested or if previously tested but not diagnosed with pre-diabetes   One screening every 6 months if diagnosed with pre-diabetes Lab Results   Component Value Date     (H) 07/21/2023        Cardiovascular Disease Screening    Lipid Panel  Cholesterol  Lipoprotein (HDL)  Triglycerides Covered every 5 years for all Medicare beneficiaries without apparent signs or symptoms of cardiovascular disease Lab Results   Component Value Date    CHOLEST 160 03/20/2023    HDL 67 03/20/2023    LDL 72 03/20/2023    TRIG 119 03/20/2023         Electrocardiogram (EKG)   Covered if needed at Welcome to Medicare, and non-screening if indicated for medical reasons 08/11/2023      Ultrasound Screening for Abdominal Aortic  Aneurysm (AAA) Covered once in a lifetime for one of the following risk factors    Men who are 65-75 years old and have ever smoked    Anyone with a family history -     Colorectal Cancer Screening  Covered for ages 50-85; only need ONE of the following:    Colonoscopy   Covered every 10 years    Covered every 2 years if patient is at high risk or previous colonoscopy was abnormal 03/04/2014    No recommendations at this time    Flexible Sigmoidoscopy   Covered every 4 years -    Fecal Occult Blood Test Covered annually -   Prostate Cancer Screening    Prostate-Specific Antigen (PSA) Annually Lab Results   Component Value Date    PSA <0.01 06/24/2022     Health Maintenance   Topic Date Due    PSA  06/24/2023      Immunizations    Influenza Covered once per flu season  Please get every year 09/29/2023  No recommendations at this time    Pneumococcal Each vaccine ([id number] & [id number]) covered once after 65 Prevnar 13: 02/08/2018    [id number]: 10/26/2022     No recommendations at this time    Hepatitis B One screening covered for patients with certain risk factors   -  No recommendations at this time    Tetanus Toxoid Not covered by Medicare Part B unless medically necessary (cut with metal); may be covered with your pharmacy prescription benefits 09/06/2020    Tetanus, Diptheria and Pertusis TD and TDaP Not covered by Medicare Part B -  No recommendations at this time    Zoster Not covered by Medicare Part B; may be covered with your pharmacy  prescription benefits 03/14/2011  Zoster Vaccines(2 of 3) due on 05/09/2011     Annual Monitoring of Persistent Medications (ACE/ARB, digoxin diuretics, anticonvulsants)    Potassium Annually Lab Results   Component Value Date    K 4.1 07/21/2023         Creatinine   Annually Lab Results   Component Value Date    CREATSERUM 1.38 (H) 07/21/2023         BUN Annually Lab Results   Component Value Date    BUN 24 (H) 07/21/2023       Drug Serum Conc Annually No results found  for: \"DIGOXIN\", \"DIG\", \"VALP\"

## 2024-01-17 NOTE — PATIENT INSTRUCTIONS
Lawrence Daniels's SCREENING SCHEDULE   Tests on this list are recommended by your physician but may not be covered, or covered at this frequency, by your insurer.   Please check with your insurance carrier before scheduling to verify coverage.   PREVENTATIVE SERVICES FREQUENCY &  COVERAGE DETAILS LAST COMPLETION DATE   Diabetes Screening    Fasting Blood Sugar / Glucose    One screening every 12 months if never tested or if previously tested but not diagnosed with pre-diabetes   One screening every 6 months if diagnosed with pre-diabetes Lab Results   Component Value Date     (H) 07/21/2023        Cardiovascular Disease Screening    Lipid Panel  Cholesterol  Lipoprotein (HDL)  Triglycerides Covered every 5 years for all Medicare beneficiaries without apparent signs or symptoms of cardiovascular disease Lab Results   Component Value Date    CHOLEST 160 03/20/2023    HDL 67 03/20/2023    LDL 72 03/20/2023    TRIG 119 03/20/2023         Electrocardiogram (EKG)   Covered if needed at Welcome to Medicare, and non-screening if indicated for medical reasons 08/11/2023      Ultrasound Screening for Abdominal Aortic Aneurysm (AAA) Covered once in a lifetime for one of the following risk factors   • Men who are 65-75 years old and have ever smoked   • Anyone with a family history -     Colorectal Cancer Screening  Covered for ages 50-85; only need ONE of the following:    Colonoscopy   Covered every 10 years    Covered every 2 years if patient is at high risk or previous colonoscopy was abnormal 03/04/2014    No recommendations at this time    Flexible Sigmoidoscopy   Covered every 4 years -    Fecal Occult Blood Test Covered annually -   Prostate Cancer Screening    Prostate-Specific Antigen (PSA) Annually Lab Results   Component Value Date    PSA <0.01 06/24/2022     Health Maintenance   Topic Date Due   • PSA  06/24/2023      Immunizations    Influenza Covered once per flu season  Please get every year 09/29/2023  No  recommendations at this time    Pneumococcal Each vaccine ([id number] & [id number]) covered once after 65 Prevnar 13: 02/08/2018    [id number]: 10/26/2022     No recommendations at this time    Hepatitis B One screening covered for patients with certain risk factors   -  No recommendations at this time    Tetanus Toxoid Not covered by Medicare Part B unless medically necessary (cut with metal); may be covered with your pharmacy prescription benefits 09/06/2020    Tetanus, Diptheria and Pertusis TD and TDaP Not covered by Medicare Part B -  No recommendations at this time    Zoster Not covered by Medicare Part B; may be covered with your pharmacy  prescription benefits 03/14/2011  Zoster Vaccines(2 of 3) due on 05/09/2011     Annual Monitoring of Persistent Medications (ACE/ARB, digoxin diuretics, anticonvulsants)    Potassium Annually Lab Results   Component Value Date    K 4.1 07/21/2023         Creatinine   Annually Lab Results   Component Value Date    CREATSERUM 1.38 (H) 07/21/2023         BUN Annually Lab Results   Component Value Date    BUN 24 (H) 07/21/2023       Drug Serum Conc Annually No results found for: \"DIGOXIN\", \"DIG\", \"VALP\"

## 2024-02-03 ENCOUNTER — TELEPHONE (OUTPATIENT)
Dept: FAMILY MEDICINE CLINIC | Facility: CLINIC | Age: 79
End: 2024-02-03

## 2024-02-03 PROBLEM — Z79.899 ENCOUNTER FOR LONG-TERM CURRENT USE OF MEDICATION: Status: ACTIVE | Noted: 2024-02-03

## 2024-02-03 PROBLEM — G20.B1 PARKINSON'S DISEASE WITH DYSKINESIA: Status: ACTIVE | Noted: 2022-02-03

## 2024-02-03 PROBLEM — G20.A1 PD (PARKINSON'S DISEASE) (HCC): Status: RESOLVED | Noted: 2019-01-21 | Resolved: 2024-02-03

## 2024-02-03 PROBLEM — G20.A1 PARKINSON'S DISEASE (HCC): Status: ACTIVE | Noted: 2024-02-03

## 2024-02-03 PROBLEM — G20.A1 PD (PARKINSON'S DISEASE): Status: RESOLVED | Noted: 2019-01-21 | Resolved: 2024-02-03

## 2024-02-03 PROBLEM — G20.A1 PARKINSON'S DISEASE: Status: ACTIVE | Noted: 2024-02-03

## 2024-02-03 PROBLEM — G20.B1 PARKINSON'S DISEASE WITH DYSKINESIA (HCC): Status: ACTIVE | Noted: 2022-02-03

## 2024-02-03 RX ORDER — SIMVASTATIN 20 MG
20 TABLET ORAL NIGHTLY
Qty: 90 TABLET | Refills: 3 | Status: SHIPPED | OUTPATIENT
Start: 2024-02-03

## 2024-02-03 RX ORDER — LEVOTHYROXINE SODIUM 125 UG/1
125 TABLET ORAL EVERY MORNING
Qty: 90 TABLET | Refills: 3 | Status: SHIPPED | OUTPATIENT
Start: 2024-02-03

## 2024-02-03 NOTE — TELEPHONE ENCOUNTER
Please call patient and remind him to do fasting labs at [facility], recommend fast 8 hours, drink plenty of water including the morning of the blood draw, okay to take medication morning of blood draw.  Remind patient do not take any vitamins or supplements containing biotin for 5 days prior to the blood draw.    Remind patient that when he goes to do his labs that he makes sure he does them under my name, Dr. Chandra, we can forward the results to his cardiologist or whoever needs them.

## 2024-02-14 ENCOUNTER — MED REC SCAN ONLY (OUTPATIENT)
Dept: FAMILY MEDICINE CLINIC | Facility: CLINIC | Age: 79
End: 2024-02-14

## 2024-02-19 LAB
ABSOLUTE BASOPHILS: 9 CELLS/UL (ref 0–200)
ABSOLUTE EOSINOPHILS: 112 CELLS/UL (ref 15–500)
ABSOLUTE LYMPHOCYTES: 1036 CELLS/UL (ref 850–3900)
ABSOLUTE MONOCYTES: 361 CELLS/UL (ref 200–950)
ABSOLUTE NEUTROPHILS: 2782 CELLS/UL (ref 1500–7800)
ALBUMIN/GLOBULIN RATIO: 1.5 (CALC) (ref 1–2.5)
ALBUMIN: 4.3 G/DL (ref 3.6–5.1)
ALKALINE PHOSPHATASE: 57 U/L (ref 35–144)
ALT: 9 U/L (ref 9–46)
AST: 13 U/L (ref 10–35)
BASOPHILS: 0.2 %
BILIRUBIN, TOTAL: 0.8 MG/DL (ref 0.2–1.2)
BUN/CREATININE RATIO: 23 (CALC) (ref 6–22)
BUN: 27 MG/DL (ref 7–25)
CALCIUM: 9.8 MG/DL (ref 8.6–10.3)
CARBON DIOXIDE: 31 MMOL/L (ref 20–32)
CHLORIDE: 104 MMOL/L (ref 98–110)
CHOL/HDLC RATIO: 2.8 (CALC)
CHOLESTEROL, TOTAL: 176 MG/DL
CREATININE: 1.16 MG/DL (ref 0.7–1.28)
EGFR: 64 ML/MIN/1.73M2
EOSINOPHILS: 2.6 %
GLOBULIN: 2.8 G/DL (CALC) (ref 1.9–3.7)
GLUCOSE: 99 MG/DL (ref 65–99)
HDL CHOLESTEROL: 63 MG/DL
HEMATOCRIT: 39.4 % (ref 38.5–50)
HEMOGLOBIN: 13 G/DL (ref 13.2–17.1)
LDL-CHOLESTEROL: 90 MG/DL (CALC)
LYMPHOCYTES: 24.1 %
MCH: 32.7 PG (ref 27–33)
MCHC: 33 G/DL (ref 32–36)
MCV: 99 FL (ref 80–100)
MONOCYTES: 8.4 %
MPV: 11.6 FL (ref 7.5–12.5)
NEUTROPHILS: 64.7 %
NON-HDL CHOLESTEROL: 113 MG/DL (CALC)
PLATELET COUNT: 159 THOUSAND/UL (ref 140–400)
POTASSIUM: 3.8 MMOL/L (ref 3.5–5.3)
PROTEIN, TOTAL: 7.1 G/DL (ref 6.1–8.1)
RDW: 11.9 % (ref 11–15)
RED BLOOD CELL COUNT: 3.98 MILLION/UL (ref 4.2–5.8)
SODIUM: 142 MMOL/L (ref 135–146)
T4, FREE: 1.2 NG/DL (ref 0.8–1.8)
TOTAL PSA: <0.1 NG/ML
TRIGLYCERIDES: 125 MG/DL
TSH: 7.28 MIU/L (ref 0.4–4.5)
WHITE BLOOD CELL COUNT: 4.3 THOUSAND/UL (ref 3.8–10.8)

## 2024-02-21 ENCOUNTER — APPOINTMENT (OUTPATIENT)
Dept: CT IMAGING | Facility: HOSPITAL | Age: 79
End: 2024-02-21
Attending: EMERGENCY MEDICINE
Payer: MEDICARE

## 2024-02-21 ENCOUNTER — HOSPITAL ENCOUNTER (OUTPATIENT)
Facility: HOSPITAL | Age: 79
Setting detail: OBSERVATION
Discharge: HOME HEALTH | End: 2024-02-22
Attending: EMERGENCY MEDICINE | Admitting: HOSPITALIST
Payer: MEDICARE

## 2024-02-21 ENCOUNTER — TELEPHONE (OUTPATIENT)
Dept: FAMILY MEDICINE CLINIC | Facility: CLINIC | Age: 79
End: 2024-02-21

## 2024-02-21 DIAGNOSIS — R19.7 DIARRHEA, UNSPECIFIED TYPE: Primary | ICD-10-CM

## 2024-02-21 DIAGNOSIS — E87.6 HYPOKALEMIA: ICD-10-CM

## 2024-02-21 PROBLEM — D61.818 PANCYTOPENIA (HCC): Status: ACTIVE | Noted: 2021-06-12

## 2024-02-21 PROBLEM — E86.0 DEHYDRATION: Status: ACTIVE | Noted: 2024-02-21

## 2024-02-21 LAB
ALBUMIN SERPL-MCNC: 2.9 G/DL (ref 3.4–5)
ALBUMIN/GLOB SERPL: 0.9 {RATIO} (ref 1–2)
ALP LIVER SERPL-CCNC: 53 U/L
ALT SERPL-CCNC: 11 U/L
ANION GAP SERPL CALC-SCNC: 6 MMOL/L (ref 0–18)
AST SERPL-CCNC: 29 U/L (ref 15–37)
BASOPHILS # BLD AUTO: 0.01 X10(3) UL (ref 0–0.2)
BASOPHILS NFR BLD AUTO: 0.3 %
BILIRUB SERPL-MCNC: 0.6 MG/DL (ref 0.1–2)
BILIRUB UR QL STRIP.AUTO: NEGATIVE
BUN BLD-MCNC: 26 MG/DL (ref 9–23)
CALCIUM BLD-MCNC: 8.4 MG/DL (ref 8.5–10.1)
CHLORIDE SERPL-SCNC: 107 MMOL/L (ref 98–112)
CLARITY UR REFRACT.AUTO: CLEAR
CO2 SERPL-SCNC: 28 MMOL/L (ref 21–32)
CREAT BLD-MCNC: 1.02 MG/DL
EGFRCR SERPLBLD CKD-EPI 2021: 75 ML/MIN/1.73M2 (ref 60–?)
EOSINOPHIL # BLD AUTO: 0.05 X10(3) UL (ref 0–0.7)
EOSINOPHIL NFR BLD AUTO: 1.3 %
ERYTHROCYTE [DISTWIDTH] IN BLOOD BY AUTOMATED COUNT: 12.2 %
GLOBULIN PLAS-MCNC: 3.3 G/DL (ref 2.8–4.4)
GLUCOSE BLD-MCNC: 98 MG/DL (ref 70–99)
GLUCOSE UR STRIP.AUTO-MCNC: NORMAL MG/DL
HCT VFR BLD AUTO: 34.3 %
HGB BLD-MCNC: 11.6 G/DL
IMM GRANULOCYTES # BLD AUTO: 0.02 X10(3) UL (ref 0–1)
IMM GRANULOCYTES NFR BLD: 0.5 %
KETONES UR STRIP.AUTO-MCNC: NEGATIVE MG/DL
LEUKOCYTE ESTERASE UR QL STRIP.AUTO: NEGATIVE
LYMPHOCYTES # BLD AUTO: 0.61 X10(3) UL (ref 1–4)
LYMPHOCYTES NFR BLD AUTO: 15.7 %
MCH RBC QN AUTO: 32.8 PG (ref 26–34)
MCHC RBC AUTO-ENTMCNC: 33.8 G/DL (ref 31–37)
MCV RBC AUTO: 96.9 FL
MONOCYTES # BLD AUTO: 0.6 X10(3) UL (ref 0.1–1)
MONOCYTES NFR BLD AUTO: 15.5 %
NEUTROPHILS # BLD AUTO: 2.59 X10 (3) UL (ref 1.5–7.7)
NEUTROPHILS # BLD AUTO: 2.59 X10(3) UL (ref 1.5–7.7)
NEUTROPHILS NFR BLD AUTO: 66.7 %
NITRITE UR QL STRIP.AUTO: NEGATIVE
OSMOLALITY SERPL CALC.SUM OF ELEC: 297 MOSM/KG (ref 275–295)
PH UR STRIP.AUTO: 5.5 [PH] (ref 5–8)
PLATELET # BLD AUTO: 118 10(3)UL (ref 150–450)
POTASSIUM SERPL-SCNC: 3.2 MMOL/L (ref 3.5–5.1)
PROT SERPL-MCNC: 6.2 G/DL (ref 6.4–8.2)
PROT UR STRIP.AUTO-MCNC: NEGATIVE MG/DL
RBC # BLD AUTO: 3.54 X10(6)UL
SODIUM SERPL-SCNC: 141 MMOL/L (ref 136–145)
SP GR UR STRIP.AUTO: 1.01 (ref 1–1.03)
TROPONIN I SERPL HS-MCNC: 18 NG/L
UROBILINOGEN UR STRIP.AUTO-MCNC: NORMAL MG/DL
WBC # BLD AUTO: 3.9 X10(3) UL (ref 4–11)

## 2024-02-21 PROCEDURE — 70450 CT HEAD/BRAIN W/O DYE: CPT | Performed by: EMERGENCY MEDICINE

## 2024-02-21 PROCEDURE — 99223 1ST HOSP IP/OBS HIGH 75: CPT | Performed by: HOSPITALIST

## 2024-02-21 RX ORDER — LATANOPROST 50 UG/ML
1 SOLUTION/ DROPS OPHTHALMIC NIGHTLY
Status: DISCONTINUED | OUTPATIENT
Start: 2024-02-21 | End: 2024-02-22

## 2024-02-21 RX ORDER — HYDRALAZINE HYDROCHLORIDE 25 MG/1
25 TABLET, FILM COATED ORAL 2 TIMES DAILY
Status: DISCONTINUED | OUTPATIENT
Start: 2024-02-21 | End: 2024-02-22

## 2024-02-21 RX ORDER — MELATONIN
400 WEEKLY
Status: DISCONTINUED | OUTPATIENT
Start: 2024-02-21 | End: 2024-02-21

## 2024-02-21 RX ORDER — POTASSIUM CHLORIDE 20 MEQ/1
40 TABLET, EXTENDED RELEASE ORAL ONCE
Status: COMPLETED | OUTPATIENT
Start: 2024-02-21 | End: 2024-02-21

## 2024-02-21 RX ORDER — HYDRALAZINE HYDROCHLORIDE 20 MG/ML
10 INJECTION INTRAMUSCULAR; INTRAVENOUS EVERY 6 HOURS PRN
Status: DISCONTINUED | OUTPATIENT
Start: 2024-02-21 | End: 2024-02-22

## 2024-02-21 RX ORDER — TIMOLOL MALEATE 5 MG/ML
1 SOLUTION/ DROPS OPHTHALMIC NIGHTLY
Status: DISCONTINUED | OUTPATIENT
Start: 2024-02-21 | End: 2024-02-22

## 2024-02-21 RX ORDER — PANTOPRAZOLE SODIUM 40 MG/1
40 TABLET, DELAYED RELEASE ORAL
Status: DISCONTINUED | OUTPATIENT
Start: 2024-02-22 | End: 2024-02-22

## 2024-02-21 RX ORDER — ENOXAPARIN SODIUM 100 MG/ML
40 INJECTION SUBCUTANEOUS DAILY
Status: DISCONTINUED | OUTPATIENT
Start: 2024-02-21 | End: 2024-02-22

## 2024-02-21 RX ORDER — SODIUM CHLORIDE 9 MG/ML
125 INJECTION, SOLUTION INTRAVENOUS CONTINUOUS
Status: DISCONTINUED | OUTPATIENT
Start: 2024-02-21 | End: 2024-02-22

## 2024-02-21 RX ORDER — ATORVASTATIN CALCIUM 10 MG/1
10 TABLET, FILM COATED ORAL NIGHTLY
Status: DISCONTINUED | OUTPATIENT
Start: 2024-02-21 | End: 2024-02-22

## 2024-02-21 RX ORDER — LOSARTAN POTASSIUM 100 MG/1
100 TABLET ORAL EVERY MORNING
Status: DISCONTINUED | OUTPATIENT
Start: 2024-02-21 | End: 2024-02-22

## 2024-02-21 RX ORDER — MELATONIN
400 WEEKLY
Status: DISCONTINUED | OUTPATIENT
Start: 2024-02-23 | End: 2024-02-22

## 2024-02-21 RX ORDER — LEVOTHYROXINE SODIUM 0.12 MG/1
125 TABLET ORAL
Status: DISCONTINUED | OUTPATIENT
Start: 2024-02-21 | End: 2024-02-22

## 2024-02-21 RX ORDER — DONEPEZIL HYDROCHLORIDE 10 MG/1
10 TABLET, FILM COATED ORAL DAILY
Status: DISCONTINUED | OUTPATIENT
Start: 2024-02-21 | End: 2024-02-22

## 2024-02-21 RX ORDER — ACETAMINOPHEN 500 MG
500 TABLET ORAL EVERY 4 HOURS PRN
Status: DISCONTINUED | OUTPATIENT
Start: 2024-02-21 | End: 2024-02-22

## 2024-02-21 RX ORDER — ONDANSETRON 2 MG/ML
4 INJECTION INTRAMUSCULAR; INTRAVENOUS EVERY 6 HOURS PRN
Status: DISCONTINUED | OUTPATIENT
Start: 2024-02-21 | End: 2024-02-22

## 2024-02-21 RX ORDER — SODIUM CHLORIDE 9 MG/ML
INJECTION, SOLUTION INTRAVENOUS ONCE
Status: COMPLETED | OUTPATIENT
Start: 2024-02-21 | End: 2024-02-22

## 2024-02-21 NOTE — ED INITIAL ASSESSMENT (HPI)
Wife called for an ambulance from home after patient had dark urine, episode of emesis and diarrhea, and has been more confused over the last 2 days. Patient has had two falls in the last two days, did hit his head, denies LOC. Patient arrives a & o x 3-4. Per wife, patient has been having hallucinations such as birds in the oven. Patient arrives with no complaints.

## 2024-02-21 NOTE — H&P
[facility]     Lawrence Daniels Patient Status:  Emergency    9/3/1945 MRN [id number]   Location [facility] EMERGENCY DEPARTMENT Attending Andrea Charles MD   Hosp Day # 0 PCP Jennifer Chandra DO     Chief Complaint: Weakness    Subjective:    History of Present Illness:     Lawrence Daniels is a 78 year old male with a PMH of Parkinson's disease, HTN, and hypothyroidism presented to ED due to weakness and dehydration. 2 days ago, patient had profuse diarrhea which has since significantly improved with only 1 episode today. Yesterday, wife reports patient was confused and hallucinating. Today, patient was noted to be very weak. Patient currently feeling much better after receiving IVF in ED. Denies abdominal pain/N/V. Denies CP/SOB. Denies fever/chills.     History/Other:    Past Medical History:  Past Medical History:   Diagnosis Date    Anemia of chronic disease 2022    Iron studies, B12 level, and folic acid levels normal.    Aneurysm of popliteal artery (HCC) 2018    Arterial disease (HCC) 2018    MRI 9/15/2017: Mild chronic microvascular ischemic changes in the cerebral white matter.    Back problem     Bilateral carotid artery disease (HCC) 2018    Bilateral carotid artery stenosis 2018    Bradycardia     Class 1 obesity due to excess calories with serious comorbidity and body mass index (BMI) of 31.0 to 31.9 in adult 2022    Associated with hypertension and hyperlipidemia    Class 1 obesity due to excess calories with serious comorbidity and body mass index (BMI) of 33.0 to 33.9 in adult 2019    Associated with HTN, Carotid Artery Disease, and Hypercholesterolemia    Class 1 obesity due to excess calories without serious comorbidity with body mass index (BMI) of 33.0 to 33.9 in adult 2018    Cognitive complaints with normal neuropsychological exam 2018    Normal Neuropsych testing 2017, results scanned in chart     Cogwheel rigidity 07/14/2018    COVID 02/2022    cough headache. 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    Multiple thyroid nodules 05/2022    x 2 on Left    Myocarditis (HCC) 1970s    Neoplasm of uncertain behavior of skin 06/07/2018    Obesity (BMI 30-39.9) 08/29/2017    ISAIAS (obstructive sleep apnea)     ISAIAS on CPAP     Dx 2009 cpap    Osteoarthritis     Other specified glaucoma 08/29/2017    Overflow incontinence of urine 08/29/2017    Parkinsonian tremor 07/31/2018    PD (Parkinson's disease) 01/21/2019    Postsurgical hypothyroidism 07/14/2022 6/27/2022 total thyroidectomy with Dr. Perry Soriano.    Prediabetes 06/12/2021    Primary open angle glaucoma (POAG) of both eyes, mild stage 06/07/2018    Prostate CA (HCC)     RBD (REM behavioral disorder) 03/02/2022    Last Assessment & Plan:  Formatting of this note might be different from the original. Only diagnosed with RBD, but behavior does seem classic for it.  He is taking melatonin 10 mg nightly so it is possible he is treating his RBD.  We will do an in lab diagnostic PSG with extra EMG leads on arms to assess for REM sleep without atonia.    Sleep apnea     Thrombocytopenia (HCC) 07/14/2018    Torn ligament     right    Visual impairment     Vocal cord dysfunction     errosion     Past Surgical History:   Past Surgical History:   Procedure Laterality Date    ANKLE  FRACTURE SURGERY      HDR ELECT BRACHYTHERAPY  2006    INCISION AND DRAINAGE Left 04/05/2022    left neck     KNEE REPLACEMENT SURGERY Left 2014    OTHER      Botox injections into bladder    THYROIDECTOMY Bilateral 06/27/2022    Dr. Soriano      Family History:   Family History   Problem Relation Age of Onset    Heart Attack Father     Colon Cancer Mother     Heart Disease Paternal Grandmother     Heart Attack Paternal Grandmother     Cancer Paternal Grandfather     Heart Disease Maternal Grandmother     Heart Attack Maternal Grandfather     Heart Disease Maternal Grandfather      Social History:    reports that he has quit smoking. His smoking use included cigars. He has never used smokeless tobacco. He reports current alcohol use of about 2.0 standard drinks of alcohol per week. He reports that he does not use drugs.     Allergies:   Allergies   Allergen Reactions    Zoledronic Acid OTHER (SEE COMMENTS)     Flu like symptoms.       Medications:    No current facility-administered medications on file prior to encounter.     Current Outpatient Medications on File Prior to Encounter   Medication Sig Dispense Refill    simvastatin 20 MG Oral Tab Take 1 tablet (20 mg total) by mouth nightly. 90 tablet 3    UNITHROID 125 MCG Oral Tab Take 1 tablet (125 mcg total) by mouth every morning. Nothing to eat or drink other than water for 30 to 60 minutes after taking.  Do not take any vitamins or supplements for 4 hours after taking. 90 tablet 3    amoxicillin 500 MG Oral Cap Take 4 capsules (2,000 mg total) by mouth one time.      OYSCO 500+D 500-5 MG-MCG Oral Tab Take 1 tablet by mouth 3 (three) times daily. 270 tablet 1    Omeprazole 40 MG Oral Capsule Delayed Release Take 1 capsule (40 mg total) by mouth daily. 90 capsule 3    LEVOTHYROXINE 125 MCG Oral Tab TAKE 1 TABLET(125 MCG) BY MOUTH BEFORE BREAKFAST 90 tablet 3    donepezil 10 MG Oral Tab Take 1 tablet (10 mg total) by mouth daily.      Potassium Chloride ER 10 MEQ  Oral Tab CR Take 1 tablet (10 mEq total) by mouth daily. 90 tablet 0    latanoprost 0.005 % Ophthalmic Solution Place 1 drop into both eyes nightly.      carbidopa-levodopa  MG Oral Tab Take by mouth 3 (three) times daily. Taking 1 1/2 tablets 3 times daily      Timolol Maleate 0.5 % Ophthalmic Solution Place 1 drop into both eyes nightly.      hydrALAzine HCl 25 MG Oral Tab Take 1 tablet (25 mg total) by mouth 2 (two) times daily.      Melatonin 10 MG Oral Tab Take 10 mg by mouth nightly.      omega-3 fatty acids 1000 MG Oral Cap Take 1,000 mg by mouth daily.      Sennosides (SENEXON OR) Take 2 tablets by mouth nightly.      docusate sodium 100 MG Oral Cap Take 1 capsule (100 mg total) by mouth nightly.      folic acid 400 MCG Oral Tab Take 1 tablet (400 mcg total) by mouth once a week.      Vitamin B-12 1000 MCG Oral Tab Take 1 tablet (1,000 mcg total) by mouth once a week.      Pyridoxine HCl (VITAMIN B-6) 100 MG Oral Tab Take 1 tablet (100 mg total) by mouth once a week.      hydrochlorothiazide 25 MG Oral Tab Take 1 tablet (25 mg total) by mouth daily.      Cholecalciferol 50 MCG (2000 UT) Oral Tab Take 1 tablet (2,000 Units total) by mouth at bedtime.      losartan 100 MG Oral Tab Take 1 tablet (100 mg total) by mouth every morning.         Review of Systems:   A comprehensive review of systems was completed.    Pertinent positives and negatives noted in the HPI.    Objective:   Physical Exam:    /68   Pulse 53   Temp 97.1 °F (36.2 °C)   Resp 13   Ht 172.7 cm (5' 8\")   Wt 202 lb (91.6 kg)   SpO2 97%   BMI 30.71 kg/m²   General: No acute distress, Alert  Respiratory: No rhonchi, no wheezes  Cardiovascular: S1, S2. Regular rate and rhythm  Abdomen: Soft, Non-tender, non-distended, positive bowel sounds  Neuro: No new focal deficits  Extremities: No edema    Results:    Labs:      Labs Last 24 Hours:    Recent Labs   Lab 02/16/24  1012 02/21/24  1216   RBC 3.98* 3.54*   HGB 13.0* 11.6*   HCT  39.4 34.3*   MCV 99.0 96.9   MCH 32.7 32.8   MCHC 33.0 33.8   RDW 11.9 12.2   NEPRELIM  --  2.59   WBC 4.3 3.9*    118.0*       Recent Labs   Lab 02/16/24  1012 02/21/24  1216   GLU 99 98   BUN 27* 26*   CREATSERUM 1.16 1.02   EGFRCR 64 75   CA 9.8 8.4*   ALB 4.3 2.9*    141   K 3.8 3.2*    107   CO2 31 28.0   ALKPHO 57 53   AST 13 29   ALT 9 11*   BILT 0.8 0.6   TP 7.1 6.2*       No results found for: \"PT\", \"INR\"    Recent Labs   Lab 02/21/24  1216   TROPHS 18       No results for input(s): \"TROP\", \"PBNP\" in the last 168 hours.    No results for input(s): \"PCT\" in the last 168 hours.    Imaging: Imaging data reviewed in Epic.    Assessment & Plan:      #Diarrhea  -Stool studies  -IVF x 1L  -Hold stool softener     #Pancytopenia  -Monitor counts    #Hypokalemia  -Replace PRN    #Hypothyroidism  -Synthroid    #Parkinson's disease  -Sinemet     #Deconditioning  -PT eval     Plan of care discussed with patient and ED physician.     Jeremy Cunha DO    Supplementary Documentation:     The 21st Century Cures Act makes medical notes like these available to patients in the interest of transparency. Please be advised this is a medical document. Medical documents are intended to carry relevant information, facts as evident, and the clinical opinion of the practitioner. The medical note is intended as peer to peer communication and may appear blunt or direct. It is written in medical language and may contain abbreviations or verbiage that are unfamiliar.

## 2024-02-21 NOTE — PROGRESS NOTES
NURSING ADMISSION NOTE      Patient admitted via Cart  Oriented to room.  Safety precautions initiated.  Bed in low position.  Call light in reach.

## 2024-02-21 NOTE — TELEPHONE ENCOUNTER
Pt started Monday night with diarrhea and then yesterday pt was very confused.  Barely move.  Not eating.  Drinking very little and his urine for dark and he did just experience more diarrhea this am.      Instructed pt's wife to take pt to nearest ER.  She verbalized understanding and will get a family member to take them.    CARL

## 2024-02-21 NOTE — ED QUICK NOTES
Patient felt urge to have bowel movement. Patient was placed on bedpan and attempted to go but stated the urge resolved. Patient unable to produce BM for stool sample.

## 2024-02-21 NOTE — ED QUICK NOTES
Orders for admission, patient is aware of plan and ready to go upstairs. Any questions, please call ED RN Radha at extension [phone].     Patient Covid vaccination status: Fully vaccinated     COVID Test Ordered in ED: None    COVID Suspicion at Admission: N/A    Running Infusions:    sodium chloride 125 mL/hr (02/21/24 1532)        Mental Status/LOC at time of transport: awake and alert, appropriately interactive. Slightly confused/forgetful intermittently.    Other pertinent information:   CIWA score: N/A   NIH score:  N/A

## 2024-02-21 NOTE — ED PROVIDER NOTES
Patient Seen in: [facility] Emergency Department      History     Chief Complaint   Patient presents with    Nausea/Vomiting/Diarrhea    Altered Mental Status     Stated Complaint: Pt presents to ED via [facility]. Pt is normally AOx4, but currently AOx3 *    Subjective:   HPI    Patient is a 78-year-old gentleman with a history of hypothyroidism, Parkinson's with abnormal gait at risk for falling, history of anemia prostate cancer hyperglycemia among other medical problems outlined below, presenting to the ED for evaluation for diarrhea altered mental status.  Has had diarrhea for 2 days at least.  No antibiotics or travel.  Patient family reports that he has been more confused yesterday was hallucinating thinking the emergency room and he had to go to the airport.  He did fall once as well.  No vomiting x 1.  No blood per orifice.  No abdominal pain no chest pain.  There is generalized weakness but no focal weakness.  No other complaints    Objective:   Past Medical History:   Diagnosis Date    Anemia of chronic disease 07/14/2022    Iron studies, B12 level, and folic acid levels normal.    Aneurysm of popliteal artery ([facility]) 06/07/2018    Arterial disease ([facility]) 06/07/2018    MRI 9/15/2017: Mild chronic microvascular ischemic changes in the cerebral white matter.    Back problem     Bilateral carotid artery disease ([facility]) 02/11/2018    Bilateral carotid artery stenosis 06/07/2018    Bradycardia     Class 1 obesity due to excess calories with serious comorbidity and body mass index (BMI) of 31.0 to 31.9 in adult 07/14/2022    Associated with hypertension and hyperlipidemia    Class 1 obesity due to excess calories with serious comorbidity and body mass index (BMI) of 33.0 to 33.9 in adult 06/17/2019    Associated with HTN, Carotid Artery Disease, and Hypercholesterolemia    Class 1 obesity due to excess calories without serious comorbidity with body mass index (BMI) of 33.0 to 33.9 in adult 02/11/2018     Cognitive complaints with normal neuropsychological exam 06/07/2018    Normal Neuropsych testing September 2017, results scanned in chart    Cogwheel rigidity 07/14/2018    COVID 02/2022    cough headache. 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    Multiple thyroid nodules 05/2022    x 2 on Left    Myocarditis (HCC) 1970s    Neoplasm of uncertain behavior of skin 06/07/2018    Obesity (BMI 30-39.9) 08/29/2017    ISAIAS (obstructive sleep apnea)     ISAIAS on CPAP     Dx 2009 cpap    Osteoarthritis     Other specified glaucoma 08/29/2017    Overflow incontinence of urine 08/29/2017    Parkinsonian tremor 07/31/2018    PD (Parkinson's disease) 01/21/2019    Postsurgical hypothyroidism 07/14/2022 6/27/2022 total thyroidectomy with Dr. Perry Soriano.    Prediabetes 06/12/2021    Primary open angle glaucoma (POAG) of both eyes, mild stage 06/07/2018    Prostate CA (HCC)     RBD (REM behavioral disorder) 03/02/2022    Last Assessment & Plan:  Formatting of this note might be different from the original. Only diagnosed with RBD, but behavior does seem classic for it.  He is taking melatonin 10 mg nightly so it is possible he is treating his RBD.  We will do an in lab diagnostic PSG with extra EMG leads on arms to assess for REM sleep without atonia.    Sleep apnea     Thrombocytopenia (HCC) 07/14/2018    Torn ligament     right    Visual  impairment     Vocal cord dysfunction     errosion              Past Surgical History:   Procedure Laterality Date    ANKLE FRACTURE SURGERY      HDR ELECT BRACHYTHERAPY  2006    INCISION AND DRAINAGE Left 04/05/2022    left neck     KNEE REPLACEMENT SURGERY Left 2014    OTHER      Botox injections into bladder    THYROIDECTOMY Bilateral 06/27/2022    Dr. Soriano                Social History     Socioeconomic History    Marital status:    Tobacco Use    Smoking status: Former     Types: Cigars    Smokeless tobacco: Never   Vaping Use    Vaping Use: Never used   Substance and Sexual Activity    Alcohol use: Yes     Alcohol/week: 2.0 standard drinks of alcohol     Types: 2 Standard drinks or equivalent per week     Comment: 2 servings a week    Drug use: No    Sexual activity: Not Currently   Other Topics Concern    Caffeine Concern No     Comment: 1 cup of coffee daily    Exercise Yes     Comment: some walking    Seat Belt Yes    Special Diet No    Stress Concern No    Weight Concern No     Service No    Blood Transfusions No    Occupational Exposure No    Hobby Hazards No    Sleep Concern No    Back Care No    Bike Helmet No    Self-Exams No              Review of Systems    Positive for stated complaint: Pt presents to ED via [facility]. Pt is normally AOx4, but currently AOx3 *  Other systems are as noted in HPI.  Constitutional and vital signs reviewed.      All other systems reviewed and negative except as noted above.    Physical Exam     ED Triage Vitals [02/21/24 1204]   /83   Pulse 59   Resp 18   Temp 97.1 °F (36.2 °C)   Temp src    SpO2 95 %   O2 Device None (Room air)       Current:/68   Pulse 53   Temp 97.1 °F (36.2 °C)   Resp 13   Ht 172.7 cm (5' 8\")   Wt 91.6 kg   SpO2 97%   BMI 30.71 kg/m²         Physical Exam  Vitals and nursing note reviewed.   Constitutional:       General: He is not in acute distress.     Appearance: He is well-developed. He is not  toxic-appearing.   HENT:      Head: Normocephalic and atraumatic.   Eyes:      General: No scleral icterus.     Conjunctiva/sclera: Conjunctivae normal.   Cardiovascular:      Rate and Rhythm: Normal rate and regular rhythm.   Pulmonary:      Effort: Pulmonary effort is normal. No respiratory distress.      Breath sounds: Normal breath sounds.   Abdominal:      General: There is no distension.      Tenderness: There is no abdominal tenderness.   Musculoskeletal:         General: No tenderness. Normal range of motion.      Cervical back: Normal range of motion and neck supple.   Skin:     General: Skin is warm and dry.      Findings: No rash.   Neurological:      General: No focal deficit present.      Mental Status: He is alert and oriented to person, place, and time.      Cranial Nerves: No cranial nerve deficit.      Motor: No abnormal muscle tone.      Coordination: Coordination normal.   Psychiatric:         Behavior: Behavior normal.              ED Course     Labs Reviewed   URINALYSIS WITH CULTURE REFLEX - Abnormal; Notable for the following components:       Result Value    Blood Urine Trace (*)     Squamous Epi. Cells Few (*)     All other components within normal limits   COMP METABOLIC PANEL (14) - Abnormal; Notable for the following components:    Potassium 3.2 (*)     BUN 26 (*)     Calcium, Total 8.4 (*)     Calculated Osmolality 297 (*)     ALT 11 (*)     Total Protein 6.2 (*)     Albumin 2.9 (*)     A/G Ratio 0.9 (*)     All other components within normal limits   CBC W/ DIFFERENTIAL - Abnormal; Notable for the following components:    WBC 3.9 (*)     RBC 3.54 (*)     HGB 11.6 (*)     HCT 34.3 (*)     .0 (*)     Lymphocyte Absolute 0.61 (*)     All other components within normal limits   TROPONIN I HIGH SENSITIVITY - Normal   CBC WITH DIFFERENTIAL WITH PLATELET    Narrative:     The following orders were created for panel order CBC With Differential With Platelet.  Procedure                                Abnormality         Status                     ---------                               -----------         ------                     CBC W/ DIFFERENTIAL[[id number]]          Abnormal            Final result                 Please view results for these tests on the individual orders.   URINALYSIS, ROUTINE   RAINBOW DRAW LAVENDER   RAINBOW DRAW LIGHT GREEN   RAINBOW DRAW BLUE   C. DIFFICILE(TOXIGENIC)PCR   STOOL CULTURE W/SHIGATOXIN     EKG    Rate, intervals and axes as noted on EKG Report.  Rate: 61  Rhythm: Sinus Rhythm  Reading:  Sinus rhythm first-degree AV block, anterolateral T wave changes no significant change from July 2023                           [facility]            -Pulse oximetry was interpreted by me and was normal.  Pulse oximeter was ordered to monitor patient for hypoxia.        -History source other than patient -wife        -Comorbidities did add complexity to the management are mentioned in the HPI above        -I personally reviewed the prior external notes and the medical record to obtain additional history -reviewed recent office visit note January 2024, patient presented for his Medicare annual visit.  Reviewed his past medical history        -DDX: Includes but not limited to -dehydration, infection, intracranial hemorrhage subdural hematoma,         -I personally reviewed the CT findings and it shows no hemorrhage  Please refer to radiology report for official interpretation        Labs Reviewed   URINALYSIS WITH CULTURE REFLEX - Abnormal; Notable for the following components:       Result Value    Blood Urine Trace (*)     Squamous Epi. Cells Few (*)     All other components within normal limits   COMP METABOLIC PANEL (14) - Abnormal; Notable for the following components:    Potassium 3.2 (*)     BUN 26 (*)     Calcium, Total 8.4 (*)     Calculated Osmolality 297 (*)     ALT 11 (*)     Total Protein 6.2 (*)     Albumin 2.9 (*)     A/G Ratio 0.9 (*)     All other components within  normal limits   CBC W/ DIFFERENTIAL - Abnormal; Notable for the following components:    WBC 3.9 (*)     RBC 3.54 (*)     HGB 11.6 (*)     HCT 34.3 (*)     .0 (*)     Lymphocyte Absolute 0.61 (*)     All other components within normal limits   TROPONIN I HIGH SENSITIVITY - Normal   CBC WITH DIFFERENTIAL WITH PLATELET    Narrative:     The following orders were created for panel order CBC With Differential With Platelet.  Procedure                               Abnormality         Status                     ---------                               -----------         ------                     CBC W/ DIFFERENTIAL[[id number]]          Abnormal            Final result                 Please view results for these tests on the individual orders.   URINALYSIS, ROUTINE   RAINBOW DRAW LAVENDER   RAINBOW DRAW LIGHT GREEN   RAINBOW DRAW BLUE   C. DIFFICILE(TOXIGENIC)PCR   STOOL CULTURE W/SHIGATOXIN     Labs reviewed white count 3.9.  Platelets 118 BUN is 26 creatinine is normal and no UTI and urinalysis.  CT brain is unremarkable for any acute intracranial process at this time.    He was given fluids.  Still feeling weak and unsteady.  Wife does not feel comfortable taking him home either.  He will be admitted for diarrhea dehydration hypokalemia.  Patient case discussed with hospital service for admission.        Admission disposition: 2/21/2024  3:14 PM                                        Medical Decision Making      Disposition and Plan     Clinical Impression:  1. Diarrhea, unspecified type    2. Hypokalemia         Disposition:  Admit  2/21/2024  3:14 pm    Follow-up:  No follow-up provider specified.        Medications Prescribed:  Current Discharge Medication List                            Hospital Problems       Present on Admission  Date Reviewed: 2/3/2024            ICD-10-CM Noted POA    * (Principal) Diarrhea, unspecified type R19.7 2/21/2024 Unknown

## 2024-02-21 NOTE — TELEPHONE ENCOUNTER
Agree with patient going to closest ER.  If patient is not able to ambulate, okay for wife to call 911.

## 2024-02-22 VITALS
DIASTOLIC BLOOD PRESSURE: 74 MMHG | HEIGHT: 68 IN | BODY MASS INDEX: 30.62 KG/M2 | OXYGEN SATURATION: 86 % | RESPIRATION RATE: 16 BRPM | HEART RATE: 57 BPM | WEIGHT: 202 LBS | SYSTOLIC BLOOD PRESSURE: 126 MMHG | TEMPERATURE: 98 F

## 2024-02-22 LAB
ANION GAP SERPL CALC-SCNC: 6 MMOL/L (ref 0–18)
ATRIAL RATE: 250 BPM
ATRIAL RATE: 61 BPM
BASOPHILS # BLD AUTO: 0.01 X10(3) UL (ref 0–0.2)
BASOPHILS NFR BLD AUTO: 0.2 %
BUN BLD-MCNC: 18 MG/DL (ref 9–23)
CALCIUM BLD-MCNC: 8.2 MG/DL (ref 8.5–10.1)
CHLORIDE SERPL-SCNC: 111 MMOL/L (ref 98–112)
CO2 SERPL-SCNC: 24 MMOL/L (ref 21–32)
CREAT BLD-MCNC: 0.84 MG/DL
EGFRCR SERPLBLD CKD-EPI 2021: 89 ML/MIN/1.73M2 (ref 60–?)
EOSINOPHIL # BLD AUTO: 0.12 X10(3) UL (ref 0–0.7)
EOSINOPHIL NFR BLD AUTO: 2.7 %
ERYTHROCYTE [DISTWIDTH] IN BLOOD BY AUTOMATED COUNT: 12.4 %
GLUCOSE BLD-MCNC: 95 MG/DL (ref 70–99)
HCT VFR BLD AUTO: 38.8 %
HGB BLD-MCNC: 13 G/DL
IMM GRANULOCYTES # BLD AUTO: 0.01 X10(3) UL (ref 0–1)
IMM GRANULOCYTES NFR BLD: 0.2 %
LYMPHOCYTES # BLD AUTO: 0.92 X10(3) UL (ref 1–4)
LYMPHOCYTES NFR BLD AUTO: 20.7 %
MCH RBC QN AUTO: 32.6 PG (ref 26–34)
MCHC RBC AUTO-ENTMCNC: 33.5 G/DL (ref 31–37)
MCV RBC AUTO: 97.2 FL
MONOCYTES # BLD AUTO: 0.65 X10(3) UL (ref 0.1–1)
MONOCYTES NFR BLD AUTO: 14.6 %
NEUTROPHILS # BLD AUTO: 2.74 X10 (3) UL (ref 1.5–7.7)
NEUTROPHILS # BLD AUTO: 2.74 X10(3) UL (ref 1.5–7.7)
NEUTROPHILS NFR BLD AUTO: 61.6 %
OSMOLALITY SERPL CALC.SUM OF ELEC: 294 MOSM/KG (ref 275–295)
P AXIS: 80 DEGREES
P-R INTERVAL: 224 MS
PLATELET # BLD AUTO: 120 10(3)UL (ref 150–450)
POTASSIUM SERPL-SCNC: 3.5 MMOL/L (ref 3.5–5.1)
POTASSIUM SERPL-SCNC: 3.5 MMOL/L (ref 3.5–5.1)
Q-T INTERVAL: 438 MS
Q-T INTERVAL: 444 MS
QRS DURATION: 90 MS
QRS DURATION: 96 MS
QTC CALCULATION (BEZET): 444 MS
QTC CALCULATION (BEZET): 446 MS
R AXIS: -18 DEGREES
R AXIS: -9 DEGREES
RBC # BLD AUTO: 3.99 X10(6)UL
SODIUM SERPL-SCNC: 141 MMOL/L (ref 136–145)
T AXIS: -63 DEGREES
T AXIS: 169 DEGREES
VENTRICULAR RATE: 61 BPM
VENTRICULAR RATE: 62 BPM
WBC # BLD AUTO: 4.5 X10(3) UL (ref 4–11)

## 2024-02-22 PROCEDURE — 99239 HOSP IP/OBS DSCHRG MGMT >30: CPT | Performed by: HOSPITALIST

## 2024-02-22 NOTE — PROGRESS NOTES
NURSING DISCHARGE NOTE    Discharged Home via Wheelchair.  Accompanied by Support staff  Belongings Taken by patient/family.    Patient cleared for discharge by hospitalist and PT. IV removed by Christie YADAV. Patient educated on all AVS discharge information by Marilyn YADAV, all questions answered. Patient brought down to [facility] with all belongings to be driven home by family. Patient resuming [facility].

## 2024-02-22 NOTE — PROGRESS NOTES
[facility]     Hospitalist Progress Note     Lawrence Daniels Patient Status:  Observation    9/3/1945 MRN [id number]   [facility] 3SW-A Attending Denis Romero MD   Hosp Day # 0 PCP Jennfier Chandra DO     Chief Complaint: AMS    Subjective:   Patient doing well. AAOx3. No complaints of pain. No chest pain or shortness of breath. No nausea, vomiting, diarrhea. Tolerating diet. Not lightheaded or dizzy.    Current medications:   carbidopa-levodopa  1.5 tablet Oral TID    donepezil  10 mg Oral Daily    hydrALAZINE  25 mg Oral BID    latanoprost  1 drop Both Eyes Nightly    levothyroxine  125 mcg Oral Before breakfast    losartan  100 mg Oral QAM    melatonin  10 mg Oral Nightly    pantoprazole  40 mg Oral QAM AC    atorvastatin  10 mg Oral Nightly    timolol  1 drop Both Eyes Nightly    enoxaparin  40 mg Subcutaneous Daily    [START ON 2024] folic acid  400 mcg Oral Weekly       Objective:    Review of Systems:   10 point ROS completed and was negative, except for pertinent positive and negatives stated in subjective.    Vital signs:  Temp:  [97.1 °F (36.2 °C)-98 °F (36.7 °C)] 97.4 °F (36.3 °C)  Pulse:  [53-63] 63  Resp:  [13-19] 16  BP: (128-198)/(68-99) 184/88  SpO2:  [95 %-99 %] 98 %  Patient Weight for the past 72 hrs:   Weight   24 1204 202 lb (91.6 kg)     Physical Exam:    General: No acute distress.   Respiratory: Clear to auscultation bilaterally.   Cardiovascular: S1, S2. Regular rate and rhythm.   Abdomen: Soft, nontender, nondistended.  Positive bowel sounds.   Extremities: No edema.  Neuro: AAOx3    Diagnostic Data:    Labs:  Recent Labs   Lab 24  1012 24  1216 24  0749   WBC 4.3 3.9* 4.5   HGB 13.0* 11.6* 13.0   MCV 99.0 96.9 97.2    118.0* 120.0*       Recent Labs   Lab 24  1012 24  1216 24  0539   GLU 99 98 95   BUN 27* 26* 18   CREATSERUM 1.16 1.02 0.84   CA 9.8 8.4* 8.2*   ALB 4.3 2.9*  --     141 141   K 3.8 3.2* 3.5  3.5     107 111   CO2 31 28.0 24.0   ALKPHO 57 53  --    AST 13 29  --    ALT 9 11*  --    BILT 0.8 0.6  --    TP 7.1 6.2*  --        Estimated Creatinine Clearance: 70.1 mL/min (based on SCr of 0.84 mg/dL).    No results for input(s): \"PTP\", \"INR\" in the last 168 hours.         COVID-19 Lab Results    COVID-19  Lab Results   Component Value Date    COVID19 Not Detected 06/24/2022    COVID19 Not Detected 05/06/2022    COVID19 Not Detected 04/05/2022       Pro-Calcitonin  No results for input(s): \"PCT\" in the last 168 hours.    Cardiac  No results for input(s): \"TROP\", \"PBNP\" in the last 168 hours.    Creatinine Kinase  No results for input(s): \"CK\" in the last 168 hours.    Inflammatory Markers  No results for input(s): \"CRP\", \"NAOMY\", \"LDH\", \"DDIMER\" in the last 168 hours.    Recent Labs   Lab 02/21/24  1216   TROPHS 18       Imaging: Imaging data reviewed in Epic.    Medications:    carbidopa-levodopa  1.5 tablet Oral TID    donepezil  10 mg Oral Daily    hydrALAZINE  25 mg Oral BID    latanoprost  1 drop Both Eyes Nightly    levothyroxine  125 mcg Oral Before breakfast    losartan  100 mg Oral QAM    melatonin  10 mg Oral Nightly    pantoprazole  40 mg Oral QAM AC    atorvastatin  10 mg Oral Nightly    timolol  1 drop Both Eyes Nightly    enoxaparin  40 mg Subcutaneous Daily    [START ON 2/23/2024] folic acid  400 mcg Oral Weekly       Assessment & Plan:    Acute encephalopathy, CTb neg, UA neg suspect dehydration, resolved   Monitor mental status  Parkinson's disease  Sinemet  Diarrhea, resolved   Stop bowel care  Check stool studies   Isolation   Falls  PT/OT > Home  Essential hypertension  Losartan  Hydralazine  Stop HCTZ  Monitor hemodynamics  Dyslipidemia  Statin  Pancytopenia, improving   Monitor counts   Hypothyroidism  Synthroid  Prostate cancer  Glaucoma  ISAIAS    Supplementary Documentation:   Quality:  DVT Prophylaxis: Lovenox     Home today.    Plan of care discussed with patient, wife and DONNA Barrios  MD Nick

## 2024-02-22 NOTE — PHYSICAL THERAPY NOTE
PHYSICAL THERAPY EVALUATION - INPATIENT     Room Number: 383/383-A  Evaluation Date: 2/22/2024  Type of Evaluation: Initial  Physician Order: PT Eval and Treat    Presenting Problem: diarrhea, altered mental status  Co-Morbidities : PD, HTN, hypothyroidism  Reason for Therapy: Mobility Dysfunction and Discharge Planning    PHYSICAL THERAPY ASSESSMENT   Patient is a 78 year old male admitted 2/21/2024 for diarrhea and altered mental status.   Patient is currently functioning at baseline with bed mobility, transfers, gait, and stair negotiation. Prior to admission, patient's baseline is [place] with RW or cane.     Patient will benefit from continued skilled PT Services at discharge to promote functional independence in home.  Anticipate patient will return home with home health PT.    PLAN  Patient has been evaluated and presents with no skilled Physical Therapy needs at this time.  Patient discharged from Physical Therapy services.  Please re-order if a new functional limitation presents during this admission.    GOALS  Patient was able to achieve the following goals ...    Patient was able to transfer At previous, functional level  Safely and independently   Patient able to ambulate on level surfaces At previous, functional level  Safely w/ RW     HOME SITUATION  Type of Home: House   Home Layout: One level  Stairs to Enter : 3  Railing: Yes          Lives With: Spouse  Drives: No  Patient Owned Equipment: Rolling walker;Cane  Patient Regularly Uses: Glasses    Prior Level of [place]: Per pt and pt's spouse- lives in one level home with 3 steps to enter and 2 rails. Lives with spouse. Sons nearby that provide meals and assistance as needed. Three falls in 2 weeks. About 7-8 falls in last 6 months. Past couple of days has been ambulating with RW. Typically uses cane. Sleeps in adjustable bed.  Wife assists with upper and lower body dressing. Spouse provides supervision when pt showering.     SUBJECTIVE  \"I feel  better today.\"      OBJECTIVE  Precautions: Bed/chair alarm  Fall Risk: High fall risk    WEIGHT BEARING RESTRICTION  Weight Bearing Restriction: None                PAIN ASSESSMENT  Ratin  Location: denies  Management Techniques: Activity promotion;Body mechanics;Repositioning    COGNITION  Overall Cognitive Status:  WFL - within functional limits    RANGE OF MOTION AND STRENGTH ASSESSMENT  Upper extremity ROM and strength are within functional limits     Lower extremity ROM is within functional limits     Lower extremity strength is within functional limits       BALANCE  Static Sitting: Good  Dynamic Sitting: Fair +  Static Standing: Fair  Dynamic Standing: Fair -    ADDITIONAL TESTS                                    ACTIVITY TOLERANCE                         O2 WALK       NEUROLOGICAL FINDINGS                        AM-PAC '6-Clicks' INPATIENT SHORT FORM - BASIC MOBILITY  How much difficulty does the patient currently have...  Patient Difficulty: Turning over in bed (including adjusting bedclothes, sheets and blankets)?: A Little   Patient Difficulty: Sitting down on and standing up from a chair with arms (e.g., wheelchair, bedside commode, etc.): A Little   Patient Difficulty: Moving from lying on back to sitting on the side of the bed?: A Little   How much help from another person does the patient currently need...   Help from Another: Moving to and from a bed to a chair (including a wheelchair)?: A Little   Help from Another: Need to walk in hospital room?: A Little   Help from Another: Climbing 3-5 steps with a railing?: A Little       AM-PAC Score:  Raw Score: 18   Approx Degree of Impairment: 46.58%   Standardized Score (AM-PAC Scale): 43.63   CMS Modifier (G-Code): CK    FUNCTIONAL ABILITY STATUS  Gait Assessment   Functional Mobility/Gait Assessment  Gait Assistance: Contact guard assist;Supervision  Distance (ft): 150, 50, 50  Assistive Device: Rolling walker  Pattern: Festinating;Shuffle  Stairs:  Stairs  How Many Stairs: 4  Device: 2 Rails  Assist: Supervision  Pattern: Ascend and Descend  Ascend and Descend : Step to    Skilled Therapy Provided     Bed Mobility:  Rolling: NT  Supine to sit: NT   Sit to supine:  NT     Transfer Mobility:  Sit to stand: supervision to RW   Stand to sit: supervision  Gait = initial CGA progressed to supervision w/ RW. 150 feet, 50 feet, 50 feet. Festinating gait pattern. Incr difficulty with turning.     Therapist's comments: Patient presents standing up at bedside chair with spouse attempting to doff brief. Discussed role and goal of physical therapy in hospital setting. Pt in agreement to session. Denies pain at this time.  Per spouse, pt about 85% back to normal self.  Sit to stand transfer at supervision to RW. Ambulated 150 feet w/ RW at initial CGA progressed to supervision. Simulated stairs via standing marching- pt completed without difficulty. Acutal stairs not practiced due to contact precautions. Pt upright in chair at end of session.  RN messaged writer about hospitalist requesting patient do stairs prior to discharge- writer returned in afternoon to complete stairs. Ambulated to rehab gym with RW at supervision, about 50 feet. Ascended/descended 4 steps with use of 2 rails at supervision. Ambulated back to room with RW at supervision.  Educated on importance of continued mobility. Encouraged pt to use RW at all times for mobility to assist with balance and reduce risk of falls. Pt and spouse verbalize understanding.     Exercise/Education Provided:  Body mechanics  Energy conservation  Functional activity tolerated  Gait training  Posture  Transfer training    Patient End of Session: Up in chair;Needs met;Call light within reach;RN aware of session/findings;All patient questions and concerns addressed;Family present;Discussed recommendations with /    Patient Evaluation Complexity Level:  History Moderate - 1 or 2 personal factors and/or  co-morbidities   Examination of body systems Low - addressing 1-2 elements   Clinical Presentation Low - Stable   Clinical Decision Making Low Complexity     PT Session Time: 30 minutes  Gait Training: 15 minutes

## 2024-02-22 NOTE — PLAN OF CARE
Patient alert and oriented x4. VSS on RA. Denies pain. SCDs in place, ankle pumps encouraged, IS encouraged. Up x1 with the walker. LBM 2/20, patient reports passing gas, active bowel sounds. Tolerating diet, no c/o n/v. Voiding freely in bathroom.     Plan: discharge home when cleared

## 2024-02-22 NOTE — CM/SW NOTE
02/22/24 1100   CM/SW Referral Data   Referral Source Social Work (self-referral)   Reason for Referral Discharge planning   Informant EMR;Clinical Staff Member   Patient Status Prior to Admission   Services in place prior to admission Home Health Care   Home Health Provider Info [facility]   Discharge Needs   Anticipated D/C needs Home health care       Patient is a 77 y/o man admitted with falls and diarrhea.  Informed by RN that pt is current with [facility] services and would like to resume, but does not know the name.  Updated by PT that HHC is recommended at [place].    Received call from Marta with [facility] ([phone], fax: [phone]) who stated pt is current with them for PT/OT/ST.  Updates with resumption order sent via [employer].      / to remain available for support and/or discharge planning.     Ying Harris, [facility]  Discharge Planner  [phone]

## 2024-02-22 NOTE — PLAN OF CARE
Patient A&Ox4. VSS on RA. . Tele-NSR/SB. ISAIAS no CPAP. SCDs. Patient denies any pain at this time. Patient denies any N/T at this time. Denies any N/V at this time. Stand/pivot to commode, LBM 2/20. Tolerating regular diet. Voiding freely. Plan for PT eval and stool sample to be collected, C.diff and GI panel pending. Patient and wife updated on plan of care, both verbalizes understanding. Call light within reach. Safety and precautions in place.

## 2024-02-22 NOTE — PROGRESS NOTES
AVS reviewed, IV dc'd by Christie YADAV, will dc home w/ [facility] , no new prescriptions , wife at bedside, both verbalized understanding of discharge instructions.

## 2024-02-22 NOTE — DISCHARGE SUMMARY
[facility]     Lawrence Daniels Patient Status:  Observation    9/3/1945 MRN [id number]   [facility] 3SW-A Attending Denis Romero MD   Hosp Day # 0 PCP Jennifer Chandra DO     Date of Admission: 2024  Date of Discharge:   2024    Discharge Disposition: Home or Self Care    Discharge Diagnosis:  Acute encephalopathy, CTb neg, UA neg suspect dehydration, resolved   Parkinson's disease  Diarrhea, resolved   Falls  Essential hypertension  Dyslipidemia  Pancytopenia, improving   Hypothyroidism  Prostate cancer  Glaucoma  ISAIAS    History of Present Illness: Lawrence Daniels is a 78 year old male with a PMH of Parkinson's disease, HTN, and hypothyroidism presented to ED due to weakness and dehydration. 2 days ago, patient had profuse diarrhea which has since significantly improved with only 1 episode today. Yesterday, wife reports patient was confused and hallucinating. Today, patient was noted to be very weak. Patient currently feeling much better after receiving IVF in ED. Denies abdominal pain/N/V. Denies CP/SOB. Denies fever/chills.      Brief Synopsis: Patient presented with AMS. He was admitted. Infection ruled out. Patient hydrated. Patient with clinical improvement. He is doing well. Home in stable condition.     Lace+ Score: 52  59-90 High Risk  29-58 Medium Risk  0-28   Low Risk       TCM Follow-Up Recommendation:  LACE > 58: High Risk of readmission after discharge from the hospital.      Discharge Medication List:     Discharge Medications        CHANGE how you take these medications        Instructions Prescription details   levothyroxine 125 MCG Tabs  Commonly known as: Synthroid  What changed: Another medication with the same name was removed. Continue taking this medication, and follow the directions you see here.      TAKE 1 TABLET(125 MCG) BY MOUTH BEFORE BREAKFAST   Quantity: 90 tablet  Refills: 3            CONTINUE taking these medications        Instructions  Prescription details   carbidopa-levodopa  MG Tabs  Commonly known as: SINEMET      Take by mouth 3 (three) times daily. Taking 1 1/2 tablets 3 times daily   Refills: 0     Cholecalciferol 50 MCG (2000 UT) Tabs      Take 1 tablet (2,000 Units total) by mouth at bedtime.   Refills: 0     cyanocobalamin 1000 MCG Tabs  Commonly known as: Vitamin B12      Take 1 tablet (1,000 mcg total) by mouth once a week.   Refills: 0     docusate sodium 100 MG Caps  Commonly known as: Colace      Take 1 capsule (100 mg total) by mouth nightly.   Refills: 0     donepezil 10 MG Tabs  Commonly known as: Aricept      Take 1 tablet (10 mg total) by mouth daily.   Refills: 0     folic acid 400 MCG Tabs  Commonly known as: Folvite      Take 1 tablet (400 mcg total) by mouth once a week.   Refills: 0     hydrALAZINE 25 MG Tabs  Commonly known as: Apresoline      Take 1 tablet (25 mg total) by mouth 2 (two) times daily.   Refills: 0     latanoprost 0.005 % Soln  Commonly known as: Xalatan      Place 1 drop into both eyes nightly.   Refills: 0     losartan 100 MG Tabs  Commonly known as: Cozaar      Take 1 tablet (100 mg total) by mouth every morning.   Refills: 0     Melatonin 10 MG Tabs      Take 10 mg by mouth nightly.   Refills: 0     omega-3 fatty acids 1000 MG Caps  Commonly known as: Fish Oil      Take 1,000 mg by mouth daily.   Refills: 0     Omeprazole 40 MG Cpdr      Take 1 capsule (40 mg total) by mouth daily.   Quantity: 90 capsule  Refills: 3     Oysco 500+D 500-5 MG-MCG Tabs  Generic drug: Calcium Carbonate-Vitamin D      Take 1 tablet by mouth 3 (three) times daily.   Quantity: 270 tablet  Refills: 1     pyridoxine 100 MG Tabs  Commonly known as: Vitamin B6      Take 1 tablet (100 mg total) by mouth once a week.   Refills: 0     simvastatin 20 MG Tabs  Commonly known as: Zocor      Take 1 tablet (20 mg total) by mouth nightly.   Quantity: 90 tablet  Refills: 3     timolol 0.5 % Soln  Commonly known as: Timoptic      Place  1 drop into both eyes nightly.   Refills: 0            STOP taking these medications      amoxicillin 500 MG Caps  Commonly known as: Amoxil        hydroCHLOROthiazide 25 MG Tabs  Commonly known as: Hydrodiuril        Potassium Chloride ER 10 MEQ Tbcr  Commonly known as: K-DUR        [place] OR                 [place] RAD[place]    Follow-up appointment:   Jennifer Chandra DO  [address] [facility] [address]  [facility] [id number] [phone]    Follow up in 1 week(s)      Appointments for Next 30 Days 2024 - 3/24/2024      None              -----------------------------------------------------------------------------------------------  PATIENT DISCHARGE INSTRUCTIONS: See electronic chart    Denis Romero MD    Total time spent on discharge plannin minutes     The  [place] Cures Act makes medical notes like these available to patients in the interest of transparency. Please be advised this is a medical document. Medical documents are intended to carry relevant information, facts as evident, and the clinical opinion of the practitioner. The medical note is intended as peer to peer communication and may appear blunt or direct. It is written in medical language and may contain abbreviations or verbiage that are unfamiliar.

## 2024-02-22 NOTE — PLAN OF CARE
Patient alert and oriented x4. VSS on RA. Tele in NSR. Denies pain. Denies n/t. SCDs in place, ankle pumps encouraged, IS encouraged. LBM 2/20, patient reports passing gas, active bowel sounds. Tolerating diet, no c/o n/v. Voiding freely in urinal. Patient up to stand pivot. IVF x1 infusing per order.     Plan: stool samples to be collected, PT oswaldo

## 2024-02-22 NOTE — DISCHARGE INSTRUCTIONS
Sometimes managing your health at home requires assistance.  The [facility] team has recognized your preference to use [facility] [phone].  A representative from the home health agency will contact you or your family to schedule your first visit.

## 2024-02-23 ENCOUNTER — TELEPHONE (OUTPATIENT)
Dept: FAMILY MEDICINE CLINIC | Facility: CLINIC | Age: 79
End: 2024-02-23

## 2024-02-23 ENCOUNTER — PATIENT OUTREACH (OUTPATIENT)
Dept: CASE MANAGEMENT | Age: 79
End: 2024-02-23

## 2024-02-23 DIAGNOSIS — Z02.9 ENCOUNTERS FOR UNSPECIFIED ADMINISTRATIVE PURPOSE: Primary | ICD-10-CM

## 2024-02-23 NOTE — TELEPHONE ENCOUNTER
Front office: Can you schedule this patient?    Dr. Chandra: There is a message for you within the text.    Please advise if anything is needed.

## 2024-02-23 NOTE — TELEPHONE ENCOUNTER
Spoke to patient's spouse for TCM today.  Patient does not have an appointment scheduled at this time. Attempted to schedule an appt for TCM however unable to due to schedule limitations.     TCM appointment recommended by 2/29/24 as patient is a High risk for readmission.  Please advise.    BOOK BY DATE (last date for TCM): 3/7/24    Also during the call, spouse stated Hydrochlorothiazide and Potassium were stopped at discharge. Pt would like to see if Dr. Chandra agrees with this. Spouse confirmed pt has not checked his B/P at home yet therefore advised spouse to start checking B/P and to keep a log. Spouse is aware the pt is to hydrate well. Spouse stated she did give the pt the Hydrochlorothiazide today as she felt he needs to take it. As of today diarrhea as resolved, pt was able to have a solid BM. Please advise. Thank you.     Clinical staff:  Please follow-up with patient and assist with scheduling as patient would greatly benefit from TCM appointment.  Thank you!      53

## 2024-02-23 NOTE — PROGRESS NOTES
Initial Post Discharge Follow Up   Discharge Date: 2/22/24  Contact Date: 2/23/2024    Consent Verification:  Assessment Completed With: Spouse: Delores Permission received per patient?  written  HIPAA Verified?  Yes    Discharge Dx:   Acute encephalopathy, CTb neg, UA neg suspect dehydration, resolved   Parkinson's disease  Diarrhea, resolved     General:   How have you been since your discharge from the hospital? Spouse stated pt is doing better. Pt is calm and doing better today, not as agitated. Pt is ambulating with a walker. Spouse stated pt had diarrhea last night but a solid BM today. Pt is alert and seems less confused today. Pt is eating and drinking well. Pt has not checked his B/P at home but has a cuff to check it. Spouse was advised to check pt B/P and HR daily and to take keep a log of all readings. Spouse stated [facility] did call and the RN plans to come Monday or sooner if possible.  Spouse is awaiting a call back from [facility]. Spouse denies pt has issues urinating.    Do you have any pain since discharge?  No    How well was your pain managed while in the hospital?   On a scale of 1-5   1- Very Poor and 5- Very well   Very Well  When you were leaving the hospital were your discharge instructions reviewed with you? Yes  How well were your discharge instructions explained to you?   On a scale of 1-5   1- Very Poor and 5- Very well   Very Well  Do you have any questions about your discharge instructions?  No  Before leaving the hospital was your diagnoses explained to you? Yes  Do you have any questions about your diagnoses? No  Are you able to perform normal daily activities of living as you have prior to your hospital stay (dressing, bathing, ambulating to the bathroom, etc)? no  If No, What are some barriers or concerns?  Taking his time and needs assistance at times.   (NCM) Was patient given a different diet per AVS? no    Medications:   Current Outpatient Medications   Medication Sig Dispense Refill     simvastatin 20 MG Oral Tab Take 1 tablet (20 mg total) by mouth nightly. 90 tablet 3    OYSCO 500+D 500-5 MG-MCG Oral Tab Take 1 tablet by mouth 3 (three) times daily. 270 tablet 1    Omeprazole 40 MG Oral Capsule Delayed Release Take 1 capsule (40 mg total) by mouth daily. 90 capsule 3    LEVOTHYROXINE 125 MCG Oral Tab TAKE 1 TABLET(125 MCG) BY MOUTH BEFORE BREAKFAST 90 tablet 3    donepezil 10 MG Oral Tab Take 1 tablet (10 mg total) by mouth daily.      latanoprost 0.005 % Ophthalmic Solution Place 1 drop into both eyes nightly.      carbidopa-levodopa  MG Oral Tab Take by mouth 3 (three) times daily. Taking 1 1/2 tablets 3 times daily      Timolol Maleate 0.5 % Ophthalmic Solution Place 1 drop into both eyes nightly.      hydrALAzine HCl 25 MG Oral Tab Take 1 tablet (25 mg total) by mouth 2 (two) times daily.      Melatonin 10 MG Oral Tab Take 10 mg by mouth nightly.      omega-3 fatty acids 1000 MG Oral Cap Take 1,000 mg by mouth daily.      docusate sodium 100 MG Oral Cap Take 1 capsule (100 mg total) by mouth nightly.      folic acid 400 MCG Oral Tab Take 1 tablet (400 mcg total) by mouth once a week.      Vitamin B-12 1000 MCG Oral Tab Take 1 tablet (1,000 mcg total) by mouth once a week.      Pyridoxine HCl (VITAMIN B-6) 100 MG Oral Tab Take 1 tablet (100 mg total) by mouth once a week.      Cholecalciferol 50 MCG (2000 UT) Oral Tab Take 1 tablet (2,000 Units total) by mouth at bedtime.      losartan 100 MG Oral Tab Take 1 tablet (100 mg total) by mouth every morning.       Were there any changes to your current medication(s) noted on the AVS? Yes  If so, were these medication changes discussed with you prior to leaving the hospital? Yes  Let's go over your medications together to make sure we are not missing anything. Patient Declined, explain: Spouse reviewed stopped medications only as she would like to clarify with Dr. Chandra if pt should be on the Hydrochlorothiazide and Potassium. Both were  stopped at discharge however spouse feels pt should still be on them. See below. NCM to FU. Med rec to be completed at TCM appt.    Are there any reasons that keep you from taking your medication as prescribed? No  Are you having any concerns with constipation? No  Did patient receive their flu shot (Sept-March)? Yes    Discharge medications reviewed/discussed/and reconciled against outpatient medications with patient.  Any changes or updates to medications sent to PCP.  Patient Declined     Referrals/orders at D/C:  Referrals/orders placed at D/C? yes  What services:   Home health   (If HH was ordered) Has HH been set up?  Yes    If Yes: With Whom: [facility]   Except for Home Health Services mentioned above, have you scheduled these other services? Not Applicable    DME ordered at D/C? No      Discharge orders, AVS reviewed and discussed with patient. Any changes or updates to orders sent to PCP.  Patient Acknowledged      SDOH:     Transportation Needs: No Transportation Needs (2/23/2024)    Transportation Needs     Lack of Transportation: No         Financial Resource Strain: Low Risk  (2/23/2024)    Financial Resource Strain     Difficulty of Paying Living Expenses: Not very hard     Med Affordability: No       Follow up appointments:  Reviewed recommended follow up appointment. Pt denies any barriers to keeping/getting to HFU appts.     Your appointments       Date & Time Appointment Department (Center)    Jul 17, 2024 10:00 AM CDT Exam - Established with Jennifer Chandra DO [facility])              [facility]  [address] [facility] [address] [address]  [facility] [id number] [phone]            TCC  Was TCC ordered: No      PCP (If no TCC appointment)  Does patient already have a PCP appointment scheduled? No  NCM Attempted to schedule PCP office TCM appointment with  patient   If no appointment scheduled: Explain: Unable to schedule due to schedule limitations. TE will be sent to PCP office to FU on TCM appt.     Specialist    Does the patient have any other follow up appointment(s) needing to be scheduled? No    Is there any reason as to why you cannot make your appointment(s)?  No     Needs post D/C:   Now that you are home, are there any needs or concerns you need addressed before your next visit with your PCP?  (DME, meds, questions, etc.): No    Interventions by NCM:   All d/c instructions reviewed with the pt. Reviewed when to call MD vs when to call 911 or go the ED. Discussed s/s of dehydration. Educated pt on the importance of taking all meds as prescribed as well as close f/u with PCP/specialists. Pt verbalized understanding and will contact the office with any further questions or concerns. Spouse is aware to let PCP know if pt's B/P seems to be running lower than usual or elevates.       Overall Rating:   How would you rate the care you received while in the hospital? good    CCM referral placed:    Yes

## 2024-02-24 NOTE — TELEPHONE ENCOUNTER
Recommend hold the diuretic until the diarrhea completely stops.  Agree with monitoring blood pressure.    Okay to overbook for TCM visit Wednesday (28th) morning or Thursday (29th) afternoon.

## 2024-02-26 NOTE — TELEPHONE ENCOUNTER
Spoke w/pt's spouse. Diarrhea has completely resolved. Pt has restarted the hydrochlorothiazide and potassium. TCM scheduled for 2/28 @ 11am

## 2024-02-28 ENCOUNTER — TELEMEDICINE (OUTPATIENT)
Dept: FAMILY MEDICINE CLINIC | Facility: CLINIC | Age: 79
End: 2024-02-28
Payer: MEDICARE

## 2024-02-28 VITALS — WEIGHT: 203 LBS | BODY MASS INDEX: 30.77 KG/M2 | HEIGHT: 68 IN

## 2024-02-28 DIAGNOSIS — I10 ESSENTIAL HYPERTENSION: ICD-10-CM

## 2024-02-28 DIAGNOSIS — G93.40 ACUTE ENCEPHALOPATHY: ICD-10-CM

## 2024-02-28 DIAGNOSIS — I73.9 SMALL VESSEL DISEASE (HCC): ICD-10-CM

## 2024-02-28 DIAGNOSIS — E88.09 HYPOALBUMINEMIA DUE TO PROTEIN-CALORIE MALNUTRITION (HCC): ICD-10-CM

## 2024-02-28 DIAGNOSIS — E46 HYPOALBUMINEMIA DUE TO PROTEIN-CALORIE MALNUTRITION (HCC): ICD-10-CM

## 2024-02-28 DIAGNOSIS — Z09 HOSPITAL DISCHARGE FOLLOW-UP: Primary | ICD-10-CM

## 2024-02-28 DIAGNOSIS — R29.6 MULTIPLE FALLS: ICD-10-CM

## 2024-02-28 DIAGNOSIS — R19.7 DIARRHEA OF PRESUMED INFECTIOUS ORIGIN: ICD-10-CM

## 2024-02-28 DIAGNOSIS — G20.B1 PARKINSON'S DISEASE WITH DYSKINESIA, UNSPECIFIED WHETHER MANIFESTATIONS FLUCTUATE: ICD-10-CM

## 2024-02-28 DIAGNOSIS — E78.5 DYSLIPIDEMIA: ICD-10-CM

## 2024-02-28 DIAGNOSIS — E86.0 DEHYDRATION: ICD-10-CM

## 2024-02-28 DIAGNOSIS — E87.6 HYPOKALEMIA: ICD-10-CM

## 2024-02-28 DIAGNOSIS — I67.9 CEREBROVASCULAR SMALL VESSEL DISEASE: ICD-10-CM

## 2024-02-28 NOTE — PROGRESS NOTES
Telephone visit      Please note that the following visit was completed using two-way, real-time interactive audio and/or video communication.  This has been done in good michael to provide continuity of care in the best interest of the provider-patient relationship, due to the ongoing public health crisis/national emergency and because of restrictions of visitation.  There are limitations of this visit as no physical exam could be performed.  Every conscious effort was taken to allow for sufficient and adequate time.  This billing was spent on reviewing labs, medications, radiology tests and decision making.  Appropriate medical decision-making and tests are ordered as detailed in the plan of care above.    Time spent was 30 minutes, more than 50% of time was spent on counseling regarding medical conditions and treatment.  Rest of time was spent reviewing chart, and reviewing any pertinent blood work and radiology tests.         Subjective:       Lawrence Daniels is a 78 year old male who presents for hospital follow up.   He was discharged from [facility]  Admission Date: 2/21/24   Discharge Date: 2/22/24  Hospital Discharge Diagnosis:     Acute encephalopathy, CTb neg, UA neg suspect dehydration, resolved   Parkinson's disease  Diarrhea, resolved   Falls  Essential hypertension  Dyslipidemia  Pancytopenia, improving   Hypothyroidism  Prostate cancer  Glaucoma  ISAIAS        Interactive contact within 2 business days post discharge first initiated on Date: 2/23/2024    During the visit, the following was completed:  Obtained and reviewed discharge summary, continuity of care documents, and Hospitalist notes  Reviewed  lab results, radiology report, EKG results    HPI:     Patient's wife is also present on speaker phone during patient's telephone encounter.    \"I feel pretty good\".  No diarrhea since he has been home.  Positive for weakness since home.  Using a walker which is new.  Per wife seems  to be back to his baseline which is chronically lower than normal.  Physical therapist has been to the house twice already and one with OT.  Home health was ordered both by neurologist and the hospital.  Prior to hospitalization had fallen 4 times without head injury, neurologist had ordered home health physical therapy prior to patient's hospitalization.          History/Other:   Current Medications:  Medication Reconciliation:  I am aware of an inpatient discharge within the last 30 days.  The discharge medication list has been reconciled with the patient's current medication list and reviewed by me.  See medication list for additions of new medication, and changes to current doses of medications and discontinued medications.  Outpatient Medications Marked as Taking for the 2/28/24 encounter (Telemedicine) with Jennifer Chandra, DO   Medication Sig    simvastatin 20 MG Oral Tab Take 1 tablet (20 mg total) by mouth nightly.    OYSCO 500+D 500-5 MG-MCG Oral Tab Take 1 tablet by mouth 3 (three) times daily.    Omeprazole 40 MG Oral Capsule Delayed Release Take 1 capsule (40 mg total) by mouth daily.    LEVOTHYROXINE 125 MCG Oral Tab TAKE 1 TABLET(125 MCG) BY MOUTH BEFORE BREAKFAST    donepezil 10 MG Oral Tab Take 1 tablet (10 mg total) by mouth daily.    latanoprost 0.005 % Ophthalmic Solution Place 1 drop into both eyes nightly.    carbidopa-levodopa  MG Oral Tab Take by mouth 3 (three) times daily. Taking 1 1/2 tablets 3 times daily    Timolol Maleate 0.5 % Ophthalmic Solution Place 1 drop into both eyes nightly.    hydrALAzine HCl 25 MG Oral Tab Take 1 tablet (25 mg total) by mouth 2 (two) times daily.    Melatonin 10 MG Oral Tab Take 10 mg by mouth nightly.    omega-3 fatty acids 1000 MG Oral Cap Take 1,000 mg by mouth daily.    docusate sodium 100 MG Oral Cap Take 1 capsule (100 mg total) by mouth nightly.    folic acid 400 MCG Oral Tab Take 1 tablet (400 mcg total) by mouth once a week.    Vitamin B-12  1000 MCG Oral Tab Take 1 tablet (1,000 mcg total) by mouth once a week.    Pyridoxine HCl (VITAMIN B-6) 100 MG Oral Tab Take 1 tablet (100 mg total) by mouth once a week.    Cholecalciferol 50 MCG (2000 UT) Oral Tab Take 1 tablet (2,000 Units total) by mouth at bedtime.    losartan 100 MG Oral Tab Take 1 tablet (100 mg total) by mouth every morning.       Review of Systems:  GENERAL: Overall feels he is improving  SKIN: No new rashes.  EYES: Denies changes in vision  HEENT: Denies sore throat or other upper respiratory symptoms.  LUNGS: Denies cough or shortness of breath  CARDIOVASCULAR: Denies chest pain or palpitations.  GI: Denies abdominal pain, nausea, vomiting, or diarrhea  MUSCULOSKELETAL: Denies any new muscle aches or joint pains.  Positive for overall weakness.  NEURO: Denies headache or dizziness.  PSYCH: Denies depression or anxiety.    Objective:   No LMP for male patient.  Estimated body mass index is 30.87 kg/m² as calculated from the following:    Height as of this encounter: 5' 8\" (1.727 m).    Weight as of this encounter: 203 lb (92.1 kg).   Ht 5' 8\" (1.727 m)   Wt 203 lb (92.1 kg)   BMI 30.87 kg/m²    GENERAL: Pleasant.  Comfortably speaking in full sentences.   HEENT: Voice sounds strong.  LUNGS: No cough during telephone visit.  No audible dyspnea.  No audible wheezing.  NEURO: Alert and oriented   PSYCH: Normal affect.  Intonation of voice is normal.  No pressured speech.    Assessment & Plan:       1. Hospital discharge follow-up  Patient slowly improving.    2. Hypoalbuminemia due to protein-calorie malnutrition (HCC)  Due to acute losses secondary to diarrhea and lack of intake due to acute illness.  Patient counseled on healthy diet.    3. Small vessel disease (HCC)  4. Cerebrovascular small vessel disease  Cerebrovascular small vessel disease.  Recommend continue simvastatin 20 mg nightly.    5. Acute encephalopathy  Resolved.    6. Dehydration  7. Diarrhea of presumed infectious  origin  8. Hypokalemia  Dehydration and hypokalemia secondary to diarrhea.  Acute dehydration resolved.  Water intake encouraged.  Recommend reevaluate BMP as patient has restarted HCTZ.    - BASIC METABOLIC PANEL [89738] [Q]  - MAGNESIUM [622][Q]    9. Parkinson's disease with dyskinesia, unspecified whether manifestations fluctuate  10. Multiple falls  Recommend reevaluate DEXA due to Parkinson's disease and patient having frequent falls prior to acute illness.  Last DEXA was March 2021.  Recommend continue with home health.  Follow-up with neurologist as planned.    - XR DEXA BONE DENSITOMETRY (CPT=77080); Future    11. Essential hypertension  Patient has restarted his hydrochlorothiazide.  Okay to continue hydrochlorothiazide, hydralazine, and losartan.  Recommend reevaluate BMP.  Monitor blood pressure at home.  Follow-up in 5 months, sooner if needed.    - BASIC METABOLIC PANEL [50229] [Q]    12. Dyslipidemia  Recommend continue simvastatin 20 mg nightly.            1. Hospital discharge follow-up (Primary)  2. Hypoalbuminemia due to protein-calorie malnutrition (HCC)  3. Small vessel disease (HCC)  4. Cerebrovascular small vessel disease  5. Acute encephalopathy  6. Dehydration  -     Basic Metabolic Panel (8)  7. Diarrhea of presumed infectious origin  8. Hypokalemia  -     Basic Metabolic Panel (8)  -     Magnesium  9. Parkinson's disease with dyskinesia, unspecified whether manifestations fluctuate  -     XR DEXA BONE DENSITOMETRY (CPT=77080); Future; Expected date: 02/28/2024  10. Multiple falls  -     XR DEXA BONE DENSITOMETRY (CPT=77080); Future; Expected date: 02/28/2024  11. Essential hypertension  -     Basic Metabolic Panel (8)  12. Dyslipidemia        Return in about 5 months (around 7/28/2024) for Hypertension, hyperlipidemia, and hypothyroidism.  Sooner if needed..

## 2024-03-16 ENCOUNTER — APPOINTMENT (OUTPATIENT)
Dept: CT IMAGING | Facility: HOSPITAL | Age: 79
End: 2024-03-16
Attending: EMERGENCY MEDICINE
Payer: MEDICARE

## 2024-03-16 ENCOUNTER — TELEPHONE (OUTPATIENT)
Dept: FAMILY MEDICINE CLINIC | Facility: CLINIC | Age: 79
End: 2024-03-16

## 2024-03-16 ENCOUNTER — APPOINTMENT (OUTPATIENT)
Dept: GENERAL RADIOLOGY | Facility: HOSPITAL | Age: 79
End: 2024-03-16
Attending: EMERGENCY MEDICINE
Payer: MEDICARE

## 2024-03-16 ENCOUNTER — HOSPITAL ENCOUNTER (INPATIENT)
Facility: HOSPITAL | Age: 79
LOS: 5 days | Discharge: NURSING, REHAB OR LONG-TERM CARE | End: 2024-03-21
Attending: EMERGENCY MEDICINE | Admitting: HOSPITALIST
Payer: MEDICARE

## 2024-03-16 DIAGNOSIS — I10 ELEVATED BLOOD PRESSURE READING WITH DIAGNOSIS OF HYPERTENSION: ICD-10-CM

## 2024-03-16 DIAGNOSIS — N17.9 AKI (ACUTE KIDNEY INJURY) (HCC): ICD-10-CM

## 2024-03-16 DIAGNOSIS — R62.7 ADULT FAILURE TO THRIVE: ICD-10-CM

## 2024-03-16 DIAGNOSIS — E86.0 DEHYDRATION: Primary | ICD-10-CM

## 2024-03-16 DIAGNOSIS — R53.1 WEAKNESS GENERALIZED: ICD-10-CM

## 2024-03-16 PROBLEM — I16.0 HYPERTENSIVE URGENCY: Status: ACTIVE | Noted: 2024-03-16

## 2024-03-16 LAB
ADENOVIRUS PCR:: NOT DETECTED
ALBUMIN SERPL-MCNC: 3.5 G/DL (ref 3.4–5)
ALBUMIN/GLOB SERPL: 0.9 {RATIO} (ref 1–2)
ALP LIVER SERPL-CCNC: 64 U/L
ALT SERPL-CCNC: 7 U/L
AMMONIA PLAS-MCNC: 14 UMOL/L (ref 11–32)
ANION GAP SERPL CALC-SCNC: 2 MMOL/L (ref 0–18)
AST SERPL-CCNC: 13 U/L (ref 15–37)
B PARAPERT DNA SPEC QL NAA+PROBE: NOT DETECTED
B PERT DNA SPEC QL NAA+PROBE: NOT DETECTED
BASOPHILS # BLD AUTO: 0.01 X10(3) UL (ref 0–0.2)
BASOPHILS NFR BLD AUTO: 0.2 %
BILIRUB SERPL-MCNC: 0.5 MG/DL (ref 0.1–2)
BILIRUB UR QL STRIP.AUTO: NEGATIVE
BUN BLD-MCNC: 21 MG/DL (ref 9–23)
C PNEUM DNA SPEC QL NAA+PROBE: NOT DETECTED
CALCIUM BLD-MCNC: 9.5 MG/DL (ref 8.5–10.1)
CHLORIDE SERPL-SCNC: 108 MMOL/L (ref 98–112)
CLARITY UR REFRACT.AUTO: CLEAR
CO2 SERPL-SCNC: 29 MMOL/L (ref 21–32)
COLOR UR AUTO: COLORLESS
CORONAVIRUS 229E PCR:: NOT DETECTED
CORONAVIRUS HKU1 PCR:: NOT DETECTED
CORONAVIRUS NL63 PCR:: NOT DETECTED
CORONAVIRUS OC43 PCR:: NOT DETECTED
CREAT BLD-MCNC: 1.38 MG/DL
EGFRCR SERPLBLD CKD-EPI 2021: 52 ML/MIN/1.73M2 (ref 60–?)
EOSINOPHIL # BLD AUTO: 0.1 X10(3) UL (ref 0–0.7)
EOSINOPHIL NFR BLD AUTO: 1.6 %
ERYTHROCYTE [DISTWIDTH] IN BLOOD BY AUTOMATED COUNT: 12.2 %
FLUAV + FLUBV RNA SPEC NAA+PROBE: NEGATIVE
FLUAV + FLUBV RNA SPEC NAA+PROBE: NEGATIVE
FLUAV RNA SPEC QL NAA+PROBE: NOT DETECTED
FLUBV RNA SPEC QL NAA+PROBE: NOT DETECTED
GLOBULIN PLAS-MCNC: 3.8 G/DL (ref 2.8–4.4)
GLUCOSE BLD-MCNC: 121 MG/DL (ref 70–99)
GLUCOSE UR STRIP.AUTO-MCNC: NORMAL MG/DL
HCT VFR BLD AUTO: 37.7 %
HGB BLD-MCNC: 12.7 G/DL
IMM GRANULOCYTES # BLD AUTO: 0.02 X10(3) UL (ref 0–1)
IMM GRANULOCYTES NFR BLD: 0.3 %
KETONES UR STRIP.AUTO-MCNC: NEGATIVE MG/DL
LEUKOCYTE ESTERASE UR QL STRIP.AUTO: NEGATIVE
LYMPHOCYTES # BLD AUTO: 0.99 X10(3) UL (ref 1–4)
LYMPHOCYTES NFR BLD AUTO: 15.7 %
MAGNESIUM SERPL-MCNC: 2.2 MG/DL (ref 1.6–2.6)
MCH RBC QN AUTO: 32.6 PG (ref 26–34)
MCHC RBC AUTO-ENTMCNC: 33.7 G/DL (ref 31–37)
MCV RBC AUTO: 96.9 FL
METAPNEUMOVIRUS PCR:: NOT DETECTED
MONOCYTES # BLD AUTO: 0.76 X10(3) UL (ref 0.1–1)
MONOCYTES NFR BLD AUTO: 12.1 %
MYCOPLASMA PNEUMONIA PCR:: NOT DETECTED
NEUTROPHILS # BLD AUTO: 4.41 X10 (3) UL (ref 1.5–7.7)
NEUTROPHILS # BLD AUTO: 4.41 X10(3) UL (ref 1.5–7.7)
NEUTROPHILS NFR BLD AUTO: 70.1 %
NITRITE UR QL STRIP.AUTO: NEGATIVE
OSMOLALITY SERPL CALC.SUM OF ELEC: 292 MOSM/KG (ref 275–295)
PARAINFLUENZA 1 PCR:: NOT DETECTED
PARAINFLUENZA 2 PCR:: NOT DETECTED
PARAINFLUENZA 3 PCR:: NOT DETECTED
PARAINFLUENZA 4 PCR:: NOT DETECTED
PH UR STRIP.AUTO: 6.5 [PH] (ref 5–8)
PLATELET # BLD AUTO: 172 10(3)UL (ref 150–450)
POTASSIUM SERPL-SCNC: 3.8 MMOL/L (ref 3.5–5.1)
PROCALCITONIN SERPL-MCNC: <0.05 NG/ML (ref ?–0.16)
PROT SERPL-MCNC: 7.3 G/DL (ref 6.4–8.2)
PROT UR STRIP.AUTO-MCNC: NEGATIVE MG/DL
RBC # BLD AUTO: 3.89 X10(6)UL
RBC UR QL AUTO: NEGATIVE
RHINOVIRUS/ENTERO PCR:: NOT DETECTED
RSV RNA SPEC NAA+PROBE: NEGATIVE
RSV RNA SPEC QL NAA+PROBE: NOT DETECTED
SARS-COV-2 RNA NPH QL NAA+NON-PROBE: NOT DETECTED
SARS-COV-2 RNA RESP QL NAA+PROBE: NOT DETECTED
SODIUM SERPL-SCNC: 139 MMOL/L (ref 136–145)
SP GR UR STRIP.AUTO: 1 (ref 1–1.03)
T4 FREE SERPL-MCNC: 0.8 NG/DL (ref 0.8–1.7)
TSI SER-ACNC: 24.2 MIU/ML (ref 0.36–3.74)
UROBILINOGEN UR STRIP.AUTO-MCNC: NORMAL MG/DL
WBC # BLD AUTO: 6.3 X10(3) UL (ref 4–11)

## 2024-03-16 PROCEDURE — 99223 1ST HOSP IP/OBS HIGH 75: CPT | Performed by: HOSPITALIST

## 2024-03-16 PROCEDURE — 70450 CT HEAD/BRAIN W/O DYE: CPT | Performed by: EMERGENCY MEDICINE

## 2024-03-16 PROCEDURE — 71045 X-RAY EXAM CHEST 1 VIEW: CPT | Performed by: EMERGENCY MEDICINE

## 2024-03-16 RX ORDER — POLYETHYLENE GLYCOL 3350 17 G/17G
17 POWDER, FOR SOLUTION ORAL DAILY PRN
Status: DISCONTINUED | OUTPATIENT
Start: 2024-03-16 | End: 2024-03-21

## 2024-03-16 RX ORDER — ONDANSETRON 2 MG/ML
4 INJECTION INTRAMUSCULAR; INTRAVENOUS EVERY 6 HOURS PRN
Status: DISCONTINUED | OUTPATIENT
Start: 2024-03-16 | End: 2024-03-21

## 2024-03-16 RX ORDER — ENOXAPARIN SODIUM 100 MG/ML
40 INJECTION SUBCUTANEOUS DAILY
Status: DISCONTINUED | OUTPATIENT
Start: 2024-03-17 | End: 2024-03-21

## 2024-03-16 RX ORDER — MELATONIN
100 WEEKLY
Status: DISCONTINUED | OUTPATIENT
Start: 2024-03-16 | End: 2024-03-21

## 2024-03-16 RX ORDER — ATORVASTATIN CALCIUM 10 MG/1
10 TABLET, FILM COATED ORAL NIGHTLY
Status: DISCONTINUED | OUTPATIENT
Start: 2024-03-16 | End: 2024-03-21

## 2024-03-16 RX ORDER — HYDRALAZINE HYDROCHLORIDE 20 MG/ML
10 INJECTION INTRAMUSCULAR; INTRAVENOUS EVERY 4 HOURS PRN
Status: DISCONTINUED | OUTPATIENT
Start: 2024-03-16 | End: 2024-03-21

## 2024-03-16 RX ORDER — SODIUM CHLORIDE 9 MG/ML
INJECTION, SOLUTION INTRAVENOUS CONTINUOUS
Status: DISCONTINUED | OUTPATIENT
Start: 2024-03-16 | End: 2024-03-17

## 2024-03-16 RX ORDER — ENEMA 19; 7 G/133ML; G/133ML
1 ENEMA RECTAL ONCE AS NEEDED
Status: DISCONTINUED | OUTPATIENT
Start: 2024-03-16 | End: 2024-03-21

## 2024-03-16 RX ORDER — SENNOSIDES 8.6 MG
17.2 TABLET ORAL NIGHTLY PRN
Status: DISCONTINUED | OUTPATIENT
Start: 2024-03-16 | End: 2024-03-21

## 2024-03-16 RX ORDER — LEVOTHYROXINE SODIUM 0.15 MG/1
150 TABLET ORAL
Status: DISCONTINUED | OUTPATIENT
Start: 2024-03-16 | End: 2024-03-16

## 2024-03-16 RX ORDER — ECHINACEA PURPUREA EXTRACT 125 MG
1 TABLET ORAL
Status: DISCONTINUED | OUTPATIENT
Start: 2024-03-16 | End: 2024-03-21

## 2024-03-16 RX ORDER — LEVOTHYROXINE SODIUM 0.12 MG/1
125 TABLET ORAL
Status: DISCONTINUED | OUTPATIENT
Start: 2024-03-17 | End: 2024-03-16

## 2024-03-16 RX ORDER — AMLODIPINE BESYLATE 5 MG/1
5 TABLET ORAL DAILY
Status: DISCONTINUED | OUTPATIENT
Start: 2024-03-16 | End: 2024-03-21

## 2024-03-16 RX ORDER — LOSARTAN POTASSIUM 100 MG/1
100 TABLET ORAL EVERY MORNING
Status: DISCONTINUED | OUTPATIENT
Start: 2024-03-17 | End: 2024-03-17

## 2024-03-16 RX ORDER — MELATONIN
400 WEEKLY
Status: DISCONTINUED | OUTPATIENT
Start: 2024-03-16 | End: 2024-03-21

## 2024-03-16 RX ORDER — TIMOLOL MALEATE 5 MG/ML
1 SOLUTION/ DROPS OPHTHALMIC NIGHTLY
Status: DISCONTINUED | OUTPATIENT
Start: 2024-03-16 | End: 2024-03-21

## 2024-03-16 RX ORDER — LATANOPROST 50 UG/ML
1 SOLUTION/ DROPS OPHTHALMIC NIGHTLY
Status: DISCONTINUED | OUTPATIENT
Start: 2024-03-16 | End: 2024-03-21

## 2024-03-16 RX ORDER — HYDRALAZINE HYDROCHLORIDE 25 MG/1
25 TABLET, FILM COATED ORAL 2 TIMES DAILY
Status: DISCONTINUED | OUTPATIENT
Start: 2024-03-16 | End: 2024-03-19

## 2024-03-16 RX ORDER — ACETAMINOPHEN 500 MG
1000 TABLET ORAL EVERY 4 HOURS PRN
Status: DISCONTINUED | OUTPATIENT
Start: 2024-03-16 | End: 2024-03-21

## 2024-03-16 RX ORDER — DOCUSATE SODIUM 100 MG/1
100 CAPSULE, LIQUID FILLED ORAL NIGHTLY
Status: DISCONTINUED | OUTPATIENT
Start: 2024-03-16 | End: 2024-03-21

## 2024-03-16 RX ORDER — HYDRALAZINE HYDROCHLORIDE 20 MG/ML
10 INJECTION INTRAMUSCULAR; INTRAVENOUS ONCE
Status: COMPLETED | OUTPATIENT
Start: 2024-03-16 | End: 2024-03-16

## 2024-03-16 RX ORDER — SODIUM CHLORIDE 9 MG/ML
125 INJECTION, SOLUTION INTRAVENOUS CONTINUOUS
Status: DISCONTINUED | OUTPATIENT
Start: 2024-03-16 | End: 2024-03-17

## 2024-03-16 RX ORDER — DONEPEZIL HYDROCHLORIDE 10 MG/1
10 TABLET, FILM COATED ORAL DAILY
Status: DISCONTINUED | OUTPATIENT
Start: 2024-03-16 | End: 2024-03-21

## 2024-03-16 RX ORDER — LEVOTHYROXINE SODIUM 0.15 MG/1
150 TABLET ORAL
Status: DISCONTINUED | OUTPATIENT
Start: 2024-03-17 | End: 2024-03-17

## 2024-03-16 RX ORDER — METOCLOPRAMIDE HYDROCHLORIDE 5 MG/ML
10 INJECTION INTRAMUSCULAR; INTRAVENOUS EVERY 8 HOURS PRN
Status: DISCONTINUED | OUTPATIENT
Start: 2024-03-16 | End: 2024-03-19

## 2024-03-16 RX ORDER — BISACODYL 10 MG
10 SUPPOSITORY, RECTAL RECTAL
Status: DISCONTINUED | OUTPATIENT
Start: 2024-03-16 | End: 2024-03-21

## 2024-03-16 RX ORDER — LABETALOL HYDROCHLORIDE 5 MG/ML
10 INJECTION, SOLUTION INTRAVENOUS ONCE
Status: DISCONTINUED | OUTPATIENT
Start: 2024-03-16 | End: 2024-03-21

## 2024-03-16 RX ORDER — PANTOPRAZOLE SODIUM 40 MG/1
40 TABLET, DELAYED RELEASE ORAL
Status: DISCONTINUED | OUTPATIENT
Start: 2024-03-17 | End: 2024-03-21

## 2024-03-16 RX ORDER — BENZONATATE 100 MG/1
200 CAPSULE ORAL 3 TIMES DAILY PRN
Status: DISCONTINUED | OUTPATIENT
Start: 2024-03-16 | End: 2024-03-21

## 2024-03-16 NOTE — TELEPHONE ENCOUNTER
On-call: Delores calling stating pt has parkinsons and is mentally out of it with no strength to walk.  Declined in the last 24 hr. She herself has a cold    Rec- go to ER for eval. Discussed concern for infection or other issue going on. If she or family are unable to get him to the car, to call 911 to get him to ER.   -wife verbalizes understanding and agrees to plan.

## 2024-03-16 NOTE — ED INITIAL ASSESSMENT (HPI)
Has parkinsons, was hospitalized 2-3 weeks ago, had cough in last week and sicne Thursday has been confused, pt reports urinary pain

## 2024-03-16 NOTE — ED QUICK NOTES
Orders for admission, patient is aware of plan and ready to go upstairs. Any questions, please call ED VICENTA mendosa extension [phone]    Patient Covid vaccination status: Fully vaccinated     COVID Test Ordered in ED: SARS-CoV-2/Flu A and B/RSV by PCR (GeneXpert)    COVID Suspicion at Admission: N/A    Running Infusions:    sodium chloride 125 mL/hr (03/16/24 1525)        Mental Status/LOC at time of transport: oriented x4    Other pertinent information: na  CIWA score: N/A   NIH score:  N/A

## 2024-03-16 NOTE — ED PROVIDER NOTES
Patient Seen in: [facility] Emergency Department      History     Chief Complaint   Patient presents with    Altered Mental Status     Stated Complaint: parkinsons, ams, weakness    Subjective:   78-year-old male, presents with confusion, weakness.  Patient with Parkinson's and at baseline has difficulty with ambulation.  Patient recently admitted for metabolic encephalopathy.  Family states that over the past several days been much worsening was during his last admission.  More confused, not eating as much, very unstable on his feet.  Wife cannot take care of him, family cannot take care of him because it is large.\"  States that he cannot even get up from sitting to standing or from standing to sitting without significant fall risk.  Patient is oriented to person place and situation.  Denies any pain.  Limited HPI/ROS            Objective:   Past Medical History:   Diagnosis Date    Anemia of chronic disease 07/14/2022    Iron studies, B12 level, and folic acid levels normal.    Aneurysm of popliteal artery (HCC) 06/07/2018    Arterial disease (HCC) 06/07/2018    MRI 9/15/2017: Mild chronic microvascular ischemic changes in the cerebral white matter.    Back problem     Bilateral carotid artery disease (HCC) 02/11/2018    Bilateral carotid artery stenosis 06/07/2018    Bradycardia     Class 1 obesity due to excess calories with serious comorbidity and body mass index (BMI) of 31.0 to 31.9 in adult 07/14/2022    Associated with hypertension and hyperlipidemia    Class 1 obesity due to excess calories with serious comorbidity and body mass index (BMI) of 33.0 to 33.9 in adult 06/17/2019    Associated with HTN, Carotid Artery Disease, and Hypercholesterolemia    Class 1 obesity due to excess calories without serious comorbidity with body mass index (BMI) of 33.0 to 33.9 in adult 02/11/2018    Cognitive complaints with normal neuropsychological exam 06/07/2018    Normal Neuropsych testing September 2017, results  scanned in chart    Cogwheel rigidity 07/14/2018    COVID 02/2022    cough headache. 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    Multiple thyroid nodules 05/2022    x 2 on Left    Myocarditis (HCC) 1970s    Neoplasm of uncertain behavior of skin 06/07/2018    Obesity (BMI 30-39.9) 08/29/2017    ISAIAS (obstructive sleep apnea)     ISAIAS on CPAP     Dx 2009 cpap    Osteoarthritis     Other specified glaucoma 08/29/2017    Overflow incontinence of urine 08/29/2017    Parkinsonian tremor 07/31/2018    PD (Parkinson's disease) 01/21/2019    Postsurgical hypothyroidism 07/14/2022 6/27/2022 total thyroidectomy with Dr. Perry Soriano.    Prediabetes 06/12/2021    Primary open angle glaucoma (POAG) of both eyes, mild stage 06/07/2018    Prostate CA (HCC)     RBD (REM behavioral disorder) 03/02/2022    Last Assessment & Plan:  Formatting of this note might be different from the original. Only diagnosed with RBD, but behavior does seem classic for it.  He is taking melatonin 10 mg nightly so it is possible he is treating his RBD.  We will do an in lab diagnostic PSG with extra EMG leads on arms to assess for REM sleep without atonia.    Sleep apnea     Thrombocytopenia (HCC) 07/14/2018    Torn ligament     right    Visual impairment     Vocal cord dysfunction     errosion               Past Surgical History:   Procedure Laterality Date    ANKLE FRACTURE SURGERY      HDR ELECT BRACHYTHERAPY  2006    INCISION AND DRAINAGE Left 04/05/2022    left neck     KNEE REPLACEMENT SURGERY Left 2014    OTHER      Botox injections into bladder    THYROIDECTOMY Bilateral 06/27/2022    Dr. Soriano                Social History     Socioeconomic History    Marital status:    Tobacco Use    Smoking status: Former     Types: Cigars    Smokeless tobacco: Never   Vaping Use    Vaping Use: Never used   Substance and Sexual Activity    Alcohol use: Yes     Alcohol/week: 2.0 standard drinks of alcohol     Types: 2 Standard drinks or equivalent per week     Comment: 2 servings a week    Drug use: No    Sexual activity: Not Currently   Other Topics Concern    Caffeine Concern No     Comment: 1 cup of coffee daily    Exercise Yes     Comment: some walking    Seat Belt Yes    Special Diet No    Stress Concern No    Weight Concern No     Service No    Blood Transfusions No    Occupational Exposure No    Hobby Hazards No    Sleep Concern No    Back Care No    Bike Helmet No    Self-Exams No     Social Determinants of Health     Financial Resource Strain: Low Risk  (2/23/2024)    Financial Resource Strain     Difficulty of Paying Living Expenses: Not very hard     Med Affordability: No   Food Insecurity: No Food Insecurity (2/21/2024)    Food Insecurity     Food Insecurity: Never true   Transportation Needs: No Transportation Needs (2/23/2024)    Transportation Needs     Lack of Transportation: No   Housing Stability: Low Risk  (2/21/2024)    Housing Stability     Housing Instability: No              Review of Systems   Constitutional:  Negative for fever.   Respiratory:  Negative for shortness of breath.    Cardiovascular:  Negative for chest pain.   Gastrointestinal:  Negative for abdominal pain.   Genitourinary:  Positive for dysuria.   Neurological:  Positive for weakness.       Positive for  stated complaint: parkinsons, ams, weakness  Other systems are as noted in HPI.  Constitutional and vital signs reviewed.      All other systems reviewed and negative except as noted above.    Physical Exam     ED Triage Vitals [03/16/24 1356]   /82   Pulse 59   Resp 16   Temp 97.2 °F (36.2 °C)   Temp src Temporal   SpO2 96 %   O2 Device None (Room air)       Current:BP (!) 178/98   Pulse 67   Temp 97.2 °F (36.2 °C) (Temporal)   Resp 16   Ht 172.7 cm (5' 8\")   Wt 91.6 kg   SpO2 100%   BMI 30.71 kg/m²         Physical Exam  Vitals and nursing note reviewed.   HENT:      Head: Atraumatic.   Cardiovascular:      Rate and Rhythm: Normal rate.      Pulses: Normal pulses.   Pulmonary:      Effort: Pulmonary effort is normal. No respiratory distress.      Breath sounds: Normal breath sounds.   Abdominal:      Palpations: Abdomen is soft.      Tenderness: There is no abdominal tenderness.   Musculoskeletal:      Cervical back: Neck supple.   Skin:     General: Skin is warm and dry.   Neurological:      Mental Status: He is alert.          strength is equal.  Both legs are about 3+ out of 5.  Denies any pain.  Abdomen is soft.  Lungs are clear, pulse are equal.    ED Course     Labs Reviewed   COMP METABOLIC PANEL (14) - Abnormal; Notable for the following components:       Result Value    Glucose 121 (*)     Creatinine 1.38 (*)     eGFR-Cr 52 (*)     AST 13 (*)     ALT 7 (*)     A/G Ratio 0.9 (*)     All other components within normal limits   URINALYSIS WITH CULTURE REFLEX - Abnormal; Notable for the following components:    Urine Color Colorless (*)     All other components within normal limits   TSH W REFLEX TO FREE T4 - Abnormal; Notable for the following components:    TSH 24.200 (*)     All other components within normal limits   CBC W/ DIFFERENTIAL - Abnormal; Notable for the following components:    HGB 12.7 (*)     HCT 37.7 (*)     Lymphocyte Absolute 0.99 (*)     All other components within normal  limits   AMMONIA, PLASMA - Normal   PROCALCITONIN - Normal    Narrative:     Resulted by: batch: TP,    MAGNESIUM - Normal   T4, FREE (S) - Normal   SARS-COV-2/FLU A AND B/RSV BY PCR (GENEXPERT) - Normal    Narrative:     This test is intended for the qualitative detection and differentiation of SARS-CoV-2, influenza A, influenza B, and respiratory syncytial virus (RSV) viral RNA in nasopharyngeal or nares swabs from individuals suspected of respiratory viral infection consistent with COVID-19 by their healthcare provider. Signs and symptoms of respiratory viral infection due to SARS-CoV-2, influenza, and RSV can be similar.    Test performed using the Xpert Xpress SARS-CoV-2/FLU/RSV (real time RT-PCR)  assay on the [employer] instrument, [employer], [place] [address].   This test is being used under the Food and Drug Administration's Emergency Use Authorization.    The authorized Fact Sheet for Healthcare Providers for this assay is available upon request from the laboratory.   CBC WITH DIFFERENTIAL WITH PLATELET    Narrative:     The following orders were created for panel order CBC With Differential With Platelet.  Procedure                               Abnormality         Status                     ---------                               -----------         ------                     CBC W/ DIFFERENTIAL[[id number]]          Abnormal            Final result                 Please view results for these tests on the individual orders.   RAINBOW DRAW LAVENDER   RAINBOW DRAW LIGHT GREEN   RAINBOW DRAW BLUE     EKG    Rate, intervals and axes as noted on EKG Report.  Rate: 54  Rhythm: Sinus Rhythm  Reading: EKG sinus bradycardia 54 bpm with first-degree AV block with a NM interval of 220 ms.  No ST elevations.  Lateral T wave inversions.  When compared to February 2024, no significant changes are noted    Patient placed on cardiac monitor for telemetry monitoring secondary to weakness, confusion. Interpretation at  bedside by me is sinus bradycardia.                            MDM      XR CHEST AP PORTABLE  (CPT=71045)    Result Date: 3/16/2024  CONCLUSION:  Low lung volumes with mild left basilar atelectasis.   LOCATION:  [facility]      Dictated by (CST): Stromberg, LeRoy, MD on 3/16/2024 at 4:47 PM     Finalized by (CST): Stromberg, LeRoy, MD on 3/16/2024 at 4:47 PM       CT BRAIN OR HEAD ([id number])    Result Date: 3/16/2024  CONCLUSION:   1. Negative for acute intracranial process.    LOCATION:  [facility]   Dictated by (CST): Eusebia Luna MD on 3/16/2024 at 4:28 PM     Finalized by (CST): Eusebia Luna MD on 3/16/2024 at 4:30 PM        Independent interpreted the CT of the brain without any obvious signs of acute hemorrhage    Differential diagnosis includes, but is not limited to, CVA, TIA, vertigo, dehydration, electro disturbance, infection    Family at bedside helpful to provide information on the history presenting illness    External chart review demonstrates his admission about a month ago for similar    78-year-old male with fatigue, weakness, confusion.  Cannot be cared for at home by his multiple family members.  Some mild LEVI probably from some mild dehydration.  Otherwise overall benign workup.  Did have some elevated blood pressure reading so he required some IV hydralazine to control it.  CT brain is negative.  X-ray is overall stable.  Labs otherwise stable otherwise.  Had some dysuria but urinalysis unremarkable and culture was sent.  Admitted to [place] hospitalist, inpatient social work/case management consult placed his family requesting long-term placement    Patient was screened and evaluated during this visit.  As the treating physician attending to the patient, I determined within reasonable clinical confidence and prior to discharge, that an emergency medical condition was not or was no longer present.  There was no indication for further evaluation, treatment, or admission on an emergency basis.  Comprehensive  verbal and written discharge and follow-up instructions were provided to help prevent relapse or worsening.  Patient was instructed to follow-up with their primary care provider for further evaluation and treatment, return immediately to ER for worsening, concerning, new, or changing/persisting symptoms. I discussed the case with the patient and they had no questions, complaints, or concerns.  Patient was comfortable going home.     Per the discharge paperwork, patients are encouraged to and given instructions on how to sign up for MyChart, where they have access to their records, including any/all incidental findings.     This note was prepared using Dragon Medical voice recognition dictation software. As a result errors may occur. When identified these errors have been corrected. While every attempt is made to correct errors during dictation discrepancies may still exist    Note to patient: The 21st Century Cures Act makes medical notes like these available to patients in the interest of transparency. However, this is a medical document intended as peer to peer communication. It is written in medical language and may contain abbreviations or verbiage that are unfamiliar. It may appear blunt or direct. Medical documents are intended to carry relevant information, facts as evident, and the clinical opinion of the practitioner.       Admission disposition: 3/16/2024  6:33 PM                                        Medical Decision Making      Disposition and Plan     Clinical Impression:  1. Dehydration    2. Adult failure to thrive    3. Weakness generalized    4. LEVI (acute kidney injury) (HCC)    5. Elevated blood pressure reading with diagnosis of hypertension         Disposition:  Admit  3/16/2024  6:33 pm    Follow-up:  No follow-up provider specified.        Medications Prescribed:  Current Discharge Medication List                            Hospital Problems       Present on Admission  Date Reviewed:  2/28/2024            ICD-10-CM Noted POA    * (Principal) Dehydration E86.0 2/21/2024 Unknown

## 2024-03-17 LAB
ALBUMIN SERPL-MCNC: 2.9 G/DL (ref 3.4–5)
ALBUMIN/GLOB SERPL: 0.9 {RATIO} (ref 1–2)
ALP LIVER SERPL-CCNC: 56 U/L
ALT SERPL-CCNC: <6 U/L
ANION GAP SERPL CALC-SCNC: 5 MMOL/L (ref 0–18)
AST SERPL-CCNC: 11 U/L (ref 15–37)
BASOPHILS # BLD AUTO: 0.01 X10(3) UL (ref 0–0.2)
BASOPHILS NFR BLD AUTO: 0.2 %
BILIRUB SERPL-MCNC: 0.8 MG/DL (ref 0.1–2)
BUN BLD-MCNC: 20 MG/DL (ref 9–23)
CALCIUM BLD-MCNC: 8.8 MG/DL (ref 8.5–10.1)
CHLORIDE SERPL-SCNC: 112 MMOL/L (ref 98–112)
CO2 SERPL-SCNC: 25 MMOL/L (ref 21–32)
CREAT BLD-MCNC: 1.05 MG/DL
EGFRCR SERPLBLD CKD-EPI 2021: 73 ML/MIN/1.73M2 (ref 60–?)
EOSINOPHIL # BLD AUTO: 0.11 X10(3) UL (ref 0–0.7)
EOSINOPHIL NFR BLD AUTO: 1.9 %
ERYTHROCYTE [DISTWIDTH] IN BLOOD BY AUTOMATED COUNT: 12.3 %
GLOBULIN PLAS-MCNC: 3.4 G/DL (ref 2.8–4.4)
GLUCOSE BLD-MCNC: 99 MG/DL (ref 70–99)
HCT VFR BLD AUTO: 32.5 %
HGB BLD-MCNC: 11.4 G/DL
IMM GRANULOCYTES # BLD AUTO: 0.02 X10(3) UL (ref 0–1)
IMM GRANULOCYTES NFR BLD: 0.3 %
LYMPHOCYTES # BLD AUTO: 1.22 X10(3) UL (ref 1–4)
LYMPHOCYTES NFR BLD AUTO: 20.7 %
MAGNESIUM SERPL-MCNC: 2.1 MG/DL (ref 1.6–2.6)
MCH RBC QN AUTO: 33.7 PG (ref 26–34)
MCHC RBC AUTO-ENTMCNC: 35.1 G/DL (ref 31–37)
MCV RBC AUTO: 96.2 FL
MONOCYTES # BLD AUTO: 0.75 X10(3) UL (ref 0.1–1)
MONOCYTES NFR BLD AUTO: 12.7 %
NEUTROPHILS # BLD AUTO: 3.78 X10 (3) UL (ref 1.5–7.7)
NEUTROPHILS # BLD AUTO: 3.78 X10(3) UL (ref 1.5–7.7)
NEUTROPHILS NFR BLD AUTO: 64.2 %
OSMOLALITY SERPL CALC.SUM OF ELEC: 297 MOSM/KG (ref 275–295)
PHOSPHATE SERPL-MCNC: 3.1 MG/DL (ref 2.5–4.9)
PLATELET # BLD AUTO: 154 10(3)UL (ref 150–450)
POTASSIUM SERPL-SCNC: 3.5 MMOL/L (ref 3.5–5.1)
PROT SERPL-MCNC: 6.3 G/DL (ref 6.4–8.2)
RBC # BLD AUTO: 3.38 X10(6)UL
SODIUM SERPL-SCNC: 142 MMOL/L (ref 136–145)
WBC # BLD AUTO: 5.9 X10(3) UL (ref 4–11)

## 2024-03-17 PROCEDURE — 99232 SBSQ HOSP IP/OBS MODERATE 35: CPT | Performed by: HOSPITALIST

## 2024-03-17 RX ORDER — LOSARTAN POTASSIUM 50 MG/1
50 TABLET ORAL 2 TIMES DAILY
Status: DISCONTINUED | OUTPATIENT
Start: 2024-03-17 | End: 2024-03-21

## 2024-03-17 RX ORDER — LEVOTHYROXINE SODIUM 0.12 MG/1
125 TABLET ORAL
Status: DISCONTINUED | OUTPATIENT
Start: 2024-03-18 | End: 2024-03-21

## 2024-03-17 NOTE — CM/SW NOTE
Anticipated therapy need: Gradual Rehabilitative Therapy    CL[facility] submitted, PASRR done. Referral started.     SW noted that family is requesting information for LTC planning. SW will follow up with family Monday regarding LTC planning information.     SW will continue to follow for plan of care changes and remain available for any additional DC needs or concerns.     Nancy Johnson MSW, LSW  Discharge Planner   [phone]

## 2024-03-17 NOTE — PHYSICAL THERAPY NOTE
PHYSICAL THERAPY EVALUATION - INPATIENT     Room Number: 419/419-A  Evaluation Date: 3/17/2024  Type of Evaluation: Initial  Physician Order: PT Eval and Treat    Presenting Problem: AMS  Co-Morbidities : Parkinson's Anemia, Bradycardia, hypothyroidism, GERD, HTN, hyperlipidemia  Reason for Therapy: Mobility Dysfunction and Discharge Planning    3/16 CT Brain: Negative for acute intracranial process.     Recent admission:  2/21-2/22 for diarrhea/AMS, dc with HHPT    PHYSICAL THERAPY ASSESSMENT   Patient is currently functioning below baseline with bed mobility, transfers, gait, and standing prolonged periods.  Prior to admission, patient's baseline is assisted gait with the RW per EMR.  Patient is requiring contact guard assist as a result of the following impairments: decreased functional strength, decreased endurance/aerobic capacity, impaired standing balance, impaired coordination, impaired motor planning, and decreased muscular endurance.  Physical Therapy will continue to follow for duration of hospitalization.    Patient will benefit from continued skilled PT Services to promote return to prior level of function and safety with continuous assistance and gradual rehabilitative therapy .    PLAN  PT Treatment Plan: Bed mobility;Patient education;Gait training;Range of motion;Strengthening;Transfer training  Rehab Potential : Fair  Frequency (Obs): 3-5x/week         CURRENT GOALS    Goal #1 Patient is able to demonstrate supine - sit EOB @ level: modified independent     Goal #2 Patient is able to demonstrate transfers Sit to/from Stand at assistance level: SBA     Goal #3 Patient is able to ambulate 100 feet with assist device: walker - rolling at assistance level: cga     Goal #4    Goal #5    Goal #6    Goal Comments: Goals established on 3/17/2024      PHYSICAL THERAPY MEDICAL/SOCIAL HISTORY  History related to current admission: Patient is a 78 year old male admitted on 3/16/2024 from home for AMS.   Pt diagnosed with dehydration, adult failure to thrive, generalized weakness, LEVI.      HOME SITUATION  Type of Home: House   Home Layout: One level  Stairs to Enter : 3             Lives With: Spouse  Drives: No  Patient Owned Equipment: Cane;Rolling walker       Prior Level of [place]: Per  EMR/ PT Evaluation-patient lives in one level home with 3 steps to enter and 2 rails. Lives with spouse. Sons nearby that provide meals and assistance as needed. H/O falls,  Typically uses cane. Sleeps in adjustable bed. Wife assists with upper and lower body dressing. Spouse provides supervision when pt showering. No family present during today's session, patient is unable to provide accurate PLOF information.    SUBJECTIVE  \"Yes\" when asked if he would want to work with PT to get OOB, patient was sleeping and required minimal cues to wake up, patient required multiple cues to stay awake and keep eyes open until patient started to perform bed mobility supine->sit.  Patient was stayed awake while sitting at the EOB and initially after getting in the recliner chair.  Patient stated \"I'm in [place]\" when asked if he knows where he is.       OBJECTIVE  Precautions: Bed/chair alarm  Fall Risk: High fall risk    WEIGHT BEARING RESTRICTION  Weight Bearing Restriction: None                PAIN ASSESSMENT  Ratin          COGNITION  Attention Span:  attends with cues to redirect  Orientation Level:  oriented to person  Memory:  impaired working memory and decreased recall of recent events  Following Commands:  follows one step commands with increased time and follows one step commands with repetition  Motor Planning: impaired  Safety Judgement:  decreased awareness of need for assistance and decreased awareness of need for safety  Awareness of Errors:  assistance required to identify errors made, assistance required to correct errors made, and decreased awareness of errors   Awareness of Deficits:  decreased awareness of  deficits    RANGE OF MOTION AND STRENGTH ASSESSMENT  Upper extremity ROM and strength are within functional limits     Lower extremity ROM is within functional limits     Lower extremity strength is within functional limits grossly 3+-4-/5 hips and knees      BALANCE  Static Sitting: Fair  Dynamic Sitting: Fair  Static Standing: Fair - (with RW)  Dynamic Standing: Fair - (with RW)    ADDITIONAL TESTS                                    ACTIVITY TOLERANCE                         O2 WALK       NEUROLOGICAL FINDINGS                        AM-PAC '6-Clicks' INPATIENT SHORT FORM - BASIC MOBILITY  How much difficulty does the patient currently have...  Patient Difficulty: Turning over in bed (including adjusting bedclothes, sheets and blankets)?: A Little   Patient Difficulty: Sitting down on and standing up from a chair with arms (e.g., wheelchair, bedside commode, etc.): A Little   Patient Difficulty: Moving from lying on back to sitting on the side of the bed?: A Little   How much help from another person does the patient currently need...   Help from Another: Moving to and from a bed to a chair (including a wheelchair)?: A Little   Help from Another: Need to walk in hospital room?: A Little   Help from Another: Climbing 3-5 steps with a railing?: A Lot       AM-PAC Score:  Raw Score: 17   Approx Degree of Impairment: 50.57%   Standardized Score (AM-PAC Scale): 42.13   CMS Modifier (G-Code): CK    FUNCTIONAL ABILITY STATUS  Gait Assessment   Functional Mobility/Gait Assessment  Gait Assistance: Contact guard assist  Distance (ft): 2  Assistive Device: Rolling walker  Pattern:  (Decr julianna/step length/heel=toe pattern)    Skilled Therapy Provided     Bed Mobility:  Rolling: cga with HOB elevated towards L, cues to use bed rail  Supine to sit: min assist to trunk, cues provided   Sit to supine: NT     Transfer Mobility:  Sit to stand: cga with the RW, cues given   Stand to sit: cga, cues given  Gait = cga with the  RW    Therapist's Comments: RN approved session, patient is in bed with HOB elevated sleeping needing minimal cues to wake up.  Patient performed supine->sit with difficulty observed, patient was able to scoot to EOB in sitting, patient assumed standing with the RW and ambulated to the recliner chair, agreeable to sitting in the recliner chair; patient required increase in time to perform tasks  with cues provided.    Exercise/Education Provided:  Discussed role of PT, goals for the session, POC.  Patient performed LAQ and sitting hip flexion sitting at the EOB, patient was encouraged to sit up in the chair as able and call staff when performing any transfers, standing, gait to prevent any falls; patient verbalized understanding.  RN and PCT made aware that there is no chair alarm box in the room but chair pad is under patient.      Patient End of Session: Up in chair;Needs met;Call light within reach;RN aware of session/findings;All patient questions and concerns addressed      Patient Evaluation Complexity Level:  History Moderate - 1 or 2 personal factors and/or co-morbidities   Examination of body systems Moderate - addressing a total of 3 or more elements   Clinical Presentation Moderate - Evolving   Clinical Decision Making Moderate - Evolving       PT Session Time: 35 minutes  Gait Trainin minutes  Therapeutic Activity: 10 minutes  Neuromuscular Re-education:  minutes  Therapeutic Exercise: 5 minutes

## 2024-03-17 NOTE — H&P
[facility]     Lawrence Daniels Patient Status:  Emergency    9/3/1945 MRN [id number]   Location [facility] EMERGENCY DEPARTMENT Attending Sav Roldan DO   Hosp Day # 0 PCP Jennifer Chandra DO     Chief Complaint: AMS, weakness    Subjective:    History of Present Illness:     Lawrence Daniels is a 78 year old male with past medical history anemia chronic disease, bradycardia, Parkinson's disease, hypothyroidism, GERD, hypertension, hyperlipidemia, ISAIAS who presents with increasing weakness and confusion.  At baseline, patient has difficulty with ambulation due to his Parkinson's disease.  Had a recent admission for encephalopathy thought to be due to dehydration from diarrhea at that time.  Over the past 12 days patient has continued to worsen.  Has increasing confusion and poor p.o. intake.  Unstable on his feet and unable to ambulate. States he's had a cough and productive cough for past 12 days. Also has had b/l conjunctivitis. Wife cannot take care of him at this point.  Patient unable to even stand.  Oriented x 3.      History/Other:    Past Medical History:  Past Medical History:   Diagnosis Date    Anemia of chronic disease 2022    Iron studies, B12 level, and folic acid levels normal.    Aneurysm of popliteal artery (HCC) 2018    Arterial disease (HCC) 2018    MRI 9/15/2017: Mild chronic microvascular ischemic changes in the cerebral white matter.    Back problem     Bilateral carotid artery disease (HCC) 2018    Bilateral carotid artery stenosis 2018    Bradycardia     Class 1 obesity due to excess calories with serious comorbidity and body mass index (BMI) of 31.0 to 31.9 in adult 2022    Associated with hypertension and hyperlipidemia    Class 1 obesity due to excess calories with serious comorbidity and body mass index (BMI) of 33.0 to 33.9 in adult 2019    Associated with HTN, Carotid Artery Disease, and Hypercholesterolemia     Class 1 obesity due to excess calories without serious comorbidity with body mass index (BMI) of 33.0 to 33.9 in adult 02/11/2018    Cognitive complaints with normal neuropsychological exam 06/07/2018    Normal Neuropsych testing September 2017, results scanned in chart    Cogwheel rigidity 07/14/2018    COVID 02/2022    cough headache. 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    Multiple thyroid nodules 05/2022    x 2 on Left    Myocarditis (HCC) 1970s    Neoplasm of uncertain behavior of skin 06/07/2018    Obesity (BMI 30-39.9) 08/29/2017    ISAIAS (obstructive sleep apnea)     ISAIAS on CPAP     Dx 2009 cpap    Osteoarthritis     Other specified glaucoma 08/29/2017    Overflow incontinence of urine 08/29/2017    Parkinsonian tremor 07/31/2018    PD (Parkinson's disease) 01/21/2019    Postsurgical hypothyroidism 07/14/2022 6/27/2022 total thyroidectomy with Dr. Perry Soriano.    Prediabetes 06/12/2021    Primary open angle glaucoma (POAG) of both eyes, mild stage 06/07/2018    Prostate CA (HCC)     RBD (REM behavioral disorder) 03/02/2022    Last Assessment & Plan:  Formatting of this note might be different from the original. Only diagnosed with RBD, but behavior does seem classic for it.  He is taking melatonin 10 mg nightly so it is possible he is treating his  RBD.  We will do an in lab diagnostic PSG with extra EMG leads on arms to assess for REM sleep without atonia.    Sleep apnea     Thrombocytopenia (HCC) 07/14/2018    Torn ligament     right    Visual impairment     Vocal cord dysfunction     errosion     Past Surgical History:   Past Surgical History:   Procedure Laterality Date    ANKLE FRACTURE SURGERY      HDR ELECT BRACHYTHERAPY  2006    INCISION AND DRAINAGE Left 04/05/2022    left neck     KNEE REPLACEMENT SURGERY Left 2014    OTHER      Botox injections into bladder    THYROIDECTOMY Bilateral 06/27/2022    Dr. Soriano      Family History:   Family History   Problem Relation Age of Onset    Heart Attack Father     Colon Cancer Mother     Heart Disease Paternal Grandmother     Heart Attack Paternal Grandmother     Cancer Paternal Grandfather     Heart Disease Maternal Grandmother     Heart Attack Maternal Grandfather     Heart Disease Maternal Grandfather      Social History:    reports that he has quit smoking. His smoking use included cigars. He has never used smokeless tobacco. He reports current alcohol use of about 2.0 standard drinks of alcohol per week. He reports that he does not use drugs.     Allergies:   Allergies   Allergen Reactions    Zoledronic Acid OTHER (SEE COMMENTS)     Flu like symptoms.       Medications:    Current Facility-Administered Medications on File Prior to Encounter   Medication Dose Route Frequency Provider Last Rate Last Admin    [COMPLETED] sodium chloride 0.9 % IV bolus 1,000 mL  1,000 mL Intravenous Once Andrea Charles MD   Stopped at 02/21/24 1400    [COMPLETED] sodium chloride 0.9% infusion   Intravenous Once Jeremy Cunha DO   Stopped at 02/22/24 0200    [COMPLETED] potassium chloride (K-Dur) tab 40 mEq  40 mEq Oral Once Jeremy Cunha DO   40 mEq at 02/21/24 1813     Current Outpatient Medications on File Prior to Encounter   Medication Sig Dispense Refill    simvastatin 20 MG Oral Tab Take 1 tablet (20 mg total) by  mouth nightly. 90 tablet 3    OYSCO 500+D 500-5 MG-MCG Oral Tab Take 1 tablet by mouth 3 (three) times daily. 270 tablet 1    Omeprazole 40 MG Oral Capsule Delayed Release Take 1 capsule (40 mg total) by mouth daily. 90 capsule 3    LEVOTHYROXINE 125 MCG Oral Tab TAKE 1 TABLET(125 MCG) BY MOUTH BEFORE BREAKFAST 90 tablet 3    donepezil 10 MG Oral Tab Take 1 tablet (10 mg total) by mouth daily.      latanoprost 0.005 % Ophthalmic Solution Place 1 drop into both eyes nightly.      carbidopa-levodopa  MG Oral Tab Take by mouth 3 (three) times daily. Taking 1 1/2 tablets 3 times daily      Timolol Maleate 0.5 % Ophthalmic Solution Place 1 drop into both eyes nightly.      hydrALAzine HCl 25 MG Oral Tab Take 1 tablet (25 mg total) by mouth 2 (two) times daily.      Melatonin 10 MG Oral Tab Take 10 mg by mouth nightly.      omega-3 fatty acids 1000 MG Oral Cap Take 1,000 mg by mouth daily.      docusate sodium 100 MG Oral Cap Take 1 capsule (100 mg total) by mouth nightly.      folic acid 400 MCG Oral Tab Take 1 tablet (400 mcg total) by mouth once a week.      Vitamin B-12 1000 MCG Oral Tab Take 1 tablet (1,000 mcg total) by mouth once a week.      Pyridoxine HCl (VITAMIN B-6) 100 MG Oral Tab Take 1 tablet (100 mg total) by mouth once a week.      Cholecalciferol 50 MCG (2000 UT) Oral Tab Take 1 tablet (2,000 Units total) by mouth at bedtime.      losartan 100 MG Oral Tab Take 1 tablet (100 mg total) by mouth every morning.         Review of Systems:   A comprehensive review of systems was completed.    Pertinent positives and negatives noted in the HPI.    Objective:   Physical Exam:    BP (!) 178/98   Pulse 67   Temp 97.2 °F (36.2 °C) (Temporal)   Resp 16   Ht 5' 8\" (1.727 m)   Wt 202 lb (91.6 kg)   SpO2 100%   BMI 30.71 kg/m²   General: No acute distress, Alert, b/l conjunctivitis  Respiratory: No rhonchi, no wheezes  Cardiovascular: S1, S2. Regular rate and rhythm  Abdomen: Soft, Non-tender,  non-distended, positive bowel sounds  Neuro: No new focal deficits  Extremities: No edema      Results:    Labs:      Labs Last 24 Hours:    Recent Labs   Lab 03/16/24  1517   RBC 3.89   HGB 12.7*   HCT 37.7*   MCV 96.9   MCH 32.6   MCHC 33.7   RDW 12.2   NEPRELIM 4.41   WBC 6.3   .0       Recent Labs   Lab 03/16/24  1517   *   BUN 21   CREATSERUM 1.38*   EGFRCR 52*   CA 9.5   ALB 3.5      K 3.8      CO2 29.0   ALKPHO 64   AST 13*   ALT 7*   BILT 0.5   TP 7.3       No results found for: \"PT\", \"INR\"    No results for input(s): \"TROP\", \"TROPHS\", \"CK\" in the last 168 hours.    No results for input(s): \"TROP\", \"PBNP\" in the last 168 hours.    Recent Labs   Lab 03/16/24  1517   PCT <0.05       Imaging: Imaging data reviewed in Epic.    Assessment & Plan:      # Generalized weakness, worsening  #Acute metabolic encephalopathy likely secondary to viral URI  -Likely due to dehydration/LEVI on top of poor baseline from Parkinson's  -PT/OT  -Likely needs placement  -Consult social work    #LEVI  -Secondary to poor p.o. intake  -Nutrition  -IV fluids    #b/l conjunctivitis  #suspected viral URI  -abx eye gtt  -RVP  -sputum cx  -CXR - atelectasis only       #Hypertensive urgency  -Continue home meds  -add amlodipine  -IV hydralazine prn    #Parkinson's disease    #Hypothyroidism  -Normal T4 but TSH is significantly elevated at 24  -Increase Synthroid dose    #Anemia chronic disease    #Hyperlipidemia  #Pancytopenia  #History of prostate cancer  #Glaucoma  #ISAIAS      Plan of care discussed with patient, ED physician    Denis Giraldo MD    Supplementary Documentation:     The 21st Century Cures Act makes medical notes like these available to patients in the interest of transparency. Please be advised this is a medical document. Medical documents are intended to carry relevant information, facts as evident, and the clinical opinion of the practitioner. The medical note is intended as peer to peer  communication and may appear blunt or direct. It is written in medical language and may contain abbreviations or verbiage that are unfamiliar.

## 2024-03-17 NOTE — PLAN OF CARE
Patient A/O x 2. VSS. Afebrile. Room air. No complaints of pain. Regular diet; tolerated fairly well, poor appetite. Up to stand and pivot to chair. Incontinent x2. All medications given per MAR. IVF stopped per order. Set to  KVO Safety precautions in place. Call light within reach. Chair alarm in place. Daughter updated via phone on plan of care.  Problem: MUSCULOSKELETAL - ADULT  Goal: Return mobility to safest level of function  Description: INTERVENTIONS:  - Assess patient stability and activity tolerance for standing, transferring and ambulating w/ or w/o assistive devices  - Assist with transfers and ambulation using safe patient handling equipment as needed  - Ensure adequate protection for wounds/incisions during mobilization  - Obtain PT/OT consults as needed  - Advance activity as appropriate  - Communicate ordered activity level and limitations with patient/family  3/17/2024 1311 by Madeline Edmonds, RN  Outcome: Progressing  3/17/2024 1220 by Madeline Edmonds RN  Outcome: Progressing     Problem: NEUROLOGICAL - ADULT  Goal: Achieves stable or improved neurological status  Description: INTERVENTIONS  - Assess for and report changes in neurological status  - Initiate measures to prevent increased intracranial pressure  - Maintain blood pressure and fluid volume within ordered parameters to optimize cerebral perfusion and minimize risk of hemorrhage  - Monitor temperature, glucose, and sodium. Initiate appropriate interventions as ordered  3/17/2024 1311 by Madeline Edmonds RN  Outcome: Progressing  3/17/2024 1220 by Madeline Edmonds, RN  Outcome: Progressing

## 2024-03-17 NOTE — PROGRESS NOTES
[facility]   part of [facility]     Hospitalist Progress Note     Lawrence Daniels Patient Status:  Inpatient    9/3/1945 MRN [id number]   [facility] 4NW-A Attending Hardeep Spann MD   Hosp Day # 1 PCP Jennifer Chandra DO     Chief Complaint: weakness    Subjective:     Patient has no complaints.     Objective:    Review of Systems:   ROS completed; pertinent positive and negatives stated in subjective.    Vital signs:  Temp:  [97.2 °F (36.2 °C)-98.7 °F (37.1 °C)] 98.3 °F (36.8 °C)  Pulse:  [57-81] 66  Resp:  [15-20] 18  BP: (123-223)/() 154/78  SpO2:  [95 %-100 %] 98 %    Physical Exam:    General: No acute distress  Respiratory: Clear to auscultation bilaterally  Cardiovascular: S1, S2.  Abdomen: Soft  Neuro: No new focal deficits      Diagnostic Data:    Labs:  Recent Labs   Lab 24  1517 24  0634   WBC 6.3 5.9   HGB 12.7* 11.4*   MCV 96.9 96.2   .0 154.0       Recent Labs   Lab 24  1517 24  0634   * 99   BUN 21 20   CREATSERUM 1.38* 1.05   CA 9.5 8.8   ALB 3.5 2.9*    142   K 3.8 3.5    112   CO2 29.0 25.0   ALKPHO 64 56   AST 13* 11*   ALT 7* <6*   BILT 0.5 0.8   TP 7.3 6.3*       Estimated Creatinine Clearance: 56.1 mL/min (based on SCr of 1.05 mg/dL).    No results for input(s): \"TROP\", \"TROPHS\", \"CK\" in the last 168 hours.    No results for input(s): \"PTP\", \"INR\" in the last 168 hours.    Lab Results   Component Value Date    TSH 24.200 2024    T4F 0.8 2024         Imaging: Imaging data reviewed in Epic.    Medications:    labetalol  10 mg Intravenous Once    carbidopa-levodopa  1.5 tablet Oral TID    docusate sodium  100 mg Oral Nightly    donepezil  10 mg Oral Daily    folic acid  400 mcg Oral Weekly    hydrALAZINE  25 mg Oral BID    latanoprost  1 drop Both Eyes Nightly    losartan  100 mg Oral QAM    melatonin  10 mg Oral Nightly    pantoprazole  40 mg Oral QAM AC    pyridoxine  100 mg Oral Weekly    atorvastatin  10  mg Oral Nightly    timolol  1 drop Both Eyes Nightly    amLODIPine  5 mg Oral Daily    enoxaparin  40 mg Subcutaneous Daily    tobramycin   Both Eyes QID    levothyroxine  150 mcg Oral Before breakfast       Assessment & Plan:     #Weakness  PT    #TME due to URI on top of baseline dementia  MS seems close to baseline now  Will need placement as family no longer able to care for him    #LEVI  Resolved  HL IVF    #b/l conjunctivitis  -abx eye gtt  -RVP neg  -sputum cx  -CXR - atelectasis only       #Hypertensive urgency  -Continue home meds  On norvasc, hydral, losartan     #Parkinson's disease     #Hypothyroidism  TFT abnormal likely due to non compliance  Cont. Home dose, repeat TFT as OP    #Anemia chronic disease     #Hyperlipidemia  #History of prostate cancer  #Glaucoma  #ISAIAS    #medication non compliance         Hardeep Spann MD    Supplementary Documentation:     Quality:    DVT Mechanical Prophylaxis:   SCDs,    DVT Pharmacologic Prophylaxis   Medication    enoxaparin (Lovenox) 40 MG/0.4ML SUBQ injection 40 mg                Code Status: Not on file  Perez: No urinary catheter in place  Perez Duration (in days):   Central line:    SHASHI:       Discharge is dependent on: clinical stability  At this point Mr. Daniels is expected to be discharge to: home    The 21st Century Cures Act makes medical notes like these available to patients in the interest of transparency. Please be advised this is a medical document. Medical documents are intended to carry relevant information, facts as evident, and the clinical opinion of the practitioner. The medical note is intended as peer to peer communication and may appear blunt or direct. It is written in medical language and may contain abbreviations or verbiage that are unfamiliar.             **Certification      PHYSICIAN Certification of Need for Inpatient Hospitalization - Initial Certification    Patient will require inpatient services that will reasonably be expected to  span two midnight's based on the clinical documentation in H+P.   Based on patients current state of illness, I anticipate that, after discharge, patient will require TBD.

## 2024-03-17 NOTE — PLAN OF CARE
NURSING ADMISSION NOTE    Patient admitted via cart  Oriented to room  Safety precautions initiated   Bed in low position  Call light in reach     Pt alert and oriented x2-3. VSS on RA. Afebrile. No complaints of pain. IVF per MAR. Scheduled medications given. Navigators completed. Fall and safety precautions in place. Call light within reach.     0530: Pt refused AM medications. Pt stated, \"I'm sleeping and not taking any medications.\" Refusal noted in MAR.

## 2024-03-18 LAB
ATRIAL RATE: 54 BPM
P AXIS: 38 DEGREES
P-R INTERVAL: 220 MS
Q-T INTERVAL: 454 MS
QRS DURATION: 96 MS
QTC CALCULATION (BEZET): 430 MS
R AXIS: -20 DEGREES
T AXIS: 158 DEGREES
VENTRICULAR RATE: 54 BPM

## 2024-03-18 PROCEDURE — 99232 SBSQ HOSP IP/OBS MODERATE 35: CPT | Performed by: HOSPITALIST

## 2024-03-18 RX ORDER — AMLODIPINE BESYLATE 5 MG/1
5 TABLET ORAL DAILY
Status: SHIPPED | COMMUNITY
Start: 2024-03-19

## 2024-03-18 RX ORDER — LOSARTAN POTASSIUM 50 MG/1
50 TABLET ORAL 2 TIMES DAILY
Status: SHIPPED | COMMUNITY
Start: 2024-03-18

## 2024-03-18 NOTE — TELEPHONE ENCOUNTER
Called Dr. Toby Torres office at [phone]. Unable to reach representative. Will try again later.  (PCP requesting neuro clearance.) No

## 2024-03-18 NOTE — PAYOR COMM NOTE
--------------  ADMISSION REVIEW     Payor: [employer]  Subscriber #:  [id number]  Authorization Number: [id number]    Admit date: 3/16/24  Admit time:  8:12 PM       REVIEW DOCUMENTATION:     ED Provider Notes        ED Provider Notes signed by Sav Roldan DO at 3/16/2024  7:08 PM       Author: Sav Roldan DO Service: -- Author Type: Physician    Filed: 3/16/2024  7:08 PM Date of Service: 3/16/2024  5:00 PM Status: Signed    : Sav Roldan DO (Physician)           Patient Seen in: [facility] Emergency Department      History     Chief Complaint   Patient presents with    Altered Mental Status     Stated Complaint: parkinsons, ams, weakness    Subjective:   78-year-old male, presents with confusion, weakness.  Patient with Parkinson's and at baseline has difficulty with ambulation.  Patient recently admitted for metabolic encephalopathy.  Family states that over the past several days been much worsening was during his last admission.  More confused, not eating as much, very unstable on his feet.  Wife cannot take care of him, family cannot take care of him because it is large.\"  States that he cannot even get up from sitting to standing or from standing to sitting without significant fall risk.  Patient is oriented to person place and situation.  Denies any pain.  Limited HPI/ROS            Objective:   Past Medical History:   Diagnosis Date    Anemia of chronic disease 07/14/2022    Iron studies, B12 level, and folic acid levels normal.    Aneurysm of popliteal artery (HCC) 06/07/2018    Arterial disease ([facility]) 06/07/2018    MRI 9/15/2017: Mild chronic microvascular ischemic changes in the cerebral white matter.    Back problem     Bilateral carotid artery disease (HCC) 02/11/2018    Bilateral carotid artery stenosis 06/07/2018    Bradycardia     Class 1 obesity due to excess calories with serious comorbidity and body mass index (BMI) of 31.0 to 31.9 in adult 07/14/2022     Associated with hypertension and hyperlipidemia    Class 1 obesity due to excess calories with serious comorbidity and body mass index (BMI) of 33.0 to 33.9 in adult 06/17/2019    Associated with HTN, Carotid Artery Disease, and Hypercholesterolemia    Class 1 obesity due to excess calories without serious comorbidity with body mass index (BMI) of 33.0 to 33.9 in adult 02/11/2018    Cognitive complaints with normal neuropsychological exam 06/07/2018    Normal Neuropsych testing September 2017, results scanned in chart    Cogwheel rigidity 07/14/2018    COVID 02/2022    cough headache. 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    Multiple thyroid nodules 05/2022    x 2 on Left    Myocarditis (HCC) 1970s    Neoplasm of uncertain behavior of skin 06/07/2018    Obesity (BMI 30-39.9) 08/29/2017    ISAIAS (obstructive sleep apnea)     ISAIAS on CPAP     Dx 2009 cpap    Osteoarthritis     Other specified glaucoma 08/29/2017    Overflow incontinence of urine 08/29/2017    Parkinsonian tremor 07/31/2018    PD (Parkinson's disease) 01/21/2019    Postsurgical hypothyroidism 07/14/2022 6/27/2022 total thyroidectomy with Dr. Perry Soriano.    Prediabetes 06/12/2021    Primary open angle glaucoma (POAG) of both eyes, mild stage 06/07/2018    Prostate CA (HCC)     RBD (REM  behavioral disorder) 03/02/2022    Last Assessment & Plan:  Formatting of this note might be different from the original. Only diagnosed with RBD, but behavior does seem classic for it.  He is taking melatonin 10 mg nightly so it is possible he is treating his RBD.  We will do an in lab diagnostic PSG with extra EMG leads on arms to assess for REM sleep without atonia.    Sleep apnea     Thrombocytopenia (HCC) 07/14/2018    Torn ligament     right    Visual impairment     Vocal cord dysfunction     errosion              Past Surgical History:   Procedure Laterality Date    ANKLE FRACTURE SURGERY      HDR ELECT BRACHYTHERAPY  2006    INCISION AND DRAINAGE Left 04/05/2022    left neck     KNEE REPLACEMENT SURGERY Left 2014    OTHER      Botox injections into bladder    THYROIDECTOMY Bilateral 06/27/2022    Dr. Soriano                Social History     Socioeconomic History    Marital status:    Tobacco Use    Smoking status: Former     Types: Cigars    Smokeless tobacco: Never   Vaping Use    Vaping Use: Never used   Substance and Sexual Activity    Alcohol use: Yes     Alcohol/week: 2.0 standard drinks of alcohol     Types: 2 Standard drinks or equivalent per week     Comment: 2 servings a week    Drug use: No    Sexual activity: Not Currently   Other Topics Concern    Caffeine Concern No     Comment: 1 cup of coffee daily    Exercise Yes     Comment: some walking    Seat Belt Yes    Special Diet No    Stress Concern No    Weight Concern No     Service No    Blood Transfusions No    Occupational Exposure No    Hobby Hazards No    Sleep Concern No    Back Care No    Bike Helmet No    Self-Exams No     Social Determinants of Health     Financial Resource Strain: Low Risk  (2/23/2024)    Financial Resource Strain     Difficulty of Paying Living Expenses: Not very hard     Med Affordability: No   Food Insecurity: No Food Insecurity (2/21/2024)    Food Insecurity     Food Insecurity: Never true    Transportation Needs: No Transportation Needs (2/23/2024)    Transportation Needs     Lack of Transportation: No   Housing Stability: Low Risk  (2/21/2024)    Housing Stability     Housing Instability: No              Review of Systems   Constitutional:  Negative for fever.   Respiratory:  Negative for shortness of breath.    Cardiovascular:  Negative for chest pain.   Gastrointestinal:  Negative for abdominal pain.   Genitourinary:  Positive for dysuria.   Neurological:  Positive for weakness.       Positive for stated complaint: parkinsons, ams, weakness  Other systems are as noted in HPI.  Constitutional and vital signs reviewed.      All other systems reviewed and negative except as noted above.    Physical Exam     ED Triage Vitals [03/16/24 1356]   /82   Pulse 59   Resp 16   Temp 97.2 °F (36.2 °C)   Temp src Temporal   SpO2 96 %   O2 Device None (Room air)       Current:BP (!) 178/98   Pulse 67   Temp 97.2 °F (36.2 °C) (Temporal)   Resp 16   Ht 172.7 cm (5' 8\")   Wt 91.6 kg   SpO2 100%   BMI 30.71 kg/m²         Physical Exam  Vitals and nursing note reviewed.   HENT:      Head: Atraumatic.   Cardiovascular:      Rate and Rhythm: Normal rate.      Pulses: Normal pulses.   Pulmonary:      Effort: Pulmonary effort is normal. No respiratory distress.      Breath sounds: Normal breath sounds.   Abdominal:      Palpations: Abdomen is soft.      Tenderness: There is no abdominal tenderness.   Musculoskeletal:      Cervical back: Neck supple.   Skin:     General: Skin is warm and dry.   Neurological:      Mental Status: He is alert.          strength is equal.  Both legs are about 3+ out of 5.  Denies any pain.  Abdomen is soft.  Lungs are clear, pulse are equal.    ED Course     Labs Reviewed   COMP METABOLIC PANEL (14) - Abnormal; Notable for the following components:       Result Value    Glucose 121 (*)     Creatinine 1.38 (*)     eGFR-Cr 52 (*)     AST 13 (*)     ALT 7 (*)     A/G Ratio 0.9 (*)      All other components within normal limits   URINALYSIS WITH CULTURE REFLEX - Abnormal; Notable for the following components:    Urine Color Colorless (*)     All other components within normal limits   TSH W REFLEX TO FREE T4 - Abnormal; Notable for the following components:    TSH 24.200 (*)     All other components within normal limits   CBC W/ DIFFERENTIAL - Abnormal; Notable for the following components:    HGB 12.7 (*)     HCT 37.7 (*)     Lymphocyte Absolute 0.99 (*)     All other components within normal limits   AMMONIA, PLASMA - Normal   PROCALCITONIN - Normal    Narrative:     Resulted by: batch: TP,    MAGNESIUM - Normal   T4, FREE (S) - Normal   SARS-COV-2/FLU A AND B/RSV BY PCR (GENEXPERT) - Normal    Narrative:     This test is intended for the qualitative detection and differentiation of SARS-CoV-2, influenza A, influenza B, and respiratory syncytial virus (RSV) viral RNA in nasopharyngeal or nares swabs from individuals suspected of respiratory viral infection consistent with COVID-19 by their healthcare provider. Signs and symptoms of respiratory viral infection due to SARS-CoV-2, influenza, and RSV can be similar.    Test performed using the Xpert Xpress SARS-CoV-2/FLU/RSV (real time RT-PCR)  assay on the GeneXpert instrument, [employer], [place] [address].   This test is being used under the Food and Drug Administration's Emergency Use Authorization.    The authorized Fact Sheet for Healthcare Providers for this assay is available upon request from the laboratory.   CBC WITH DIFFERENTIAL WITH PLATELET    Narrative:     The following orders were created for panel order CBC With Differential With Platelet.  Procedure                               Abnormality         Status                     ---------                               -----------         ------                     CBC W/ DIFFERENTIAL[[id number]]          Abnormal            Final result                 Please view results for these  tests on the individual orders.   RAINBOW DRAW LAVENDER   RAINBOW DRAW LIGHT GREEN   RAINBOW DRAW BLUE     EKG    Rate, intervals and axes as noted on EKG Report.  Rate: 54  Rhythm: Sinus Rhythm  Reading: EKG sinus bradycardia 54 bpm with first-degree AV block with a NM interval of 220 ms.  No ST elevations.  Lateral T wave inversions.  When compared to February 2024, no significant changes are noted    Patient placed on cardiac monitor for telemetry monitoring secondary to weakness, confusion. Interpretation at bedside by me is sinus bradycardia.                           MDM      XR CHEST AP PORTABLE  (CPT=71045)    Result Date: 3/16/2024  CONCLUSION:  Low lung volumes with mild left basilar atelectasis.   LOCATION:  [facility]      Dictated by (CST): Stromberg, LeRoy, MD on 3/16/2024 at 4:47 PM     Finalized by (CST): Stromberg, LeRoy, MD on 3/16/2024 at 4:47 PM       CT BRAIN OR HEAD (18554)    Result Date: 3/16/2024  CONCLUSION:   1. Negative for acute intracranial process.    LOCATION:  [facility]   Dictated by (CST): Eusebia Luna MD on 3/16/2024 at 4:28 PM     Finalized by (CST): Eusebia Luna MD on 3/16/2024 at 4:30 PM        Independent interpreted the CT of the brain without any obvious signs of acute hemorrhage    Differential diagnosis includes, but is not limited to, CVA, TIA, vertigo, dehydration, electro disturbance, infection    Family at bedside helpful to provide information on the history presenting illness    External chart review demonstrates his admission about a month ago for similar    78-year-old male with fatigue, weakness, confusion.  Cannot be cared for at home by his multiple family members.  Some mild LEVI probably from some mild dehydration.  Otherwise overall benign workup.  Did have some elevated blood pressure reading so he required some IV hydralazine to control it.  CT brain is negative.  X-ray is overall stable.  Labs otherwise stable otherwise.  Had some dysuria but urinalysis unremarkable  and culture was sent.  Admitted to [facility], inpatient social work/case management consult placed his family requesting long-term placement    Patient was screened and evaluated during this visit.  As the treating physician attending to the patient, I determined within reasonable clinical confidence and prior to discharge, that an emergency medical condition was not or was no longer present.  There was no indication for further evaluation, treatment, or admission on an emergency basis.  Comprehensive verbal and written discharge and follow-up instructions were provided to help prevent relapse or worsening.  Patient was instructed to follow-up with their primary care provider for further evaluation and treatment, return immediately to ER for worsening, concerning, new, or changing/persisting symptoms. I discussed the case with the patient and they had no questions, complaints, or concerns.  Patient was comfortable going home.     Per the discharge paperwork, patients are encouraged to and given instructions on how to sign [facility], where they have access to their records, including any/all incidental findings.     This note was prepared using Dragon Medical voice recognition dictation software. As a result errors may occur. When identified these errors have been corrected. While every attempt is made to correct errors during dictation discrepancies may still exist    Note to patient: The 21st Century Cures Act makes medical notes like these available to patients in the interest of transparency. However, this is a medical document intended as peer to peer communication. It is written in medical language and may contain abbreviations or verbiage that are unfamiliar. It may appear blunt or direct. Medical documents are intended to carry relevant information, facts as evident, and the clinical opinion of the practitioner.       Admission disposition: 3/16/2024  6:33 PM                                         Medical Decision Making      Disposition and Plan     Clinical Impression:  1. Dehydration    2. Adult failure to thrive    3. Weakness generalized    4. LEVI (acute kidney injury) (HCC)    5. Elevated blood pressure reading with diagnosis of hypertension         Disposition:  Admit  3/16/2024  6:33 pm    Follow-up:  No follow-up provider specified.        Medications Prescribed:  Current Discharge Medication List                            Hospital Problems       Present on Admission  Date Reviewed: 2/28/2024            ICD-10-CM Noted POA    * (Principal) Dehydration E86.0 2/21/2024 Unknown                     Signed by Sav Roldan DO on 3/16/2024  7:08 PM       History and Physical   Assessment & Plan:   # Generalized weakness, worsening  #Acute metabolic encephalopathy likely secondary to viral URI  -Likely due to dehydration/LEVI on top of poor baseline from Parkinson's  -PT/OT  -Likely needs placement  -Consult social work     #LEVI  -Secondary to poor p.o. intake  -Nutrition  -IV fluids     #b/l conjunctivitis  #suspected viral URI  -abx eye gtt  -RVP  -sputum cx  -CXR - atelectasis only         #Hypertensive urgency  -Continue home meds  -add amlodipine  -IV hydralazine prn     #Parkinson's disease     #Hypothyroidism  -Normal T4 but TSH is significantly elevated at 24  -Increase Synthroid dose     #Anemia chronic disease     #Hyperlipidemia  #Pancytopenia  #History of prostate cancer  #Glaucoma  #ISAIAS  3/17  Hospitalist Progress Note   Assessment & Plan:   #Weakness  PT     #TME due to URI on top of baseline dementia  MS seems close to baseline now  Will need placement as family no longer able to care for him     #LEVI    HL IVF     #b/l conjunctivitis  -abx eye gtt    -sputum cx  -CXR - atelectasis only       #Hypertensive urgency  -Continue home meds  On norvasc, hydral, losartan     #Parkinson's disease     #Hypothyroidism  TFT abnormal likely due to non compliance  Cont. Home dose, repeat TFT as  OP     #Anemia chronic disease     #Hyperlipidemia  #History of prostate cancer  #Glaucoma  #ISAIAS     #medication non compliance     MEDICATIONS ADMINISTERED IN LAST 1 DAY:  amLODIPine (Norvasc) tab 5 mg       Date Action Dose Route User    3/18/2024 0849 Given 5 mg Oral Madeline Edmonds RN          atorvastatin (Lipitor) tab 10 mg       Date Action Dose Route User    3/17/2024 2021 Given 10 mg Oral Mckenna León RN          carbidopa-levodopa (SINEMET)  MG per tab 1.5 tablet       Date Action Dose Route User    3/18/2024 0849 Given 1.5 tablet Oral Madeline Edmonds RN    3/17/2024 2022 Given 1.5 tablet Oral Mckenna León RN    3/17/2024 1722 Given 1.5 tablet Oral Madeline Edmonds RN          docusate sodium (Colace) cap 100 mg       Date Action Dose Route User    3/17/2024 2021 Given 100 mg Oral Mckenna León RN          donepezil (Aricept) tab 10 mg       Date Action Dose Route User    3/18/2024 0849 Given 10 mg Oral Madeline Edmonds RN          enoxaparin (Lovenox) 40 MG/0.4ML SUBQ injection 40 mg       Date Action Dose Route User    3/18/2024 0849 Given 40 mg Subcutaneous (Left Lower Abdomen) Madeline Edmonds RN          hydrALAzine (Apresoline) 20 mg/mL injection 10 mg       Date Action Dose Route User    3/18/2024 0533 Given 10 mg Intravenous Mckenna León RN          hydrALAZINE (Apresoline) tab 25 mg       Date Action Dose Route User    3/18/2024 0849 Given 25 mg Oral Madeline Edmonds RN    3/17/2024 2021 Given 25 mg Oral Mckenna León RN          latanoprost (Xalatan) 0.005 % ophthalmic solution 1 drop       Date Action Dose Route User    3/17/2024 2019 Given 1 drop Both Eyes Mckenna León RN          levothyroxine (Synthroid) tab 125 mcg       Date Action Dose Route User    3/18/2024 0517 Given 125 mcg Oral Mckenna León RN          losartan (Cozaar) tab 50 mg       Date Action Dose Route User    3/18/2024 0849 Given 50 mg Oral Madeline Edmonds RN    3/17/2024 2021 Given 50 mg Oral Mckenna León RN           melatonin cap/tab 10 mg       Date Action Dose Route User    3/17/2024 2021 Given 10 mg Oral Mckenna León RN          pantoprazole (Protonix) DR tab 40 mg       Date Action Dose Route User    3/18/2024 0517 Given 40 mg Oral Mckenna León RN          timolol (Timoptic) 0.5 % ophthalmic solution 1 drop       Date Action Dose Route User    3/17/2024 2026 Given 1 drop Both Eyes Mckenna León RN          tobramycin (Tobrex) 0.3 % ophthalmic ointment       Date Action Dose Route User    3/18/2024 0852 Given (none) Both Eyes Madeline Edmonds RN    3/17/2024 2028 Given (none) Both Eyes Mckenna León RN    3/17/2024 1722 Given (none) Both Eyes Madeline Edmonds RN    3/17/2024 1333 Given (none) Both Eyes Madeline Edmonds RN            Vitals (last day)       Date/Time Temp Pulse Resp BP SpO2 Weight O2 Device O2 Flow Rate (L/min) [facility]    03/18/24 1152 -- 75 -- 163/93 96 % -- -- --     03/18/24 1149 97.3 °F (36.3 °C) 66 18 179/80 98 % -- None (Room air) --     03/18/24 0741 97.7 °F (36.5 °C) 76 16 175/92 97 % -- None (Room air) -- LV    03/18/24 0525 -- 70 -- 171/112 97 % -- -- --     03/18/24 0524 98 °F (36.7 °C) 64 18 183/132 100 % -- None (Room air) -- SG    03/18/24 0057 98.4 °F (36.9 °C) 63 17 195/93 99 % -- None (Room air) -- RF    03/17/24 2027 98 °F (36.7 °C) 64 16 183/76 98 % -- None (Room air) -- RF    03/17/24 1625 -- 67 -- 165/89 99 % -- -- -- MK    03/17/24 1621 -- 68 -- 171/101 99 % -- -- --     03/17/24 1617 97.6 °F (36.4 °C) 67 18 173/89 100 % -- -- -- MK          CIWA Scores (since admission)       None            PLEASE FAX DAYS CERTIFIED AND NEXT REVIEW DATE [phone]

## 2024-03-18 NOTE — PROGRESS NOTES
[facility]   part of [facility]     Hospitalist Progress Note     Lawrence Daniels Patient Status:  Inpatient    9/3/1945 MRN [id number]   [facility] 4NW-A Attending Hardeep Spann MD   Hosp Day # 2 PCP Jennifer Chandra DO     Chief Complaint: weakness    Subjective:     Patient feels ok.     Objective:    Review of Systems:   ROS completed; pertinent positive and negatives stated in subjective.    Vital signs:  Temp:  [97.3 °F (36.3 °C)-98.4 °F (36.9 °C)] 97.8 °F (36.6 °C)  Pulse:  [57-76] 63  Resp:  [16-18] 18  BP: (129-195)/() 145/86  SpO2:  [94 %-100 %] 97 %    Physical Exam:    General: No acute distress  Respiratory: Clear to auscultation bilaterally  Cardiovascular: S1, S2.  Abdomen: Soft  Neuro: No new focal deficits      Diagnostic Data:    Labs:  Recent Labs   Lab 24  1517 24  0634   WBC 6.3 5.9   HGB 12.7* 11.4*   MCV 96.9 96.2   .0 154.0       Recent Labs   Lab 24  1517 24  0634   * 99   BUN 21 20   CREATSERUM 1.38* 1.05   CA 9.5 8.8   ALB 3.5 2.9*    142   K 3.8 3.5    112   CO2 29.0 25.0   ALKPHO 64 56   AST 13* 11*   ALT 7* <6*   BILT 0.5 0.8   TP 7.3 6.3*       Estimated Creatinine Clearance: 56.1 mL/min (based on SCr of 1.05 mg/dL).    No results for input(s): \"TROP\", \"TROPHS\", \"CK\" in the last 168 hours.    No results for input(s): \"PTP\", \"INR\" in the last 168 hours.             Imaging: Imaging data reviewed in Epic.    Medications:    levothyroxine  125 mcg Oral Before breakfast    losartan  50 mg Oral BID    labetalol  10 mg Intravenous Once    carbidopa-levodopa  1.5 tablet Oral TID    docusate sodium  100 mg Oral Nightly    donepezil  10 mg Oral Daily    folic acid  400 mcg Oral Weekly    hydrALAZINE  25 mg Oral BID    latanoprost  1 drop Both Eyes Nightly    melatonin  10 mg Oral Nightly    pantoprazole  40 mg Oral QAM AC    pyridoxine  100 mg Oral Weekly    atorvastatin  10 mg Oral Nightly    timolol  1 drop Both  Eyes Nightly    amLODIPine  5 mg Oral Daily    enoxaparin  40 mg Subcutaneous Daily    tobramycin   Both Eyes QID       Assessment & Plan:     #Weakness  PT    #TME due to URI on top of baseline dementia  MS seems close to baseline now  Will need placement as family no longer able to care for him    #LEVI  Resolved  HL IVF    #b/l conjunctivitis  -abx eye gtt  -RVP neg     #Hypertensive urgency  BP better  Cont. Home meds  Norvasc added  monitor     #Parkinson's disease     #Hypothyroidism  TFT abnormal likely due to non compliance  Cont. Home dose, repeat TFT as OP    #Anemia chronic disease     #Hyperlipidemia  #History of prostate cancer  #Glaucoma  #ISAIAS    #medication non compliance         Hardeep Spann MD    Supplementary Documentation:     Quality:    DVT Mechanical Prophylaxis:   SCDs,    DVT Pharmacologic Prophylaxis   Medication    enoxaparin (Lovenox) 40 MG/0.4ML SUBQ injection 40 mg                Code Status: Not on file  Perez: No urinary catheter in place  Perez Duration (in days):   Central line:    SHASHI:       Discharge is dependent on: clinical stability  At this point Mr. Daniels is expected to be discharge to: home    The 21st Century Cures Act makes medical notes like these available to patients in the interest of transparency. Please be advised this is a medical document. Medical documents are intended to carry relevant information, facts as evident, and the clinical opinion of the practitioner. The medical note is intended as peer to peer communication and may appear blunt or direct. It is written in medical language and may contain abbreviations or verbiage that are unfamiliar.             **Certification      PHYSICIAN Certification of Need for Inpatient Hospitalization - Initial Certification    Patient will require inpatient services that will reasonably be expected to span two midnight's based on the clinical documentation in H+P.   Based on patients current state of illness, I anticipate  that, after discharge, patient will require TBD.

## 2024-03-18 NOTE — CONGREGATE LIVING REVIEW
[facility] Authorization    The [facility] Review Committee has reviewed this case and the patient IS APPROVED for discharge to a facility for Short Term Skilled once the following procedure is followed:     - The physician discharge instructions (contained within the EDWINA note for SNF) must inlcude the below appropriate and approved COVID instructions to the facility    For questions regarding CLRC approval process, please contact the CM assigned to the case.  For questions regarding RN discharge workflow, please contact the unit Clinical Leader.

## 2024-03-18 NOTE — OCCUPATIONAL THERAPY NOTE
OCCUPATIONAL THERAPY EVALUATION - INPATIENT     Room Number: 419/419-A  Evaluation Date: 3/18/2024  Type of Evaluation: Initial  Presenting Problem: altered mental status, dehydration    Physician Order: IP Consult to Occupational Therapy  Reason for Therapy: ADL/IADL Dysfunction and Discharge Planning    OCCUPATIONAL THERAPY ASSESSMENT   Patient is currently functioning below baseline with  all ADL . Prior to admission, and supervision using RW for ambulation. Patient's baseline is min A LB dressing, supervision for showering.  Patient is requiring minimal assist and moderate assist as a result of the following impairments: decreased endurance, impaired standing balance, decreased insight to deficits, and decreased safety awareness. Occupational Therapy will continue to follow for duration of hospitalization.    Patient will benefit from continued skilled OT Services to promote return to prior level of function and safety with continuous assistance and gradual rehabilitative therapy       History Related to Current Admission: Patient is a 78 year old male admitted on 3/16/2024 with Presenting Problem: altered mental status, dehydration. Co-Morbidities : Parkinson's Anemia, Bradycardia, hypothyroidism, GERD, HTN, hyperlipidemia. Pt was admitted from home on 3/16 with confusion and weakness. Admitted for dehydration and LEVI.    Recent admission:  2/21 to 2/22/24 for diarrhea and altered mental status-> Home PT    WEIGHT BEARING RESTRICTION  Weight Bearing Restriction: None                Recommendations for nursing staff:   Transfers: min A  Toileting location: toilet    EVALUATION SESSION:  Patient Start of Session: seated at edge of bed with rails up, PCT talking to the pt  FUNCTIONAL TRANSFER ASSESSMENT  Sit to Stand: Edge of Bed; Chair  Edge of Bed: Minimal Assist  Chair: Minimal Assist    BED MOBILITY  Supine to Sit : Not tested      COGNITION  Orientation Level:  oriented to person, disoriented to place, and  disoriented to time  Following Commands:  follows one step commands with increased time  Initiation: hand over hand to initiate tasks  Motor Planning: impaired  Safety Judgement:  decreased awareness of need for assistance and decreased awareness of need for safety  Awareness of Errors:  assistance required to correct errors made  Awareness of Deficits:  not aware of deficits  Problem Solving:  assistance required to identify errors made, assistance required to generate solutions, and assistance required to implement solutions    Upper Extremity   ROM: within functional limits   Strength: within functional limits     EDUCATION PROVIDED  Patient: Plan of Care; Role of Occupational Therapy; DME Recommendations; Functional Transfer Techniques; Fall Prevention; Posture/Positioning  Patient's Response to Education: Requires Further Education    Equipment used: rw  Demonstrates functional use,      Therapist comments: Pt was sitting at edge of the bed with both rails up. PCT was talking with the pt.  Pt told OT, \"I am trying to check things out here.\"Pt was pointing at the hallway.  Pt was trying to scoot forward. Min cueing to stay seated.  Min A  and increased time to process placing one hand next to him and another hand on the RW.  Min A to stand.   Provided auditory cueing for steps.  Min A with RW for functional ambulation. Completed sit to stand, stand to sit transfer sequencing from the chair, min A. This transfer was completed in preparation for toilet transfer. Alarm on. Informed RN that the end (foot) part of the bed was moving slightly even though the bed break is fully locked.       Patient End of Session: Up in chair;Needs met;Call light within reach;RN aware of session/findings;All patient questions and concerns addressed;Alarm set    OCCUPATIONAL PROFILE    HOME SITUATION  Type of Home: House  Home Layout: One level  Lives With: Spouse    Toilet and Equipment: Standard height toilet  Shower/Tub and  Equipment: Shower chair  Other Equipment:  (rw)          Drives: No       Prior Level of Function: see above section    SUBJECTIVE   See above    PAIN ASSESSMENT  Ratin          OBJECTIVE  Precautions: Bed/chair alarm  Fall Risk: High fall risk      ASSESSMENTS    AM-PAC ‘6-Clicks’ Inpatient Daily Activity Short Form  -   Putting on and taking off regular lower body clothing?: A Lot  -   Bathing (including washing, rinsing, drying)?: A Lot  -   Toileting, which includes using toilet, bedpan or urinal? : A Lot  -   Putting on and taking off regular upper body clothing?: A Little  -   Taking care of personal grooming such as brushing teeth?: A Little  -   Eating meals?: A Little    AM-PAC Score:  Score: 15  Approx Degree of Impairment: 56.46%  Standardized Score (AM-PAC Scale): 34.69    ADDITIONAL TESTS     NEUROLOGICAL FINDINGS      COGNITION ASSESSMENTS       PLAN  OT Treatment Plan: Balance activities;Energy conservation/work simplification techniques;ADL training;Functional transfer training;UE strengthening/ROM;Cognitive reorientation;Patient/Family education;Patient/Family training;Equipment eval/education;Compensatory technique education  Rehab Potential : Fair  Frequency: 3x/week  Number of Visits to Meet Established Goals: 5    ADL Goals   Patient will perform grooming: with setup  Patient will perform upper body dressing:  with stand by assist  Patient will perform lower body dressing:  with min assist  Patient will perform toileting: with supervision    Functional Transfer Goals  Patient will transfer to toilet:  with supervision    UE Exercise Program Goal  Patient will be supervision with bilateral AROM HEP (home exercise program).    Patient Evaluation Complexity Level:   Occupational Profile/Medical History LOW - Brief history including review of medical or therapy records    Specific performance deficits impacting engagement in ADL/IADL LOW  1 - 3 performance deficits    Client  Assessment/Performance Deficits MODERATE - Comorbidities and min to mod modifications of tasks    Clinical Decision Making LOW - Analysis of occupational profile, problem-focused assessments, limited treatment options    Overall Complexity LOW     OT Session Time: 19 minutes  Self-Care Home Management:  minutes  Therapeutic Activity: 12 minutes

## 2024-03-18 NOTE — CM/SW NOTE
ESVIN received a call back from patient's wife Delores who reported that she would like to reserve [facility] in [place]. SW reserved [facility] and requested that they initiate insurance authorization for patient.     ESVIN will continue to follow for plan of care changes and remain available for any additional DC needs or concerns.     Nancy Johnson MSW, LSW  Discharge Planner   [phone]

## 2024-03-18 NOTE — DIETARY NOTE
[facility]   part of [facility]   CLINICAL NUTRITION    Lawrence Daniels     Admitting diagnosis:  Adult failure to thrive [R62.7]  Dehydration [E86.0]  Weakness generalized [R53.1]  LEVI (acute kidney injury) (HCC) [N17.9]  Elevated blood pressure reading with diagnosis of hypertension [I10]    Ht: 172.7 cm (5' 8\")  Wt: 90.5 kg (199 lb 8 oz).   Body mass index is 30.33 kg/m².  IBW: 70kg    Wt Readings from Last 6 Encounters:   03/18/24 90.5 kg (199 lb 8 oz)   02/28/24 92.1 kg (203 lb)   02/21/24 91.6 kg (202 lb)   01/17/24 92.6 kg (204 lb 3.2 oz)   08/11/23 90.3 kg (199 lb)   07/17/23 89.6 kg (197 lb 9.6 oz)        Labs/Meds reviewed    Diet:       Procedures    Regular/General diet Is Patient on Accuchecks? No     Percent Meals Eaten (last 3 days)       Date/Time Percent Meals Eaten (%)    03/17/24 2121 0 %    03/18/24 1336 50 %            78 year old male admitted with AMS, Dehydration  Pt with h/o Parkinson, hypothyroid.   Pt chart reviewed d/t consult for poor po intake.  Patient reports poor appetite at this time.  Nursing notes reports Percent Meals Eaten (%): 50 % intake for last meal.  Tolerating po diet without diarrhea, emesis, or constipation.   No significant weight changes noted.   RD to order EPHP bid to maximize nutrition     Patient is at low nutrition risk at this time.    Please consult if patient status changes or nutrition issues arise.    Purvi Sahu,RD,LDN  Clinical dietitian  [id number]

## 2024-03-18 NOTE — CM/SW NOTE
ESVIN called patient's wife to discuss PT recommendation for rehab placement. ESVIN emailed patient's wife Delores a copy of [facility] choice list to [email or web address].     ESVIN informed patient's wife that insurance authorization will be needed in order for patient to go to rehab. She reported that she is aware and will review choice list once she receives it.     ESVIN will continue to follow for plan of care changes and remain available for any additional DC needs or concerns.     Nancy Johnson MSW, LSW  Discharge Planner   [id number]

## 2024-03-18 NOTE — PLAN OF CARE
Patient A/O x 2-3. VSS. Afebrile. Room air. No complaints of pain. Regular diet; tolerated fairly well, poor appetite. Up to stand and pivot to chair. Occasional Incontinence x2. All medications given per MAR. IVF saline locked. Safety precautions in place. Call light within reach.   1800: patient is cleared for discharge pending authorization. See SW note.    Problem: MUSCULOSKELETAL - ADULT  Goal: Return mobility to safest level of function  Description: INTERVENTIONS:  - Assess patient stability and activity tolerance for standing, transferring and ambulating w/ or w/o assistive devices  - Assist with transfers and ambulation using safe patient handling equipment as needed  - Ensure adequate protection for wounds/incisions during mobilization  - Obtain PT/OT consults as needed  - Advance activity as appropriate  - Communicate ordered activity level and limitations with patient/family  Outcome: Progressing     Problem: NEUROLOGICAL - ADULT  Goal: Achieves stable or improved neurological status  Description: INTERVENTIONS  - Assess for and report changes in neurological status  - Initiate measures to prevent increased intracranial pressure  - Maintain blood pressure and fluid volume within ordered parameters to optimize cerebral perfusion and minimize risk of hemorrhage  - Monitor temperature, glucose, and sodium. Initiate appropriate interventions as ordered  Outcome: Progressing

## 2024-03-18 NOTE — PLAN OF CARE
Pt A&O x2-3,  VSS and afebrile  No complaints of pain.   Scheduled medications given per MAR.   Stand/Pivot w/walker x1  Fall and safety precautions in place. Call light within reach     Update: BP elevated, prn hydralazine administered        Problem: MUSCULOSKELETAL - ADULT  Goal: Return mobility to safest level of function  Description: INTERVENTIONS:  - Assess patient stability and activity tolerance for standing, transferring and ambulating w/ or w/o assistive devices  - Assist with transfers and ambulation using safe patient handling equipment as needed  - Ensure adequate protection for wounds/incisions during mobilization  - Obtain PT/OT consults as needed  - Advance activity as appropriate  - Communicate ordered activity level and limitations with patient/family  Outcome: Progressing     Problem: NEUROLOGICAL - ADULT  Goal: Achieves stable or improved neurological status  Description: INTERVENTIONS  - Assess for and report changes in neurological status  - Initiate measures to prevent increased intracranial pressure  - Maintain blood pressure and fluid volume within ordered parameters to optimize cerebral perfusion and minimize risk of hemorrhage  - Monitor temperature, glucose, and sodium. Initiate appropriate interventions as ordered  Outcome: Progressing

## 2024-03-19 LAB
ANION GAP SERPL CALC-SCNC: 4 MMOL/L (ref 0–18)
BASOPHILS # BLD AUTO: 0.01 X10(3) UL (ref 0–0.2)
BASOPHILS NFR BLD AUTO: 0.2 %
BUN BLD-MCNC: 19 MG/DL (ref 9–23)
CALCIUM BLD-MCNC: 9.2 MG/DL (ref 8.5–10.1)
CHLORIDE SERPL-SCNC: 109 MMOL/L (ref 98–112)
CO2 SERPL-SCNC: 25 MMOL/L (ref 21–32)
CREAT BLD-MCNC: 0.86 MG/DL
EGFRCR SERPLBLD CKD-EPI 2021: 89 ML/MIN/1.73M2 (ref 60–?)
EOSINOPHIL # BLD AUTO: 0.08 X10(3) UL (ref 0–0.7)
EOSINOPHIL NFR BLD AUTO: 1.4 %
ERYTHROCYTE [DISTWIDTH] IN BLOOD BY AUTOMATED COUNT: 12 %
GLUCOSE BLD-MCNC: 117 MG/DL (ref 70–99)
HCT VFR BLD AUTO: 35.6 %
HGB BLD-MCNC: 12.5 G/DL
IMM GRANULOCYTES # BLD AUTO: 0.05 X10(3) UL (ref 0–1)
IMM GRANULOCYTES NFR BLD: 0.8 %
LYMPHOCYTES # BLD AUTO: 0.98 X10(3) UL (ref 1–4)
LYMPHOCYTES NFR BLD AUTO: 16.6 %
MCH RBC QN AUTO: 33.2 PG (ref 26–34)
MCHC RBC AUTO-ENTMCNC: 35.1 G/DL (ref 31–37)
MCV RBC AUTO: 94.4 FL
MONOCYTES # BLD AUTO: 0.55 X10(3) UL (ref 0.1–1)
MONOCYTES NFR BLD AUTO: 9.3 %
NEUTROPHILS # BLD AUTO: 4.24 X10 (3) UL (ref 1.5–7.7)
NEUTROPHILS # BLD AUTO: 4.24 X10(3) UL (ref 1.5–7.7)
NEUTROPHILS NFR BLD AUTO: 71.7 %
OSMOLALITY SERPL CALC.SUM OF ELEC: 289 MOSM/KG (ref 275–295)
PHOSPHATE SERPL-MCNC: 2.8 MG/DL (ref 2.5–4.9)
PLATELET # BLD AUTO: 172 10(3)UL (ref 150–450)
POTASSIUM SERPL-SCNC: 3.4 MMOL/L (ref 3.5–5.1)
RBC # BLD AUTO: 3.77 X10(6)UL
SODIUM SERPL-SCNC: 138 MMOL/L (ref 136–145)
WBC # BLD AUTO: 5.9 X10(3) UL (ref 4–11)

## 2024-03-19 PROCEDURE — 99233 SBSQ HOSP IP/OBS HIGH 50: CPT | Performed by: HOSPITALIST

## 2024-03-19 RX ORDER — HALOPERIDOL 5 MG/ML
2 INJECTION INTRAMUSCULAR EVERY 6 HOURS PRN
Status: DISCONTINUED | OUTPATIENT
Start: 2024-03-19 | End: 2024-03-21

## 2024-03-19 RX ORDER — HALOPERIDOL 5 MG/ML
2 INJECTION INTRAMUSCULAR EVERY 6 HOURS PRN
Status: DISCONTINUED | OUTPATIENT
Start: 2024-03-19 | End: 2024-03-19

## 2024-03-19 RX ORDER — HYDRALAZINE HYDROCHLORIDE 50 MG/1
50 TABLET, FILM COATED ORAL 2 TIMES DAILY
Status: DISCONTINUED | OUTPATIENT
Start: 2024-03-19 | End: 2024-03-21

## 2024-03-19 RX ORDER — OLANZAPINE 5 MG/1
5 TABLET, ORALLY DISINTEGRATING ORAL NIGHTLY
Status: DISCONTINUED | OUTPATIENT
Start: 2024-03-19 | End: 2024-03-20

## 2024-03-19 RX ORDER — QUETIAPINE FUMARATE 25 MG/1
25 TABLET, FILM COATED ORAL 3 TIMES DAILY PRN
Status: DISCONTINUED | OUTPATIENT
Start: 2024-03-19 | End: 2024-03-21

## 2024-03-19 NOTE — PROGRESS NOTES
[facility]   part of [facility]     Hospitalist Progress Note     Lawrence Daniels Patient Status:  Inpatient    9/3/1945 MRN [id number]   [facility] 4NW-A Attending Hardeep Spann MD   Hosp Day # 3 PCP Jennifer Chandra DO     Chief Complaint: weakness    Subjective:     Patient has been agitated. Had to be placed in restraints.     Objective:    Review of Systems:   ROS completed; pertinent positive and negatives stated in subjective.    Vital signs:  Temp:  [97.3 °F (36.3 °C)-98.4 °F (36.9 °C)] 98.1 °F (36.7 °C)  Pulse:  [60-75] 68  Resp:  [16-18] 16  BP: (145-179)/(78-93) 165/82  SpO2:  [92 %-98 %] 92 %    Physical Exam:    General: mild to moderate acute distress, confused  Respiratory: Clear to auscultation bilaterally  Cardiovascular: S1, S2.  Abdomen: Soft  Neuro: No new focal deficits      Diagnostic Data:    Labs:  Recent Labs   Lab 24  1517 24  0634   WBC 6.3 5.9   HGB 12.7* 11.4*   MCV 96.9 96.2   .0 154.0       Recent Labs   Lab 24  1517 24  0634   * 99   BUN 21 20   CREATSERUM 1.38* 1.05   CA 9.5 8.8   ALB 3.5 2.9*    142   K 3.8 3.5    112   CO2 29.0 25.0   ALKPHO 64 56   AST 13* 11*   ALT 7* <6*   BILT 0.5 0.8   TP 7.3 6.3*       Estimated Creatinine Clearance: 56.1 mL/min (based on SCr of 1.05 mg/dL).    No results for input(s): \"TROP\", \"TROPHS\", \"CK\" in the last 168 hours.    No results for input(s): \"PTP\", \"INR\" in the last 168 hours.             Imaging: Imaging data reviewed in Epic.    Medications:    levothyroxine  125 mcg Oral Before breakfast    losartan  50 mg Oral BID    labetalol  10 mg Intravenous Once    carbidopa-levodopa  1.5 tablet Oral TID    docusate sodium  100 mg Oral Nightly    donepezil  10 mg Oral Daily    folic acid  400 mcg Oral Weekly    hydrALAZINE  25 mg Oral BID    latanoprost  1 drop Both Eyes Nightly    melatonin  10 mg Oral Nightly    pantoprazole  40 mg Oral QAM AC    pyridoxine  100 mg Oral Weekly     atorvastatin  10 mg Oral Nightly    timolol  1 drop Both Eyes Nightly    amLODIPine  5 mg Oral Daily    enoxaparin  40 mg Subcutaneous Daily    tobramycin   Both Eyes QID       Assessment & Plan:     #Weakness  PT    #TME due to URI on top of baseline dementia  MS seems close to baseline now  Will need placement as family no longer able to care for him    #LEVI  Resolved  HL IVF    #b/l conjunctivitis  -abx eye gtt  -RVP neg     #Hypertensive urgency  BP better  Cont. Home meds  Norvasc added  Inc hydral a bit  monitor     #Parkinson's disease     #Hypothyroidism  TFT abnormal likely due to non compliance  Cont. Home dose, repeat TFT as OP    #Anemia chronic disease     #Hyperlipidemia  #History of prostate cancer  #Glaucoma  #ISAIAS    #medication non compliance       Dispo:  more agitated.  In restraints.  Add zyprexa at night.  DC restraints.  Will repeat labs        Hardeep Spann MD    Supplementary Documentation:     Quality:    DVT Mechanical Prophylaxis:   SCDs,    DVT Pharmacologic Prophylaxis   Medication    enoxaparin (Lovenox) 40 MG/0.4ML SUBQ injection 40 mg                Code Status: Not on file  Perez: No urinary catheter in place  Perez Duration (in days):   Central line:    SHASHI: 3/19/2024      Discharge is dependent on: clinical stability  At this point Mr. Daniels is expected to be discharge to: home    The 21st Century Cures Act makes medical notes like these available to patients in the interest of transparency. Please be advised this is a medical document. Medical documents are intended to carry relevant information, facts as evident, and the clinical opinion of the practitioner. The medical note is intended as peer to peer communication and may appear blunt or direct. It is written in medical language and may contain abbreviations or verbiage that are unfamiliar.

## 2024-03-19 NOTE — PAYOR COMM NOTE
--------------  CONTINUED STAY REVIEW    Payor: [employer]  Subscriber #:  [id number]  Authorization Number: [id number]    Admit date: 3/16/24  Admit time:  8:12 PM    Admitting Physician: Denis Giraldo MD  Attending Physician:  Hardeep Spann MD  Primary Care Physician: Jennifer Chandra,     REVIEW DOCUMENTATION:  3/19  Hospitalist Progress Note       Assessment & Plan:   #Weakness  PT     #TME due to URI on top of baseline dementia  MS seems close to baseline now  Will need placement as family no longer able to care for him     #LEVI    HL IVF     #b/l conjunctivitis  -abx eye gtt       #Hypertensive urgency  BP better  Cont. Home meds  Norvasc added  Inc hydral a bit  monitor     #Parkinson's disease     #Hypothyroidism  TFT abnormal likely due to non compliance  Cont. Home dose, repeat TFT as OP     #Anemia chronic disease     #Hyperlipidemia  #History of prostate cancer  #Glaucoma  #ISAIAS     #medication non compliance        Dispo:  more agitated.  In restraints.  Add zyprexa at night.  DC restraints.  Will repeat labs     MEDICATIONS ADMINISTERED IN LAST 1 DAY:  amLODIPine (Norvasc) tab 5 mg       Date Action Dose Route User    3/19/2024 0957 Given 5 mg Oral Julienne White RN          atorvastatin (Lipitor) tab 10 mg       Date Action Dose Route User    3/18/2024 2045 Given 10 mg Oral Mckenna León RN          carbidopa-levodopa (SINEMET)  MG per tab 1.5 tablet       Date Action Dose Route User    3/19/2024 0956 Given 1.5 tablet Oral Julienne White RN    3/18/2024 2044 Given 1.5 tablet Oral Mckenna León RN    3/18/2024 1717 Given 1.5 tablet Oral Madeline Edmonds RN          docusate sodium (Colace) cap 100 mg       Date Action Dose Route User    3/18/2024 2044 Given 100 mg Oral Mckenna León RN          donepezil (Aricept) tab 10 mg       Date Action Dose Route User    3/19/2024 0957 Given 10 mg Oral Julienne White RN          enoxaparin (Lovenox) 40 MG/0.4ML SUBQ  injection 40 mg       Date Action Dose Route User    3/19/2024 0957 Given 40 mg Subcutaneous (Right Lower Abdomen) SiaJulienne loyola RN          haloperidol lactate (Haldol) 5 MG/ML injection 2 mg       Date Action Dose Route User    3/19/2024 0029 Given 2 mg Intravenous Mckenna León RN          hydrALAZINE (Apresoline) tab 25 mg       Date Action Dose Route User    3/19/2024 0957 Given 25 mg Oral Julienne White RN    3/18/2024 2045 Given 25 mg Oral Mckenna León RN          latanoprost (Xalatan) 0.005 % ophthalmic solution 1 drop       Date Action Dose Route User    3/18/2024 2046 Given 1 drop Both Eyes Mckenna León RN          levothyroxine (Synthroid) tab 125 mcg       Date Action Dose Route User    3/19/2024 0550 Given 125 mcg Oral Mckenna León RN          losartan (Cozaar) tab 50 mg       Date Action Dose Route User    3/19/2024 0957 Given 50 mg Oral Julienne White RN    3/18/2024 2045 Given 50 mg Oral Mckenna León RN          melatonin cap/tab 10 mg       Date Action Dose Route User    3/18/2024 2044 Given 10 mg Oral Mckenna León RN          pantoprazole (Protonix) DR tab 40 mg       Date Action Dose Route User    3/19/2024 0550 Given 40 mg Oral Mckenna León RN          timolol (Timoptic) 0.5 % ophthalmic solution 1 drop       Date Action Dose Route User    3/18/2024 2043 Given 1 drop Both Eyes Mckenna León RN          tobramycin (Tobrex) 0.3 % ophthalmic ointment       Date Action Dose Route User    3/19/2024 0957 Given (none) Both Eyes Julienne White RN    3/18/2024 2048 Given (none) Both Eyes Mckenna León RN    3/18/2024 1717 Given (none) Both Eyes Madeline Edmonds RN    3/18/2024 1330 Given (none) Both Eyes Madeline Edmonds RN            Vitals (last day)       Date/Time Temp Pulse Resp BP SpO2 Weight O2 Device O2 Flow Rate (L/min) [facility]    03/19/24 0335 98.1 °F (36.7 °C) 68 16 165/82 92 % -- None (Room air) -- CP    03/18/24 2318 98 °F (36.7 °C) 60 16 162/78  98 % -- None (Room air) -- CP    03/18/24 1152 -- 75 -- 163/93 96 % -- -- -- JS    03/18/24 1149 97.3 °F (36.3 °C) 66 18 179/80 98 % -- None (Room air) -- LV    03/18/24 0741 97.7 °F (36.5 °C) 76 16 175/92 97 % -- None (Room air) -- LV    03/18/24 0525 -- 70 -- 171/112 97 % -- -- -- SG    03/18/24 0524 98 °F (36.7 °C) 64 18 183/132 100 % -- None (Room air) -- SG    03/18/24 0057 98.4 °F (36.9 °C) 63 17 195/93 99 % -- None (Room air) -- RF          CIWA Scores (since admission)       None              PLEASE FAX DAYS CERTIFIED AND NEXT REVIEW DATE [phone]

## 2024-03-19 NOTE — PLAN OF CARE
Resting in bed at this time. Awake & alert,sleeping intermittently but easily arousable & verbally responsive. [place] vest has been off & patient has been quiet & calm. Ate breakfast w/ fair appetite.Took all due morning meds. Redirected as needed.Having hand tremors due to parkinsons. Fall precautions observed.Frequent rounding rendered.

## 2024-03-19 NOTE — PHYSICAL THERAPY NOTE
PHYSICAL THERAPY TREATMENT NOTE - INPATIENT    Room Number: 419/419-A     Session: 1          Presenting Problem: AMS  Co-Morbidities : Parkinson's Anemia, Bradycardia, hypothyroidism, GERD, HTN, hyperlipidemia    ASSESSMENT   Patient demonstrates limited progress this session, goals  remain in progress.    Patient continues to function below baseline with bed mobility, transfers, and gait.  Contributing factors to remaining limitations include decreased functional strength, decreased endurance/aerobic capacity, decreased muscular endurance, and decreased compliance/participation.  Next session anticipate patient to progress bed mobility, transfers, and gait.  Physical Therapy will continue to follow patient for duration of hospitalization.    Patient continues to benefit from continued skilled PT services: to promote return to prior level of function and safety with continuous assistance and gradual rehabilitative therapy .    PLAN  PT Treatment Plan: Bed mobility;Patient education;Gait training;Range of motion;Strengthening;Transfer training  Rehab Potential : Fair  Frequency (Obs): 3-5x/week    CURRENT GOALS       Goal #1 Patient is able to demonstrate supine - sit EOB @ level: modified independent      Goal #2 Patient is able to demonstrate transfers Sit to/from Stand at assistance level: SBA      Goal #3 Patient is able to ambulate 100 feet with assist device: walker - rolling at assistance level: cga      Goal #4     Goal #5     Goal #6     Goal Comments: Goals established on 3/17/2024  3/19/2024 all goals ongoing     SUBJECTIVE  \"I need to go close the door\"     OBJECTIVE  Precautions: Bed/chair alarm    WEIGHT BEARING RESTRICTION  Weight Bearing Restriction: None                PAIN ASSESSMENT   Ratin          BALANCE                                                                                                                       Static Sitting: Not tested  Dynamic Sitting: Not tested            Static Standing: Not tested  Dynamic Standing: Fair - (with RW)    ACTIVITY TOLERANCE                         O2 WALK         AM-PAC '6-Clicks' INPATIENT SHORT FORM - BASIC MOBILITY  How much difficulty does the patient currently have...  Patient Difficulty: Turning over in bed (including adjusting bedclothes, sheets and blankets)?: A Lot   Patient Difficulty: Sitting down on and standing up from a chair with arms (e.g., wheelchair, bedside commode, etc.): Unable   Patient Difficulty: Moving from lying on back to sitting on the side of the bed?: A Lot   How much help from another person does the patient currently need...   Help from Another: Moving to and from a bed to a chair (including a wheelchair)?: Total   Help from Another: Need to walk in hospital room?: Total   Help from Another: Climbing 3-5 steps with a railing?: Total       AM-PAC Score:  Raw Score: 8   Approx Degree of Impairment: 86.62%   Standardized Score (AM-PAC Scale): 28.58   CMS Modifier (G-Code): CM    FUNCTIONAL ABILITY STATUS  Gait Assessment   Functional Mobility/Gait Assessment  Gait Assistance: Not tested  Distance (ft): 0  Assistive Device: Rolling walker  Pattern:  (Decr julianna/step length/heel=toe pattern)    Skilled Therapy Provided  RN consulted prior to session  Pt presents in semi sup  Pt follows commands intermittently c verbal, tactile and AAROM for BUE BLE   However, pt with eyes closed throughout and oriented to self only  Pt cued multiple times for eyes open, is unable to tell time of day and returns to sleep between cues     Bed Mobility:  Rolling: nt    Supine<>Sit: max A , pt unable to achieve 2/2 somnolence    Sit<>Supine: nt      Transfer Mobility:  Sit<>Stand: nt    Stand<>Sit: nt    Gait: nt     Therapist's Comments: per chart review, pt received haldol last night 2/2 attempting to get OOB  Writer opened shades and turned on lights to assist with alertness, pt falling asleep between bouts of activity         THERAPEUTIC  EXERCISES  Lower Extremity Ankle pumps  Heel slides     Upper Extremity Elbow flex/ext,  - open/close, Shoulder flex/ext, and AAROM      Position Supine     Repetitions   5   Sets   1     Patient End of Session: In bed;Needs met;Call light within reach;RN aware of session/findings;All patient questions and concerns addressed;Alarm set;Restraints (posey vest)    PT Session Time: 23 minutes  Gait Training:  minutes  Therapeutic Activity: 23 minutes  Therapeutic Exercise:  minutes   Neuromuscular Re-education:  minutes

## 2024-03-19 NOTE — PLAN OF CARE
Anticoagulation Summary  As of 12/14/2018    INR goal:   2.0-3.0   TTR:   68.8 % (8.8 mo)   Today's INR:   No new INR was available at the time of this encounter.   Warfarin maintenance plan:   5 mg (5 mg x 1) every day   Weekly warfarin total:   35 mg   Plan last modified:   Zay LouieD (7/10/2018)   Next INR check:   12/17/2018   Target end date:   Indefinite    Indications    Presence of IVC filter [Z95.828]  Transient ischemic attack [G45.9] [G45.9]  Deep vein thrombosis (DVT) of both lower extremities (HCC) (Resolved) [I82.403]  DVT (deep venous thrombosis) (HCC) (Resolved) [I82.409]  Multiple pulmonary emboli (HCC) (Resolved) [I26.99]  Chronic anticoagulation [Z79.01]             Anticoagulation Episode Summary     INR check location:       Preferred lab:       Send INR reminders to:       Comments:         Anticoagulation Care Providers     Provider Role Specialty Phone number    [facility]   [phone]    [facility] M.D.  Family Medicine [phone]        Anticoagulation Patient Findings    Pt to begin reduced regimen while on Bactrim.  Follow up INR in 3 days.     John Clement, PharmD    In bed resting,quiet & calm.No untoward behaviors. Been awake all afternoon. Kept monitored. Frequent rounding rendered. Assisted during meals.Encouraged p,o intake.

## 2024-03-19 NOTE — CM/SW NOTE
Attempted to contact mom regarding importance of appointments again    No answer Received call from patient's wife.  She expressed concern that patient is now require restraints for safety.  She has been sick with an URI and can't come to visit and was crying on the phone and wanting update on the status of rehab discharge.    Explained that insurance auth is pending at [facility] and that pt will be able to go to rehab once he is free of restraints for 24 hours.  Explained that staff will continue to work with him with regards to safety with hopes of removing restraints soon. Wife expressed gratitude for the update.     / to remain available for support and/or discharge planning.     Isabela Ram MBA MSN, RN CTL/  [id number]

## 2024-03-19 NOTE — PLAN OF CARE
Pt A&O x2, restless and confused  Pt attempted to get out of bed multiple times danny after redirecting pt. Recvd T.O for PRN Haldol Q6hrs and posey vest/soft restraints   No complaints of pain.   Scheduled medications given per MAR.   PRN Haldol administered @ 0030  Fall and safety precautions in place. Call light within reach      Problem: NEUROLOGICAL - ADULT  Goal: Achieves stable or improved neurological status  Description: INTERVENTIONS  - Assess for and report changes in neurological status  - Initiate measures to prevent increased intracranial pressure  - Maintain blood pressure and fluid volume within ordered parameters to optimize cerebral perfusion and minimize risk of hemorrhage  - Monitor temperature, glucose, and sodium. Initiate appropriate interventions as ordered  Outcome: Progressing     Problem: Safety Risk - Non-Violent Restraints  Goal: Patient will remain free from self-harm  Description: INTERVENTIONS:  - Apply the least restrictive restraint to prevent harm  - Notify patient and family of reasons restraints applied  - Assess for any contributing factors to confusion (electrolyte disturbances, delirium, medications)  - Discontinue any unnecessary medical devices as soon as possible  - Assess the patient's physical comfort, circulation, skin condition, hydration, nutrition and elimination needs   - Reorient and redirection as needed  - Assess for the need to continue restraints  Outcome: Progressing

## 2024-03-19 NOTE — SPIRITUAL CARE NOTE
Spiritual Care Visit Note    Patient Name: Lawrence Daniels Date of Spiritual Care Visit: 24   : 9/3/1945 Primary Dx: Dehydration       Referred By: Referral From: Other (Comment)    Spiritual Care Taxonomy:    Intended Effects: Demonstrate caring and concern    Methods: Offer support    Interventions: Silent prayer;Acknowledge current situation    Visit Type/Summary:     - Spiritual Care: Consulted with RN prior to visit.  remains available as needed for follow up.    Spiritual Care support can be requested via an Epic consult. For urgent/immediate needs, please contact the On Call  at: [facility]: ext [phone]

## 2024-03-19 NOTE — PAYOR COMM NOTE
--------------  CONTINUED STAY REVIEW    Payor: [employer]  Subscriber #:  [id number]  Authorization Number: [id number]    Admit date: 3/16/24  Admit time:  8:12 PM    Admitting Physician: Denis Giraldo MD  Attending Physician:  Hardeep Spann MD  Primary Care Physician: Jennifer Chandra DO    REVIEW DOCUMENTATION:  3/18   Hospitalist Progress Note   Assessment & Plan:   #Weakness  PT     #TME due to URI on top of baseline dementia  MS seems close to baseline now  Will need placement as family no longer able to care for him     #LEVI    HL IVF     #b/l conjunctivitis  -abx eye gtt       #Hypertensive urgency  BP better  Cont. Home meds  Norvasc added  monitor     #Parkinson's disease     #Hypothyroidism  TFT abnormal likely due to non compliance  Cont. Home dose, repeat TFT as OP     #Anemia chronic disease     #Hyperlipidemia  #History of prostate cancer  #Glaucoma  #ISAIAS     #medication non compliance       MEDICATIONS ADMINISTERED IN LAST 1 DAY:  amLODIPine (Norvasc) tab 5 mg       Date Action Dose Route User    3/18/2024 0849 Given 5 mg Oral Madeline Edmonds RN          atorvastatin (Lipitor) tab 10 mg       Date Action Dose Route User    3/18/2024 2045 Given 10 mg Oral Mckenna León RN          carbidopa-levodopa (SINEMET)  MG per tab 1.5 tablet       Date Action Dose Route User    3/18/2024 2044 Given 1.5 tablet Oral Mckenna León RN    3/18/2024 1717 Given 1.5 tablet Oral Madeline Edmonds RN    3/18/2024 0849 Given 1.5 tablet Oral Madeline Edmonds RN          docusate sodium (Colace) cap 100 mg       Date Action Dose Route User    3/18/2024 2044 Given 100 mg Oral Mckenna León RN          donepezil (Aricept) tab 10 mg       Date Action Dose Route User    3/18/2024 0849 Given 10 mg Oral Madeline Edmonds RN          enoxaparin (Lovenox) 40 MG/0.4ML SUBQ injection 40 mg       Date Action Dose Route User    3/18/2024 0849 Given 40 mg Subcutaneous (Left Lower Abdomen) Madeline Edmonds RN           haloperidol lactate (Haldol) 5 MG/ML injection 2 mg       Date Action Dose Route User    3/19/2024 0029 Given 2 mg Intravenous Mckenna León RN          hydrALAZINE (Apresoline) tab 25 mg       Date Action Dose Route User    3/18/2024 2045 Given 25 mg Oral Mckenna León RN    3/18/2024 0849 Given 25 mg Oral Madeline Edmonds RN          latanoprost (Xalatan) 0.005 % ophthalmic solution 1 drop       Date Action Dose Route User    3/18/2024 2046 Given 1 drop Both Eyes Mckenna León RN          levothyroxine (Synthroid) tab 125 mcg       Date Action Dose Route User    3/19/2024 0550 Given 125 mcg Oral Mckenna León RN          losartan (Cozaar) tab 50 mg       Date Action Dose Route User    3/18/2024 2045 Given 50 mg Oral Mckenna León RN    3/18/2024 0849 Given 50 mg Oral Madeline Edmonds RN          melatonin cap/tab 10 mg       Date Action Dose Route User    3/18/2024 2044 Given 10 mg Oral Mckenna León RN          pantoprazole (Protonix) DR tab 40 mg       Date Action Dose Route User    3/19/2024 0550 Given 40 mg Oral Mckenna León RN          timolol (Timoptic) 0.5 % ophthalmic solution 1 drop       Date Action Dose Route User    3/18/2024 2043 Given 1 drop Both Eyes Mckenna León RN          tobramycin (Tobrex) 0.3 % ophthalmic ointment       Date Action Dose Route User    3/18/2024 2048 Given (none) Both Eyes Mckenna León RN    3/18/2024 1717 Given (none) Both Eyes Madeline Edmonds RN    3/18/2024 1330 Given (none) Both Eyes Madeline Edmonds RN    3/18/2024 0852 Given (none) Both Eyes Madeline Edmonds RN            Vitals (last day)       Date/Time Temp Pulse Resp BP SpO2 Weight O2 Device O2 Flow Rate (L/min) Who    03/19/24 0335 98.1 °F (36.7 °C) 68 16 165/82 92 % -- None (Room air) --     03/18/24 2318 98 °F (36.7 °C) 60 16 162/78 98 % -- None (Room air) -- CP    03/18/24 1152 -- 75 -- 163/93 96 % -- -- -- JS    03/18/24 1149 97.3 °F (36.3 °C) 66 18 179/80 98 % -- None (Room air) -- LV     03/18/24 0741 97.7 °F (36.5 °C) 76 16 175/92 97 % -- None (Room air) -- LV    03/18/24 0525 -- 70 -- 171/112 97 % -- -- -- SG    03/18/24 0524 98 °F (36.7 °C) 64 18 183/132 100 % -- None (Room air) -- SG    03/18/24 0057 98.4 °F (36.9 °C) 63 17 195/93 99 % -- None (Room air) -- RF          CIWA Scores (since admission)       None              PLEASE FAX DAYS CERTIFIED AND NEXT REVIEW DATE [phone]

## 2024-03-20 PROBLEM — G20.A1: Status: ACTIVE | Noted: 2024-02-03

## 2024-03-20 PROBLEM — G93.40 ENCEPHALOPATHY: Status: ACTIVE | Noted: 2024-03-20

## 2024-03-20 PROBLEM — F02.811: Status: ACTIVE | Noted: 2024-02-03

## 2024-03-20 LAB
ARTERIAL PATENCY WRIST A: POSITIVE
ATRIAL RATE: 73 BPM
BASE EXCESS BLDA CALC-SCNC: 2.4 MMOL/L (ref ?–2)
BODY TEMPERATURE: 98.6 F
CA-I BLD-SCNC: 1.22 MMOL/L (ref 0.95–1.32)
COHGB MFR BLD: 1.9 % SAT (ref 0–3)
HCO3 BLDA-SCNC: 26.8 MEQ/L (ref 21–27)
HGB BLD-MCNC: 11.5 G/DL
LACTATE BLD-SCNC: 0.7 MMOL/L (ref 0.5–2)
METHGB MFR BLD: 0.3 % SAT (ref 0.4–1.5)
OXYHGB MFR BLDA: 95.8 % (ref 92–100)
P AXIS: 98 DEGREES
P-R INTERVAL: 240 MS
PCO2 BLDA: 38 MM HG (ref 35–45)
PH BLDA: 7.45 [PH] (ref 7.35–7.45)
PO2 BLDA: 78 MM HG (ref 80–100)
POTASSIUM BLD-SCNC: 3.8 MMOL/L (ref 3.6–5.1)
Q-T INTERVAL: 442 MS
QRS DURATION: 80 MS
QTC CALCULATION (BEZET): 486 MS
R AXIS: -21 DEGREES
SODIUM BLD-SCNC: 137 MMOL/L (ref 135–145)
T AXIS: -85 DEGREES
VENTRICULAR RATE: 73 BPM

## 2024-03-20 PROCEDURE — 99232 SBSQ HOSP IP/OBS MODERATE 35: CPT | Performed by: INTERNAL MEDICINE

## 2024-03-20 RX ORDER — POTASSIUM CHLORIDE 20 MEQ/1
40 TABLET, EXTENDED RELEASE ORAL ONCE
Status: COMPLETED | OUTPATIENT
Start: 2024-03-20 | End: 2024-03-20

## 2024-03-20 RX ORDER — OLANZAPINE 5 MG/1
2.5 TABLET ORAL NIGHTLY
Status: DISCONTINUED | OUTPATIENT
Start: 2024-03-20 | End: 2024-03-21

## 2024-03-20 NOTE — PLAN OF CARE
Somnolent,hard to arouse patient.Attempts to give verbal responses w/ stimulation but words incomprehensible. Dr Lieberman at bedside,assessed patient & said this is normal for patients who had taken zsyprexa. Patient took 5mg dose of zyprexa last night. MD told RN to monitor patient by 1-2pm patient should start waking up if not will inform MD for management.MD also mentioned he will reduce dose of zyprexa to 2mg . Social informed RN insurance auth still pending for placement. Will continue to monitor. Holding p.o meds at this time. Frequent rounds rendered.

## 2024-03-20 NOTE — CM/SW NOTE
ESVIN received a call from Kenna at [facility] stating that the DON at [facility] is not comfortable accepting patient due to his confusion. Kenna stated that she does have a bed at a Memory Care unit at [facility] and rehab and would like to offer that as a placement option instead.     ESVIN called patient's with Delores to discuss and she is agreeable to the switch.     ESVIN called Kenna back and notified her. She is going to update the insurance company and switch the auth location.     ESVIN will continue to follow for plan of care changes and remain available for any additional DC needs or concerns.     Nancy Johnson MSW, LSW  Discharge Planner   [id number]

## 2024-03-20 NOTE — PLAN OF CARE
Patient alert and oriented x1-2, drowsy. More calm at this time. Afebrile. Room air. No SOB. No complaints of pain. No nausea and vomiting. Fall precautions in place. Call light in reach. Needs attended. Will continue Plan of care.    Problem: NEUROLOGICAL - ADULT  Goal: Achieves stable or improved neurological status  Description: INTERVENTIONS  - Assess for and report changes in neurological status  - Initiate measures to prevent increased intracranial pressure  - Maintain blood pressure and fluid volume within ordered parameters to optimize cerebral perfusion and minimize risk of hemorrhage  - Monitor temperature, glucose, and sodium. Initiate appropriate interventions as ordered  Outcome: Progressing

## 2024-03-20 NOTE — PLAN OF CARE
Patient drowsy but starting to wake up & talking. Morning meds rescheduled & given at this time. Able to drink ensure & 1 cup applesauce given.Eye ointment applied for conjuctivitis,eye care rendered.

## 2024-03-20 NOTE — PROGRESS NOTES
[facility]   part of [facility]     Hospitalist Progress Note     Lawrence Daniels Patient Status:  Inpatient    9/3/1945 MRN [id number]   Location [facility] 4NW-A Attending Hardeep Spann MD   Hosp Day # 4 PCP Jennifer Chandra DO     Chief Complaint: weakness    Subjective:     Off of restraints but drowsy and sleeping this morning    Unable to obtain ROS    Objective:    Review of Systems:   ROS completed; pertinent positive and negatives stated in subjective.    Vital signs:  Temp:  [97.7 °F (36.5 °C)-99 °F (37.2 °C)] 98.5 °F (36.9 °C)  Pulse:  [57-81] 57  Resp:  [16-20] 20  BP: (135-165)/(70-98) 135/70  SpO2:  [93 %-98 %] 93 %    Physical Exam:    General: drowsy, sleeping   Respiratory: Clear to auscultation bilaterally  Cardiovascular: S1, S2.  Abdomen: Soft  Neuro: limited assessment       Diagnostic Data:    Labs:  Recent Labs   Lab 24  1517 24  0634 24  1141   WBC 6.3 5.9 5.9   HGB 12.7* 11.4* 12.5*   MCV 96.9 96.2 94.4   .0 154.0 172.0       Recent Labs   Lab 24  1517 24  0634 24  1141   * 99 117*   BUN 21 20 19   CREATSERUM 1.38* 1.05 0.86   CA 9.5 8.8 9.2   ALB 3.5 2.9*  --     142 138   K 3.8 3.5 3.4*    112 109   CO2 29.0 25.0 25.0   ALKPHO 64 56  --    AST 13* 11*  --    ALT 7* <6*  --    BILT 0.5 0.8  --    TP 7.3 6.3*  --        Estimated Creatinine Clearance: 68.5 mL/min (based on SCr of 0.86 mg/dL).    No results for input(s): \"TROP\", \"TROPHS\", \"CK\" in the last 168 hours.    No results for input(s): \"PTP\", \"INR\" in the last 168 hours.             Imaging: Imaging data reviewed in Epic.    Medications:    OLANZapine  2.5 mg Oral Nightly    hydrALAZINE  50 mg Oral BID    levothyroxine  125 mcg Oral Before breakfast    losartan  50 mg Oral BID    labetalol  10 mg Intravenous Once    carbidopa-levodopa  1.5 tablet Oral TID    docusate sodium  100 mg Oral Nightly    donepezil  10 mg Oral Daily    folic acid  400 mcg Oral Weekly     latanoprost  1 drop Both Eyes Nightly    melatonin  10 mg Oral Nightly    pantoprazole  40 mg Oral QAM AC    pyridoxine  100 mg Oral Weekly    atorvastatin  10 mg Oral Nightly    timolol  1 drop Both Eyes Nightly    amLODIPine  5 mg Oral Daily    enoxaparin  40 mg Subcutaneous Daily    tobramycin   Both Eyes QID       Assessment & Plan:     #TME due to URI on top of baseline dementia  Mental status improved but now drowsy, will reduce dose of Zyprexa for tonight - if not improved, will consider further neurologic workup, CT head on admission was unremarkable   Will need placement as family no longer able to care for him - Insurance auth in progress    #LEVI  Resolved  HL IVF    #b/l conjunctivitis  -abx eye gtt  -RVP neg     #Hypertensive urgency  Losartan   Norvasc added  Hydralazine   monitor     #Parkinson's disease     #Hypothyroidism  TFT abnormal likely due to non compliance  Cont. Home dose, repeat TFT as OP    #Anemia chronic disease     #Hyperlipidemia  #History of prostate cancer  #Glaucoma  #ISAIAS    #medication non compliance       POC discussed with patient's wife     Supplementary Documentation:     Quality:    DVT Mechanical Prophylaxis:   SCDs,    DVT Pharmacologic Prophylaxis   Medication    enoxaparin (Lovenox) 40 MG/0.4ML SUBQ injection 40 mg                Code Status: Not on file  Perez: No urinary catheter in place  Perez Duration (in days):   Central line:    SHASHI: 3/20/2024      Discharge is dependent on: clinical stability  At this point Mr. Daniels is expected to be discharge to: home    The 21st Century Cures Act makes medical notes like these available to patients in the interest of transparency. Please be advised this is a medical document. Medical documents are intended to carry relevant information, facts as evident, and the clinical opinion of the practitioner. The medical note is intended as peer to peer communication and may appear blunt or direct. It is written in medical language and  may contain abbreviations or verbiage that are unfamiliar.

## 2024-03-20 NOTE — CM/SW NOTE
SW sent updated clinical information to [facility] and requested update regarding patient's insurance authorization.     SW will continue to follow for plan of care changes and remain available for any additional DC needs or concerns.     Nancy Johnson MSW, LSW  Discharge Planner   [id number]

## 2024-03-21 VITALS
TEMPERATURE: 98 F | RESPIRATION RATE: 18 BRPM | WEIGHT: 201 LBS | HEIGHT: 68 IN | HEART RATE: 64 BPM | OXYGEN SATURATION: 95 % | BODY MASS INDEX: 30.46 KG/M2 | DIASTOLIC BLOOD PRESSURE: 71 MMHG | SYSTOLIC BLOOD PRESSURE: 132 MMHG

## 2024-03-21 LAB — POTASSIUM SERPL-SCNC: 3.8 MMOL/L (ref 3.5–5.1)

## 2024-03-21 PROCEDURE — 99239 HOSP IP/OBS DSCHRG MGMT >30: CPT | Performed by: INTERNAL MEDICINE

## 2024-03-21 RX ORDER — OLANZAPINE 2.5 MG/1
2.5 TABLET, FILM COATED ORAL NIGHTLY PRN
Status: DISCONTINUED | OUTPATIENT
Start: 2024-03-21 | End: 2024-03-21

## 2024-03-21 NOTE — PLAN OF CARE
Received pt lethargic, oriented x1-2. VSS. Afebrile. Denies pain. Pt arousable to verbal stimuli, able to follow basic commands- falling asleep during conversations at times. Medication admin per MAR, tolerated whole in applesauce. Call light within reach. Safety precautions in place.     Problem: MUSCULOSKELETAL - ADULT  Goal: Return mobility to safest level of function  Description: INTERVENTIONS:  - Assess patient stability and activity tolerance for standing, transferring and ambulating w/ or w/o assistive devices  - Assist with transfers and ambulation using safe patient handling equipment as needed  - Ensure adequate protection for wounds/incisions during mobilization  - Obtain PT/OT consults as needed  - Advance activity as appropriate  - Communicate ordered activity level and limitations with patient/family  Outcome: Progressing     Problem: NEUROLOGICAL - ADULT  Goal: Achieves stable or improved neurological status  Description: INTERVENTIONS  - Assess for and report changes in neurological status  - Initiate measures to prevent increased intracranial pressure  - Maintain blood pressure and fluid volume within ordered parameters to optimize cerebral perfusion and minimize risk of hemorrhage  - Monitor temperature, glucose, and sodium. Initiate appropriate interventions as ordered  Outcome: Progressing

## 2024-03-21 NOTE — CM/SW NOTE
SW sent updated clinical information to [facility] for eventual patient admission. ESVIN will continue to follow for insurance authorization approval.     SW will continue to follow for plan of care changes and remain available for any additional DC needs or concerns.     Nancy Johnson MSW, LSW  Discharge Planner   [id number]

## 2024-03-21 NOTE — CM/SW NOTE
03/21/24 1500   Discharge disposition   Expected discharge disposition subacute   Discharge transportation [facility]     SW received update from [facility] stating that patient's insurance authorization was approved. ESVIN confirmed with RN that patient is cleared for DC today. ESVIN called and notified patient's wife Delores and she is agreeable to the DC. She will visit patient tomorrow morning.     [facility] [phone] has been requested to arrange ambulance for  time of 6pm. PCS form completed.    [facility] [phone]    SW will continue to follow for plan of care changes and remain available for any additional DC needs or concerns.     Nancy Johnson MSW, LSW  Discharge Planner   [id number]

## 2024-03-21 NOTE — PROGRESS NOTES
[facility]   part of [facility]     Hospitalist Progress Note     Lawrence Daniels Patient Status:  Inpatient    9/3/1945 MRN [id number]   [facility] 4NW-A Attending Hardeep Spann MD   Hosp Day # 5 PCP Jennifer Chandra DO     Chief Complaint: weakness    Subjective:     Slept most of the day yesterday  Today, drowsy but awakens and is appropriate, denies somatic complaints     Objective:    Review of Systems:   ROS completed; pertinent positive and negatives stated in subjective.    Vital signs:  Temp:  [97.8 °F (36.6 °C)-98.8 °F (37.1 °C)] 98.6 °F (37 °C)  Pulse:  [52-69] 69  Resp:  [18-20] 18  BP: (113-169)/(68-84) 156/68  SpO2:  [93 %-97 %] 96 %    Physical Exam:    General: drowsy but awakens and is alert and oriented x 2/3  Respiratory: Clear to auscultation bilaterally  Cardiovascular: S1, S2.  Abdomen: Soft  Neuro: no obvious deficits       Diagnostic Data:    Labs:  Recent Labs   Lab 24  1517 24  0634 24  1141   WBC 6.3 5.9 5.9   HGB 12.7* 11.4* 12.5*   MCV 96.9 96.2 94.4   .0 154.0 172.0       Recent Labs   Lab 24  1517 24  0634 24  1141 24  0733   * 99 117*  --    BUN 21  19  --    CREATSERUM 1.38* 1.05 0.86  --    CA 9.5 8.8 9.2  --    ALB 3.5 2.9*  --   --     142 138  --    K 3.8 3.5 3.4* 3.8    112 109  --    CO2 29.0 25.0 25.0  --    ALKPHO 64 56  --   --    AST 13* 11*  --   --    ALT 7* <6*  --   --    BILT 0.5 0.8  --   --    TP 7.3 6.3*  --   --        Estimated Creatinine Clearance: 68.5 mL/min (based on SCr of 0.86 mg/dL).    No results for input(s): \"TROP\", \"TROPHS\", \"CK\" in the last 168 hours.    No results for input(s): \"PTP\", \"INR\" in the last 168 hours.             Imaging: Imaging data reviewed in Epic.    Medications:    hydrALAZINE  50 mg Oral BID    levothyroxine  125 mcg Oral Before breakfast    losartan  50 mg Oral BID    labetalol  10 mg Intravenous Once    carbidopa-levodopa  1.5 tablet Oral  TID    docusate sodium  100 mg Oral Nightly    donepezil  10 mg Oral Daily    folic acid  400 mcg Oral Weekly    latanoprost  1 drop Both Eyes Nightly    melatonin  10 mg Oral Nightly    pantoprazole  40 mg Oral QAM AC    pyridoxine  100 mg Oral Weekly    atorvastatin  10 mg Oral Nightly    timolol  1 drop Both Eyes Nightly    amLODIPine  5 mg Oral Daily    enoxaparin  40 mg Subcutaneous Daily    tobramycin   Both Eyes QID       Assessment & Plan:     #TME due to URI on top of baseline dementia  -resolved and back to baseline, if becomes agitated again will use Zyprexa at a lower dose of 2.5 mg due to oversedation with 5 mg  Will need placement as family no longer able to care for him - Insurance auth in progress    #LEVI  Resolved  HL IVF    #b/l conjunctivitis  -abx eye gtt  -RVP neg     #Hypertensive urgency  Losartan   Norvasc added  Hydralazine   monitor     #Parkinson's disease     #Hypothyroidism  TFT abnormal likely due to non compliance  Cont. Home dose, repeat TFT as OP    #Anemia chronic disease     #Hyperlipidemia  #History of prostate cancer  #Glaucoma  #ISAIAS    #medication non compliance       POC discussed with patient's wife   Medically stable for discharge to memory care ASAP - placement/insurance pending     Supplementary Documentation:     Quality:    DVT Mechanical Prophylaxis:   SCDs,    DVT Pharmacologic Prophylaxis   Medication    enoxaparin (Lovenox) 40 MG/0.4ML SUBQ injection 40 mg                Code Status: Not on file  Perez: No urinary catheter in place  Perez Duration (in days):   Central line:    SHASHI: 3/21/2024      Discharge is dependent on: clinical stability  At this point Mr. Daniels is expected to be discharge to: home    The 21st Century Cures Act makes medical notes like these available to patients in the interest of transparency. Please be advised this is a medical document. Medical documents are intended to carry relevant information, facts as evident, and the clinical opinion of  the practitioner. The medical note is intended as peer to peer communication and may appear blunt or direct. It is written in medical language and may contain abbreviations or verbiage that are unfamiliar.

## 2024-03-21 NOTE — CM/SW NOTE
SW checked with [facility] to verify status of insurance authorization.     It is still pending for approval.     SW will continue to follow for plan of care changes and remain available for any additional DC needs or concerns.     Nancy Johnson MSW, LSW  Discharge Planner   [id number]

## 2024-03-21 NOTE — CDS QUERY
CLINICAL DOCUMENTATION CLARIFICATION FORM    Dr. Garcia  Please clarify the following regarding the documented diagnosis of Dementia -   Please specify the severity and any associated behaviors: agitation, psychotic disturbance, mood disturbance and anxiety  Dementia Severity:      (   ) Mild                              (   ) Severe      (x   ) Moderate                    (  )  Other   Dementia Associated Behaviors:       (  x )  Agitation       (   ) Mood disturbance                    (   ) Anxiety                        (   ) Psychotic Disturbance      (   ) Other Behavioral Disturbance         (   ) Other   _____________________________________________________________________  DOCUMENTATION FROM THE MEDICAL RECORD  ed impression: dehydration; ftt; gen weakness; danis; htn; notes: at baseline has difficulty with ambulation     H&P: generalized weakness - worsening; acute metabolic encephalopathy likely secondary to viral uri likely d/t dehydration/danis; sp conjunctivitis; suspected viral uri; hypertensive urgency; Parkinson's; hypothyroidism; aocd; hld; pancyto; history of prostate ca; glaucoma; leighann;  3/17 Maria Ines: weakness; tme d/t uri on yisel of baseline dementia - ms seems close to baseline now; danis - resolved; sp conjunctivitis; hypertensive urgency; Parkinson's disease; hypothyroidism; anemia chronic disease; hld; history f prostate cancer; glaucoma; leighann; medication non compliance     3/19 pn: TME due to URI on top of baseline dementia MS seems close to baseline now Will need placement as family no longer able to care for him    Medications:   donepezil (Aricept) tab 10 mg   OLANZapine (ZyPREXA) tab 2.5 mg     * Definitions:    Agitation - ( includes agitation, verbal, or physical behaviors such as profanity, shouting, threatening, anger, aggression, combativeness, or violence. Also, includes aberrant motor behavior such as restlessness, rocking, pacing, or exit-seeking.)     Other behavioral disturbance-(  includes sleep disturbance, social disinhibition, or sexual disinhibition)  Psychotic disturbance -( includes hallucinations, paranoia, suspiciousness)  Mood disturbance - includes depression, apathy, or anhedonia  Anxiety - includes anxiety w/dementia         PLEASE DOCUMENT ANY ADDITIONAL DIAGNOSES AND/OR SPECIFICITY IN THE PROGRESS NOTES AND/OR DISCHARGE SUMMARY.  If you have any questions, please contact Clinical :  Cyndi Costa RN [phone]  Thank You  THIS FORM IS A PERMANENT PART OF THE MEDICAL RECORD

## 2024-03-21 NOTE — DISCHARGE SUMMARY
[facility]PITAL[facility]  DISCHARGE SUMMARY     Lawrence Daniels Patient Status:  Inpatient    9/3/1945 MRN [id number]   Location [facility] 4NW-A Attending Lucretia Garcia DO   Hosp Day # 5 PCP Jennifer Chandra DO     Date of Admission: 3/16/2024  Date of Discharge:   3/21/2024    Discharge Disposition: Home Health Care Services    Discharge Diagnosis:  #TME due to URI on top of baseline dementia  -resolved and back to baseline, if becomes agitated again will use Zyprexa at a lower dose of 2.5 mg due to oversedation with 5 mg  Will need placement as family no longer able to care for him - Insurance auth in progress  -no focal deficits to warrant stroke workup, CT head negative on admission      #LEVI  Resolved  HL IVF     #b/l conjunctivitis  -abx eye gtt  -RVP neg     #Hypertensive urgency  Losartan   Norvasc added  Hydralazine   monitor     #Parkinson's disease     #Hypothyroidism  TFT abnormal likely due to non compliance  Cont. Home dose, repeat TFT as OP     #Anemia chronic disease     #Hyperlipidemia  #History of prostate cancer  #Glaucoma  #ISAIAS         History of Present Illness:      Lawrence Daniels is a 78 year old male with past medical history anemia chronic disease, bradycardia, Parkinson's disease, hypothyroidism, GERD, hypertension, hyperlipidemia, ISAIAS who presents with increasing weakness and confusion.  At baseline, patient has difficulty with ambulation due to his Parkinson's disease.  Had a recent admission for encephalopathy thought to be due to dehydration from diarrhea at that time.  Over the past 12 days patient has continued to worsen.  Has increasing confusion and poor p.o. intake.  Unstable on his feet and unable to ambulate. States he's had a cough and productive cough for past 12 days. Also has had b/l conjunctivitis. Wife cannot take care of him at this point.  Patient unable to even stand.  Oriented x 3.      Brief Synopsis:     Patient was admitted, he was managed primarily supportively, his  mentation returned to back near his baseline, discussion with family that patient would need memory care unit due to his progressive dementia, he is being discharged in stable condition to memory care facility     Lace+ Score: 81  59-90 High Risk  29-58 Medium Risk  0-28   Low Risk       TCM Follow-Up Recommendation:  LACE > 58: High Risk of readmission after discharge from the hospital.      Procedures during hospitalization:   N/a    Incidental or significant findings and recommendations (brief descriptions):  N/a    Lab/Test results pending at Discharge:   N/a    Consultants:  N/a    Discharge Medication List:     Discharge Medications        START taking these medications        Instructions Prescription details   amLODIPine 5 MG Tabs  Commonly known as: Norvasc      Take 1 tablet (5 mg total) by mouth daily.   Refills: 0     tobramycin 0.3 % Oint  Commonly known as: Tobrex      Place into both eyes 4 (four) times daily.   Stop taking on: March 22, 2024  Refills: 0            CHANGE how you take these medications        Instructions Prescription details   losartan 50 MG Tabs  Commonly known as: Cozaar  What changed:   medication strength  how much to take  when to take this      Take 1 tablet (50 mg total) by mouth 2 (two) times daily.   Refills: 0            CONTINUE taking these medications        Instructions Prescription details   carbidopa-levodopa  MG Tabs  Commonly known as: SINEMET      Take by mouth 3 (three) times daily. Taking 1 1/2 tablets 3 times daily   Refills: 0     Cholecalciferol 50 MCG (2000 UT) Tabs      Take 1 tablet (2,000 Units total) by mouth at bedtime.   Refills: 0     cyanocobalamin 1000 MCG Tabs  Commonly known as: Vitamin B12      Take 1 tablet (1,000 mcg total) by mouth once a week.   Refills: 0     docusate sodium 100 MG Caps  Commonly known as: Colace      Take 1 capsule (100 mg total) by mouth nightly.   Refills: 0     donepezil 10 MG Tabs  Commonly known as: Aricept       Take 1 tablet (10 mg total) by mouth daily.   Refills: 0     folic acid 400 MCG Tabs  Commonly known as: Folvite      Take 1 tablet (400 mcg total) by mouth once a week.   Refills: 0     hydrALAZINE 25 MG Tabs  Commonly known as: Apresoline      Take 1 tablet (25 mg total) by mouth 2 (two) times daily.   Refills: 0     latanoprost 0.005 % Soln  Commonly known as: Xalatan      Place 1 drop into both eyes nightly.   Refills: 0     levothyroxine 125 MCG Tabs  Commonly known as: Synthroid      TAKE 1 TABLET(125 MCG) BY MOUTH BEFORE BREAKFAST   Quantity: 90 tablet  Refills: 3     Melatonin 10 MG Tabs      Take 10 mg by mouth nightly.   Refills: 0     omega-3 fatty acids 1000 MG Caps  Commonly known as: Fish Oil      Take 1,000 mg by mouth daily.   Refills: 0     Omeprazole 40 MG Cpdr      Take 1 capsule (40 mg total) by mouth daily.   Quantity: 90 capsule  Refills: 3     Oysco 500+D 500-5 MG-MCG Tabs  Generic drug: Calcium Carbonate-Vitamin D      Take 1 tablet by mouth 3 (three) times daily.   Quantity: 270 tablet  Refills: 1     pyridoxine 100 MG Tabs  Commonly known as: Vitamin B6      Take 1 tablet (100 mg total) by mouth once a week.   Refills: 0     simvastatin 20 MG Tabs  Commonly known as: Zocor      Take 1 tablet (20 mg total) by mouth nightly.   Quantity: 90 tablet  Refills: 3     timolol 0.5 % Soln  Commonly known as: Timoptic      Place 1 drop into both eyes nightly.   Refills: 0              [place]    Follow-up appointment:   [place]ennifer [place]  [address] [address] [address] [address] [phone]    Follow up in 2 week(s)  Follow up    Appointments for Next 30 Days 3/21/2024 - 2024      None            Vital signs:  Temp:  [97.8 °F (36.6 °C)-98.8 °F (37.1 °C)] 98.3 °F (36.8 °C)  Pulse:  [52-69] 64  Resp:  [18-20] 18  BP: (113-169)/(68-84) 132/71  SpO2:  [93 %-96 %] 95 %      Total time spent on discharge plannin minutes     The  Century Cures Act makes medical notes like  these available to patients in the interest of transparency. Please be advised this is a medical document. Medical documents are intended to carry relevant information, facts as evident, and the clinical opinion of the practitioner. The medical note is intended as peer to peer communication and may appear blunt or direct. It is written in medical language and may contain abbreviations or verbiage that are unfamiliar.

## 2024-03-21 NOTE — PLAN OF CARE
Patient is drowsy, arousable to tactile stimuli. Patient is alert to self and place, reoriented to time and situation. Patient declines pain. Patient has no documented BM  - PRN per MAR. Patient assisted during mealtime. PIV removed. All paperwork given to [facility]. Belongings with patient including watch, wedding ring, bag of clothing, shoes, etc. Report called to Emily at [facility]. Patient taken by [facility].

## 2024-03-22 NOTE — PAYOR COMM NOTE
--------------  DISCHARGE REVIEW    Payor: [employer]  Subscriber #:  [id number]  Authorization Number: [id number]    Admit date: 3/16/24  Admit time:   8:12 PM  Discharge Date: 3/21/2024  7:02 PM     Admitting Physician: Denis Giraldo MD  Attending Physician:  No att. providers found  Primary Care Physician: Jennifer Chandra DO    3/20  Chief Complaint: weakness     Subjective:      Off of restraints but drowsy and sleeping this morning    Unable to obtain ROS     Objective:    Review of Systems:   ROS completed; pertinent positive and negatives stated in subjective.     Vital signs:  Temp:  [97.7 °F (36.5 °C)-99 °F (37.2 °C)] 98.5 °F (36.9 °C)  Pulse:  [57-81] 57  Resp:  [16-20] 20  BP: (135-165)/(70-98) 135/70  SpO2:  [93 %-98 %] 93 %     Physical Exam:    General: drowsy, sleeping   Respiratory: Clear to auscultation bilaterally  Cardiovascular: S1, S2.  Abdomen: Soft  Neuro: limited assessment         Diagnostic Data:    Labs:        Recent Labs   Lab 03/16/24  1517 03/17/24  0634 03/19/24  1141   WBC 6.3 5.9 5.9   HGB 12.7* 11.4* 12.5*   MCV 96.9 96.2 94.4   .0 154.0 172.0               Recent Labs   Lab 03/16/24  1517 03/17/24  0634 03/19/24  1141   * 99 117*   BUN 21 20 19   CREATSERUM 1.38* 1.05 0.86   CA 9.5 8.8 9.2   ALB 3.5 2.9*  --     142 138   K 3.8 3.5 3.4*    112 109   CO2 29.0 25.0 25.0   ALKPHO 64 56  --    AST 13* 11*  --    ALT 7* <6*  --    BILT 0.5 0.8  --    TP 7.3 6.3*  --          Estimated Creatinine Clearance: 68.5 mL/min (based on SCr of 0.86 mg/dL).     No results for input(s): \"TROP\", \"TROPHS\", \"CK\" in the last 168 hours.     No results for input(s): \"PTP\", \"INR\" in the last 168 hours.              Imaging: Imaging data reviewed in Epic.     Medications:    OLANZapine  2.5 mg Oral Nightly    hydrALAZINE  50 mg Oral BID    levothyroxine  125 mcg Oral Before breakfast    losartan  50 mg Oral BID    labetalol  10 mg Intravenous Once     carbidopa-levodopa  1.5 tablet Oral TID    docusate sodium  100 mg Oral Nightly    donepezil  10 mg Oral Daily    folic acid  400 mcg Oral Weekly    latanoprost  1 drop Both Eyes Nightly    melatonin  10 mg Oral Nightly    pantoprazole  40 mg Oral QAM AC    pyridoxine  100 mg Oral Weekly    atorvastatin  10 mg Oral Nightly    timolol  1 drop Both Eyes Nightly    amLODIPine  5 mg Oral Daily    enoxaparin  40 mg Subcutaneous Daily    tobramycin   Both Eyes QID         Assessment & Plan:      #TME due to URI on top of baseline dementia  Mental status improved but now drowsy, will reduce dose of Zyprexa for tonight - if not improved, will consider further neurologic workup, CT head on admission was unremarkable   Will need placement as family no longer able to care for him - Insurance auth in progress     #LEVI  Resolved  HL IVF     #b/l conjunctivitis  -abx eye gtt  -RVP neg     #Hypertensive urgency  Losartan   Norvasc added  Hydralazine   monitor     #Parkinson's disease     #Hypothyroidism  TFT abnormal likely due to non compliance  Cont. Home dose, repeat TFT as OP     #Anemia chronic disease     #Hyperlipidemia  #History of prostate cancer  #Glaucoma  #ISAIAS     #medication non compliance        POC discussed with patient's wife         Discharge Summary Notes        Discharge Summary signed by Lucretia Garcia DO at 3/21/2024  6:23 PM       Author: Lucretia Garcia DO Specialty: HOSPITALIST, Internal Medicine Author Type: Physician    Filed: 3/21/2024  6:23 PM Date of Service: 3/21/2024  6:21 PM Status: Signed    : Lucretia Garcia DO (Physician)           [facility]  [facility]     Lawrence Daniels Patient Status:  Inpatient    9/3/1945 MRN [id number]   [facility] 4NW-A Attending Lucretia Garcia DO   Hosp Day # 5 PCP Jennifer Chandra DO     Date of Admission: 3/16/2024  Date of Discharge:   3/21/2024    Discharge Disposition: Home Health Care Services    Discharge Diagnosis:  #TME due to URI  on top of baseline dementia  -resolved and back to baseline, if becomes agitated again will use Zyprexa at a lower dose of 2.5 mg due to oversedation with 5 mg  Will need placement as family no longer able to care for him - Insurance auth in progress  -no focal deficits to warrant stroke workup, CT head negative on admission      #LEVI  Resolved  HL IVF     #b/l conjunctivitis  -abx eye gtt  -RVP neg     #Hypertensive urgency  Losartan   Norvasc added  Hydralazine   monitor     #Parkinson's disease     #Hypothyroidism  TFT abnormal likely due to non compliance  Cont. Home dose, repeat TFT as OP     #Anemia chronic disease     #Hyperlipidemia  #History of prostate cancer  #Glaucoma  #ISAIAS         History of Present Illness:      Lawrence Daniels is a 78 year old male with past medical history anemia chronic disease, bradycardia, Parkinson's disease, hypothyroidism, GERD, hypertension, hyperlipidemia, ISAIAS who presents with increasing weakness and confusion.  At baseline, patient has difficulty with ambulation due to his Parkinson's disease.  Had a recent admission for encephalopathy thought to be due to dehydration from diarrhea at that time.  Over the past 12 days patient has continued to worsen.  Has increasing confusion and poor p.o. intake.  Unstable on his feet and unable to ambulate. States he's had a cough and productive cough for past 12 days. Also has had b/l conjunctivitis. Wife cannot take care of him at this point.  Patient unable to even stand.  Oriented x 3.      Brief Synopsis:     Patient was admitted, he was managed primarily supportively, his mentation returned to back near his baseline, discussion with family that patient would need memory care unit due to his progressive dementia, he is being discharged in stable condition to memory care facility     Lace+ Score: 81  59-90 High Risk  29-58 Medium Risk  0-28   Low Risk       TCM Follow-Up Recommendation:  LACE > 58: High Risk of readmission after discharge  from the hospital.      Procedures during hospitalization:   N/a    Incidental or significant findings and recommendations (brief descriptions):  N/a    Lab/Test results pending at Discharge:   N/a    Consultants:  N/a    Discharge Medication List:     Discharge Medications        START taking these medications        Instructions Prescription details   amLODIPine 5 MG Tabs  Commonly known as: Norvasc      Take 1 tablet (5 mg total) by mouth daily.   Refills: 0     tobramycin 0.3 % Oint  Commonly known as: Tobrex      Place into both eyes 4 (four) times daily.   Stop taking on: March 22, 2024  Refills: 0            CHANGE how you take these medications        Instructions Prescription details   losartan 50 MG Tabs  Commonly known as: Cozaar  What changed:   medication strength  how much to take  when to take this      Take 1 tablet (50 mg total) by mouth 2 (two) times daily.   Refills: 0            CONTINUE taking these medications        Instructions Prescription details   carbidopa-levodopa  MG Tabs  Commonly known as: SINEMET      Take by mouth 3 (three) times daily. Taking 1 1/2 tablets 3 times daily   Refills: 0     Cholecalciferol 50 MCG (2000 UT) Tabs      Take 1 tablet (2,000 Units total) by mouth at bedtime.   Refills: 0     cyanocobalamin 1000 MCG Tabs  Commonly known as: Vitamin B12      Take 1 tablet (1,000 mcg total) by mouth once a week.   Refills: 0     docusate sodium 100 MG Caps  Commonly known as: Colace      Take 1 capsule (100 mg total) by mouth nightly.   Refills: 0     donepezil 10 MG Tabs  Commonly known as: Aricept      Take 1 tablet (10 mg total) by mouth daily.   Refills: 0     folic acid 400 MCG Tabs  Commonly known as: Folvite      Take 1 tablet (400 mcg total) by mouth once a week.   Refills: 0     hydrALAZINE 25 MG Tabs  Commonly known as: Apresoline      Take 1 tablet (25 mg total) by mouth 2 (two) times daily.   Refills: 0     latanoprost 0.005 % Soln  Commonly known as:  Xalatan      Place 1 drop into both eyes nightly.   Refills: 0     levothyroxine 125 MCG Tabs  Commonly known as: Synthroid      TAKE 1 TABLET(125 MCG) BY MOUTH BEFORE BREAKFAST   Quantity: 90 tablet  Refills: 3     Melatonin 10 MG Tabs      Take 10 mg by mouth nightly.   Refills: 0     omega-3 fatty acids 1000 MG Caps  Commonly known as: Fish Oil      Take 1,000 mg by mouth daily.   Refills: 0     Omeprazole 40 MG Cpdr      Take 1 capsule (40 mg total) by mouth daily.   Quantity: 90 capsule  Refills: 3     Oysco 500+D 500-5 MG-MCG Tabs  Generic drug: Calcium Carbonate-Vitamin D      Take 1 tablet by mouth 3 (three) times daily.   Quantity: 270 tablet  Refills: 1     pyridoxine 100 MG Tabs  Commonly known as: Vitamin B6      Take 1 tablet (100 mg total) by mouth once a week.   Refills: 0     simvastatin 20 MG Tabs  Commonly known as: Zocor      Take 1 tablet (20 mg total) by mouth nightly.   Quantity: 90 tablet  Refills: 3     timolol 0.5 % Soln  Commonly known as: Timoptic      Place 1 drop into both eyes nightly.   Refills: 0              [place]    Follow-up appointment:   [place]  [address] [address] [id number] [phone]    Follow up in 2 week(s)  Follow up    Appointments for Next 30 Days 3/21/2024 - 2024      None            Vital signs:  Temp:  [97.8 °F (36.6 °C)-98.8 °F (37.1 °C)] 98.3 °F (36.8 °C)  Pulse:  [52-69] 64  Resp:  [18-20] 18  BP: (113-169)/(68-84) 132/71  SpO2:  [93 %-96 %] 95 %      Total time spent on discharge plannin minutes     The  Century Cures Act makes medical notes like these available to patients in the interest of transparency. Please be advised this is a medical document. Medical documents are intended to carry relevant information, facts as evident, and the clinical opinion of the practitioner. The medical note is intended as peer to peer communication and may appear blunt or direct. It is written in medical language and may  contain abbreviations or verbiage that are unfamiliar.       Electronically signed by Lucretia Garcia DO on 3/21/2024  6:23 PM         REVIEWER COMMENTS

## 2024-03-25 ENCOUNTER — TELEPHONE (OUTPATIENT)
Dept: FAMILY MEDICINE CLINIC | Facility: CLINIC | Age: 79
End: 2024-03-25

## 2024-03-25 NOTE — TELEPHONE ENCOUNTER
Patients spouse calling and would like to speak to either Dr. Chandra or Romana Woods regarding update with her  who was discharged from [facility] and transferred him to [facility]. She would like to update them and get advice.   Please advise

## 2024-03-25 NOTE — TELEPHONE ENCOUNTER
Pt.'s wife called. She is very upset that she cannot call you directly. She wants to talk to her 's Dr. She wants you to know that he was in [facility] and was discharge to [facility]. She would like to speak to you personally regarding his condition and did not want to relay anything through me stating \"it is absolutely crazy that I can't speak to my 's doctor\" I let wife Delores know that I would forward your request.

## 2024-03-26 NOTE — TELEPHONE ENCOUNTER
Outgoing call made to patient's wife, Delores, we discussed Lawrence's current condition.  Delores was appreciative of my call.  She will keep me updated on his progress.

## 2024-03-26 NOTE — TELEPHONE ENCOUNTER
Please call pt's wife and let her know that I was seeing pts at the time that she called and that I'm off on Tuesday but I will try to call her on Tuesday 3/26/2024.  Please obtain a good number that I can call her back at.

## 2024-04-15 ENCOUNTER — TELEPHONE (OUTPATIENT)
Dept: FAMILY MEDICINE CLINIC | Facility: CLINIC | Age: 79
End: 2024-04-15

## 2024-04-15 NOTE — TELEPHONE ENCOUNTER
Patients spouse calling trying to get an appt. For her  for an [facility] f/up - URI/parkinsons dr. Jennifer Chandra does not have any appointments until May.   Please advise.

## 2024-04-18 ENCOUNTER — OFFICE VISIT (OUTPATIENT)
Dept: FAMILY MEDICINE CLINIC | Facility: CLINIC | Age: 79
End: 2024-04-18
Payer: MEDICARE

## 2024-04-18 VITALS
OXYGEN SATURATION: 99 % | SYSTOLIC BLOOD PRESSURE: 136 MMHG | WEIGHT: 190 LBS | TEMPERATURE: 97 F | BODY MASS INDEX: 29 KG/M2 | DIASTOLIC BLOOD PRESSURE: 84 MMHG | HEART RATE: 60 BPM

## 2024-04-18 DIAGNOSIS — E89.0 POSTSURGICAL HYPOTHYROIDISM: Primary | ICD-10-CM

## 2024-04-18 DIAGNOSIS — F02.80 DEMENTIA DUE TO PARKINSON'S DISEASE WITHOUT BEHAVIORAL DISTURBANCE (HCC): ICD-10-CM

## 2024-04-18 DIAGNOSIS — G20.A1 DEMENTIA DUE TO PARKINSON'S DISEASE WITHOUT BEHAVIORAL DISTURBANCE (HCC): ICD-10-CM

## 2024-04-18 DIAGNOSIS — G20.B1 PARKINSON'S DISEASE WITH DYSKINESIA, UNSPECIFIED WHETHER MANIFESTATIONS FLUCTUATE (HCC): ICD-10-CM

## 2024-04-18 DIAGNOSIS — Z79.899 ENCOUNTER FOR LONG-TERM CURRENT USE OF MEDICATION: ICD-10-CM

## 2024-04-18 PROCEDURE — 99214 OFFICE O/P EST MOD 30 MIN: CPT | Performed by: FAMILY MEDICINE

## 2024-04-18 PROCEDURE — G2211 COMPLEX E/M VISIT ADD ON: HCPCS | Performed by: FAMILY MEDICINE

## 2024-04-18 RX ORDER — POTASSIUM CHLORIDE 750 MG/1
10 TABLET, FILM COATED, EXTENDED RELEASE ORAL DAILY
COMMUNITY
Start: 2023-07-11

## 2024-04-18 RX ORDER — LOSARTAN POTASSIUM 100 MG/1
100 TABLET ORAL DAILY
COMMUNITY
Start: 2024-04-18

## 2024-04-18 RX ORDER — HYDROCHLOROTHIAZIDE 12.5 MG/1
12.5 TABLET ORAL DAILY
COMMUNITY
Start: 2024-02-26

## 2024-04-18 RX ORDER — LEVOTHYROXINE SODIUM 0.12 MG/1
125 TABLET ORAL EVERY MORNING
Qty: 90 TABLET | Refills: 0 | Status: SHIPPED | OUTPATIENT
Start: 2024-04-18

## 2024-04-18 NOTE — PROGRESS NOTES
Lawrence Daniels is a 78 year old male.     Chief Complaint   Patient presents with    Hospital F/U     3/18 for parkinsons         HPI:       Patient is accompanied by his wife and brother, they are very pleasant and supportive.      At last hospitalization patient was recommended to be transferred to a dementia unit, he is now returned to home.  He has had some improvement that is why he was able to return home.          Wt Readings from Last 6 Encounters:   04/18/24 190 lb (86.2 kg)   03/19/24 201 lb (91.2 kg)   02/28/24 203 lb (92.1 kg)   02/21/24 202 lb (91.6 kg)   01/17/24 204 lb 3.2 oz (92.6 kg)   08/11/23 199 lb (90.3 kg)      Body mass index is 28.89 kg/m².        Current Outpatient Medications   Medication Sig Dispense Refill    hydroCHLOROthiazide 12.5 MG Oral Tab Take 1 tablet (12.5 mg total) by mouth daily.      Potassium Chloride ER 10 MEQ Oral Tab CR Take 1 tablet (10 mEq total) by mouth daily.      losartan 100 MG Oral Tab Take 1 tablet (100 mg total) by mouth daily.      levothyroxine 125 MCG Oral Tab Take 1 tablet (125 mcg total) by mouth every morning. On an empty stomach nothing to eat or drink other than water for 30 to 60 minutes after taking.  Do not take any vitamins or supplements for 4 hours after taking. 90 tablet 0    simvastatin 20 MG Oral Tab Take 1 tablet (20 mg total) by mouth nightly. 90 tablet 3    OYSCO 500+D 500-5 MG-MCG Oral Tab Take 1 tablet by mouth 3 (three) times daily. 270 tablet 1    Omeprazole 40 MG Oral Capsule Delayed Release Take 1 capsule (40 mg total) by mouth daily. 90 capsule 3    donepezil 10 MG Oral Tab Take 1 tablet (10 mg total) by mouth daily.      latanoprost 0.005 % Ophthalmic Solution Place 1 drop into both eyes nightly.      carbidopa-levodopa  MG Oral Tab Take by mouth 3 (three) times daily. Taking 1 1/2 tablets 3 times daily      Timolol Maleate 0.5 % Ophthalmic Solution Place 1 drop into both eyes nightly.      hydrALAzine HCl 25 MG Oral Tab Take 1  tablet (25 mg total) by mouth 2 (two) times daily.      Melatonin 10 MG Oral Tab Take 10 mg by mouth nightly.      omega-3 fatty acids 1000 MG Oral Cap Take 1,000 mg by mouth daily.      docusate sodium 100 MG Oral Cap Take 1 capsule (100 mg total) by mouth nightly.      folic acid 400 MCG Oral Tab Take 1 tablet (400 mcg total) by mouth once a week.      Vitamin B-12 1000 MCG Oral Tab Take 1 tablet (1,000 mcg total) by mouth once a week.      Pyridoxine HCl (VITAMIN B-6) 100 MG Oral Tab Take 1 tablet (100 mg total) by mouth once a week.      Cholecalciferol 50 MCG (2000 UT) Oral Tab Take 1 tablet (2,000 Units total) by mouth at bedtime.        Past Medical History:    Anemia of chronic disease    Iron studies, B12 level, and folic acid levels normal.    Aneurysm of popliteal artery (HCC)    Arterial disease (HCC)    MRI 9/15/2017: Mild chronic microvascular ischemic changes in the cerebral white matter.    Back problem    Bilateral carotid artery disease (HCC)    Bilateral carotid artery stenosis    Bradycardia    Class 1 obesity due to excess calories with serious comorbidity and body mass index (BMI) of 31.0 to 31.9 in adult    Associated with hypertension and hyperlipidemia    Class 1 obesity due to excess calories with serious comorbidity and body mass index (BMI) of 33.0 to 33.9 in adult    Associated with HTN, Carotid Artery Disease, and Hypercholesterolemia    Class 1 obesity due to excess calories without serious comorbidity with body mass index (BMI) of 33.0 to 33.9 in adult    Cognitive complaints with normal neuropsychological exam    Normal Neuropsych testing September 2017, results scanned in chart    Cogwheel rigidity    COVID    cough headache. 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    Multiple thyroid nodules    x 2 on Left    Myocarditis (HCC)    Neoplasm of uncertain behavior of skin    Obesity (BMI 30-39.9)    ISAIAS (obstructive sleep apnea)    ISAIAS on CPAP    Dx 2009 cpap    Osteoarthritis    Other specified glaucoma    Overflow incontinence of urine    Parkinsonian tremor (HCC)    PD (Parkinson's disease) (HCC)    Postsurgical hypothyroidism    6/27/2022 total thyroidectomy with Dr. Perry Soriano.    Prediabetes    Primary open angle glaucoma (POAG) of both eyes, mild stage    Prostate CA (HCC)    RBD (REM behavioral disorder)    Last Assessment & Plan:  Formatting of this note might be different from the original. Only diagnosed with RBD, but behavior does seem classic for it.  He is taking melatonin 10 mg nightly so it is possible he is treating his RBD.  We will do an in lab diagnostic PSG with extra EMG leads on arms to assess for REM sleep without atonia.    Sleep apnea    Thrombocytopenia (HCC)    Torn ligament    right    Visual impairment    Vocal cord dysfunction    errosion      Past Surgical History:   Procedure Laterality Date    Ankle fracture surgery      Hdr elect brachytherapy  2006    Incision and drainage Left 04/05/2022    left neck     Knee replacement surgery Left 2014    Other      Botox injections into bladder    Thyroidectomy Bilateral 06/27/2022    Dr. Soriano      Social History:    Social History     Socioeconomic History    Marital status:    Tobacco Use    Smoking status: Former     Types: Cigars    Smokeless tobacco: Never   Vaping Use    Vaping status: Never Used   Substance and Sexual Activity    Alcohol use: Yes      Alcohol/week: 2.0 standard drinks of alcohol     Types: 2 Standard drinks or equivalent per week     Comment: 2 servings a week    Drug use: No    Sexual activity: Not Currently   Other Topics Concern    Caffeine Concern No     Comment: 1 cup of coffee daily    Exercise Yes     Comment: some walking    Seat Belt Yes    Special Diet No    Stress Concern No    Weight Concern No     Service No    Blood Transfusions No    Occupational Exposure No    Hobby Hazards No    Sleep Concern No    Back Care No    Bike Helmet No    Self-Exams No     Social Determinants of Health     Financial Resource Strain: Low Risk  (2/23/2024)    Financial Resource Strain     Difficulty of Paying Living Expenses: Not very hard     Med Affordability: No   Food Insecurity: No Food Insecurity (3/16/2024)    Food Insecurity     Food Insecurity: Never true   Transportation Needs: No Transportation Needs (3/16/2024)    Transportation Needs     Lack of Transportation: No   Physical Activity: Inactive (10/1/2020)    Received from [employer] [employer]    Exercise Vital Sign     Days of Exercise per Week: 0 days     Minutes of Exercise per Session: 0 min   Housing Stability: Low Risk  (3/16/2024)    Housing Stability     Housing Instability: No         Family History   Problem Relation Age of Onset    Heart Attack Father     Colon Cancer Mother     Heart Disease Paternal Grandmother     Heart Attack Paternal Grandmother     Cancer Paternal Grandfather     Heart Disease Maternal Grandmother     Heart Attack Maternal Grandfather     Heart Disease Maternal Grandfather      REVIEW OF SYSTEMS:   GENERAL HEALTH: Overall feels well.  RESPIRATORY: Denies: PALOMO/DWYER/Cough  CARDIOVASCULAR: Denies CP/palpitations  VASCULAR: Denies LE edema  GI: Denies abdominal pain/nausea/vomiting/diarrhea  NEURO: denies headaches/dizziness  PSYCH: denies depression and anxiety    Immunization History   Administered Date(s) Administered     Covid-19 Vaccine Pfizer 30 mcg/0.3 ml 02/02/2021, 02/23/2021, 10/22/2021    Covid-19 Vaccine Pfizer Bivalent 30mcg/0.3mL 09/19/2022    Covid-19 Vaccine Pfizer José-Sucrose 30 mcg/0.3 ml 04/21/2022    FLU VAC High Dose 65 YRS & Older PRSV Free (11114) 10/06/2018, 09/25/2019, 09/30/2020, 09/29/2023    FLUAD High Dose 65 yr and older (46571) 10/22/2021, 09/19/2022    FLULAVAL 6 months & older 0.5 ml Prefilled syringe (25139) 10/23/2017    FLUZONE 6 months and older PFS 0.5 ml (65489) 10/23/2017    Fluvirin, 3 Years & >, Im 10/31/2011, 11/08/2013    Fluzone Vaccine Medicare () 09/16/2014, 11/07/2015, 10/10/2016, 10/06/2018, 09/25/2019    Pfizer Covid-19 Vaccine 30mcg/0.3ml 12yrs+ (8362-3810) 09/29/2023    Pneumococcal (Prevnar 13) 02/08/2018    Pneumovax 23 10/01/2010, 10/08/2011, 10/26/2022    Td, Preserv Free 09/06/2020    Zoster Vaccine Live (Zostavax) 03/14/2011       EXAM:   /84   Pulse 60   Temp 97.3 °F (36.3 °C) (Temporal)   Wt 190 lb (86.2 kg)   SpO2 99%   BMI 28.89 kg/m²   GENERAL: NAD, pleasant sitting in wheelchair  SKIN: no visible rashes  HEAD: NCAT  NEURO: Alert and Oriented.  Sitting in wheelchair  PSYCH: Flattish affect        DATA:    Thyroid:    Lab Results   Component Value Date    TSH 24.200 (H) 03/16/2024    TSH 7.28 (H) 02/16/2024    TSH 4.33 03/20/2023    T4F 0.8 03/16/2024    T4F 1.2 02/16/2024    T4F 1.5 03/20/2023           ASSESSMENT AND PLAN:       Encounter Diagnoses   Name Primary?    Postsurgical hypothyroidism Yes    Encounter for long-term current use of medication     Dementia due to Parkinson's disease without behavioral disturbance (HCC)     Parkinson's disease with dyskinesia, unspecified whether manifestations fluctuate (HCC)        -Recheck thyroid function blood tests on or shortly after 5/30/2024.  -Remember do not take any vitamins or supplements containing biotin for 5 days prior to the blood draw.      1. Postsurgical hypothyroidism  TSH elevated, patient may  have been missing doses of his levothyroxine or taking supplements around the time that he was taking the levothyroxine.  Patient and patient's family counseled on how and when to take the levothyroxine.  Recommend reevaluate TFTs on or shortly after 5/3/2024.  Been 3 months.    - levothyroxine 125 MCG Oral Tab; Take 1 tablet (125 mcg total) by mouth every morning. On an empty stomach nothing to eat or drink other than water for 30 to 60 minutes after taking.  Do not take any vitamins or supplements for 4 hours after taking.  Dispense: 90 tablet; Refill: 0  - Assay, Thyroid Stim Hormone; Future  - Free T4, (Free Thyroxine); Future  - Assay, Thyroid Stim Hormone  - Free T4, (Free Thyroxine)    2. Encounter for long-term current use of medication  Medication use, risks, benefits, side effects and precautions discussed, patient verbalizes understanding. Questions encouraged and answered to patient's satisfaction.    - levothyroxine 125 MCG Oral Tab; Take 1 tablet (125 mcg total) by mouth every morning. On an empty stomach nothing to eat or drink other than water for 30 to 60 minutes after taking.  Do not take any vitamins or supplements for 4 hours after taking.  Dispense: 90 tablet; Refill: 0    (Neuro)  3. Dementia due to Parkinson's disease without behavioral disturbance (HCC)  4. Parkinson's disease with dyskinesia, unspecified whether manifestations fluctuate (HCC)  Patient to follow-up with his neurologist, patient and family plan on continuing with neurology through [facility] but transferring care to a closer location such as [facility] in [place].          Orders Placed This Encounter   Procedures    Assay, Thyroid Stim Hormone    Free T4, (Free Thyroxine)       Meds & Refills for this Visit:  Requested Prescriptions     Signed Prescriptions Disp Refills    levothyroxine 125 MCG Oral Tab 90 tablet 0     Sig: Take 1 tablet (125 mcg total) by mouth every morning. On an empty stomach nothing to eat or  drink other than water for 30 to 60 minutes after taking.  Do not take any vitamins or supplements for 4 hours after taking.       Return in about 3 months (around 7/18/2024) for Hypertension, hyperlipidemia, and hypothyroidism.  Sooner if needed..

## 2024-04-20 PROBLEM — F02.80 DEMENTIA DUE TO PARKINSON'S DISEASE WITHOUT BEHAVIORAL DISTURBANCE (HCC): Status: ACTIVE | Noted: 2024-04-20

## 2024-04-20 PROBLEM — G20.A1 DEMENTIA DUE TO PARKINSON'S DISEASE WITHOUT BEHAVIORAL DISTURBANCE (HCC): Status: ACTIVE | Noted: 2024-04-20

## 2024-04-22 NOTE — TELEPHONE ENCOUNTER
Requested Prescriptions     Signed Prescriptions Disp Refills    OYSTER SHELL CALCIUM W/D 500-5 MG-MCG Oral Tab 270 tablet 1     Sig: TAKE 1 TABLET BY MOUTH THREE TIMES DAILY     Authorizing Provider: CHIKI MARIA     Ordering User: SKYLAR FLANAGAN     Refilled per protocol/OV notes

## 2024-05-06 ENCOUNTER — TELEPHONE (OUTPATIENT)
Dept: FAMILY MEDICINE CLINIC | Facility: CLINIC | Age: 79
End: 2024-05-06

## 2024-05-07 NOTE — TELEPHONE ENCOUNTER
Noted.  Thank you.  Thyroid function tests were reevaluated too soon, it had been recommended that patient wait until 5/30/2024 to recheck thyroid function tests.  At this point I recommend a good 6 weeks before reevaluation of thyroid function tests.  The thyroid function tests that were placed to be done at [facility] are still valid orders, recommend go to [facility] if able for next blood draw for thyroid function tests, and remind patient/patient's wife do not take any vitamins or supplements containing biotin for 3 to 5 days prior to blood draw.

## 2024-05-07 NOTE — TELEPHONE ENCOUNTER
Please call patient/patient's wife:  Lab results received from outside facility.  TSH is still elevated above normal indicating hypothyroidism is still not well treated.  Please ask how long has he been back on the levothyroxine on a regular basis?  For example how many weeks, as accurately as possible, has patient been back to taking levothyroxine 125 mcg p.o. every morning?

## 2024-05-07 NOTE — TELEPHONE ENCOUNTER
Spoke w/wife. She reports patient has been back on levothyroxine 125 mcg every morning for 3 weeks. She also wants you to know she has been very careful to not let him have anything by mouth for 1 hour after taking the medication.

## 2024-05-13 ENCOUNTER — TELEPHONE (OUTPATIENT)
Dept: FAMILY MEDICINE CLINIC | Facility: CLINIC | Age: 79
End: 2024-05-13

## 2024-05-13 ENCOUNTER — HOSPITAL ENCOUNTER (EMERGENCY)
Age: 79
Discharge: HOME | End: 2024-05-13

## 2024-05-13 ENCOUNTER — LAB ENCOUNTER (OUTPATIENT)
Dept: LAB | Age: 79
End: 2024-05-13
Attending: FAMILY MEDICINE

## 2024-05-13 ENCOUNTER — VIRTUAL PHONE E/M (OUTPATIENT)
Dept: FAMILY MEDICINE CLINIC | Facility: CLINIC | Age: 79
End: 2024-05-13
Payer: MEDICARE

## 2024-05-13 ENCOUNTER — APPOINTMENT (OUTPATIENT)
Dept: LAB | Age: 79
End: 2024-05-13
Attending: FAMILY MEDICINE
Payer: MEDICARE

## 2024-05-13 DIAGNOSIS — R19.7 DIARRHEA, UNSPECIFIED TYPE: ICD-10-CM

## 2024-05-13 DIAGNOSIS — R35.0 URINARY FREQUENCY: Primary | ICD-10-CM

## 2024-05-13 DIAGNOSIS — E89.0 POSTSURGICAL HYPOTHYROIDISM: ICD-10-CM

## 2024-05-13 DIAGNOSIS — R35.0 FREQUENT URINATION: Primary | ICD-10-CM

## 2024-05-13 DIAGNOSIS — E87.6 HYPOPOTASSEMIA: Primary | ICD-10-CM

## 2024-05-13 LAB
ALBUMIN SERPL-MCNC: 3.3 G/DL (ref 3.4–5)
ALBUMIN/GLOB SERPL: 0.9 {RATIO} (ref 1–2)
ALP LIVER SERPL-CCNC: 63 U/L
ALT SERPL-CCNC: 9 U/L
ANION GAP SERPL CALC-SCNC: 3 MMOL/L (ref 0–18)
AST SERPL-CCNC: 17 U/L (ref 15–37)
BASOPHILS # BLD AUTO: 0.02 X10(3) UL (ref 0–0.2)
BASOPHILS NFR BLD AUTO: 0.4 %
BILIRUB SERPL-MCNC: 0.5 MG/DL (ref 0.1–2)
BILIRUB UR QL STRIP.AUTO: NEGATIVE
BUN BLD-MCNC: 18 MG/DL (ref 9–23)
CALCIUM BLD-MCNC: 9.5 MG/DL (ref 8.5–10.1)
CHLORIDE SERPL-SCNC: 108 MMOL/L (ref 98–112)
CHOLEST SERPL-MCNC: 171 MG/DL (ref ?–200)
CLARITY UR REFRACT.AUTO: CLEAR
CO2 SERPL-SCNC: 29 MMOL/L (ref 21–32)
CREAT BLD-MCNC: 1.19 MG/DL
EGFRCR SERPLBLD CKD-EPI 2021: 63 ML/MIN/1.73M2 (ref 60–?)
EOSINOPHIL # BLD AUTO: 0.22 X10(3) UL (ref 0–0.7)
EOSINOPHIL NFR BLD AUTO: 4.9 %
ERYTHROCYTE [DISTWIDTH] IN BLOOD BY AUTOMATED COUNT: 12.9 %
FASTING PATIENT LIPID ANSWER: NO
FASTING STATUS PATIENT QL REPORTED: NO
GLOBULIN PLAS-MCNC: 3.7 G/DL (ref 2.8–4.4)
GLUCOSE BLD-MCNC: 110 MG/DL (ref 70–99)
GLUCOSE UR STRIP.AUTO-MCNC: NORMAL MG/DL
HCT VFR BLD AUTO: 37 %
HDLC SERPL-MCNC: 71 MG/DL (ref 40–59)
HGB BLD-MCNC: 12.2 G/DL
IMM GRANULOCYTES # BLD AUTO: 0.01 X10(3) UL (ref 0–1)
IMM GRANULOCYTES NFR BLD: 0.2 %
KETONES UR STRIP.AUTO-MCNC: NEGATIVE MG/DL
LDLC SERPL CALC-MCNC: 80 MG/DL (ref ?–100)
LEUKOCYTE ESTERASE UR QL STRIP.AUTO: NEGATIVE
LYMPHOCYTES # BLD AUTO: 1.32 X10(3) UL (ref 1–4)
LYMPHOCYTES NFR BLD AUTO: 29.3 %
MAGNESIUM SERPL-MCNC: 2.1 MG/DL (ref 1.6–2.6)
MCH RBC QN AUTO: 33 PG (ref 26–34)
MCHC RBC AUTO-ENTMCNC: 33 G/DL (ref 31–37)
MCV RBC AUTO: 100 FL
MONOCYTES # BLD AUTO: 0.36 X10(3) UL (ref 0.1–1)
MONOCYTES NFR BLD AUTO: 8 %
NEUTROPHILS # BLD AUTO: 2.58 X10 (3) UL (ref 1.5–7.7)
NEUTROPHILS # BLD AUTO: 2.58 X10(3) UL (ref 1.5–7.7)
NEUTROPHILS NFR BLD AUTO: 57.2 %
NITRITE UR QL STRIP.AUTO: NEGATIVE
NONHDLC SERPL-MCNC: 100 MG/DL (ref ?–130)
OSMOLALITY SERPL CALC.SUM OF ELEC: 293 MOSM/KG (ref 275–295)
PH UR STRIP.AUTO: 6.5 [PH] (ref 5–8)
PLATELET # BLD AUTO: 162 10(3)UL (ref 150–450)
POTASSIUM SERPL-SCNC: 3.9 MMOL/L (ref 3.5–5.1)
PROT SERPL-MCNC: 7 G/DL (ref 6.4–8.2)
PROT UR STRIP.AUTO-MCNC: NEGATIVE MG/DL
PSA SERPL-MCNC: <0.01 NG/ML (ref ?–4)
RBC # BLD AUTO: 3.7 X10(6)UL
SODIUM SERPL-SCNC: 140 MMOL/L (ref 136–145)
SP GR UR STRIP.AUTO: 1.01 (ref 1–1.03)
T4 FREE SERPL-MCNC: 0.9 NG/DL (ref 0.8–1.7)
TRIGL SERPL-MCNC: 116 MG/DL (ref 30–149)
TSI SER-ACNC: 11.6 MIU/ML (ref 0.36–3.74)
UROBILINOGEN UR STRIP.AUTO-MCNC: NORMAL MG/DL
VLDLC SERPL CALC-MCNC: 18 MG/DL (ref 0–30)
WBC # BLD AUTO: 4.5 X10(3) UL (ref 4–11)

## 2024-05-13 PROCEDURE — 99214 OFFICE O/P EST MOD 30 MIN: CPT | Performed by: FAMILY MEDICINE

## 2024-05-13 PROCEDURE — 36415 COLL VENOUS BLD VENIPUNCTURE: CPT | Performed by: FAMILY MEDICINE

## 2024-05-13 PROCEDURE — 80061 LIPID PANEL: CPT | Performed by: FAMILY MEDICINE

## 2024-05-13 PROCEDURE — 87086 URINE CULTURE/COLONY COUNT: CPT | Performed by: FAMILY MEDICINE

## 2024-05-13 PROCEDURE — 83735 ASSAY OF MAGNESIUM: CPT | Performed by: FAMILY MEDICINE

## 2024-05-13 PROCEDURE — 84153 ASSAY OF PSA TOTAL: CPT | Performed by: FAMILY MEDICINE

## 2024-05-13 PROCEDURE — 84439 ASSAY OF FREE THYROXINE: CPT | Performed by: FAMILY MEDICINE

## 2024-05-13 PROCEDURE — 84443 ASSAY THYROID STIM HORMONE: CPT | Performed by: FAMILY MEDICINE

## 2024-05-13 PROCEDURE — 80053 COMPREHEN METABOLIC PANEL: CPT | Performed by: FAMILY MEDICINE

## 2024-05-13 PROCEDURE — G2211 COMPLEX E/M VISIT ADD ON: HCPCS | Performed by: FAMILY MEDICINE

## 2024-05-13 PROCEDURE — 85025 COMPLETE CBC W/AUTO DIFF WBC: CPT | Performed by: FAMILY MEDICINE

## 2024-05-13 PROCEDURE — 81001 URINALYSIS AUTO W/SCOPE: CPT | Performed by: FAMILY MEDICINE

## 2024-05-13 RX ORDER — QUETIAPINE FUMARATE 25 MG/1
25 TABLET, FILM COATED ORAL NIGHTLY
COMMUNITY
Start: 2024-05-13

## 2024-05-13 NOTE — TELEPHONE ENCOUNTER
Patient was just seen at [facility], they did take a urine sample, but would not run it until Dr. Jennifer Chandra put in an order for possible UTI.   Please advise.

## 2024-05-13 NOTE — TELEPHONE ENCOUNTER
Lab results reviewed, PSA, urinalysis and urine culture still in process.    TSH is trending downward in the correct direction but is not yet to goal of being in normal range.  Recommend repeat TSH and free T4 in 4 weeks, if not in normal range we may increase his levothyroxine.  Thyroid function tests ordered to be completed at [facility], remind patient do not take any vitamins or supplements containing biotin for at least 3 or 5 days prior to the blood draw, no need to fast.    White count is not elevated.  Kidney function BUN/creatinine in normal range.  Liver enzymes normal.      Component      Latest Ref Rn 5/13/2024   WBC      4.0 - 11.0 x10(3) uL 4.5    RBC      3.80 - 5.80 x10(6)uL 3.70 (L)    Hemoglobin      13.0 - 17.5 g/dL 12.2 (L)    Hematocrit      39.0 - 53.0 % 37.0 (L)    Platelet Count      150.0 - 450.0 10(3)uL 162.0    MCV      80.0 - 100.0 fL 100.0    MCH      26.0 - 34.0 pg 33.0    MCHC      31.0 - 37.0 g/dL 33.0    RDW      % 12.9    Prelim Neutrophil Abs      1.50 - 7.70 x10 (3) uL 2.58    Neutrophils Absolute      1.50 - 7.70 x10(3) uL 2.58    Lymphocytes Absolute      1.00 - 4.00 x10(3) uL 1.32    Monocytes Absolute      0.10 - 1.00 x10(3) uL 0.36    Eosinophils Absolute      0.00 - 0.70 x10(3) uL 0.22    Basophils Absolute      0.00 - 0.20 x10(3) uL 0.02    Immature Granulocyte Absolute      0.00 - 1.00 x10(3) uL 0.01    Neutrophils %      % 57.2    Lymphocytes %      % 29.3    Monocytes %      % 8.0    Eosinophils %      % 4.9    Basophils %      % 0.4    Immature Granulocyte %      % 0.2    Glucose      70 - 99 mg/dL 110 (H)    Sodium      136 - 145 mmol/L 140    Potassium      3.5 - 5.1 mmol/L 3.9    Chloride      98 - 112 mmol/L 108    Carbon Dioxide, Total      21.0 - 32.0 mmol/L 29.0    ANION GAP      0 - 18 mmol/L 3    BUN      9 - 23 mg/dL 18    CREATININE      0.70 - 1.30 mg/dL 1.19    CALCIUM      8.5 - 10.1 mg/dL 9.5    CALCULATED OSMOLALITY      275 - 295 mOsm/kg 293    EGFR       >=60 mL/min/1.73m2 63    AST (SGOT)      15 - 37 U/L 17    ALT (SGPT)      16 - 61 U/L 9 (L)    ALKALINE PHOSPHATASE      45 - 117 U/L 63    Total Bilirubin      0.1 - 2.0 mg/dL 0.5    PROTEIN, TOTAL      6.4 - 8.2 g/dL 7.0    Albumin      3.4 - 5.0 g/dL 3.3 (L)    Globulin      2.8 - 4.4 g/dL 3.7    A/G Ratio      1.0 - 2.0  0.9 (L)    Patient Fasting for CMP? No    Magnesium, Serum      1.6 - 2.6 mg/dL 2.1

## 2024-05-13 NOTE — TELEPHONE ENCOUNTER
Patient was advised at  this morning to go to the ER. I spoke to wife Delores because I see that he never checked in to the ED but did have blood work done. Delores did take him to the [facility] location but confused and checked him in for blood work instead of the ER. Wife says she will not be able to get him back to the ER now because the caregiver is gone. Is it OK to wait and see what the blood work shows? Can you also place an order for UA/C&S? They did collect urine but there were no orders. Please advise, thanks.

## 2024-05-13 NOTE — PROGRESS NOTES
Virtual/Telephone Check-In    Lawrence Daniels verbally consents to a Virtual/Telephone Check-In service on 05/13/24.  Patient has been referred to the [facility] website at [email or web address] to review the yearly Consent to Treat document.  Patient understands and accepts financial responsibility for any deductible, co-insurance and/or co-pays associated with this service.        Please note that the following visit was completed using two-way, real-time interactive audio and/or video communication.  This has been done in good michael to provide continuity of care in the best interest of the provider-patient relationship, due to the ongoing public health crisis/national emergency and because of restrictions of visitation.  There are limitations of this visit as no physical exam could be performed.  Every conscious effort was taken to allow for sufficient and adequate time.  This billing was spent on reviewing labs, medications, radiology tests and decision making.  Appropriate medical decision-making and tests are ordered as detailed in the plan of care above.    Time spent was 15 minutes, more than 50% of time was spent on counseling regarding medical conditions and treatment.  Rest of time was spent reviewing chart, and reviewing any pertinent blood work and radiology tests.           Lawrence Daniels is a 78 year old male.      Chief Complaint   Patient presents with    Diarrhea     C/o past 2 days    UTI     Possible c/o past 3 days              HPI:       Patient's wife Delores is very pleasant and supportive and helps with history of present illness and review of systems.      Urinary issue:  Frequency of urination started 4 days ago.  Urinating approximately once or twice an hour.  Stinging with urination started this morning.      Diarrhea:  Loose to watery stools several times a day.  No blood in stool.  No black stool.  Bowel movements are described as explosive.      Patient and patient's wife feel he is keeping up  with his fluid intake, drinking approximately 64 ounces of water per day.    Wife who lives in household with him is not ill.        Current Outpatient Medications   Medication Sig Dispense Refill    QUEtiapine 25 MG Oral Tab Take 1 tablet (25 mg total) by mouth nightly.      OYSTER SHELL CALCIUM W/D 500-5 MG-MCG Oral Tab TAKE 1 TABLET BY MOUTH THREE TIMES DAILY 270 tablet 1    hydroCHLOROthiazide 12.5 MG Oral Tab Take 1 tablet (12.5 mg total) by mouth daily.      Potassium Chloride ER 10 MEQ Oral Tab CR Take 1 tablet (10 mEq total) by mouth daily.      losartan 100 MG Oral Tab Take 1 tablet (100 mg total) by mouth daily.      levothyroxine 125 MCG Oral Tab Take 1 tablet (125 mcg total) by mouth every morning. On an empty stomach nothing to eat or drink other than water for 30 to 60 minutes after taking.  Do not take any vitamins or supplements for 4 hours after taking. 90 tablet 0    simvastatin 20 MG Oral Tab Take 1 tablet (20 mg total) by mouth nightly. 90 tablet 3    Omeprazole 40 MG Oral Capsule Delayed Release Take 1 capsule (40 mg total) by mouth daily. 90 capsule 3    donepezil 10 MG Oral Tab Take 1 tablet (10 mg total) by mouth daily.      latanoprost 0.005 % Ophthalmic Solution Place 1 drop into both eyes nightly.      carbidopa-levodopa  MG Oral Tab Take by mouth 3 (three) times daily. Taking 1 1/2 tablets 3 times daily      Timolol Maleate 0.5 % Ophthalmic Solution Place 1 drop into both eyes nightly.      hydrALAzine HCl 25 MG Oral Tab Take 1 tablet (25 mg total) by mouth 2 (two) times daily.      omega-3 fatty acids 1000 MG Oral Cap Take 1,000 mg by mouth daily.      docusate sodium 100 MG Oral Cap Take 1 capsule (100 mg total) by mouth nightly.      folic acid 400 MCG Oral Tab Take 1 tablet (400 mcg total) by mouth once a week.      Vitamin B-12 1000 MCG Oral Tab Take 1 tablet (1,000 mcg total) by mouth once a week.      Pyridoxine HCl (VITAMIN B-6) 100 MG Oral Tab Take 1 tablet (100 mg total)  by mouth once a week.      Cholecalciferol 50 MCG (2000 UT) Oral Tab Take 1 tablet (2,000 Units total) by mouth at bedtime.        Patient Active Problem List   Diagnosis    Essential hypertension    Bilateral carotid artery disease (HCC)    Primary open angle glaucoma (POAG) of both eyes, mild stage    Hypercholesterolemia    Bilateral carotid artery stenosis    Arterial disease (HCC)    Aneurysm of popliteal artery (HCC)    Thrombocytopenia (HCC)    Abnormal gait    At risk for falling    Agatston CAC score 100-199    Chronic bilateral low back pain without sciatica    Hyperglycemia    Stage 3a chronic kidney disease (HCC)    Prediabetes    Pancytopenia (HCC)    History of prostate cancer    RBD (REM behavioral disorder)    Parkinson's disease with dyskinesia ([facility])    AV block, 1st degree    Anemia of chronic disease    Postsurgical hypothyroidism    Hypokalemia    Ptosis of both eyelids    Mass on back    Mass of shoulder region    Lipoma of back    Sebaceous cyst    Class 1 obesity due to excess calories with serious comorbidity and body mass index (BMI) of 30.0 to 30.9 in adult    Dementia due to Parkinson's disease, 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[facility])    Encounter for long-term current use of medication    Diarrhea, unspecified type    Dehydration    Hypoalbuminemia due to protein-calorie malnutrition (HCC)    Small vessel disease (HCC)    Cerebrovascular small vessel disease    LEVI (acute kidney injury) ([facility])    Hypertensive urgency    Adult failure to thrive    Weakness generalized    Elevated blood pressure reading with diagnosis of hypertension    Encephalopathy    Parkinson's disease (HCC)    Dementia due to Parkinson's disease without behavioral disturbance ([facility])      Past Medical History:    Anemia of chronic disease    Iron studies, B12 level, and folic acid levels normal.    Aneurysm of popliteal artery (HCC)    Arterial disease ([facility])    MRI 9/15/2017: Mild chronic microvascular ischemic changes in the cerebral  white matter.    Back problem    Bilateral carotid artery disease (HCC)    Bilateral carotid artery stenosis    Bradycardia    Class 1 obesity due to excess calories with serious comorbidity and body mass index (BMI) of 31.0 to 31.9 in adult    Associated with hypertension and hyperlipidemia    Class 1 obesity due to excess calories with serious comorbidity and body mass index (BMI) of 33.0 to 33.9 in adult    Associated with HTN, Carotid Artery Disease, and Hypercholesterolemia    Class 1 obesity due to excess calories without serious comorbidity with body mass index (BMI) of 33.0 to 33.9 in adult    Cognitive complaints with normal neuropsychological exam    Normal Neuropsych testing September 2017, results scanned in chart    Cogwheel rigidity    COVID    cough headache. 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    Multiple thyroid nodules    x 2 on Left    Myocarditis (HCC)    Neoplasm of uncertain behavior of skin    Obesity (BMI 30-39.9)    ISAIAS (obstructive sleep apnea)    ISAIAS on CPAP    Dx 2009 cpap    Osteoarthritis    Other specified glaucoma    Overflow incontinence of urine    Parkinsonian tremor (HCC)    PD (Parkinson's disease) (HCC)    Postsurgical hypothyroidism    6/27/2022 total thyroidectomy with Dr. Perry Soriano.    Prediabetes    Primary open angle glaucoma (POAG) of both eyes, mild stage    Prostate CA (HCC)    RBD (REM behavioral disorder)     Last Assessment & Plan:  Formatting of this note might be different from the original. Only diagnosed with RBD, but behavior does seem classic for it.  He is taking melatonin 10 mg nightly so it is possible he is treating his RBD.  We will do an in lab diagnostic PSG with extra EMG leads on arms to assess for REM sleep without atonia.    Sleep apnea    Thrombocytopenia (HCC)    Torn ligament    right    Visual impairment    Vocal cord dysfunction    errosion      Social History:  Social History     Socioeconomic History    Marital status:    Tobacco Use    Smoking status: Former     Types: Cigars    Smokeless tobacco: Never   Vaping Use    Vaping status: Never Used   Substance and Sexual Activity    Alcohol use: Yes     Alcohol/week: 2.0 standard drinks of alcohol     Types: 2 Standard drinks or equivalent per week     Comment: 2 servings a week    Drug use: No    Sexual activity: Not Currently   Other Topics Concern    Caffeine Concern No     Comment: 1 cup of coffee daily    Exercise Yes     Comment: some walking    Seat Belt Yes    Special Diet No    Stress Concern No    Weight Concern No     Service No    Blood Transfusions No    Occupational Exposure No    Hobby Hazards No    Sleep Concern No    Back Care No    Bike Helmet No    Self-Exams No     Social Determinants of Health     Financial Resource Strain: Low Risk  (2/23/2024)    Financial Resource Strain     Difficulty of Paying Living Expenses: Not very hard     Med Affordability: No   Food Insecurity: No Food Insecurity (3/16/2024)    Food Insecurity     Food Insecurity: Never true   Transportation Needs: No Transportation Needs (3/16/2024)    Transportation Needs     Lack of Transportation: No   Physical Activity: Inactive (10/1/2020)    Received from [facility]    Exercise Vital Sign     Days of Exercise per Week: 0 days     Minutes of Exercise per Session: 0 min   Housing Stability: Low Risk   (3/16/2024)    Housing Stability     Housing Instability: No        REVIEW OF SYSTEMS:   GENERAL: Overall does not feel well  SKIN: No new rashes.  EYES: Denies changes in vision  HEENT: No complaints of sore throat or other upper respiratory symptoms.  LUNGS: No complaints of cough or shortness of breath  CARDIOVASCULAR: No complaints of chest pain or palpitations.  GI: As in HPI  MUSCULOSKELETAL: Denies any new muscle aches or joint pains.        EXAM:   There were no vitals taken for this visit.  GENERAL: Pleasant.  Comfortably speaking in full sentences.  HEENT: Voice is not denasally  LUNGS: No audible cough during  visit.  No audible dyspnea.  No audible wheezing.  NEURO: Alert and oriented  PSYCH:  No pressured speech.        Immunization History   Administered Date(s) Administered    Covid-19 Vaccine Pfizer 30 mcg/0.3 ml 02/02/2021, 02/23/2021, 10/22/2021    Covid-19 Vaccine Pfizer Bivalent 30mcg/0.3mL 09/19/2022    Covid-19 Vaccine Pfizer José-Sucrose 30 mcg/0.3 ml 04/21/2022    FLU VAC High Dose 65 YRS & Older PRSV Free (68800) 10/06/2018, 09/25/2019, 09/30/2020, 09/29/2023    FLUAD High Dose 65 yr and older (52452) 10/22/2021, 09/19/2022    FLULAVAL 6 months & older 0.5 ml Prefilled syringe (29894) 10/23/2017    FLUZONE 6 months and older PFS 0.5 ml (90787) 10/23/2017    Fluvirin, 3 Years & >, Im 10/31/2011, 11/08/2013    Fluzone Vaccine Medicare () 09/16/2014, 11/07/2015, 10/10/2016, 10/06/2018, 09/25/2019    Pfizer Covid-19 Vaccine 30mcg/0.3ml 12yrs+ (4510-1332) 09/29/2023    Pneumococcal (Prevnar 13) 02/08/2018    Pneumovax 23 10/01/2010, 10/08/2011, 10/26/2022    Td, Preserv Free 09/06/2020    Zoster Vaccine Live (Zostavax) 03/14/2011           DATA:    Patient was hospitalized in February and again in March.        ASSESSMENT AND PLAN:     Encounter Diagnoses   Name Primary?    Urinary frequency Yes    Diarrhea, unspecified type          Patient advised to go to the emergency department for  evaluation and care.      1. Urinary frequency  2. Diarrhea, unspecified type  Patient advised to go to the emergency department for evaluation and care.  Patient's wife is unable to take him to the emergency department on her own, currently the caregiver is present and will assist with getting him into the car and to the emergency department in [place].      Return in about 1 week (around 5/20/2024) for After visit to the ER.

## 2024-05-13 NOTE — TELEPHONE ENCOUNTER
Patients spouse calling, patient started yesterday with diarrhea and frequent urination (possible UTI) patient would like Dr. Jennifer Chandra to know as he has parkinson's   Please advise.

## 2024-05-13 NOTE — TELEPHONE ENCOUNTER
CARL I did speak w/ wife Delores. I advised her that we are still waiting on some results from the lab, including urine, but that should his symptoms worsen he needs to go to the ER. Call ambulance if needed. Will call her with final results and POC.

## 2024-05-13 NOTE — TELEPHONE ENCOUNTER
Recommend if symptoms worsen go to emergency department.    Await lab results including urine results.    Please note patient may still be instructed to go to the emergency department to be evaluated.

## 2024-06-12 LAB
T4, FREE: 1.2 NG/DL (ref 0.8–1.8)
TSH: 9.77 MIU/L (ref 0.4–4.5)

## 2024-06-24 ENCOUNTER — TELEPHONE (OUTPATIENT)
Dept: FAMILY MEDICINE CLINIC | Facility: CLINIC | Age: 79
End: 2024-06-24

## 2024-06-24 DIAGNOSIS — E89.0 POSTSURGICAL HYPOTHYROIDISM: Primary | ICD-10-CM

## 2024-06-24 RX ORDER — LEVOTHYROXINE SODIUM 137 UG/1
137 TABLET ORAL EVERY MORNING
Qty: 90 TABLET | Refills: 0 | Status: SHIPPED | OUTPATIENT
Start: 2024-06-24

## 2024-06-24 NOTE — TELEPHONE ENCOUNTER
TSH has improved but it is not yet in normal range.  Recommend patient's stop taking levothyroxine 125 mcg every morning.  A new prescription for levothyroxine 137 mcg every morning has been sent to his pharmacy.  Lets recheck thyroid function tests again in 8 weeks, lab orders have been placed for patient to complete at [facility], remind no vitamins or supplements containing biotin for 3 to 5 days prior to blood draw.          Thyroid:    Lab Results   Component Value Date    TSH 9.77 (H) 06/11/2024    TSH 11.600 (H) 05/13/2024    TSH 17.226 04/25/2024    TSH 17.226 04/25/2024    T4F 1.2 06/11/2024    T4F 0.9 05/13/2024    T4F 0.8 03/16/2024

## 2024-06-24 NOTE — TELEPHONE ENCOUNTER
Patients spouse calling and would like to speak to nurse regarding lab results, as there can be a potential change in medications     Please advise.

## 2024-07-12 ENCOUNTER — TELEPHONE (OUTPATIENT)
Dept: FAMILY MEDICINE CLINIC | Facility: CLINIC | Age: 79
End: 2024-07-12

## 2024-07-12 NOTE — TELEPHONE ENCOUNTER
I agree with plan, recommend observation for now, recommend maintain good hydration.  If worsening recommend go to the [facility] or [place] emergency department, especially if associated with fatigue, abdominal pain, fever, or chills.

## 2024-07-12 NOTE — TELEPHONE ENCOUNTER
Patient developed diarrhea within the last week. It occurs every other day-about 2 episodes. It is liquid. No blood noted. He is felling fine. No complaints of abdominal pain, distension/bloating, N/V, or fever/chills. He feels well and is eating and drinking. No new meds. No recent antibiotic use. Patient did have diarrhea a couple of months ago but this resolved on it's own. Patient does have OV here 7/17. Do you want stool studies in the meantime? Encouraged wife to push water and reviewed ER precautions with her. Verbalized understanding. Please advise, thanks.

## 2024-07-12 NOTE — TELEPHONE ENCOUNTER
Patients spouse calling and patient is having bouts of diarrhea (every other day) for about a week. They declined appointment with Dr. Jennifer Chandra at this time would like to speak to nurse first     Please advise

## 2024-07-17 ENCOUNTER — OFFICE VISIT (OUTPATIENT)
Dept: FAMILY MEDICINE CLINIC | Facility: CLINIC | Age: 79
End: 2024-07-17
Payer: MEDICARE

## 2024-07-17 VITALS
DIASTOLIC BLOOD PRESSURE: 72 MMHG | TEMPERATURE: 97 F | SYSTOLIC BLOOD PRESSURE: 130 MMHG | OXYGEN SATURATION: 99 % | HEIGHT: 68 IN | BODY MASS INDEX: 31.37 KG/M2 | RESPIRATION RATE: 15 BRPM | WEIGHT: 207 LBS | HEART RATE: 56 BPM

## 2024-07-17 DIAGNOSIS — G20.A1 PARKINSON'S DISEASE, UNSPECIFIED WHETHER DYSKINESIA PRESENT, UNSPECIFIED WHETHER MANIFESTATIONS FLUCTUATE (HCC): ICD-10-CM

## 2024-07-17 DIAGNOSIS — E78.00 HYPERCHOLESTEROLEMIA: ICD-10-CM

## 2024-07-17 DIAGNOSIS — R19.7 DIARRHEA, UNSPECIFIED TYPE: ICD-10-CM

## 2024-07-17 DIAGNOSIS — E89.0 POSTSURGICAL HYPOTHYROIDISM: ICD-10-CM

## 2024-07-17 DIAGNOSIS — I10 ESSENTIAL HYPERTENSION: Primary | ICD-10-CM

## 2024-07-17 DIAGNOSIS — Z79.899 ENCOUNTER FOR LONG-TERM CURRENT USE OF MEDICATION: ICD-10-CM

## 2024-07-17 PROCEDURE — G2211 COMPLEX E/M VISIT ADD ON: HCPCS | Performed by: FAMILY MEDICINE

## 2024-07-17 PROCEDURE — 99214 OFFICE O/P EST MOD 30 MIN: CPT | Performed by: FAMILY MEDICINE

## 2024-07-17 NOTE — PATIENT INSTRUCTIONS
-Please do your recheck of thyroid function test on or shortly after 8/19/2024.  -Remember do not take any vitamins or supplements containing biotin for 5 days prior to the blood draw.

## 2024-07-17 NOTE — PROGRESS NOTES
Lawrence Daniels is a 78 year old male.     Chief Complaint   Patient presents with    Hypertension    Hyperlipidemia    Hypothyroidism    Diarrhea         HPI:     Patient is accompanied by his wife who is pleasant and supportive.    Had diarrhea 3 times a week for about a week, seemed like it was every other day.  Watery and explosive.  The diarrhea has subsided.        Hypertension:  Stable.   Severity is mild to moderate, patient is on 3 agents to control his hypertension, hydralazine 25 mg twice a day, hydrochlorothiazide 12.5 mg daily, losartan 100 mg daily.  Pt has been taking medications as instructed, no medication side effects.     Hypercholesterolemia:  LDL and triglycerides May 2024 in normal range, HDL greater than or equal to 60.  Patient taking simvastatin 20 mg nightly.  No adverse effects for simvastatin noted such as muscle aches or pains.      Postsurgical hypothyroidism:  Undertreated as evidenced by elevated TSH.  On 6/24/2024 levothyroxine was increased to 137 mcg p.o. every morning from 125 mcg p.o. every morning.  No adverse effects to the levothyroxine noticed.      Parkinson's disease:  Patient stable and doing well on current regimen.  Patient follows with neurologist at [facility].          Wt Readings from Last 6 Encounters:   07/17/24 207 lb (93.9 kg)   04/18/24 190 lb (86.2 kg)   03/19/24 201 lb (91.2 kg)   02/28/24 203 lb (92.1 kg)   02/21/24 202 lb (91.6 kg)   01/17/24 204 lb 3.2 oz (92.6 kg)      Body mass index is 31.47 kg/m².        Current Outpatient Medications   Medication Sig Dispense Refill    levothyroxine 137 MCG Oral Tab Take 137 mcg by mouth every morning. On an empty stomach nothing to eat or drink other than water for 30 to 60 minutes after taking. Do not take any vitamins or supplements for 4 hours after taking. 90 tablet 0    QUEtiapine 25 MG Oral Tab Take 1 tablet (25 mg total) by mouth nightly.      OYSTER SHELL CALCIUM W/D 500-5 MG-MCG Oral Tab TAKE 1 TABLET BY MOUTH  THREE TIMES DAILY 270 tablet 1    hydroCHLOROthiazide 12.5 MG Oral Tab Take 1 tablet (12.5 mg total) by mouth daily.      Potassium Chloride ER 10 MEQ Oral Tab CR Take 1 tablet (10 mEq total) by mouth daily.      losartan 100 MG Oral Tab Take 1 tablet (100 mg total) by mouth daily.      simvastatin 20 MG Oral Tab Take 1 tablet (20 mg total) by mouth nightly. 90 tablet 3    Omeprazole 40 MG Oral Capsule Delayed Release Take 1 capsule (40 mg total) by mouth daily. 90 capsule 3    donepezil 10 MG Oral Tab Take 1 tablet (10 mg total) by mouth daily.      latanoprost 0.005 % Ophthalmic Solution Place 1 drop into both eyes nightly.      carbidopa-levodopa  MG Oral Tab Take by mouth 3 (three) times daily. Taking 1 1/2 tablets 3 times daily      Timolol Maleate 0.5 % Ophthalmic Solution Place 1 drop into both eyes nightly.      hydrALAzine HCl 25 MG Oral Tab Take 1 tablet (25 mg total) by mouth 2 (two) times daily.      omega-3 fatty acids 1000 MG Oral Cap Take 1,000 mg by mouth daily.      docusate sodium 100 MG Oral Cap Take 1 capsule (100 mg total) by mouth nightly.      folic acid 400 MCG Oral Tab Take 1 tablet (400 mcg total) by mouth once a week.      Vitamin B-12 1000 MCG Oral Tab Take 1 tablet (1,000 mcg total) by mouth once a week.      Pyridoxine HCl (VITAMIN B-6) 100 MG Oral Tab Take 1 tablet (100 mg total) by mouth once a week.      Cholecalciferol 50 MCG (2000 UT) Oral Tab Take 1 tablet (2,000 Units total) by mouth at bedtime.        Past Medical History:    Anemia of chronic disease    Iron studies, B12 level, and folic acid levels normal.    Aneurysm of popliteal artery (HCC)    Arterial disease (HCC)    MRI 9/15/2017: Mild chronic microvascular ischemic changes in the cerebral white matter.    Back problem    Bilateral carotid artery disease (HCC)    Bilateral carotid artery stenosis    Bradycardia    Class 1 obesity due to excess calories with serious comorbidity and body mass index (BMI) of 31.0 to  31.9 in adult    Associated with hypertension and hyperlipidemia    Class 1 obesity due to excess calories with serious comorbidity and body mass index (BMI) of 33.0 to 33.9 in adult    Associated with HTN, Carotid Artery Disease, and Hypercholesterolemia    Class 1 obesity due to excess calories without serious comorbidity with body mass index (BMI) of 33.0 to 33.9 in adult    Cognitive complaints with normal neuropsychological exam    Normal Neuropsych testing September 2017, results scanned in chart    Cogwheel rigidity    COVID    cough headache. 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    Multiple thyroid nodules    x 2 on Left    Myocarditis (HCC)    Neoplasm of uncertain behavior of skin    Obesity (BMI 30-39.9)    ISAIAS (obstructive sleep apnea)    ISAIAS on CPAP    Dx 2009 cpap    Osteoarthritis    Other specified glaucoma    Overflow incontinence of urine    Parkinsonian tremor (HCC)    PD (Parkinson's disease) (HCC)    Postsurgical hypothyroidism    6/27/2022 total thyroidectomy with Dr. Perry Soriano.    Prediabetes    Primary open angle glaucoma (POAG) of both eyes, mild stage    Prostate CA (HCC)    RBD (REM behavioral disorder)    Last Assessment & Plan:  Formatting of this note might be different from the original. Only diagnosed with RBD, but behavior does seem classic for it.  He is taking melatonin 10 mg nightly so it is possible he is treating his  RBD.  We will do an in lab diagnostic PSG with extra EMG leads on arms to assess for REM sleep without atonia.    Sleep apnea    Thrombocytopenia (HCC)    Torn ligament    right    Visual impairment    Vocal cord dysfunction    errosion      Past Surgical History:   Procedure Laterality Date    Ankle fracture surgery      Hdr elect brachytherapy  2006    Incision and drainage Left 04/05/2022    left neck     Knee replacement surgery Left 2014    Other      Botox injections into bladder    Thyroidectomy Bilateral 06/27/2022    Dr. Soriano      Social History:    Social History     Socioeconomic History    Marital status:    Tobacco Use    Smoking status: Former     Types: Cigars    Smokeless tobacco: Never   Vaping Use    Vaping status: Never Used   Substance and Sexual Activity    Alcohol use: Yes     Alcohol/week: 2.0 standard drinks of alcohol     Types: 2 Standard drinks or equivalent per week     Comment: 2 servings a week    Drug use: No    Sexual activity: Not Currently   Other Topics Concern    Caffeine Concern No     Comment: 1 cup of coffee daily    Exercise Yes     Comment: some walking    Seat Belt Yes    Special Diet No    Stress Concern No    Weight Concern No     Service No    Blood Transfusions No    Occupational Exposure No    Hobby Hazards No    Sleep Concern No    Back Care No    Bike Helmet No    Self-Exams No     Social Determinants of Health     Financial Resource Strain: Low Risk  (2/23/2024)    Financial Resource Strain     Difficulty of Paying Living Expenses: Not very hard     Med Affordability: No   Food Insecurity: No Food Insecurity (3/16/2024)    Food Insecurity     Food Insecurity: Never true   Transportation Needs: No Transportation Needs (3/16/2024)    Transportation Needs     Lack of Transportation: No   Physical Activity: Inactive (10/1/2020)    Received from [employer], [employer]    Exercise Vital Sign     Days of Exercise per Week: 0 days      Minutes of Exercise per Session: 0 min   Housing Stability: Low Risk  (3/16/2024)    Housing Stability     Housing Instability: No         Family History   Problem Relation Age of Onset    Heart Attack Father     Colon Cancer Mother     Heart Disease Paternal Grandmother     Heart Attack Paternal Grandmother     Cancer Paternal Grandfather     Heart Disease Maternal Grandmother     Heart Attack Maternal Grandfather     Heart Disease Maternal Grandfather      REVIEW OF SYSTEMS:   GENERAL HEALTH: Overall feels well.   SKIN: denies any unusual skin lesions or rashes   RESPIRATORY: Denies: PALOMO/DWYER/Cough  CARDIOVASCULAR: Denies CP/palpitations  VASCULAR: Denies LE edema, denies claudication type symptoms  GI: Denies abdominal pain/nausea/vomiting/blood in stool/black stool/bloating  : denies urinary symptoms  NEURO: denies headaches/dizziness  PSYCH: denies depression and anxiety    Immunization History   Administered Date(s) Administered    Covid-19 Vaccine Pfizer 30 mcg/0.3 ml 02/02/2021, 02/23/2021, 10/22/2021    Covid-19 Vaccine Pfizer Bivalent 30mcg/0.3mL 09/19/2022    Covid-19 Vaccine Pfizer José-Sucrose 30 mcg/0.3 ml 04/21/2022    FLU VAC High Dose 65 YRS & Older PRSV Free (39084) 10/06/2018, 09/25/2019, 09/30/2020, 09/29/2023    FLUAD High Dose 65 yr and older (47749) 10/22/2021, 09/19/2022    FLULAVAL 6 months & older 0.5 ml Prefilled syringe (60332) 10/23/2017    FLUZONE 6 months and older PFS 0.5 ml (65686) 10/23/2017    Fluvirin, 3 Years & >, Im 10/31/2011, 11/08/2013    Fluzone Vaccine Medicare () 09/16/2014, 11/07/2015, 10/10/2016, 10/06/2018, 09/25/2019    Pfizer Covid-19 Vaccine 30mcg/0.3ml 12yrs+ (5315-4270) 09/29/2023    Pneumococcal (Prevnar 13) 02/08/2018    Pneumovax 23 10/01/2010, 10/08/2011, 10/26/2022    Td, Preserv Free 09/06/2020    Zoster Vaccine Live (Zostavax) 03/14/2011       EXAM:   /72   Pulse 56   Temp 97.2 °F (36.2 °C) (Temporal)   Resp 15   Ht 5' 8\" (1.727 m)   Wt 207  lb (93.9 kg)   SpO2 99%   BMI 31.47 kg/m²   GENERAL: NAD, pleasant frail elderly  male who appears brighter today compared to last office visit.  SKIN: no visible rashes  HEAD: NCAT  LUNGS: CTA A/P no wheezes/ronchi/rales/crackles  CARDIO: RRR, +S1/S2, no mm  VASCULAR:  < 1+ b/l pretibial pitting edema approx 1/3 of the way up  GI: NT/ND, no pulsations, no r/r/g, no masses appreciated, no HSM appreciated  EXTREMITIES: no cyanosis or clubbing  NEURO: Alert and Oriented x3.   PSYCH: Mildly flat affect, affect is brighter than last visit.  Normal thought content.        DATA:      Lab Results   Component Value Date    A1C 5.8 (H) 03/25/2021    A1C 5.7 (H) 06/19/2019     03/25/2021     06/19/2019     Lab Results   Component Value Date    CHOLEST 171 05/13/2024    CHOLEST 176 02/16/2024    CHOLEST 160 03/20/2023     Lab Results   Component Value Date    HDL 71 (H) 05/13/2024    HDL 63 02/16/2024    HDL 67 03/20/2023     Lab Results   Component Value Date    LDL 80 05/13/2024    LDL 90 02/16/2024    LDL 72 03/20/2023     Lab Results   Component Value Date    TRIG 116 05/13/2024    TRIG 125 02/16/2024    TRIG 119 03/20/2023     Lab Results   Component Value Date    AST 17 05/13/2024    AST 11 (L) 03/17/2024    AST 13 (L) 03/16/2024     Lab Results   Component Value Date    ALT 9 (L) 05/13/2024    ALT <6 (L) 03/17/2024    ALT 7 (L) 03/16/2024       Thyroid:    Lab Results   Component Value Date    TSH 9.77 (H) 06/11/2024    TSH 11.600 (H) 05/13/2024    TSH 17.226 04/25/2024    TSH 17.226 04/25/2024    T4F 1.2 06/11/2024    T4F 0.9 05/13/2024    T4F 0.8 03/16/2024           ASSESSMENT AND PLAN:       Encounter Diagnoses   Name Primary?    Essential hypertension Yes    Hypercholesterolemia     Postsurgical hypothyroidism     Parkinson's disease, unspecified whether dyskinesia present, unspecified whether manifestations fluctuate (HCC)     Encounter for long-term current use of medication     Diarrhea,  unspecified type          -Please do your recheck of thyroid function test on or shortly after 8/19/2024.  -Remember do not take any vitamins or supplements containing biotin for 5 days prior to the blood draw.      1. Essential hypertension  Hypertension is well-controlled.  Recommend continue hydralazine 25 mg twice a day, hydrochlorothiazide 12.5 mg daily, losartan 100 mg daily.  Follow-up in 6 months, sooner if needed.    2. Hypercholesterolemia  LDL and triglycerides May 2024 in normal range, HDL greater than or equal to 60.  Recommend continue simvastatin 20 mg nightly.  Follow-up in 6 months, sooner if needed.    3. Postsurgical hypothyroidism  Undertreated as evidenced by elevated TSH.  On 6/24/2024 levothyroxine was increased to 137 mcg p.o. every morning from 125 mcg p.o. every morning.  Due for reevaluation of thyroid function tests on or shortly after 8/19/2024, we will adjust dose further in the future as indicated.  Follow-up in 6 months, sooner if needed.    4. Parkinson's disease, unspecified whether dyskinesia present, unspecified whether manifestations fluctuate (HCC)  Patient stable and doing well on current regimen.  Patient follows with neurologist at [facility].    5. Encounter for long-term current use of medication  Medication use, risks, benefits, side effects and precautions discussed, patient verbalizes understanding. Questions encouraged and answered to patient's satisfaction.      6. Diarrhea, unspecified type  Currently resolved.        Return in about 6 months (around 1/17/2025) for [facility] visit/Medicare annual visit, hypertension, hyperlipidemia, hypothyroidism.  Sooner if needed.

## 2024-08-12 ENCOUNTER — TELEPHONE (OUTPATIENT)
Dept: FAMILY MEDICINE CLINIC | Facility: CLINIC | Age: 79
End: 2024-08-12

## 2024-08-12 ENCOUNTER — TELEMEDICINE (OUTPATIENT)
Dept: FAMILY MEDICINE CLINIC | Facility: CLINIC | Age: 79
End: 2024-08-12
Payer: MEDICARE

## 2024-08-12 DIAGNOSIS — N39.0 RECURRENT URINARY TRACT INFECTION: Primary | ICD-10-CM

## 2024-08-12 RX ORDER — CIPROFLOXACIN 500 MG/1
500 TABLET, FILM COATED ORAL 2 TIMES DAILY
Qty: 10 TABLET | Refills: 0 | Status: SHIPPED | OUTPATIENT
Start: 2024-08-12 | End: 2024-08-17

## 2024-08-12 NOTE — TELEPHONE ENCOUNTER
Patients spouse calling and patient is having the same symptoms as a couple of months ago - patients daughter in law had an antibiotic that she gave him as he had tested positive for UTI.  Wife is asking if they can have an antibiotic that they can keep on hand if this happens again as it seemed to have worked   Would like to speak to nurse   Please advise.

## 2024-08-12 NOTE — PROGRESS NOTES
MyChart video visit with live interactive synchronous audio and video    Lawrence Daniels verbally consents to a MyChart video visit with live interactive synchronous audio and video service on 08/12/24.  Patient has been referred to the [facility] website at [email or web address] to review the yearly Consent to Treat document.  Patient understands and accepts financial responsibility for any deductible, co-insurance and/or co-pays associated with this service.        Please note that the following visit was completed using two-way, real-time interactive audio and/or video communication.  This has been done in good michael to provide continuity of care in the best interest of the provider-patient relationship, due to the ongoing public health crisis/national emergency and because of restrictions of visitation.  There are limitations of this visit as no physical exam could be performed.  Every conscious effort was taken to allow for sufficient and adequate time.  This billing was spent on reviewing labs, medications, radiology tests and decision making.  Appropriate medical decision-making and tests are ordered as detailed in the plan of care above.    Time spent was 16  minutes, more than 50% of time was spent on counseling regarding medical conditions and treatment.  Rest of time was spent reviewing chart, and reviewing any pertinent blood work and radiology tests.           Lawrence Daniels is a 78 year old male.      Chief Complaint   Patient presents with    UTI     Recurrent            HPI:       UTI:  Recurrent.  Thursday last week couldn't walk, \"lost touch with reality\", worsened Friday, similar to when he had UTI.  Also history of hospitalization with UTI.  He took amoxicillin from a family member 500 mg taking 3 times a day for the last 3 days.          Current Outpatient Medications   Medication Sig Dispense Refill    ciprofloxacin 500 MG Oral Tab Take 1 tablet (500 mg total) by mouth 2 (two) times daily for 5 days. 10  tablet 0    levothyroxine 137 MCG Oral Tab Take 137 mcg by mouth every morning. On an empty stomach nothing to eat or drink other than water for 30 to 60 minutes after taking. Do not take any vitamins or supplements for 4 hours after taking. 90 tablet 0    QUEtiapine 25 MG Oral Tab Take 1 tablet (25 mg total) by mouth nightly.      OYSTER SHELL CALCIUM W/D 500-5 MG-MCG Oral Tab TAKE 1 TABLET BY MOUTH THREE TIMES DAILY 270 tablet 1    hydroCHLOROthiazide 12.5 MG Oral Tab Take 1 tablet (12.5 mg total) by mouth daily.      Potassium Chloride ER 10 MEQ Oral Tab CR Take 1 tablet (10 mEq total) by mouth daily.      losartan 100 MG Oral Tab Take 1 tablet (100 mg total) by mouth daily.      simvastatin 20 MG Oral Tab Take 1 tablet (20 mg total) by mouth nightly. 90 tablet 3    Omeprazole 40 MG Oral Capsule Delayed Release Take 1 capsule (40 mg total) by mouth daily. 90 capsule 3    donepezil 10 MG Oral Tab Take 1 tablet (10 mg total) by mouth daily.      latanoprost 0.005 % Ophthalmic Solution Place 1 drop into both eyes nightly.      carbidopa-levodopa  MG Oral Tab Take by mouth 3 (three) times daily. Taking 1 1/2 tablets 3 times daily      Timolol Maleate 0.5 % Ophthalmic Solution Place 1 drop into both eyes nightly.      hydrALAzine HCl 25 MG Oral Tab Take 1 tablet (25 mg total) by mouth 2 (two) times daily.      omega-3 fatty acids 1000 MG Oral Cap Take 1,000 mg by mouth daily.      docusate sodium 100 MG Oral Cap Take 1 capsule (100 mg total) by mouth nightly.      folic acid 400 MCG Oral Tab Take 1 tablet (400 mcg total) by mouth once a week.      Vitamin B-12 1000 MCG Oral Tab Take 1 tablet (1,000 mcg total) by mouth once a week.      Pyridoxine HCl (VITAMIN B-6) 100 MG Oral Tab Take 1 tablet (100 mg total) by mouth once a week.      Cholecalciferol 50 MCG (2000 UT) Oral Tab Take 1 tablet (2,000 Units total) by mouth at bedtime.        Patient Active Problem List   Diagnosis    Essential hypertension     Bilateral carotid artery disease (HCC)    Primary open angle glaucoma (POAG) of both eyes, mild stage    Hypercholesterolemia    Bilateral carotid artery stenosis    Arterial disease (HCC)    Aneurysm of popliteal artery (HCC)    Thrombocytopenia (HCC)    Abnormal gait    At risk for falling    Agatston CAC score 100-199    Chronic bilateral low back pain without sciatica    Hyperglycemia    Stage 3a chronic kidney disease (HCC)    Prediabetes    Pancytopenia (HCC)    History of prostate cancer    RBD (REM behavioral disorder)    Parkinson's disease with dyskinesia (HCC)    AV block, 1st degree    Anemia of chronic disease    Postsurgical hypothyroidism    Hypokalemia    Ptosis of both eyelids    Mass on back    Mass of shoulder region    Lipoma of back    Sebaceous cyst    Class 1 obesity due to excess calories with serious comorbidity and body mass index (BMI) of 30.0 to 30.9 in adult    Dementia due to Parkinson's disease, 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[facility])    Encounter for long-term current use of medication    Diarrhea, unspecified type    Dehydration    Hypoalbuminemia due to protein-calorie malnutrition (HCC)    Small vessel disease (HCC)    Cerebrovascular small vessel disease    LEVI (acute kidney injury) ([facility])    Hypertensive urgency    Adult failure to thrive    Weakness generalized    Encephalopathy    Parkinson's disease (HCC)    Dementia due to Parkinson's disease without behavioral disturbance (HCC)      Past Medical History:    Anemia of chronic disease    Iron studies, B12 level, and folic acid levels normal.    Aneurysm of popliteal artery (HCC)    Arterial disease (HCC)    MRI 9/15/2017: Mild chronic microvascular ischemic changes in the cerebral white matter.    Back problem    Bilateral carotid artery disease (HCC)    Bilateral carotid artery stenosis    Bradycardia    Class 1 obesity due to excess calories with serious comorbidity and body mass index (BMI) of 31.0 to 31.9 in adult    Associated with  hypertension and hyperlipidemia    Class 1 obesity due to excess calories with serious comorbidity and body mass index (BMI) of 33.0 to 33.9 in adult    Associated with HTN, Carotid Artery Disease, and Hypercholesterolemia    Class 1 obesity due to excess calories without serious comorbidity with body mass index (BMI) of 33.0 to 33.9 in adult    Cognitive complaints with normal neuropsychological exam    Normal Neuropsych testing September 2017, results scanned in chart    Cogwheel rigidity    COVID    cough headache. 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    Multiple thyroid nodules    x 2 on Left    Myocarditis (HCC)    Neoplasm of uncertain behavior of skin    Obesity (BMI 30-39.9)    ISAIAS (obstructive sleep apnea)    ISAIAS on CPAP    Dx 2009 cpap    Osteoarthritis    Other specified glaucoma    Overflow incontinence of urine    Parkinsonian tremor (HCC)    PD (Parkinson's disease) (HCC)    Postsurgical hypothyroidism    6/27/2022 total thyroidectomy with Dr. Perry Soriano.    Prediabetes    Primary open angle glaucoma (POAG) of both eyes, mild stage    Prostate CA (HCC)    RBD (REM behavioral disorder)    Last Assessment & Plan:  Formatting of this note might be different from the original. Only diagnosed with RBD, but behavior does seem classic for it.  He is taking melatonin 10 mg nightly so it is possible he is treating his RBD.  We will do an in lab  diagnostic PSG with extra EMG leads on arms to assess for REM sleep without atonia.    Sleep apnea    Thrombocytopenia (HCC)    Torn ligament    right    Visual impairment    Vocal cord dysfunction    errosion      Social History:  Social History     Socioeconomic History    Marital status:    Tobacco Use    Smoking status: Former     Types: Cigars    Smokeless tobacco: Never   Vaping Use    Vaping status: Never Used   Substance and Sexual Activity    Alcohol use: Yes     Alcohol/week: 2.0 standard drinks of alcohol     Types: 2 Standard drinks or equivalent per week     Comment: 2 servings a week    Drug use: No    Sexual activity: Not Currently   Other Topics Concern    Caffeine Concern No     Comment: 1 cup of coffee daily    Exercise Yes     Comment: some walking    Seat Belt Yes    Special Diet No    Stress Concern No    Weight Concern No     Service No    Blood Transfusions No    Occupational Exposure No    Hobby Hazards No    Sleep Concern No    Back Care No    Bike Helmet No    Self-Exams No     Social Determinants of Health     Financial Resource Strain: Low Risk  (2/23/2024)    Financial Resource Strain     Difficulty of Paying Living Expenses: Not very hard     Med Affordability: No   Food Insecurity: No Food Insecurity (3/16/2024)    Food Insecurity     Food Insecurity: Never true   Transportation Needs: No Transportation Needs (3/16/2024)    Transportation Needs     Lack of Transportation: No   Physical Activity: Inactive (10/1/2020)    Received from [facility]    Exercise Vital Sign     Days of Exercise per Week: 0 days     Minutes of Exercise per Session: 0 min   Housing Stability: Low Risk  (3/16/2024)    Housing Stability     Housing Instability: No        REVIEW OF SYSTEMS:   GENERAL: Overall doing better since he started taking family member's amoxicillin  SKIN: No new rashes.  EYES: Denies changes in vision  HEENT: Denies sore throat or other  upper respiratory symptoms.  LUNGS: Denies cough or shortness of breath  CARDIOVASCULAR: Denies chest pain or palpitations.  GI: Denies abdominal pain, nausea, vomiting, or diarrhea  MUSCULOSKELETAL: Denies any new muscle aches or joint pains.  NEURO: Denies headache or dizziness.  PSYCH: Denies thoughts of harming himself or others      EXAM:   There were no vitals taken for this visit.  GENERAL: Pleasant.  Comfortably speaking in full sentences.  Chronically-ill appearing.  HEENT: Eyes: EOMI.  Normal conjunctiva.  Nose: No nasal discharge.  LUNGS: No cough during video visit.  No audible dyspnea.  No audible wheezing.  NEURO: Alert and oriented x3  PSYCH: Flat-danny affect.  Intonation of voice is normal.  No pressured speech.        Immunization History   Administered Date(s) Administered    Covid-19 Vaccine Pfizer 30 mcg/0.3 ml 02/02/2021, 02/23/2021, 10/22/2021    Covid-19 Vaccine Pfizer Bivalent 30mcg/0.3mL 09/19/2022    Covid-19 Vaccine Pfizer José-Sucrose 30 mcg/0.3 ml 04/21/2022    FLU VAC High Dose 65 YRS & Older PRSV Free (96797) 10/06/2018, 09/25/2019, 09/30/2020, 09/29/2023    FLUAD High Dose 65 yr and older ([id number]) 10/22/2021, 09/19/2022    FLULAVAL 6 months & older 0.5 ml Prefilled syringe (54305) 10/23/2017    FLUZONE 6 months and older PFS 0.5 ml (02742) 10/23/2017    Fluvirin, 3 Years & >, Im 10/31/2011, 11/08/2013    Fluzone Vaccine Medicare () 09/16/2014, 11/07/2015, 10/10/2016, 10/06/2018, 09/25/2019    Pfizer Covid-19 Vaccine 30mcg/0.3ml 12yrs+ (6692-7664) 09/29/2023    Pneumococcal (Prevnar 13) 02/08/2018    Pneumovax 23 10/01/2010, 10/08/2011, 10/26/2022    Td, Preserv Free 09/06/2020    Zoster Vaccine Live (Zostavax) 03/14/2011           DATA:    Component      Latest Ref Rng 3/17/2024 4/25/2024 5/13/2024   Glucose      70 - 99 mg/dL 99  115 (E) 110 (H)    Sodium      136 - 145 mmol/L 142  141 (E) 140    Potassium      3.5 - 5.1 mmol/L 3.5  3.11 ! (E) 3.9    Chloride      98 - 112 mmol/L  112  100 (E) 108    Carbon Dioxide, Total      21.0 - 32.0 mmol/L 25.0  32 (E) 29.0    ANION GAP      0 - 18 mmol/L 5   3    BUN      9 - 23 mg/dL 20  30 (E) 18    CREATININE      0.70 - 1.30 mg/dL 1.05  1.30 (E) 1.19    CALCIUM      8.5 - 10.1 mg/dL 8.8  10.5 (E) 9.5    CALCULATED OSMOLALITY      275 - 295 mOsm/kg 297 (H)   293    EGFR      >=60 mL/min/1.73m2 73   63    AST (SGOT)      15 - 37 U/L 11 (L)   17    ALT (SGPT)      16 - 61 U/L <6 (L)   9 (L)    ALKALINE PHOSPHATASE      45 - 117 U/L 56   63    Total Bilirubin      0.1 - 2.0 mg/dL 0.8  0.7 (E) 0.5    Total Bilirubin        0.7 (E)    PROTEIN, TOTAL      6.4 - 8.2 g/dL 6.3 (L)  7.07 (E) 7.0    PROTEIN, TOTAL        7.07 (E)    Albumin      3.4 - 5.0 g/dL 2.9 (L)   3.3 (L)    Globulin      2.8 - 4.4 g/dL 3.4   3.7    A/G Ratio      1.0 - 2.0  0.9 (L)   0.9 (L)    Patient Fasting for CMP?   No    eGFR NON-AFR. AMERICAN      60 - 500   70 (E)                     ASSESSMENT AND PLAN:     Encounter Diagnosis   Name Primary?    Recurrent urinary tract infection Yes       1. Recurrent urinary tract infection  Recurrent urinary tract infection.  Recommend treat empirically with Cipro as below.  Patient to call or RTC if symptoms do not resolve.    - ciprofloxacin 500 MG Oral Tab; Take 1 tablet (500 mg total) by mouth 2 (two) times daily for 5 days.  Dispense: 10 tablet; Refill: 0          Meds & Refills for this Visit:  Requested Prescriptions     Signed Prescriptions Disp Refills    ciprofloxacin 500 MG Oral Tab 10 tablet 0     Sig: Take 1 tablet (500 mg total) by mouth 2 (two) times daily for 5 days.         Return in about 5 months (around 1/17/2025) for [facility] [place] visit/Medicare annual wellness visit and chronic conditions.  Sooner if needed.

## 2024-08-12 NOTE — TELEPHONE ENCOUNTER
Future Appointments   Date Time Provider Department Center   8/12/2024 12:30 PM Jennifer Chandra DO [facility] [place]   APPOINTMENT SCHEDULED FOR VIDEO VISIT

## 2024-08-22 ENCOUNTER — PATIENT OUTREACH (OUTPATIENT)
Dept: CASE MANAGEMENT | Age: 79
End: 2024-08-22

## 2024-08-24 DIAGNOSIS — E89.0 POSTSURGICAL HYPOTHYROIDISM: ICD-10-CM

## 2024-08-24 DIAGNOSIS — Z79.899 ENCOUNTER FOR LONG-TERM CURRENT USE OF MEDICATION: ICD-10-CM

## 2024-08-26 RX ORDER — LEVOTHYROXINE SODIUM 125 UG/1
125 TABLET ORAL
Qty: 90 TABLET | Refills: 3 | OUTPATIENT
Start: 2024-08-26

## 2024-08-28 RX ORDER — CALCIUM CARBONATE/VITAMIN D3 500MG-5MCG
1 TABLET ORAL 3 TIMES DAILY
Qty: 270 TABLET | Refills: 1 | Status: SHIPPED | OUTPATIENT
Start: 2024-08-28

## 2024-09-02 PROBLEM — N39.0 RECURRENT URINARY TRACT INFECTION: Status: ACTIVE | Noted: 2024-09-02

## 2024-09-16 DIAGNOSIS — E89.0 POSTSURGICAL HYPOTHYROIDISM: ICD-10-CM

## 2024-09-19 ENCOUNTER — TELEPHONE (OUTPATIENT)
Dept: FAMILY MEDICINE CLINIC | Facility: CLINIC | Age: 79
End: 2024-09-19

## 2024-09-19 RX ORDER — HYDRALAZINE HYDROCHLORIDE 25 MG/1
25 TABLET, FILM COATED ORAL 2 TIMES DAILY
Qty: 180 TABLET | Refills: 0 | Status: SHIPPED | OUTPATIENT
Start: 2024-09-19 | End: 2024-12-18

## 2024-09-19 RX ORDER — LEVOTHYROXINE SODIUM 137 UG/1
TABLET ORAL
Qty: 90 TABLET | Refills: 0 | Status: SHIPPED | OUTPATIENT
Start: 2024-09-19

## 2024-09-19 RX ORDER — POTASSIUM CHLORIDE 750 MG/1
10 TABLET, EXTENDED RELEASE ORAL DAILY
Qty: 90 TABLET | Refills: 0 | Status: SHIPPED | OUTPATIENT
Start: 2024-09-19 | End: 2024-12-18

## 2024-09-19 NOTE — TELEPHONE ENCOUNTER
Patients cardiologist is insisting that he needs to be seen for medication refills. He has parkinson's and it is very difficult for him to get out of the house for appointment Spouse is asking if Dr. Jennifer Chandra can help.     Potassium Chloride ER 10 MEQ Oral Tab CR   hydrALAzine HCl 25 MG Oral Tab     Sent to [facility] on file     She is also asking if he is due for his Pneumonia shot & RSV vaccine as home health nurse is coming this Tuesday for Covid & flu shot.    Please advise.

## 2024-09-19 NOTE — TELEPHONE ENCOUNTER
Please call patient/patient's wife and inform him the 137 mcg dose of levothyroxine has been refilled.  Please instruct patient to complete outstanding thyroid function test that have been scheduled to be completed through [email or web address] at his earliest convenience, we will adjust the dose of the levothyroxine if indicated.  Please remind patient do not take any vitamins, multivitamins, or supplements containing biotin for 5 days prior to the blood draw.                  Thyroid:    Lab Results   Component Value Date    TSH 9.77 (H) 06/11/2024    TSH 11.600 (H) 05/13/2024    TSH 17.226 04/25/2024    TSH 17.226 04/25/2024    T4F 1.2 06/11/2024    T4F 0.9 05/13/2024    T4F 0.8 03/16/2024

## 2024-09-19 NOTE — TELEPHONE ENCOUNTER
Hydralazine and potassium refilled for patient as a courtesy, further refills and any other medications prescribed by his cardiologist will need to be from his cardiologist.    Patient is NOT due for pneumococcal vaccination until 10/27/2027.    Okay for vaccination for RSV and influenza.

## 2024-09-19 NOTE — TELEPHONE ENCOUNTER
Doc,   You have not filled these meds for patient in the past please advise.      Also, patient asking if ok or if he needs pneumonia and RSV vaccines?

## 2024-09-20 NOTE — TELEPHONE ENCOUNTER
Spoke w/Delores and his BP is good. Physical Therapy is taking it twice weekly and it has been 120/70 to 120/80. She cannot feel his pulse to feel if it is regular but the number has been good. She thinks it is always a possibility that he has a UTI. He does converse but then is confused. Today he was asking what time the plane leaves and were they going to make the flight? She has spoken w/neurology a few times a week and they are stumped. They say he is cognitively perfect one day and then full on confused the next and they don't know what it is.

## 2024-09-20 NOTE — TELEPHONE ENCOUNTER
Spoke w/Delores. She reports that Lawrence has gone downhill since last visit and is currently unable to get out of the house to see cardiology. Cardiology has ordered an Echo and will not prescribe until he completes it but again he cannot get out of the house. In fact she reports he cannot get out of the chair as when he tried yesterday he bumped in to her and knocked them both down and they laid on the floor together unable to get up for 1.5 hours waiting for son to arrive and help them off of the floor. She cannot get him to the lab to draw thyroid labs either. Please advise if there is anything we can do to help.

## 2024-09-22 ENCOUNTER — TELEPHONE (OUTPATIENT)
Dept: FAMILY MEDICINE CLINIC | Facility: CLINIC | Age: 79
End: 2024-09-22

## 2024-09-22 NOTE — TELEPHONE ENCOUNTER
Please call patient's wife:  In a previous encounter, (refill encounter), patient's wife noted a change in mental status and physical status, recommend patient go to the emergency department to be evaluated if these changes are still present.    Also, remind them that patient is due for reevaluation of thyroid function tests, no need to fast, please remind do not take vitamins or supplements containing biotin for at least 3 to 5 days prior to blood draw.

## 2024-09-23 NOTE — TELEPHONE ENCOUNTER
Spoke with patient's wife Delores. She stated that he was doing slightly better and was able to walk to the bathroom . He is still confused, but better than on 9/20. Advised her to take him to emergency room to be evaluated. She is hesitant about that due to his inability to ambulate down the stairs, and she doesn't really want to call ambulance. Delores has a [facility] nurse coming tomorrow to give vaccines and will talk with her/him about what other services they provide. She will call the office back tomorrow after that visit and let us know what they are able to provide. Advised Delores again that if his symptoms aren't improving to have him evaluated at the ED. Voiced understanding.

## 2024-10-01 ENCOUNTER — TELEPHONE (OUTPATIENT)
Dept: FAMILY MEDICINE CLINIC | Facility: CLINIC | Age: 79
End: 2024-10-01

## 2024-10-01 NOTE — TELEPHONE ENCOUNTER
Called and spoke with wife Delores. Neither her or Lawrence have any symptoms. Hand hygiene, clean and disinfecting their home, and be cautious when around others discussed. Advised that they can take at home tests, and if positive please let us know.

## 2024-10-01 NOTE — TELEPHONE ENCOUNTER
Patients spouse calling and their caretaker has tested positive for Covid the last time they saw her was last Thursday and caretaker was sick the next day. They both are feeling fine right now. Is there anything they can do to prevent them getting sick as patient has parkinsons.    Please advise.

## 2024-10-22 NOTE — PROGRESS NOTES
I outreached to the patient in regards to the chronic care management program. [facility] ext [phone]  
General

## 2024-12-09 ENCOUNTER — APPOINTMENT (OUTPATIENT)
Dept: CT IMAGING | Facility: HOSPITAL | Age: 79
End: 2024-12-09
Attending: EMERGENCY MEDICINE
Payer: MEDICARE

## 2024-12-09 ENCOUNTER — APPOINTMENT (OUTPATIENT)
Dept: GENERAL RADIOLOGY | Facility: HOSPITAL | Age: 79
End: 2024-12-09
Attending: EMERGENCY MEDICINE
Payer: MEDICARE

## 2024-12-09 ENCOUNTER — TELEPHONE (OUTPATIENT)
Dept: FAMILY MEDICINE CLINIC | Facility: CLINIC | Age: 79
End: 2024-12-09

## 2024-12-09 ENCOUNTER — HOSPITAL ENCOUNTER (INPATIENT)
Facility: HOSPITAL | Age: 79
LOS: 1 days | Discharge: NURSING, REHAB OR LONG-TERM CARE | End: 2024-12-14
Attending: EMERGENCY MEDICINE | Admitting: HOSPITALIST
Payer: MEDICARE
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
## 2024-12-09 DIAGNOSIS — R53.1 WEAKNESS GENERALIZED: Primary | ICD-10-CM

## 2024-12-09 DIAGNOSIS — L02.91 ABSCESS: ICD-10-CM

## 2024-12-09 LAB
ALBUMIN SERPL-MCNC: 4.1 G/DL (ref 3.2–4.8)
ALBUMIN/GLOB SERPL: 1.2 {RATIO} (ref 1–2)
ALP LIVER SERPL-CCNC: 65 U/L
ALT SERPL-CCNC: <7 U/L
ANION GAP SERPL CALC-SCNC: 7 MMOL/L (ref 0–18)
APTT PPP: 20.2 SECONDS (ref 23–36)
AST SERPL-CCNC: 14 U/L (ref ?–34)
BASOPHILS # BLD AUTO: 0.01 X10(3) UL (ref 0–0.2)
BASOPHILS NFR BLD AUTO: 0.1 %
BILIRUB SERPL-MCNC: 0.9 MG/DL (ref 0.2–1.1)
BILIRUB UR QL STRIP.AUTO: NEGATIVE
BUN BLD-MCNC: 24 MG/DL (ref 9–23)
CALCIUM BLD-MCNC: 9.8 MG/DL (ref 8.7–10.4)
CHLORIDE SERPL-SCNC: 104 MMOL/L (ref 98–112)
CLARITY UR REFRACT.AUTO: CLEAR
CO2 SERPL-SCNC: 28 MMOL/L (ref 21–32)
COLOR UR AUTO: YELLOW
CREAT BLD-MCNC: 1.16 MG/DL
EGFRCR SERPLBLD CKD-EPI 2021: 64 ML/MIN/1.73M2 (ref 60–?)
EOSINOPHIL # BLD AUTO: 0.07 X10(3) UL (ref 0–0.7)
EOSINOPHIL NFR BLD AUTO: 0.8 %
ERYTHROCYTE [DISTWIDTH] IN BLOOD BY AUTOMATED COUNT: 12.5 %
FLUAV + FLUBV RNA SPEC NAA+PROBE: NEGATIVE
FLUAV + FLUBV RNA SPEC NAA+PROBE: NEGATIVE
GLOBULIN PLAS-MCNC: 3.5 G/DL (ref 2–3.5)
GLUCOSE BLD-MCNC: 91 MG/DL (ref 70–99)
GLUCOSE UR STRIP.AUTO-MCNC: NORMAL MG/DL
HCT VFR BLD AUTO: 37.4 %
HGB BLD-MCNC: 12.4 G/DL
IMM GRANULOCYTES # BLD AUTO: 0.02 X10(3) UL (ref 0–1)
IMM GRANULOCYTES NFR BLD: 0.2 %
INR BLD: 1.03 (ref 0.8–1.2)
LACTATE SERPL-SCNC: 1.2 MMOL/L (ref 0.5–2)
LEUKOCYTE ESTERASE UR QL STRIP.AUTO: NEGATIVE
LYMPHOCYTES # BLD AUTO: 1.34 X10(3) UL (ref 1–4)
LYMPHOCYTES NFR BLD AUTO: 15.7 %
MCH RBC QN AUTO: 31.9 PG (ref 26–34)
MCHC RBC AUTO-ENTMCNC: 33.2 G/DL (ref 31–37)
MCV RBC AUTO: 96.1 FL
MONOCYTES # BLD AUTO: 0.93 X10(3) UL (ref 0.1–1)
MONOCYTES NFR BLD AUTO: 10.9 %
NEUTROPHILS # BLD AUTO: 6.15 X10 (3) UL (ref 1.5–7.7)
NEUTROPHILS # BLD AUTO: 6.15 X10(3) UL (ref 1.5–7.7)
NEUTROPHILS NFR BLD AUTO: 72.3 %
NITRITE UR QL STRIP.AUTO: NEGATIVE
OSMOLALITY SERPL CALC.SUM OF ELEC: 292 MOSM/KG (ref 275–295)
PH UR STRIP.AUTO: 5.5 [PH] (ref 5–8)
PLATELET # BLD AUTO: 153 10(3)UL (ref 150–450)
POTASSIUM SERPL-SCNC: 3.9 MMOL/L (ref 3.5–5.1)
PROT SERPL-MCNC: 7.6 G/DL (ref 5.7–8.2)
PROTHROMBIN TIME: 13.6 SECONDS (ref 11.6–14.8)
RBC # BLD AUTO: 3.89 X10(6)UL
RBC UR QL AUTO: NEGATIVE
RSV RNA SPEC NAA+PROBE: NEGATIVE
SARS-COV-2 RNA RESP QL NAA+PROBE: NOT DETECTED
SODIUM SERPL-SCNC: 139 MMOL/L (ref 136–145)
SP GR UR STRIP.AUTO: 1.02 (ref 1–1.03)
TROPONIN I SERPL HS-MCNC: 13 NG/L
UROBILINOGEN UR STRIP.AUTO-MCNC: NORMAL MG/DL
WBC # BLD AUTO: 8.5 X10(3) UL (ref 4–11)

## 2024-12-09 PROCEDURE — 73080 X-RAY EXAM OF ELBOW: CPT | Performed by: EMERGENCY MEDICINE

## 2024-12-09 PROCEDURE — 70450 CT HEAD/BRAIN W/O DYE: CPT | Performed by: EMERGENCY MEDICINE

## 2024-12-09 PROCEDURE — 99223 1ST HOSP IP/OBS HIGH 75: CPT | Performed by: HOSPITALIST

## 2024-12-09 PROCEDURE — 71045 X-RAY EXAM CHEST 1 VIEW: CPT | Performed by: EMERGENCY MEDICINE

## 2024-12-09 RX ORDER — CARBIDOPA AND LEVODOPA 25; 100 MG/1; MG/1
1.5 TABLET ORAL 3 TIMES DAILY
Status: DISCONTINUED | OUTPATIENT
Start: 2024-12-09 | End: 2024-12-14

## 2024-12-09 RX ORDER — DOCUSATE SODIUM 100 MG/1
100 CAPSULE, LIQUID FILLED ORAL NIGHTLY
Status: DISCONTINUED | OUTPATIENT
Start: 2024-12-09 | End: 2024-12-14

## 2024-12-09 RX ORDER — HYDRALAZINE HYDROCHLORIDE 20 MG/ML
5 INJECTION INTRAMUSCULAR; INTRAVENOUS ONCE
Status: COMPLETED | OUTPATIENT
Start: 2024-12-09 | End: 2024-12-09

## 2024-12-09 RX ORDER — FOLIC ACID 0.4 MG
400 TABLET ORAL WEEKLY
Status: DISCONTINUED | OUTPATIENT
Start: 2024-12-09 | End: 2024-12-14

## 2024-12-09 RX ORDER — DONEPEZIL HYDROCHLORIDE 10 MG/1
10 TABLET, FILM COATED ORAL DAILY
Status: DISCONTINUED | OUTPATIENT
Start: 2024-12-09 | End: 2024-12-14

## 2024-12-09 RX ORDER — SENNOSIDES 8.8 MG/5ML
5 LIQUID ORAL NIGHTLY
COMMUNITY

## 2024-12-09 RX ORDER — ASPIRIN 81 MG/1
81 TABLET ORAL DAILY
COMMUNITY

## 2024-12-09 RX ORDER — SODIUM CHLORIDE 9 MG/ML
1000 INJECTION, SOLUTION INTRAVENOUS ONCE
Status: COMPLETED | OUTPATIENT
Start: 2024-12-09 | End: 2024-12-09

## 2024-12-09 RX ORDER — LATANOPROST 50 UG/ML
1 SOLUTION/ DROPS OPHTHALMIC NIGHTLY
Status: DISCONTINUED | OUTPATIENT
Start: 2024-12-09 | End: 2024-12-14

## 2024-12-09 RX ORDER — QUETIAPINE FUMARATE 25 MG/1
25 TABLET, FILM COATED ORAL NIGHTLY
Status: DISCONTINUED | OUTPATIENT
Start: 2024-12-09 | End: 2024-12-14

## 2024-12-09 RX ORDER — SODIUM PHOSPHATE, DIBASIC AND SODIUM PHOSPHATE, MONOBASIC 7; 19 G/230ML; G/230ML
1 ENEMA RECTAL ONCE AS NEEDED
Status: DISCONTINUED | OUTPATIENT
Start: 2024-12-09 | End: 2024-12-14

## 2024-12-09 RX ORDER — LOSARTAN POTASSIUM 100 MG/1
100 TABLET ORAL DAILY
Status: DISCONTINUED | OUTPATIENT
Start: 2024-12-10 | End: 2024-12-12

## 2024-12-09 RX ORDER — HYDROCHLOROTHIAZIDE 12.5 MG/1
12.5 TABLET ORAL DAILY
Status: DISCONTINUED | OUTPATIENT
Start: 2024-12-10 | End: 2024-12-12

## 2024-12-09 RX ORDER — ATORVASTATIN CALCIUM 10 MG/1
10 TABLET, FILM COATED ORAL NIGHTLY
Status: DISCONTINUED | OUTPATIENT
Start: 2024-12-09 | End: 2024-12-14

## 2024-12-09 RX ORDER — MELATONIN
100 WEEKLY
Status: DISCONTINUED | OUTPATIENT
Start: 2024-12-09 | End: 2024-12-10 | Stop reason: ALTCHOICE

## 2024-12-09 RX ORDER — MELATONIN
1000 WEEKLY
Status: DISCONTINUED | OUTPATIENT
Start: 2024-12-09 | End: 2024-12-10 | Stop reason: ALTCHOICE

## 2024-12-09 RX ORDER — SODIUM CHLORIDE 9 MG/ML
INJECTION, SOLUTION INTRAVENOUS CONTINUOUS
Status: ACTIVE | OUTPATIENT
Start: 2024-12-09 | End: 2024-12-09

## 2024-12-09 RX ORDER — POLYETHYLENE GLYCOL 3350 17 G/17G
17 POWDER, FOR SOLUTION ORAL DAILY PRN
Status: DISCONTINUED | OUTPATIENT
Start: 2024-12-09 | End: 2024-12-14

## 2024-12-09 RX ORDER — ONDANSETRON 2 MG/ML
4 INJECTION INTRAMUSCULAR; INTRAVENOUS EVERY 6 HOURS PRN
Status: DISCONTINUED | OUTPATIENT
Start: 2024-12-09 | End: 2024-12-10

## 2024-12-09 RX ORDER — SENNOSIDES 8.6 MG
17.2 TABLET ORAL NIGHTLY PRN
Status: DISCONTINUED | OUTPATIENT
Start: 2024-12-09 | End: 2024-12-14

## 2024-12-09 RX ORDER — TIMOLOL MALEATE 5 MG/ML
1 SOLUTION/ DROPS OPHTHALMIC NIGHTLY
Status: DISCONTINUED | OUTPATIENT
Start: 2024-12-09 | End: 2024-12-14

## 2024-12-09 RX ORDER — METOCLOPRAMIDE HYDROCHLORIDE 5 MG/ML
5 INJECTION INTRAMUSCULAR; INTRAVENOUS EVERY 8 HOURS PRN
Status: DISCONTINUED | OUTPATIENT
Start: 2024-12-09 | End: 2024-12-10

## 2024-12-09 RX ORDER — CARBIDOPA AND LEVODOPA 25; 100 MG/1; MG/1
1.5 TABLET ORAL 3 TIMES DAILY
COMMUNITY

## 2024-12-09 RX ORDER — BISACODYL 10 MG
10 SUPPOSITORY, RECTAL RECTAL
Status: DISCONTINUED | OUTPATIENT
Start: 2024-12-09 | End: 2024-12-14

## 2024-12-09 RX ORDER — POTASSIUM CHLORIDE 750 MG/1
10 TABLET, EXTENDED RELEASE ORAL DAILY
Status: DISCONTINUED | OUTPATIENT
Start: 2024-12-10 | End: 2024-12-14

## 2024-12-09 RX ORDER — HYDRALAZINE HYDROCHLORIDE 25 MG/1
25 TABLET, FILM COATED ORAL 2 TIMES DAILY
Status: DISCONTINUED | OUTPATIENT
Start: 2024-12-09 | End: 2024-12-12

## 2024-12-09 NOTE — TELEPHONE ENCOUNTER
Patient needs to be examined in person to be evaluated for possible abscess versus cellulitis versus other.  Please advise patient to go to the [place] walk-in clinic or [place] immediate care.

## 2024-12-09 NOTE — ED PROVIDER NOTES
Patient Seen in: [facility]      History     Chief Complaint   Patient presents with    Fall     Stated Complaint: fall.fatigue    Subjective:   HPI      Patient is a 79-year-old male presents emergency room with a history of multiple complaints.  The patient is a history of Parkinson's and has been having a \"bad day\" today.  The patient reportedly took a fall as per patient's wife giving independent history at the bedside it is uncertain if the patient hit his head.  The patient complains of pain only to his right elbow at this time.  The patient has been more confused throughout the day today.  Patient has had good appetite at home as per patient and patient's family at the bedside.  Patient denies any chest or abdominal pain.  The patient denies history of any headache or neck pain at this time.  Patient also has a lump to the back which has been there for the last couple of days.  Patient has had no known history of any fever at home.    Objective:     Past Medical History:    Anemia of chronic disease    Iron studies, B12 level, and folic acid levels normal.    Aneurysm of popliteal artery (HCC)    Arterial disease (HCC)    MRI 9/15/2017: Mild chronic microvascular ischemic changes in the cerebral white matter.    Back problem    Bilateral carotid artery disease (HCC)    Bilateral carotid artery stenosis    Bradycardia    Class 1 obesity due to excess calories with serious comorbidity and body mass index (BMI) of 31.0 to 31.9 in adult    Associated with hypertension and hyperlipidemia    Class 1 obesity due to excess calories with serious comorbidity and body mass index (BMI) of 33.0 to 33.9 in adult    Associated with HTN, Carotid Artery Disease, and Hypercholesterolemia    Class 1 obesity due to excess calories without serious comorbidity with body mass index (BMI) of 33.0 to 33.9 in adult    Cognitive complaints with normal neuropsychological exam    Normal Neuropsych testing  September 2017, results scanned in chart    Cogwheel rigidity    COVID    cough headache. 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    Multiple thyroid nodules    x 2 on Left    Myocarditis (HCC)    Neoplasm of uncertain behavior of skin    Obesity (BMI 30-39.9)    ISAIAS (obstructive sleep apnea)    ISAIAS on CPAP    Dx 2009 cpap    Osteoarthritis    Other specified glaucoma    Overflow incontinence of urine    Parkinsonian tremor (HCC)    PD (Parkinson's disease) (HCC)    Postsurgical hypothyroidism    6/27/2022 total thyroidectomy with Dr. Perry Soriano.    Prediabetes    Primary open angle glaucoma (POAG) of both eyes, mild stage    Prostate CA (HCC)    RBD (REM behavioral disorder)    Last Assessment & Plan:  Formatting of this note might be different from the original. Only diagnosed with RBD, but behavior does seem classic for it.  He is taking melatonin 10 mg nightly so it is possible he is treating his RBD.  We will do an in lab diagnostic PSG with extra EMG leads on arms to assess for REM sleep without atonia.    Sleep apnea    Thrombocytopenia (HCC)    Torn ligament    right    Visual impairment    Vocal cord dysfunction    errosion              Past Surgical History:   Procedure Laterality Date    Ankle fracture surgery      Hdr elect brachytherapy  2006    Incision and drainage Left 04/05/2022    left neck     Knee replacement surgery Left 2014    Other       Botox injections into bladder    Thyroidectomy Bilateral 06/27/2022    Dr. Soriano                Social History     Socioeconomic History    Marital status:    Tobacco Use    Smoking status: Former     Types: Cigars    Smokeless tobacco: Never   Vaping Use    Vaping status: Never Used   Substance and Sexual Activity    Alcohol use: Yes     Alcohol/week: 2.0 standard drinks of alcohol     Types: 2 Standard drinks or equivalent per week     Comment: 2 servings a week    Drug use: No    Sexual activity: Not Currently   Other Topics Concern    Caffeine Concern No     Comment: 1 cup of coffee daily    Exercise Yes     Comment: some walking    Seat Belt Yes    Special Diet No    Stress Concern No    Weight Concern No     Service No    Blood Transfusions No    Occupational Exposure No    Hobby Hazards No    Sleep Concern No    Back Care No    Bike Helmet No    Self-Exams No     Social Drivers of Health     Financial Resource Strain: Low Risk  (2/23/2024)    Financial Resource Strain     Difficulty of Paying Living Expenses: Not very hard     Med Affordability: No   Food Insecurity: No Food Insecurity (12/9/2024)    Food Insecurity     Food Insecurity: Never true   Transportation Needs: No Transportation Needs (12/9/2024)    Transportation Needs     Lack of Transportation: No   Physical Activity: Inactive (10/1/2020)    Received from [facility]    Exercise Vital Sign     Days of Exercise per Week: 0 days     Minutes of Exercise per Session: 0 min   Housing Stability: Low Risk  (12/9/2024)    Housing Stability     Housing Instability: No                  Physical Exam     ED Triage Vitals [12/09/24 1411]   /79   Pulse 79   Resp 18   Temp 98.2 °F (36.8 °C)   Temp src Oral   SpO2 96 %   O2 Device None (Room air)       Current Vitals:   Vital Signs  BP: (!) 143/98 (RN notified)  Pulse: 92  Resp: 18  Temp: 99.1 °F (37.3 °C)  Temp src: Oral  MAP (mmHg): (!) 104 (RN  notified)    Oxygen Therapy  SpO2: 97 %  O2 Device: None (Room air)        Physical Exam  GENERAL: Well-developed, well-nourished male sitting up breathing easily in no apparent distress.  Patient is nontoxic appearance.  HEENT: Head is normocephalic, atraumatic. Pupils are 4 mm equally round and reactive to light. Oropharynx is clear. Mucous membranes are moist.  NECK: There is no focal tenderness to palpation appreciated. No stridor.  LUNGS: Clear at the apices with diminished breath sounds at both bases.    HEART: Regular rate and rhythm. Normal S1, S2 no S3, or S4. No murmur.  ABDOMEN: There is no focal tenderness to palpation appreciated anywhere throughout the abdomen. There is no guarding, no rebound, no mass, and no organomegaly appreciated. There is normoactive bowel sounds. There is no hernia.  BACK: There is an area of erythema and mild fluctuance and induration measuring approximately 5 cm in diameter in the right paraspinal thoracic area consistent with abscess and cellulitis.  EXTREMITIES: There is no cyanosis, clubbing, or edema appreciated. Pulses are 2+ and equal in all 4 extremities.  There is no focal tenderness to palpation throughout the upper or lower extremities appreciated except for some mild tenderness to palpation along the extensor aspect of the right elbow.  There is no obvious deformity noted.  NEURO: Patient is awake, alert and answering questions appropriately. Motor strength is 5 over 5 in all 4 extremities. There are no gross motor or sensory deficits appreciated. Cranial nerves II through XII are grossly intact.  Patient is generally tremulous here in the ER.          ED Course     Labs Reviewed   CBC WITH DIFFERENTIAL WITH PLATELET - Abnormal; Notable for the following components:       Result Value    HGB 12.4 (*)     HCT 37.4 (*)     All other components within normal limits   COMP METABOLIC PANEL (14) - Abnormal; Notable for the following components:    BUN 24 (*)     ALT <7  (*)     All other components within normal limits   PTT, ACTIVATED - Abnormal; Notable for the following components:    PTT 20.2 (*)     All other components within normal limits   URINALYSIS WITH CULTURE REFLEX - Abnormal; Notable for the following components:    Ketones Urine Trace (*)     Protein Urine Trace (*)     All other components within normal limits   TROPONIN I HIGH SENSITIVITY - Normal   PROTHROMBIN TIME (PT) - Normal   LACTIC ACID, PLASMA - Normal   SARS-COV-2/FLU A AND B/RSV BY PCR (GENEXPERT) - Normal    Narrative:     This test is intended for the qualitative detection and differentiation of SARS-CoV-2, influenza A, influenza B, and respiratory syncytial virus (RSV) viral RNA in nasopharyngeal or nares swabs from individuals suspected of respiratory viral infection consistent with COVID-19 by their healthcare provider. Signs and symptoms of respiratory viral infection due to SARS-CoV-2, influenza, and RSV can be similar.    Test performed using the Xpert Xpress SARS-CoV-2/FLU/RSV (real time RT-PCR)  assay on the GeneXpert instrument, [facility], [place] [address].   This test is being used under the Food and Drug Administration's Emergency Use Authorization.    The authorized Fact Sheet for Healthcare Providers for this assay is available upon request from the laboratory.   BLOOD CULTURE   BLOOD CULTURE     EKG    Rate, intervals and axes as noted on EKG Report.  Rate: 65  Rhythm: Sinus Rhythm  Reading: Evidence of first-degree AV block nonspecific ST change no change from previous EKG from March 2024.                CT BRAIN OR HEAD (CPT=70450)    Result Date: 12/9/2024  CONCLUSION:  1. No acute intracranial hemorrhage or hydrocephalus. 2. Mild chronic small vessel ischemic disease with a small chronic infarct at the left frontal lobe. If there is clinical concern for acute ischemia/infarction, an MRI of the brain would be recommended for further evaluation.    LOCATION:  [facility]   Dictated by (CST):  Stromberg, LeRoy, MD on 12/09/2024 at 6:55 PM     Finalized by (CST): Stromberg, LeRoy, MD on 12/09/2024 at 6:58 PM       XR ELBOW, COMPLETE (MIN 3 VIEWS), RIGHT (CPT=73080)    Result Date: 12/9/2024  CONCLUSION:  No acute process.  Please see further, less significant details above.   LOCATION:  [id number]    Dictated by (CST): Ruben Jeronimo DO on 12/09/2024 at 6:42 PM     Finalized by (CST): Ruben Jeronimo DO on 12/09/2024 at 6:43 PM       XR CHEST AP PORTABLE  (CPT=71045)    Result Date: 12/9/2024  CONCLUSION:  Stable mild atelectasis within the left costophrenic angle.  No acute process or significant interval changes.   LOCATION:  [id number]      Dictated by (CST): Ruben Jeronimo DO on 12/09/2024 at 6:39 PM     Finalized by (CST): Ruben Jeronimo DO on 12/09/2024 at 6:40 PM             MDM        17:18 patient sitting back and breathing easily in no apparent distress.  Patient states he took all his blood pressure medication this morning.  The patient denies chest pain or headache at this time.  Will continue to monitor blood pressure at this time.  Patient made aware of the need for incision and drainage of the abscess on his back which will be completed at some point during his ER stay.    Admission disposition: 12/9/2024  7:04 PM           Medical Decision Making  Patient had an IV line established blood work drawn including a CBC, chemistries, BUN/creatinine, and blood sugar all of which are unremarkable.  Liver function test and troponin found to be negative.  COVID, flu, and RSV are all negative.  Lactic acid is unremarkable.  The patient's urinalysis is unremarkable.  The patient did have an abscess to the back which was incised and drained as noted below.      The overlying skin was cleaned and prepped sterilely in the emergency room.  The patient's overlying skin with anesthetized 1% lidocaine without epinephrine to horizontal incisions were made in the area of question and a moderate amount of purulent material  was expressed.  The patient area was then cleaned and dried and packed with sterile gauze here in the ER.  Sterile dressing was then applied.  The patient tolerated the procedure well with no complaints.    Patient had blood and urine culture obtained here in the emergency room.  The patient will be given IV Ancef after discussion with Dr. Ron calloway and saw the patient emergency room.  Patient will be admitted to the hospital for observation this evening.  Patient has had increasing weakness secondary to his Parkinson's disease.  Patient with abscess to his back as noted above.  Patient was admitted for further care and IV antibiotics this time.  Patient admitted with no further new complaints.    Disposition and Plan     Clinical Impression:  1. Weakness generalized    2. Abscess    3. Fall, initial encounter         Disposition:  Admit  12/9/2024  7:04 pm    Follow-up:  No follow-up provider specified.        Medications Prescribed:  Current Discharge Medication List              Supplementary Documentation:         Hospital Problems       Present on Admission  Date Reviewed: 8/12/2024            ICD-10-CM Noted POA    * (Principal) Weakness generalized R53.1 3/16/2024 Unknown    Abscess L02.91 12/9/2024 Unknown    Fall, initial encounter W19.XXXA 12/9/2024 Unknown

## 2024-12-09 NOTE — ED INITIAL ASSESSMENT (HPI)
Pt has a hx of parkinson's and had a mechanical fall today. Pt states only pain is to right arm. Medics and wife were concerned of altered status. Patient is A&O x 3 in triage. Denies LOC, dizziness, or chest pain.

## 2024-12-09 NOTE — TELEPHONE ENCOUNTER
Called and left voicemail for patient's wife to return call.     PSR please notify triage w/ return call from patient.

## 2024-12-09 NOTE — TELEPHONE ENCOUNTER
Lawrence Daniels  LOV: 8/12/2024       Calling for new New or Existing condition (choose one)  Patient experiencing: Painfule Silver dollar size lump on upper back red in color and seems to be spreading  (list symptoms/concerns)  Duration/Onset of symptom: redness for about 2days   Appointment Offered:  Patient denied/ 1/17/2025

## 2024-12-09 NOTE — TELEPHONE ENCOUNTER
Spoke with patient's wife. Informed her of Dr Chandra's recommendations. She is concerned about transportation and assisting patient with ambulation. Number provided for non emergent transportation. She voiced understanding and will do her best to have him evaluated.

## 2024-12-09 NOTE — TELEPHONE ENCOUNTER
Spoke with patient's wife. Lump on back that he has had for yrs. A few days ago the lump became red and seems to be growing. Patient's wife states that patient has some pain surrounding the area. Patient has not tried any at home remedies. Warm to touch and hard when palpated. Patient's wife will try warm compress but is concerned. She would like to see if Dr Chandra can telemedicine today.     Please review and advise

## 2024-12-10 LAB — LACTATE SERPL-SCNC: 1.9 MMOL/L (ref 0.5–2)

## 2024-12-10 PROCEDURE — 99233 SBSQ HOSP IP/OBS HIGH 50: CPT | Performed by: HOSPITALIST

## 2024-12-10 RX ORDER — ACETAMINOPHEN 325 MG/1
650 TABLET ORAL EVERY 6 HOURS PRN
Status: DISCONTINUED | OUTPATIENT
Start: 2024-12-10 | End: 2024-12-14

## 2024-12-10 NOTE — ED QUICK NOTES
Orders for admission, patient is aware of plan and ready to go upstairs. Any questions, please call ED RN Terri at extension [phone].     Patient Covid vaccination status: Fully vaccinated     COVID Test Ordered in ED: SARS-CoV-2/Flu A and B/RSV by PCR (GeneXpert)    COVID Suspicion at Admission: N/A    Running Infusions:      Mental Status/LOC at time of transport: AOx4    Other pertinent information:   CIWA score: N/A   NIH score:  N/A

## 2024-12-10 NOTE — CONSULTS
[facility]  Report of Inpatient Wound Care Consultation    Lawrence Daniels Patient Status:  Observation    9/3/1945 MRN [id number]   Location [facility] 4NW-A Attending Diamond Velasquez MD   Hosp Day # 0 PCP Jennifer Chandra DO     Reason for Consultation:  back abscess    History of Present Illness:  Lawrence Daniels is a a(n) 79 year old male. Spouse present in the room. Patient with multiple comorbidities, with skin breakdown described below.     History:  Past Medical History:    Anemia of chronic disease    Iron studies, B12 level, and folic acid levels normal.    Aneurysm of popliteal artery (HCC)    Arterial disease (HCC)    MRI 9/15/2017: Mild chronic microvascular ischemic changes in the cerebral white matter.    Back problem    Bilateral carotid artery disease (HCC)    Bilateral carotid artery stenosis    Bradycardia    Class 1 obesity due to excess calories with serious comorbidity and body mass index (BMI) of 31.0 to 31.9 in adult    Associated with hypertension and hyperlipidemia    Class 1 obesity due to excess calories with serious comorbidity and body mass index (BMI) of 33.0 to 33.9 in adult    Associated with HTN, Carotid Artery Disease, and Hypercholesterolemia    Class 1 obesity due to excess calories without serious comorbidity with body mass index (BMI) of 33.0 to 33.9 in adult    Cognitive complaints with normal neuropsychological exam    Normal Neuropsych testing 2017, results scanned in chart    Cogwheel rigidity    COVID    cough headache. 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    Multiple thyroid nodules    x 2 on Left    Myocarditis (HCC)    Neoplasm of uncertain behavior of skin    Obesity (BMI 30-39.9)    ISAIAS (obstructive sleep apnea)    ISAIAS on CPAP    Dx 2009 cpap    Osteoarthritis    Other specified glaucoma    Overflow incontinence of urine    Parkinsonian tremor (HCC)    PD (Parkinson's disease) (HCC)    Postsurgical hypothyroidism    6/27/2022 total thyroidectomy with Dr. Perry Soriano.    Prediabetes    Primary open angle glaucoma (POAG) of both eyes, mild stage    Prostate CA (HCC)    RBD (REM behavioral disorder)    Last Assessment & Plan:  Formatting of this note might be different from the original. Only diagnosed with RBD, but behavior does seem classic for it.  He is taking melatonin 10 mg nightly so it is possible he is treating his RBD.  We will do an in lab diagnostic PSG with extra EMG leads on arms to assess for REM sleep without atonia.    Sleep apnea    Thrombocytopenia (HCC)    Torn ligament    right    Visual impairment    Vocal cord dysfunction    errosion     Past Surgical History:   Procedure Laterality Date    Ankle fracture surgery      Hdr elect brachytherapy  2006    Incision and drainage Left 04/05/2022    left neck     Knee replacement surgery Left 2014    Other      Botox injections into bladder    Thyroidectomy Bilateral 06/27/2022    Dr. Soriano      reports that he has quit smoking. His smoking use included cigars. He has never used smokeless tobacco. He reports current alcohol use of about 2.0 standard drinks of alcohol per week. He reports that he does not use drugs.      Allergies:  @ALLERGY    Laboratory Data:    Recent Labs   Lab 12/09/24  1656   WBC 8.5   HGB 12.4*   HCT 37.4*   .0   CREATSERUM 1.16   BUN 24*   GLU 91   CA 9.8   ALB 4.1   TP 7.6   PTT 20.2*   INR 1.03          ASSESSMENT:  Wound 12/09/24 Back Lower;Right (Active)   Date First Assessed/Time First Assessed: 12/09/24 2300   Present on Original Admission: Yes  Location: Back  Wound Location Orientation: Lower;Right  Wound Description (Comments): abcess      Assessments 12/10/2024 11:36 AM   Wound Image     Site Assessment Edema;Painful   Closure None   Drainage Amount Moderate   Drainage Description Sanguineous   Treatments Cleansed   Dressing Iodoform gauze (packing);Optifoam   Dressing Changed Changed   Dressing Status Clean;Dry;Intact   Wound Length (cm) 0.9 cm   Wound Width (cm) 1.8 cm   Wound Surface Area (cm^2) 1.62 cm^2   Wound Depth (cm) 0.8 cm   Wound Volume (cm^3) 1.296 cm^3   Margins Well-defined edges   Non-staged Wound Description Full thickness   Laure-wound Assessment Ecchymosis;Edema;Blanchable erythema   Wound Odor None   Shape unable to visualize the wound bed, the incisions are too narrow to be able to observe the wound bed.   Edema : Non-pitting    Wound Cleaning and Dressings:  Wound cleansing:  normal saline  Wound cleaning frequency: Daily  Wound product: iodoform packing, 6x6 foam border  Dressing change frequency:  Change dressing daily and/or PRN  Offloading - keep pressure off the wound (use pillows and positioning)    ALL WOUND CARE SUPPLIES CAN BE OBTAINED FROM CENTRAL DISTRIBUTION     If patient is Diabetic: want to make sure blood sugars are within a controled range for wound healing     Protein intake: depending on providers recommendations and patients kidney functions - if kidneys are good then recommend patient to increase protein intake (Boost, Kleber, Ensure, Premiere Protein)     Recommendations: If patient is discharged home, may follow up with home health and/or outpatient wound care clinic.       Thank you for this consultation and for allowing me to participate in the care of your patient.  Please call [phone] if you have any questions about this consultation and plan of care.      Time Spent 30 Minutes.    Thank you,  Lexii Childs RN  Wound/Ostomy/Continence nurse    12/10/2024  2:52 PM

## 2024-12-10 NOTE — PLAN OF CARE
Patient drowsy bur easily arousable. Oriented to time and person. Denies pain or SOB. All meds given per MAR. Wife updated on plan of care. No complaints or acute events overnight. Safety precautions in place, call light within reach, plan of care ongoing.     Problem: SKIN INTEGRITY  Goal: Incision/wound heals without complications  Description: Interventions:  - Assess wound bed/incision and surrounding skin tissue  - Collaborate with physician/APN and implement wound/incision site care and dressing changes as ordered  - Position infant to avoid placing pressure on wound  - Enterostomal/Wound therapy consult as indicated for ostomies/wounds/G-tubes  Outcome: Progressing

## 2024-12-10 NOTE — PROGRESS NOTES
[facility]   part of [facility]     Hospitalist Progress Note     Lawrence Daniels Patient Status:  Observation    9/3/1945 MRN [id number]   [facility] 4NW-A Attending Diamond Velasquez MD   Hosp Day # 0 PCP Jennifer Chandra DO     Chief Complaint: s/p fall    Subjective:     Patient BP low, nurses at the bedside, pt denies dizziness, lightheadedness, placed in Trendelenburg, BP improving.    Objective:    Review of Systems:   A comprehensive review of systems was completed; pertinent positive and negatives stated in subjective.    Vital signs:  Temp:  [98.2 °F (36.8 °C)-99.1 °F (37.3 °C)] 98.7 °F (37.1 °C)  Pulse:  [66-92] 75  Resp:  [13-18] 16  BP: (102-211)/() 128/82  SpO2:  [94 %-100 %] 97 %    Physical Exam:    General: No acute distress  Respiratory: No wheezes, no rhonchi  Cardiovascular: S1, S2, regular rate and rhythm  Abdomen: Soft, Non-tender, non-distended, positive bowel sounds  Neuro: No new focal deficits.   Extremities: No edema      Diagnostic Data:    Labs:  Recent Labs   Lab 24  1656   WBC 8.5   HGB 12.4*   MCV 96.1   .0   INR 1.03       Recent Labs   Lab 24  1656   GLU 91   BUN 24*   CREATSERUM 1.16   CA 9.8   ALB 4.1      K 3.9      CO2 28.0   ALKPHO 65   AST 14   ALT <7*   BILT 0.9   TP 7.6       Estimated Creatinine Clearance: 51.6 mL/min (based on SCr of 1.16 mg/dL).    Recent Labs   Lab 24  1656   TROPHS 13       Recent Labs   Lab 24  1656   PTP 13.6   INR 1.03                  Microbiology    No results found for this visit on 24.      Imaging: Reviewed in Epic.    Medications:    carbidopa-levodopa  1.5 tablet Oral TID    docusate sodium  100 mg Oral Nightly    donepezil  10 mg Oral Daily    folic acid  400 mcg Oral Weekly    hydrALAZINE  25 mg Oral BID    hydroCHLOROthiazide  12.5 mg Oral Daily    latanoprost  1 drop Both Eyes Nightly    levothyroxine  137 mcg Oral Daily @ 0700    losartan  100 mg Oral Daily     potassium chloride  10 mEq Oral Daily    QUEtiapine  25 mg Oral Nightly    atorvastatin  10 mg Oral Nightly    timolol  1 drop Both Eyes Nightly    ceFAZolin  2 g Intravenous Q8H       Assessment & Plan:      #s/p recurrent falls, likely due to Parkinsons (autonomic dysfunction)  PT/OT eval    #Parkinson's disease with dementia  Resume home meds, on Seroquel nightly for sundowning, needs an additional 1/2 dose (12.5 mg at around 5 pm if worsening confusion)  PT/OT eval  Do not think he needs to see neurology here at this time    #Hypotension, likely due to autonomic dysfunction vs sepsis  IVF as needed  LA pending    #Cellulitis/abscess on back s/p I&D in ED  IV abx  Wound care to see    #Hypothyroidism, Synthroid    #DL, statin    #H/o prostate cancer      Sincere Nugent MD    Supplementary Documentation:     Quality:  DVT Mechanical Prophylaxis:   SCDs, Early ambuation  DVT Pharmacologic Prophylaxis   Medication   None         DVT Pharmacologic prophylaxis: Aspirin 81 mg      Code Status: Not on file  Perez: External urinary catheter in place  Perez Duration (in days):   Central line:    SHASHI:     Discharge is dependent on: progress  At this point Mr. Daniels is expected to be discharge to: TBD    The 21st Century Cures Act makes medical notes like these available to patients in the interest of transparency. Please be advised this is a medical document. Medical documents are intended to carry relevant information, facts as evident, and the clinical opinion of the practitioner. The medical note is intended as peer to peer communication and may appear blunt or direct. It is written in medical language and may contain abbreviations or verbiage that are unfamiliar.

## 2024-12-10 NOTE — PLAN OF CARE
Patient is A/O x 2-3 can become confused. Lethargic. Room air. No complaints of pain. Regular diet; poor appetite. Incontinent; primo fit in place. Collection container to suction, urin is clear, naima. Patient up in chair with assistance. All medications given per MAR. PIV saline locked. Wound care saw patient (see notes) .Safety precautions in place. Chair alarm activated. Call light within reach.1225: Patient became hypotensive (see flowsheets), MD notified 500 mL bolus NS ordered. Patient resting in bed. Spouse at bedside.  1345: Bolus completed MD notified of results (see flow sheets).No new orders    Problem: SKIN INTEGRITY  Goal: Incision/wound heals without complications  Description: Interventions:  - Assess wound bed/incision and surrounding skin tissue  - Collaborate with physician/APN and implement wound/incision site care and dressing changes as ordered  - Position infant to avoid placing pressure on wound  - Enterostomal/Wound therapy consult as indicated for ostomies/wounds/G-tubes  Outcome: Progressing     Problem: CARDIOVASCULAR - ADULT  Goal: Maintains optimal cardiac output and hemodynamic stability  Description: INTERVENTIONS:  - Monitor vital signs, rhythm, and trends  - Monitor for bleeding, hypotension and signs of decreased cardiac output  - Evaluate effectiveness of vasoactive medications to optimize hemodynamic stability  - Monitor arterial and/or venous puncture sites for bleeding and/or hematoma  - Assess quality of pulses, skin color and temperature  - Assess for signs of decreased coronary artery perfusion - ex. Angina  - Evaluate fluid balance, assess for edema, trend weights  Outcome: Progressing     Problem: SKIN/TISSUE INTEGRITY - ADULT  Goal: Incision(s), wounds(s) or drain site(s) healing without S/S of infection  Description: INTERVENTIONS:  - Assess and document risk factors for pressure ulcer development  - Assess and document skin integrity  - Assess and document  dressing/incision, wound bed, drain sites and surrounding tissue  - Implement wound care per orders  - Initiate isolation precautions as appropriate  - Initiate Pressure Ulcer prevention bundle as indicated  Outcome: Progressing     Problem: MUSCULOSKELETAL - ADULT  Goal: Return mobility to safest level of function  Description: INTERVENTIONS:  - Assess patient stability and activity tolerance for standing, transferring and ambulating w/ or w/o assistive devices  - Assist with transfers and ambulation using safe patient handling equipment as needed  - Ensure adequate protection for wounds/incisions during mobilization  - Obtain PT/OT consults as needed  - Advance activity as appropriate  - Communicate ordered activity level and limitations with patient/family  Outcome: Progressing

## 2024-12-10 NOTE — PHYSICAL THERAPY NOTE
PHYSICAL THERAPY EVALUATION - INPATIENT     Room Number: 412/412-A  Evaluation Date: 12/10/2024  Type of Evaluation: Initial  Physician Order: PT Eval and Treat    Presenting Problem: Generalized weakness, fall  Co-Morbidities : PD, falls, dementia , CKD  Reason for Therapy: Mobility Dysfunction and Discharge Planning    PHYSICAL THERAPY ASSESSMENT   Patient is a 79 year old male admitted 12/9/2024 following a fall out of a wheelchair, unable to get up.  Prior to admission, patient's baseline is ambulatory with a rolling walker for short distances.  Patient is currently functioning below baseline with bed mobility, transfers, gait, maintaining seated position, and standing prolonged periods.  Patient is requiring minimal assist as a result of the following impairments: decreased functional strength, decreased endurance/aerobic capacity, impaired sitting and standing balance, decreased muscular endurance, cognitive deficits (impaired memory), and medical status.  Physical therapy will continue to follow for duration of hospitalization.    Patient will benefit from continued skilled PT Services at discharge to promote prior level of function and safety with additional support and return home with home health PT.    PLAN DURING HOSPITALIZATION  Nursing Mobility Recommendation : 1 Assist  PT Device Recommendation: Rolling walker;Wheelchair  PT Treatment Plan: Bed mobility;Endurance;Patient education;Family education;Gait training;Neuromuscular re-educate;Strengthening;Transfer training;Balance training  Rehab Potential : Fair  Frequency (Obs): 3-5x/week     CURRENT GOALS    Goal #1 Patient is able to demonstrate supine - sit EOB @ level: supervision     Goal #2 Patient is able to demonstrate transfers Sit to/from Stand at assistance level: supervision     Goal #3 Patient is able to ambulate 150 feet with assist device: walker - rolling at assistance level: supervision     Goal #4    Goal #5    Goal #6    Goal Comments:  Goals established on 12/10/2024      PHYSICAL THERAPY MEDICAL/SOCIAL HISTORY  History related to current admission: Patient is a 79 year old male who presented to the ED on 2024 following a fall out of the wheelchair.  Pt diagnosed with generalized weakness and fall.  Pt s/p recent I&D of cyst on back, dx with cellulitis and started on Ancef in ED.    HOME SITUATION  Type of Home: House  Home Layout: One level;Ramped entrance                     Lives With: Spouse    Drives: No   Patient Regularly Uses: Glasses;Reading glasses;Rolling walker      Prior Level of [place]: The pt is a poor historian.  Pt provided therapist permission to call his wife, Delores, who was reached at [phone].  Delores reports that pt has \"good days and bad days.\"  On good days pt is able to ambulate on his own using a rolling walker.  Pt can typically toilet on his own.  Recently the pt has had more \"bad days\" and within the last week has obtained and started to use a wheelchair. Pt has had 3 falls in the last 1 month, but before that had not fallen in several months, since last hospitalization in 2024.  The pt has caregivers 8am-Noon and 5pm-9am to assist with dressing and showering.    SUBJECTIVE  Pt reports happily, \"I'm just ducky.\"    OBJECTIVE  Precautions: Bed/chair alarm  Fall Risk: High fall risk    WEIGHT BEARING RESTRICTION     PAIN ASSESSMENT  Ratin          COGNITION  Overall Cognitive Status:  Impaired  Arousal/Alertness:  appropriate responses to stimuli, delayed responses to stimuli, and drowsy (eye closed majority of session, but will open eyes on command)  Orientation Level:  oriented to situation \"[I'm here] because I fell,\" oriented to person, disoriented to place, \"[place],\" and disoriented to time \"\"  Memory:  decreased recall of biographical information and decreased long term memory  Following Commands:  follows one step commands with increased time and follows one step commands with  repetition  Awareness of Deficits:  decreased awareness of deficits  Problem Solving:  assistance required to identify errors made, assistance required to generate solutions, and assistance required to implement solutions    RANGE OF MOTION AND STRENGTH ASSESSMENT  Upper extremity ROM and strength are within functional limits     Lower extremity ROM is within functional limits     Lower extremity strength is within functional limits     BALANCE  Static Sitting: Fair -  Dynamic Sitting: Poor +  Static Standing: Poor +  Dynamic Standing: Poor +      ACTIVITY TOLERANCE  Pulse: 61  Heart Rate Source: Monitor                   AM-PAC '6-Clicks' INPATIENT SHORT FORM - BASIC MOBILITY  How much difficulty does the patient currently have...  Patient Difficulty: Turning over in bed (including adjusting bedclothes, sheets and blankets)?: A Little   Patient Difficulty: Sitting down on and standing up from a chair with arms (e.g., wheelchair, bedside commode, etc.): A Little   Patient Difficulty: Moving from lying on back to sitting on the side of the bed?: A Little   How much help from another person does the patient currently need...   Help from Another: Moving to and from a bed to a chair (including a wheelchair)?: A Little   Help from Another: Need to walk in hospital room?: A Little   Help from Another: Climbing 3-5 steps with a railing?: A Little     AM-PAC Score:  Raw Score: 18   Approx Degree of Impairment: 46.58%   Standardized Score (AM-PAC Scale): 43.63   CMS Modifier (G-Code): CK    FUNCTIONAL ABILITY STATUS  Gait Assessment   Functional Mobility/Gait Assessment  Gait Assistance: Minimum assistance  Distance (ft): 3  Assistive Device: Rolling walker  Pattern: Shuffle    Skilled Therapy Provided     Bed Mobility:  Rolling: Min A  Supine to sit: Min A   Sit to supine: NT     Transfer Mobility:  Sit to stand: Min A   Stand to sit: Min A  Gait = Min A    Therapist's Comments: Pt with verbal and manual cues for hand  placement and sequencing with sit<>stand transfers.  Pt with Parkinsonian gait: shuffle, decreased step length and clearance, absent heel strike-toe off.  1 brief episode of freezing, but able to remain standing and continue ambulating.    Exercise/Education Provided:  Bed mobility  Functional activity tolerated  Gait training  Neuromuscular re-educate  Posture  Strengthening  Transfer training    Patient End of Session: Up in chair;Needs met;Call light within reach;RN aware of session/findings;All patient questions and concerns addressed;Alarm set      Patient Evaluation Complexity Level:  History High - 3 or more personal factors and/or co-morbidities   Examination of body systems Moderate - addressing a total of 3 or more elements   Clinical Presentation Low- Stable   Clinical Decision Making Low Complexity       PT Session Time: 25 minutes  Gait Trainin minutes  Therapeutic Activity: 10 minutes

## 2024-12-10 NOTE — OCCUPATIONAL THERAPY NOTE
OCCUPATIONAL THERAPY EVALUATION - INPATIENT     Room Number: 412/412-A  Evaluation Date: 12/10/2024  Type of Evaluation: Initial  Presenting Problem: weakness    Physician Order: IP Consult to Occupational Therapy  Reason for Therapy: ADL/IADL Dysfunction and Discharge Planning    OCCUPATIONAL THERAPY ASSESSMENT   Patient is currently functioning near baseline with toileting, bathing, lower body dressing, grooming, bed mobility, transfers, static standing balance, dynamic standing balance, and functional standing tolerance. Prior to admission, patient's baseline is assisted with all  ADLs, primarily uses WC, has assist from spouse and part time caregiver. Patient is requiring moderate to maximum assist as a result of the following impairments: decreased functional reach, decreased endurance, impaired sitting and standing balance, impaired coordination, and cognitive deficits (dementia, decreased sequencing, awareness of errors , attention and safety awareness). Occupational Therapy will continue to follow for duration of hospitalization.    Patient will benefit from continued skilled OT Services at discharge to promote prior level of function and safety with additional support and return home with home health OT    History Related to Current Admission: Patient is a 79 year old male admitted on 12/9/2024 with Presenting Problem: weakness. Co-Morbidities : PD, falls, dementia , CKD, recent abscess removal    Patient admitted s/p fall out of WC at home. S/p cyst removal on back.Positive for cellulitis at excision site. Xray R elbow and CT brain negative .    WEIGHT BEARING RESTRICTION       Recommendations for nursing staff:   Transfers: 1 person , RW  Toileting location: bedside commode    EVALUATION SESSION:  Patient Start of Session: supine    FUNCTIONAL TRANSFER ASSESSMENT  Sit to Stand: Edge of Bed  Edge of Bed: Moderate Assist    BED MOBILITY     BALANCE ASSESSMENT  Static Sitting: Contact Guard Assist  Static  Standing: Minimal Assist    FUNCTIONAL ADL ASSESSMENT  LB Dressing Seated: Maximum Assist  LB Dressing Standing: Maximum Assist    ACTIVITY TOLERANCE: denied dizziness          O2 SATURATIONS     COGNITION  Arousal/Alertness:  appropriate responses to stimuli and delayed responses to stimuli  Attention Span:  attends with cues to redirect, difficulty attending to directions, and difficulty dividing attention  Orientation Level:  oriented to place, oriented to time, and oriented to person  Following Commands:  follows one step commands with increased time and follows one step commands with repetition  Initiation: cues to initiate tasks and hand over hand to initiate tasks  Motor Planning: impaired  Awareness of Errors:  decreased awareness of errors   Awareness of Deficits:  decreased awareness of deficits  Not formally tested    Upper Extremity   ROM: within functional limits   Strength: within functional limits   Coordination  Gross motor: impaired due to slowed speed  Fine motor: as above  Sensation: no deficits reported    EDUCATION PROVIDED  Patient Education : Posture/Positioning; Fall Prevention; Functional Transfer Techniques; Plan of Care; Role of Occupational Therapy  Patient's Response to Education: Demonstrates Poor Carry Over to Information; Verbalized Understanding    Equipment used: rw, gait belt  Demonstrates functional use, Would benefit from additional trial yes     Therapist comments:   Increased time needed for assessments. Patient keeping eyes closed most of session, however was briefly able to attend to accurately read wall clock. Patient assisted supine to sit. Noted some lean in sitting, with intermittent CGA/MIN A needed to maintain balance. MAX a to don slippers. MOD A to stand up at RW. Shuffling steps and safety cues needed to walk about 3 feet and turn and sit in recliner. LEs elevated. Patient was oriented to call light. Chair alarm on . Nurse updated.     Patient End of Session:  Hospital anti-slip socks;All patient questions and concerns addressed;RN aware of session/findings;Call light within reach;Needs met;Up in chair;Alarm set    OCCUPATIONAL PROFILE    HOME SITUATION  Type of Home: House  Home Layout: One level;Ramped entrance  Lives With: Spouse;Caregiver part-time (aide for showers)       Shower/Tub and Equipment: Shower chair       Occupation/Status: retired     Drives: No  Patient Regularly Uses: Glasses;Wheelchair    Prior Level of Function: Lives with spouse and has part time CG for bathing. Primarily in WC, minimal ambulation. RN who spoke with spouse who reported that patient is not far from his baseline at this time.     SUBJECTIVE   Participatory, needs moderate cueing to maintain attention    PAIN ASSESSMENT  Ratin          OBJECTIVE  Precautions: Bed/chair alarm  Fall Risk: High fall risk    ASSESSMENTS    AM-PAC ‘6-Clicks’ Inpatient Daily Activity Short Form  -   Putting on and taking off regular lower body clothing?: A Lot  -   Bathing (including washing, rinsing, drying)?: A Lot  -   Toileting, which includes using toilet, bedpan or urinal? : A Lot  -   Putting on and taking off regular upper body clothing?: A Lot  -   Taking care of personal grooming such as brushing teeth?: A Lot  -   Eating meals?: A Lot    AM-PAC Score:  Score: 12  Approx Degree of Impairment: 66.57%  Standardized Score (AM-PAC Scale): 30.6    ADDITIONAL TESTS     NEUROLOGICAL FINDINGS      COGNITION ASSESSMENTS     PLAN     OT Treatment Plan: Functional transfer training;UE strengthening/ROM;Cognitive reorientation;Energy conservation/work simplification techniques;Balance activities;ADL training;Equipment eval/education;Patient/Family training;Patient/Family education;Compensatory technique education  Rehab Potential : Fair  Frequency: 3x/week  Number of Visits to Meet Established Goals: 5    ADL Goals   Patient will perform eating: with set up and with supervision  Patient will perform  grooming: with setup, with supervision, and while in chair  Patient will perform toileting: with mod assist and with bedside commode    Functional Transfer Goals  Patient will transfer from sit to supine:  with min assist  Patient will transfer from supine to sit:  with min assist  Patient will transfer to bedside commode:  with min assist    UE Exercise Program Goal  Patient will be supervision with bilateral AROM HEP (home exercise program).      Patient Evaluation Complexity Level:   Occupational Profile/Medical History LOW - Brief history including review of medical or therapy records    Specific performance deficits impacting engagement in ADL/IADL MODERATE  3 - 5 performance deficits   Client Assessment/Performance Deficits MODERATE - Comorbidities and min to mod modifications of tasks    Clinical Decision Making MODERATE - Analysis of occupational profile, detailed assessments, several treatment options    Overall Complexity LOW     OT Session Time: 17 minutes    Therapeutic Activity: 14 minutes

## 2024-12-10 NOTE — RESPIRATORY THERAPY NOTE
CPAP/BIPAP EVALUATION: Done     CPAP/BIPAP INITIATION: Patient refused CPAP during this admission.     NOTES: Patient stated that he has been diagnosed with ISAIAS but does not use CPAP at home. He refused CPAP during this admission. RT will be available for further assistance and provide necessary equipment.

## 2024-12-11 PROCEDURE — 99232 SBSQ HOSP IP/OBS MODERATE 35: CPT | Performed by: INTERNAL MEDICINE

## 2024-12-11 NOTE — CM/SW NOTE
12/11/24 1400   CM/SW Referral Data   Referral Source Social Work (self-referral)   Reason for Referral Discharge planning   Informant Daughter   Patient Info   Patient's Home Environment House   Patient Status Prior to Admission   Independent with ADLs and Mobility No   Pt. requires assistance with Housework;Meals;Bathing;Ambulating;Dressing;Medications;Feeding;Toileting   Services in place prior to admission [facility]     Prior Level of [place] per PT evaluation: The pt is a poor historian.  Pt provided therapist permission to call his wife, Delores, who was reached at [phone].  Delores reports that pt has \"good days and bad days.\"  On good days pt is able to ambulate on his own using a rolling walker.  Pt can typically toilet on his own.  Recently the pt has had more \"bad days\" and within the last week has obtained and started to use a wheelchair. Pt has had 3 falls in the last 1 month, but before that had not fallen in several months, since last hospitalization in March 2024.  The pt has caregivers 8am-Noon and 5pm-9am to assist with dressing and showering.     SW attempted to reach patient's wife Delores but was unable to reach or leave a voicemail. SW called patient's daughter Hallie Edgar who confirmed the above information obtained by PT but also reported that patient's wife has been attempting to get home health services instated for patient. SW informed her that PT and OT anticipate that home health services would be beneficial for patient. Hallie Edgar agreed.     SW sent referral for HH services and will call patient's wife again to discuss once choice list is available.     SW will continue to follow for plan of care changes and remain available for any additional DC needs or concerns.     Nancy Johnson MSW, LSW  Discharge Planner   [phone]

## 2024-12-11 NOTE — PROGRESS NOTES
[facility]   part of [facility]     Hospitalist Progress Note     Lawrence Daniels Patient Status:  Observation    9/3/1945 MRN [id number]   Location [facility] 4NW-A Attending Gayathri Walton MD   Hosp Day # 0 PCP Jennifer Chandra DO     Chief Complaint: Fall, generalized weakness    Subjective:     Patient noted to be very sleepy today. He was awake enough to be fed. He normally feeds himself. He hasn't gotten out of bed today. He denies any specific complaints.     Objective:    Review of Systems:   A comprehensive review of systems was completed; pertinent positive and negatives stated in subjective.    Vital signs:  Temp:  [97.9 °F (36.6 °C)-98.6 °F (37 °C)] 98.6 °F (37 °C)  Pulse:  [56-76] 56  Resp:  [15-20] 15  BP: (136-163)/(63-94) 149/70  SpO2:  [96 %-99 %] 98 %    Physical Exam:    General: No acute distress  Respiratory: No wheezes, no rhonchi  Cardiovascular: S1, S2, regular rate and rhythm  Abdomen: Soft, Non-tender, non-distended, positive bowel sounds  Neuro: oriented to self only. Not to place or time. He is moving all 4 extremities to command. NO facial asymmetry. Speech is intact.   Extremities: No edema    Diagnostic Data:    Labs:  Recent Labs   Lab 24  1656   WBC 8.5   HGB 12.4*   MCV 96.1   .0   INR 1.03       Recent Labs   Lab 24  1656   GLU 91   BUN 24*   CREATSERUM 1.16   CA 9.8   ALB 4.1      K 3.9      CO2 28.0   ALKPHO 65   AST 14   ALT <7*   BILT 0.9   TP 7.6       Estimated Creatinine Clearance: 51.6 mL/min (based on SCr of 1.16 mg/dL).    Recent Labs   Lab 24  1656   TROPHS 13       Recent Labs   Lab 24  1656   PTP 13.6   INR 1.03                  Microbiology    Hospital Encounter on 24   1. Blood Culture     Status: None (Preliminary result)    Collection Time: 24  4:56 PM    Specimen: Blood,peripheral   Result Value Ref Range    Blood Culture Result No Growth 1 Day N/A         Imaging: Reviewed in  Epic.    Medications:    carbidopa-levodopa  1.5 tablet Oral TID    docusate sodium  100 mg Oral Nightly    donepezil  10 mg Oral Daily    folic acid  400 mcg Oral Weekly    hydrALAZINE  25 mg Oral BID    hydroCHLOROthiazide  12.5 mg Oral Daily    latanoprost  1 drop Both Eyes Nightly    levothyroxine  137 mcg Oral Daily @ 0700    losartan  100 mg Oral Daily    potassium chloride  10 mEq Oral Daily    QUEtiapine  25 mg Oral Nightly    atorvastatin  10 mg Oral Nightly    timolol  1 drop Both Eyes Nightly    ceFAZolin  2 g Intravenous Q8H       Assessment & Plan:      #Fall  #generalized weakness  #Recurrent falls, likely due to Parkinsons (autonomic dysfunction)  PT/OT eval  Check orthostatic vitals     #Parkinson's disease with dementia  Resume home meds, on Seroquel nightly for sundowning, needs an additional 1/2 dose (12.5 mg at around 5 pm if worsening confusion)  PT/OT eval     #Hypotension, likely due to autonomic dysfunction. Low suspicion for sepsis   IVF as needed  Orthostatic vitals     #Cellulitis/abscess on back s/p I&D in ED  IV abx  Wound care to see     #Hypothyroidism, Synthroid     #DL, statin    #H/o prostate cancer  #ISAIAS on CPAP   #Obesity, BMI 30.27     Gayathri Walton MD    Supplementary Documentation:     Quality:  DVT Mechanical Prophylaxis:   SCDs, Early ambuation  DVT Pharmacologic Prophylaxis   Medication   None         DVT Pharmacologic prophylaxis: Aspirin 81 mg      Code Status: Not on file  Perez: External urinary catheter in place  Perez Duration (in days):   Central line:    SHASHI:     Discharge is dependent on: course  At this point Mr. Daniels is expected to be discharge to: home    The 21st Century Cures Act makes medical notes like these available to patients in the interest of transparency. Please be advised this is a medical document. Medical documents are intended to carry relevant information, facts as evident, and the clinical opinion of the practitioner. The medical note is  intended as peer to peer communication and may appear blunt or direct. It is written in medical language and may contain abbreviations or verbiage that are unfamiliar.

## 2024-12-11 NOTE — PROGRESS NOTES
No change , confused but not distressed, slept intermittently. Primo draining urine. Repositioned. Vitals stable. Call light in reach, bed alarm on.

## 2024-12-12 ENCOUNTER — APPOINTMENT (OUTPATIENT)
Dept: CV DIAGNOSTICS | Facility: HOSPITAL | Age: 79
End: 2024-12-12
Attending: INTERNAL MEDICINE
Payer: MEDICARE

## 2024-12-12 LAB
ALBUMIN SERPL-MCNC: 4 G/DL (ref 3.2–4.8)
ALBUMIN/GLOB SERPL: 1.1 {RATIO} (ref 1–2)
ALP LIVER SERPL-CCNC: 60 U/L
ALT SERPL-CCNC: <7 U/L
ANION GAP SERPL CALC-SCNC: 8 MMOL/L (ref 0–18)
AST SERPL-CCNC: 12 U/L (ref ?–34)
BILIRUB SERPL-MCNC: 0.6 MG/DL (ref 0.2–1.1)
BUN BLD-MCNC: 20 MG/DL (ref 9–23)
CALCIUM BLD-MCNC: 9.7 MG/DL (ref 8.7–10.4)
CHLORIDE SERPL-SCNC: 103 MMOL/L (ref 98–112)
CO2 SERPL-SCNC: 28 MMOL/L (ref 21–32)
CORTIS SERPL-MCNC: 22.4 UG/DL
CREAT BLD-MCNC: 1.05 MG/DL
EGFRCR SERPLBLD CKD-EPI 2021: 72 ML/MIN/1.73M2 (ref 60–?)
ERYTHROCYTE [DISTWIDTH] IN BLOOD BY AUTOMATED COUNT: 12.1 %
GLOBULIN PLAS-MCNC: 3.6 G/DL (ref 2–3.5)
GLUCOSE BLD-MCNC: 107 MG/DL (ref 70–99)
HCT VFR BLD AUTO: 38.6 %
HGB BLD-MCNC: 12.7 G/DL
MCH RBC QN AUTO: 32.8 PG (ref 26–34)
MCHC RBC AUTO-ENTMCNC: 32.9 G/DL (ref 31–37)
MCV RBC AUTO: 99.7 FL
OSMOLALITY SERPL CALC.SUM OF ELEC: 291 MOSM/KG (ref 275–295)
PLATELET # BLD AUTO: 167 10(3)UL (ref 150–450)
POTASSIUM SERPL-SCNC: 3.6 MMOL/L (ref 3.5–5.1)
PROT SERPL-MCNC: 7.6 G/DL (ref 5.7–8.2)
Q-T INTERVAL: 410 MS
QRS DURATION: 96 MS
QTC CALCULATION (BEZET): 426 MS
R AXIS: -24 DEGREES
RBC # BLD AUTO: 3.87 X10(6)UL
SODIUM SERPL-SCNC: 139 MMOL/L (ref 136–145)
T AXIS: 50 DEGREES
VENTRICULAR RATE: 65 BPM
WBC # BLD AUTO: 6.2 X10(3) UL (ref 4–11)

## 2024-12-12 PROCEDURE — 99233 SBSQ HOSP IP/OBS HIGH 50: CPT | Performed by: INTERNAL MEDICINE

## 2024-12-12 PROCEDURE — 93306 TTE W/DOPPLER COMPLETE: CPT | Performed by: INTERNAL MEDICINE

## 2024-12-12 RX ORDER — LOSARTAN POTASSIUM 50 MG/1
50 TABLET ORAL DAILY
Status: DISCONTINUED | OUTPATIENT
Start: 2024-12-13 | End: 2024-12-14

## 2024-12-12 NOTE — PLAN OF CARE
More alert this morning, conversing with staffs, answered to most questions appropriately, ate > 50% for breakfast, blood pressure was slightly elevated, was + for orthostatic hypotension, MD adjusted blood pressure medications.  Mid back dressing changes was done, skin surrounding the abscess site is more dark pinkish in color, sensitive to touch, cleansed with normal saline, obtained specimen for culture, packed site with saline soaked iodoform gauze, secured with Mepilex foam.  Repositioned for comfort, patient leans more to his left side.  TEDS stockings in place.       Problem: SKIN INTEGRITY  Goal: Incision/wound heals without complications  Description: Interventions:  - Assess wound bed/incision and surrounding skin tissue  - Collaborate with physician/APN and implement wound/incision site care and dressing changes as ordered  - Position infant to avoid placing pressure on wound  - Enterostomal/Wound therapy consult as indicated for ostomies/wounds/G-tubes  Outcome: Not Progressing     Problem: SKIN/TISSUE INTEGRITY - ADULT  Goal: Incision(s), wounds(s) or drain site(s) healing without S/S of infection  Description: INTERVENTIONS:  - Assess and document risk factors for pressure ulcer development  - Assess and document skin integrity  - Assess and document dressing/incision, wound bed, drain sites and surrounding tissue  - Implement wound care per orders  - Initiate isolation precautions as appropriate  - Initiate Pressure Ulcer prevention bundle as indicated  Outcome: Not Progressing     Problem: MUSCULOSKELETAL - ADULT  Goal: Return mobility to safest level of function  Description: INTERVENTIONS:  - Assess patient stability and activity tolerance for standing, transferring and ambulating w/ or w/o assistive devices  - Assist with transfers and ambulation using safe patient handling equipment as needed  - Ensure adequate protection for wounds/incisions during mobilization  - Obtain PT/OT consults as  needed  - Advance activity as appropriate  - Communicate ordered activity level and limitations with patient/family  Outcome: Not Progressing

## 2024-12-12 NOTE — PLAN OF CARE
Was lethargic this morning, arouses easily, fair appetite.  Was more awake for lunch, ate better this time, wife at bedside.  Dressing changes to abscess site was done, skin surrounding the I and D site is pinkish in color and tender to touch, no drainage noted.  Up on the side of the bed for orthostatic vital signs, dropped blood pressure to 100/80 standing from 163/90 lying, denied any dizziness or lightheadedness. Returned to bed, managed to feed himself for dinner. For possible discharge tomorrow.   Problem: SKIN INTEGRITY  Goal: Incision/wound heals without complications  Description: Interventions:  - Assess wound bed/incision and surrounding skin tissue  - Collaborate with physician/APN and implement wound/incision site care and dressing changes as ordered  - Position infant to avoid placing pressure on wound  - Enterostomal/Wound therapy consult as indicated for ostomies/wounds/G-tubes  Outcome: Progressing     Problem: CARDIOVASCULAR - ADULT  Goal: Maintains optimal cardiac output and hemodynamic stability  Description: INTERVENTIONS:  - Monitor vital signs, rhythm, and trends  - Monitor for bleeding, hypotension and signs of decreased cardiac output  - Evaluate effectiveness of vasoactive medications to optimize hemodynamic stability  - Monitor arterial and/or venous puncture sites for bleeding and/or hematoma  - Assess quality of pulses, skin color and temperature  - Assess for signs of decreased coronary artery perfusion - ex. Angina  - Evaluate fluid balance, assess for edema, trend weights  Outcome: Progressing     Problem: SKIN/TISSUE INTEGRITY - ADULT  Goal: Incision(s), wounds(s) or drain site(s) healing without S/S of infection  Description: INTERVENTIONS:  - Assess and document risk factors for pressure ulcer development  - Assess and document skin integrity  - Assess and document dressing/incision, wound bed, drain sites and surrounding tissue  - Implement wound care per orders  - Initiate  isolation precautions as appropriate  - Initiate Pressure Ulcer prevention bundle as indicated  Outcome: Progressing

## 2024-12-12 NOTE — PHYSICAL THERAPY NOTE
Physical Therapy    Attempted to treat pt this a.m., currently being cleaned by rn staff.  Will re-attempt as schedule allows.

## 2024-12-12 NOTE — CM/SW NOTE
ESVIN received a call from patient's daughter in law Florecita [phone] who was provided this number by patient's daughter Hallie Edgar. Florecita reported that last night, patient's wife Delores had a fall when entering a restaurant. Delores was in the ER most of the night with concerns that she had hit her head. She is now home recovering but family is strongly requesting that Lawrence go to rehab after DC due to patient's wife not being able to care for him when he is ready for DC.     ESVIN informed Florecita that referrals have been sent to locate [facility] facilities that are able to accommodate patient's needs. ESVIN stressed that it would be up to insurance if [facility] would be approved.     ESVIN will continue to follow for plan of care changes and remain available for any additional DC needs or concerns.     Nancy Johnson MSW, LSW  Discharge Planner   [id number]

## 2024-12-12 NOTE — CM/SW NOTE
ESVIN called patient's wife Delores to discuss DC planning. SW reviewed information for [facility] choice list and Delores said that driving far distances is not an option right now but that if patient could be closer to home she would really like for him to go to rehab. ESVIN reviewed choice list and Delores selected [facility]. ESVIN reserved facility and requested that they submit for auth. Delores is aware that if auth is denied that patient will need to return home with HH services.     ESVIN reviewed HH choice list and she is agreeable to [facility]. ESVIN reserved [facility] as a backup.     SW will continue to follow for plan of care changes and remain available for any additional DC needs or concerns.     Nancy Johnson MSW, LSW  Discharge Planner   [id number]     EXAM:  MRI right knee without contrast. 

  

HISTORY:  Right knee pain.  Fall 09/12/2017.  Edema.  No right knee surgery reported.. 

  

TECHNIQUE:  Using a local extremity coil on a high field strength magnet multiplanar multisequence MR
I was performed of the right knee without intravenous or intra-articular gadolinium contrast.. 

  

COMPARISON:  Four view plain film examination right knee 09/12/2017. 

  

FINDINGS:  Within the medial compartment the medial meniscus is intact without discrete surfacing men
iscal tear.  The medial compartment cartilage congruent..  There is tendinosis over the origin medial
 head gastrocnemius muscle.  Associated thickening and increased signal intensity to the capsular ins
ertion as well.  Underlying remodeling posterior nonweight bearing medial femoral condyle. 

  

Within the lateral compartment lateral meniscus is intact without discrete surfacing meniscal tear.  
The lateral compartment cartilage shows approximate 11 mm area of chondrosis and cartilage ulceration
 over the weightbearing lateral femoral condyle. 

  

Within the patellofemoral compartment the patella seated.  Intact patellar attachment of the medial a
nd lateral patellar retinaculum.  Mild patellar chondrosis/chondromalacia patella.  The trochlear sandhya
ove cartilage relatively congruent.  Early productive osteophyte formation. 

  

Small right knee effusion.  No osteochondral loose bodies. Intact anterior and posterior cruciate lig
aments.  Anterior cruciate ligament intrasubstance ganglion formation.  This may be in continuity wit
h larger bright T2 fluid signal intensity lobulated synovial/ganglion cyst anteriorly at the joint li
ne measuring approximate 22 mm wide by 11 mm AP.  No translation of the tibia with respect to the fem
ur.  The extensor mechanism is intact.  There is extensive anterior soft tissue edema/swelling.  Ther
e is a large more organized complex fluid signal intensity collection measuring 11 cm craniocaudad by
 15 mm deep by 41 mm wide which may reflect soft tissue contusion/evolving hematoma.  The medial gabriella
ateral ligament as well as lateral collateral ligament complex and posterolateral corner intact.. 

  

IMPRESSION:  No discrete surfacing meniscal tear identified. 

  

Mild patellar chondrosis/chondromalacia patella. 

  

Small right knee effusion.  Intact cruciate and collateral ligaments.  Anterior cruciate ligament int
rasubstance ganglion formation.  This may be in continuity with larger synovial/ganglion cyst anterio
rly at the joint line measuring 22 mm. No acute fracture/stress fracture. 

  

Tendinosis over the origin medial head gastrocnemius muscle.  Associated thickening and increased sig
nal intensity to the capsular insertion as well which may reflect some degree of low grade tear/injur
y. 

  

Extensive anterior superficial soft tissue edema/swelling.  Large more organized complex fluid collec
tion measuring 11 cm craniocaudad which may reflect soft tissue contusion/evolving hematoma.

## 2024-12-12 NOTE — PHYSICAL THERAPY NOTE
PHYSICAL THERAPY TREATMENT NOTE - INPATIENT    Room Number: 412/412-A     Session: 1     Number of Visits to Meet Established Goals: 3    Presenting Problem: Generalized weakness, fall  Co-Morbidities : PD, falls, dementia , CKD, recent abscess removal    PHYSICAL THERAPY ASSESSMENT   Patient demonstrates limited progress this session, goals  remain in progress.      Patient is requiring moderate assist and maximum assist as a result of the following impairments: decreased functional strength, impaired sitting and standing balance, impaired motor planning, decreased muscular endurance, cognitive deficits (dementia, A&Ox1 this session), and medical status.     Patient continues to function below baseline with bed mobility, transfers, and gait.  Next session anticipate patient to progress bed mobility, transfers, and gait.  Physical Therapy will continue to follow patient for duration of hospitalization.    Patient continues to benefit from continued skilled PT services: to promote return to prior level of function and safety with continuous assistance and gradual rehabilitative therapy .    PLAN DURING HOSPITALIZATION  Nursing Mobility Recommendation : Lift Equipment  PT Device Recommendation: Rolling walker;Wheelchair  PT Treatment Plan: Bed mobility;Endurance;Patient education;Family education;Gait training;Neuromuscular re-educate;Strengthening;Transfer training;Balance training  Frequency (Obs): 3-5x/week     CURRENT GOALS     Goal #1 Patient is able to demonstrate supine - sit EOB @ level: supervision      Goal #2 Patient is able to demonstrate transfers Sit to/from Stand at assistance level: supervision      Goal #3 Patient is able to ambulate 150 feet with assist device: walker - rolling at assistance level: supervision      Goal #4     Goal #5     Goal #6     Goal Comments: Goals established on 12/10/2024  12/12/2024 all goals ongoing    History related to current admission: Patient is a 79 year old male who  presented to the ED on 2024 following a fall out of the wheelchair.  Pt diagnosed with generalized weakness and fall.  Pt s/p recent I&D of cyst on back, dx with cellulitis and started on Ancef in ED.     HOME SITUATION  Type of Home: House  Home Layout: One level;Ramped entrance                     Lives With: Spouse    Drives: No   Patient Regularly Uses: Glasses;Reading glasses;Rolling walker       Prior Level of Kiefer: The pt is a poor historian.  Pt provided therapist permission to call his wife, Delores, who was reached at [phone].  Delores reports that pt has \"good days and bad days.\"  On good days pt is able to ambulate on his own using a rolling walker.  Pt can typically toilet on his own.  Recently the pt has had more \"bad days\" and within the last week has obtained and started to use a wheelchair. Pt has had 3 falls in the last 1 month, but before that had not fallen in several months, since last hospitalization in 2024.  The pt has caregivers 8am-Noon and 5pm-9am to assist with dressing and showering.    SUBJECTIVE  \"I'm interested in following Delores's car.\"  \"Won't they let you go to the ceremonies?\"    OBJECTIVE  Precautions: Bed/chair alarm    WEIGHT BEARING RESTRICTION     PAIN ASSESSMENT   Ratin          BALANCE                                                                                                                       Static Sitting: Poor  Dynamic Sitting: Poor -           Static Standing: Poor -  Dynamic Standing: Not tested    ACTIVITY TOLERANCE                         O2 WALK       AM-PAC '6-Clicks' INPATIENT SHORT FORM - BASIC MOBILITY  How much difficulty does the patient currently have...  Patient Difficulty: Turning over in bed (including adjusting bedclothes, sheets and blankets)?: A Lot   Patient Difficulty: Sitting down on and standing up from a chair with arms (e.g., wheelchair, bedside commode, etc.): A Lot   Patient Difficulty: Moving from lying on  back to sitting on the side of the bed?: A Lot   How much help from another person does the patient currently need...   Help from Another: Moving to and from a bed to a chair (including a wheelchair)?: Total   Help from Another: Need to walk in hospital room?: Total   Help from Another: Climbing 3-5 steps with a railing?: Total     AM-PAC Score:  Raw Score: 9   Approx Degree of Impairment: 81.38%   Standardized Score (AM-PAC Scale): 30.55   CMS Modifier (G-Code): CM    FUNCTIONAL ABILITY STATUS  Gait Assessment   Functional Mobility/Gait Assessment  Gait Assistance: Not tested  Distance (ft): 0  Assistive Device:  (N/a)  Pattern:  (N/a)    Skilled Therapy Provided    Bed Mobility:  Rolling: Left - mod a of 1   Supine<>Sit: Max a of 1 for both trunk and le's.  Pt attempting to follow command to sit up, moving le's, but is unable to coordinate them off eob.  Once in sitting, pt w/ severe lean to left and back.  Pt following commands to attempt to correct, but always required at least min a (but mostly mod) to maintain sitting balance.  Had pt reach forward for walker to try and adjust balance in a more natural way, helped a little.  Pt was mod a of 1 for balance while performing sitting ex.     Sit<>Supine: Mod a of 1 for both trunk and le's.     Transfer Mobility:  Sit<>Stand: Unable to achieve on first attempt, but completed on second attempt w/ max a of 1.  Once again in standing, severe L lean.  Pt could not correct or gain balance enough to initiate weight shift or stepping.   Stand<>Sit: mod a of 1 to control descent.   Gait: unable to complete    Therapist's Comments: Assessed pt's motor coordination of ue's, facial expressions which were symmetrical.  Pt performed le as outlined below.  Pt placed in chair mode of bed at end of session.  Remained centered and slightly dozing at end of session.  Alarm on.      THERAPEUTIC EXERCISES  Lower Extremity Ankle pumps  Heel slides  Hip adduction squeezes  LAQ     Upper  Extremity      Position Sitting and Supine     Repetitions   8-10 - constant cues to remain on task.  Pt did better w/ visual cues   Sets   1     Patient End of Session: In bed;Needs met;Call light within reach;All patient questions and concerns addressed;Alarm set    PT Session Time: 25 minutes  Gait Trainin minutes  Therapeutic Activity: 15 minutes  Therapeutic Exercise: 10 minutes   Neuromuscular Re-education: 0 minutes

## 2024-12-12 NOTE — CONGREGATE LIVING REVIEW
Congregate Living Authorization    The [facility] ([facility]) has reviewed this case and the committee DOES NOT RECOMMEND discharge to a skilled nursing facility under skilled care.    The CLRC recommends:  Home with home health and any other appropriate caregiver assistance or medical equipment as needed vs respite in SNF.     Does not appear to have skilled services for PATRICE, but additional home support appears to be needed.    For questions regarding CLRC approval process, please contact the CM assigned to the case.  For questions regarding RN discharge workflow, please contact the unit Clinical Leader.

## 2024-12-12 NOTE — PROGRESS NOTES
[facility]   part of [facility]     Hospitalist Progress Note     Lawrence P Frances Patient Status:  Observation    9/3/1945 MRN [id number]   Location [facility] 4NW-A Attending Gayathri Walton MD   Hosp Day # 0 PCP Jennifer Chandra DO     Chief Complaint: Fall, generalized weakness    Subjective:     Orthostatic vitals positive yesterday. Unfortunately patient's wife suffered a fall and back home, but difficult to provide care for patient at this time. Patient is awake. He is eating better today per nursing.     Objective:    Review of Systems:   A comprehensive review of systems was completed; pertinent positive and negatives stated in subjective.    Vital signs:  Temp:  [96.8 °F (36 °C)-99 °F (37.2 °C)] 99 °F (37.2 °C)  Pulse:  [50-63] 60  Resp:  [12-20] 12  BP: (100-168)/(62-90) 146/62  SpO2:  [96 %-99 %] 98 %    Physical Exam:    General: No acute distress  Respiratory: No wheezes, no rhonchi  Cardiovascular: S1, S2, regular rate and rhythm  Abdomen: Soft, Non-tender, non-distended, positive bowel sounds  Neuro: oriented to self only. Not to place or time. He is moving all 4 extremities to command. NO facial asymmetry. Speech is intact.   Extremities: No edema  Skin: Back abscess with surrounding erythema. Continues to have purulent drainage.     Diagnostic Data:    Labs:  Recent Labs   Lab 24  1656   WBC 8.5   HGB 12.4*   MCV 96.1   .0   INR 1.03       Recent Labs   Lab 24  1656   GLU 91   BUN 24*   CREATSERUM 1.16   CA 9.8   ALB 4.1      K 3.9      CO2 28.0   ALKPHO 65   AST 14   ALT <7*   BILT 0.9   TP 7.6       Estimated Creatinine Clearance: 51.6 mL/min (based on SCr of 1.16 mg/dL).    Recent Labs   Lab 24  1656   TROPHS 13       Recent Labs   Lab 24  1656   PTP 13.6   INR 1.03                  Microbiology    Hospital Encounter on 24   1. Blood Culture     Status: None (Preliminary result)    Collection Time: 24  4:56 PM    Specimen:  Blood,peripheral   Result Value Ref Range    Blood Culture Result No Growth 2 Days N/A         Imaging: Reviewed in Epic.    Medications:    [START ON 12/13/2024] losartan  50 mg Oral Daily    carbidopa-levodopa  1.5 tablet Oral TID    docusate sodium  100 mg Oral Nightly    donepezil  10 mg Oral Daily    folic acid  400 mcg Oral Weekly    latanoprost  1 drop Both Eyes Nightly    levothyroxine  137 mcg Oral Daily @ 0700    potassium chloride  10 mEq Oral Daily    QUEtiapine  25 mg Oral Nightly    atorvastatin  10 mg Oral Nightly    timolol  1 drop Both Eyes Nightly    ceFAZolin  2 g Intravenous Q8H       Assessment & Plan:      #Fall  #generalized weakness  #Recurrent falls, likely due to Parkinsons (autonomic dysfunction)  PT/OT eval - family would like patient to go to [facility]     #Parkinson's disease with dementia  Resume home meds, on Seroquel nightly for sundowning, needs an additional 1/2 dose (12.5 mg at around 5 pm if worsening confusion)    #Orthostatic hypotension suspect autonomic dysfunction. Low suspicion for sepsis   -Check AM cortisol  -TTE  -Discontinue hydrochlorothiazide, hydralazine   -Decrease losartan to 50 mg daily   -Cleo hose      #Cellulitis/abscess on back s/p I&D in ED  -Send drainage to cultures  -Check US  -It will need to be further drained     #Hypothyroidism, Synthroid     #DL, statin    #H/o prostate cancer  #ISAIAS on CPAP   #Obesity, BMI 30.27     Gayathri Walton MD    Supplementary Documentation:     Quality:  DVT Mechanical Prophylaxis: CLEO hose, SCDs, Early ambuation  DVT Pharmacologic Prophylaxis   Medication   None         DVT Pharmacologic prophylaxis: Aspirin 81 mg      Code Status: Not on file  Perez: External urinary catheter in place  Perez Duration (in days):   Central line:    SHASHI:     Discharge is dependent on: course  At this point Mr. Daniels is expected to be discharge to: home    The 21st Century Cures Act makes medical notes like these available to patients in the interest of  transparency. Please be advised this is a medical document. Medical documents are intended to carry relevant information, facts as evident, and the clinical opinion of the practitioner. The medical note is intended as peer to peer communication and may appear blunt or direct. It is written in medical language and may contain abbreviations or verbiage that are unfamiliar.

## 2024-12-12 NOTE — PLAN OF CARE
Alert x 3. VSS on Ra. Tolerating diet. Tele in place, SR. IV abx infusing per MAR. Up to side of bed with assist and a walker. Back incision with dressing intact. POC disccused. All current needs met. Call light in reach.

## 2024-12-13 ENCOUNTER — APPOINTMENT (OUTPATIENT)
Dept: CT IMAGING | Facility: HOSPITAL | Age: 79
End: 2024-12-13
Attending: INTERNAL MEDICINE
Payer: MEDICARE

## 2024-12-13 LAB — MRSA DNA SPEC QL NAA+PROBE: NEGATIVE

## 2024-12-13 PROCEDURE — 99233 SBSQ HOSP IP/OBS HIGH 50: CPT | Performed by: INTERNAL MEDICINE

## 2024-12-13 PROCEDURE — 71250 CT THORAX DX C-: CPT | Performed by: INTERNAL MEDICINE

## 2024-12-13 RX ORDER — HYDRALAZINE HYDROCHLORIDE 20 MG/ML
10 INJECTION INTRAMUSCULAR; INTRAVENOUS ONCE
Status: COMPLETED | OUTPATIENT
Start: 2024-12-13 | End: 2024-12-13

## 2024-12-13 RX ORDER — LABETALOL HYDROCHLORIDE 5 MG/ML
5 INJECTION, SOLUTION INTRAVENOUS ONCE
Status: DISCONTINUED | OUTPATIENT
Start: 2024-12-13 | End: 2024-12-13

## 2024-12-13 NOTE — CM/SW NOTE
ESVIN called patient's wife Delores [phone] to provide her with update. SW left a voicemail for patient's wife informing her that insurance auth is still pending and patient is not medically cleared for DC yet.     SW will continue to follow for plan of care changes and remain available for any additional DC needs or concerns.     Nancy Johnson MSW, LSW  Discharge Planner   [id number]

## 2024-12-13 NOTE — PROGRESS NOTES
[facility]   part of [facility]     Hospitalist Progress Note     Lawrence Daniels Patient Status:  Observation    9/3/1945 MRN [id number]   Location [facility] 4NW-A Attending Gayathri Walton MD   Hosp Day # 0 PCP Jennifer Chandra DO     Chief Complaint: Fall, generalized weakness    Subjective:     Patient is oriented to self and DWYER. He denies pain. He finished may be 25% of his breakfast.     Objective:    Review of Systems:   A comprehensive review of systems was completed; pertinent positive and negatives stated in subjective.    Vital signs:  Temp:  [98 °F (36.7 °C)-98.9 °F (37.2 °C)] 98 °F (36.7 °C)  Pulse:  [58-65] 62  Resp:  [14-19] 19  BP: (140-176)/(77-92) 140/83  SpO2:  [100 %] 100 %    Physical Exam:    General: No acute distress  Respiratory: No wheezes, no rhonchi  Cardiovascular: S1, S2, regular rate and rhythm  Abdomen: Soft, Non-tender, non-distended, positive bowel sounds  Neuro: oriented to self only. Not to place or time. He is moving all 4 extremities to command. NO facial asymmetry. Speech is intact.   Extremities: No edema  Skin: Back abscess with surrounding erythema. Continues to have purulent drainage.     Diagnostic Data:    Labs:  Recent Labs   Lab 246 24  1036   WBC 8.5 6.2   HGB 12.4* 12.7*   MCV 96.1 99.7   .0 167.0   INR 1.03  --        Recent Labs   Lab 24  1656 24  1036   GLU 91 107*   BUN 24* 20   CREATSERUM 1.16 1.05   CA 9.8 9.7   ALB 4.1 4.0    139   K 3.9 3.6    103   CO2 28.0 28.0   ALKPHO 65 60   AST 14 12   ALT <7* <7*   BILT 0.9 0.6   TP 7.6 7.6       Estimated Creatinine Clearance: 57 mL/min (based on SCr of 1.05 mg/dL).    Recent Labs   Lab 24  1656   TROPHS 13       Recent Labs   Lab 24  1656   PTP 13.6   INR 1.03                  Microbiology    Hospital Encounter on 24   1. Aerobic Bacterial Culture     Status: None (Preliminary result)    Collection Time: 24 12:45 PM    Specimen:  Back; Other   Result Value Ref Range    Aerobic Smear No WBCs seen N/A    Aerobic Smear No organisms seen N/A   2. Blood Culture     Status: None (Preliminary result)    Collection Time: 12/09/24  4:56 PM    Specimen: Blood,peripheral   Result Value Ref Range    Blood Culture Result No Growth 3 Days N/A         Imaging: Reviewed in Epic.    Medications:    losartan  50 mg Oral Daily    carbidopa-levodopa  1.5 tablet Oral TID    docusate sodium  100 mg Oral Nightly    donepezil  10 mg Oral Daily    folic acid  400 mcg Oral Weekly    latanoprost  1 drop Both Eyes Nightly    levothyroxine  137 mcg Oral Daily @ 0700    potassium chloride  10 mEq Oral Daily    QUEtiapine  25 mg Oral Nightly    atorvastatin  10 mg Oral Nightly    timolol  1 drop Both Eyes Nightly    ceFAZolin  2 g Intravenous Q8H       Assessment & Plan:      #Fall  #generalized weakness  #Recurrent falls, likely due to Parkinsons (autonomic dysfunction)  PT/OT eval - family would like patient to go to [facility]     #Parkinson's disease with dementia  Resume home meds, on Seroquel nightly for sundowning, needs an additional 1/2 dose (12.5 mg at around 5 pm if worsening confusion)    #Orthostatic hypotension suspect autonomic dysfunction. Low suspicion for sepsis   AM cortisol wnl. TTE with normal EF and no significant valvular disease  -Discontinue hydrochlorothiazide, hydralazine   -Decrease losartan to 50 mg daily   -Cleo hose when out of bed      #Cellulitis/abscess on back s/p I&D in ED  #H of lipoma of back   -Aerobic cultures pending   -Drained more purulent material today  -Obtain CT chest without contrast to evaluate the skin soft tissue, if the infection involves lipoma than will have general surgery evaluate patient     #Hypothyroidism, Synthroid     #DL, statin    #H/o prostate cancer  #ISAIAS on CPAP   #Obesity, BMI 30.27     Gayathri Walton MD    Supplementary Documentation:     Quality:  DVT Mechanical Prophylaxis: CLEO hose, SCDs, Early ambuation  DVT  Pharmacologic Prophylaxis   Medication   None         DVT Pharmacologic prophylaxis: Aspirin 81 mg      Code Status: Not on file  Perez: External urinary catheter in place  Perez Duration (in days):   Central line:    SHASHI: 12/13/2024    Discharge is dependent on: course  At this point Mr. Daniels is expected to be discharge to: home    The 21st Century Cures Act makes medical notes like these available to patients in the interest of transparency. Please be advised this is a medical document. Medical documents are intended to carry relevant information, facts as evident, and the clinical opinion of the practitioner. The medical note is intended as peer to peer communication and may appear blunt or direct. It is written in medical language and may contain abbreviations or verbiage that are unfamiliar.

## 2024-12-13 NOTE — CM/SW NOTE
SW sent updated clinical information to [facility] for eventual patient admission. SW will continue to follow for medical clearance.     SW will continue to follow for plan of care changes and remain available for any additional DC needs or concerns.     Nancy Johnson MSW, LSW  Discharge Planner   [id number]

## 2024-12-13 NOTE — PLAN OF CARE
Pt alert, refused to answer orientation questions, confused. Pt kept ripping of tele, MD notified, tele discontinued. BP was high, MD notified, hydralazine given, BP rechecked and was 140/83. All meds given per MAR. Denies pain, afebrile. Pt resting in bed with call light in reach, safety precautions in place.    Problem: SKIN INTEGRITY  Goal: Incision/wound heals without complications  Description: Interventions:  - Assess wound bed/incision and surrounding skin tissue  - Collaborate with physician/APN and implement wound/incision site care and dressing changes as ordered  - Position infant to avoid placing pressure on wound  - Enterostomal/Wound therapy consult as indicated for ostomies/wounds/G-tubes  Outcome: Progressing     Problem: CARDIOVASCULAR - ADULT  Goal: Maintains optimal cardiac output and hemodynamic stability  Description: INTERVENTIONS:  - Monitor vital signs, rhythm, and trends  - Monitor for bleeding, hypotension and signs of decreased cardiac output  - Evaluate effectiveness of vasoactive medications to optimize hemodynamic stability  - Monitor arterial and/or venous puncture sites for bleeding and/or hematoma  - Assess quality of pulses, skin color and temperature  - Assess for signs of decreased coronary artery perfusion - ex. Angina  - Evaluate fluid balance, assess for edema, trend weights  Outcome: Progressing     Problem: SKIN/TISSUE INTEGRITY - ADULT  Goal: Incision(s), wounds(s) or drain site(s) healing without S/S of infection  Description: INTERVENTIONS:  - Assess and document risk factors for pressure ulcer development  - Assess and document skin integrity  - Assess and document dressing/incision, wound bed, drain sites and surrounding tissue  - Implement wound care per orders  - Initiate isolation precautions as appropriate  - Initiate Pressure Ulcer prevention bundle as indicated  Outcome: Progressing     Problem: MUSCULOSKELETAL - ADULT  Goal: Return mobility to safest level of  function  Description: INTERVENTIONS:  - Assess patient stability and activity tolerance for standing, transferring and ambulating w/ or w/o assistive devices  - Assist with transfers and ambulation using safe patient handling equipment as needed  - Ensure adequate protection for wounds/incisions during mobilization  - Obtain PT/OT consults as needed  - Advance activity as appropriate  - Communicate ordered activity level and limitations with patient/family  Outcome: Progressing     Problem: GASTROINTESTINAL - ADULT  Goal: Maintains adequate nutritional intake (undernourished)  Description: INTERVENTIONS:  - Monitor percentage of each meal consumed  - Identify factors contributing to decreased intake, treat as appropriate  - Assist with meals as needed  - Monitor I&O, WT and lab values  - Obtain nutritional consult as needed  - Optimize oral hygiene and moisture  - Encourage food from home; allow for food preferences  - Enhance eating environment  Outcome: Progressing

## 2024-12-13 NOTE — CM/SW NOTE
SW received update from [facility] that patient's insurance authorization is approved thru Tuesday 12/17 for admission.     ESVIN will continue to follow for plan of care changes and remain available for any additional DC needs or concerns.     Nancy Johnson MSW, LSW  Discharge Planner   [id number]

## 2024-12-13 NOTE — PLAN OF CARE
Seen & evaluated by Dr Walton.Wound dressings was changed.MD expressed pus/drainage from the wound.She said this needs to be done again later today & aim for discharge tomorrow. Patient was changed,all bed linens changed & CHG bath was done. Patient was turned to off load pressure on his back where he has the wound. Waiting for auth to discharge to [facility].Remains on cefazolin w/ no adverse reactions. Awake but quiet,verbally responsive,slow to respond. Ate w/ fair appetite breakfast. Will continue to monitor.Turned & repositioned. Call light in reach.

## 2024-12-14 VITALS
SYSTOLIC BLOOD PRESSURE: 165 MMHG | TEMPERATURE: 99 F | RESPIRATION RATE: 18 BRPM | OXYGEN SATURATION: 99 % | DIASTOLIC BLOOD PRESSURE: 74 MMHG | BODY MASS INDEX: 30.36 KG/M2 | WEIGHT: 205 LBS | HEIGHT: 69 IN | HEART RATE: 68 BPM

## 2024-12-14 PROCEDURE — 99239 HOSP IP/OBS DSCHRG MGMT >30: CPT | Performed by: INTERNAL MEDICINE

## 2024-12-14 RX ORDER — LOSARTAN POTASSIUM 100 MG/1
50 TABLET ORAL DAILY
Status: SHIPPED | COMMUNITY
Start: 2024-12-15

## 2024-12-14 RX ORDER — CEFADROXIL 500 MG/1
500 CAPSULE ORAL 2 TIMES DAILY
Qty: 10 CAPSULE | Refills: 0 | Status: SHIPPED | OUTPATIENT
Start: 2024-12-14 | End: 2024-12-19

## 2024-12-14 NOTE — PLAN OF CARE
Pt A&Ox1, RA, VSS, denies pain. Lethargic. All meds given per MAR. Pt turned to off load pressure on back wound. Wound dressing intact.  Pt resting in bed with call light in reach, safety precautions in place.     Problem: SKIN INTEGRITY  Goal: Incision/wound heals without complications  Description: Interventions:  - Assess wound bed/incision and surrounding skin tissue  - Collaborate with physician/APN and implement wound/incision site care and dressing changes as ordered  - Position infant to avoid placing pressure on wound  - Enterostomal/Wound therapy consult as indicated for ostomies/wounds/G-tubes  Outcome: Progressing     Problem: CARDIOVASCULAR - ADULT  Goal: Maintains optimal cardiac output and hemodynamic stability  Description: INTERVENTIONS:  - Monitor vital signs, rhythm, and trends  - Monitor for bleeding, hypotension and signs of decreased cardiac output  - Evaluate effectiveness of vasoactive medications to optimize hemodynamic stability  - Monitor arterial and/or venous puncture sites for bleeding and/or hematoma  - Assess quality of pulses, skin color and temperature  - Assess for signs of decreased coronary artery perfusion - ex. Angina  - Evaluate fluid balance, assess for edema, trend weights  Outcome: Progressing     Problem: SKIN/TISSUE INTEGRITY - ADULT  Goal: Incision(s), wounds(s) or drain site(s) healing without S/S of infection  Description: INTERVENTIONS:  - Assess and document risk factors for pressure ulcer development  - Assess and document skin integrity  - Assess and document dressing/incision, wound bed, drain sites and surrounding tissue  - Implement wound care per orders  - Initiate isolation precautions as appropriate  - Initiate Pressure Ulcer prevention bundle as indicated  Outcome: Progressing     Problem: MUSCULOSKELETAL - ADULT  Goal: Return mobility to safest level of function  Description: INTERVENTIONS:  - Assess patient stability and activity tolerance for standing,  transferring and ambulating w/ or w/o assistive devices  - Assist with transfers and ambulation using safe patient handling equipment as needed  - Ensure adequate protection for wounds/incisions during mobilization  - Obtain PT/OT consults as needed  - Advance activity as appropriate  - Communicate ordered activity level and limitations with patient/family  Outcome: Progressing     Problem: GASTROINTESTINAL - ADULT  Goal: Maintains adequate nutritional intake (undernourished)  Description: INTERVENTIONS:  - Monitor percentage of each meal consumed  - Identify factors contributing to decreased intake, treat as appropriate  - Assist with meals as needed  - Monitor I&O, WT and lab values  - Obtain nutritional consult as needed  - Optimize oral hygiene and moisture  - Encourage food from home; allow for food preferences  - Enhance eating environment  Outcome: Progressing

## 2024-12-14 NOTE — DISCHARGE SUMMARY
[facility]     Lawrence Daniels Patient Status:  Inpatient    9/3/1945 MRN [id number]   Location [facility] 4NW-A Attending Gayathri Walton MD   Hosp Day # 1 PCP Jennifer Chandra DO     Date of Admission: 2024  Date of Discharge:   2024    Discharge Disposition: Home or Self Care    Discharge Diagnosis:  Fall  Generalized weakness  Recurrent falls due to Parkinson's  Suspected autonomic dysfunction  Orthostatic hypotension  Parkinson's dementia  Cellulitis/abscess on the back status post I&D in the ER  Hypothyroidism  Dyslipidemia  History of prostate cancer  ISAIAS on CPAP  Obesity, BMI 30.27    History of Present Illness:   79 year old male with history of hypertension carotid artery disease hyperlipidemia CKD 3 Parkinson's disease with dyskinesia who lives at home with his wife and he is using either a wheelchair or a walker.  Patient suffered a mechanical fall at home out of his wheelchair without losing consciousness or hitting his head but he was unable to get up by himself.  Upon coming to emergency room he was found to be significantly weak without any focal deficit.  He has been treated multiple times this year for recurrent UTIs by his primary care physician.  Patient follows up with neurology at [facility].     Brief Synopsis:   Patient was admitted after a fall.  He was found to be orthostatic.  He is on 3 different blood pressure medications.  He was continued on losartan at half his home dose of 50 mg daily.  Hydralazine, hydrochlorothiazide was discontinued.  He was found to have an abscess on his back that was I&D in the ER.  He was treated with IV cefazolin.  He will finish a course of Keflex with close wound follow-up.  He was discharged to rehab.  Advised his family to have him follow-up with cardiology after discharge from [facility].    Lace+ Score: 72  59-90 High Risk  29-58 Medium Risk  0-28   Low Risk       TCM Follow-Up Recommendation:  LACE > 58:  High Risk of readmission after discharge from the hospital.  **Certification    Admission date was 12/9/2024.  Inpatient stay was shorter than expected.  Patient's Weakness generalized was initially serious enough to expect a more lengthy hospitalization but patient improved faster than expected.                 Procedures during hospitalization:   NA    Incidental or significant findings and recommendations (brief descriptions):  NA    Lab/Test results pending at Discharge:   [facility]    Consultants:  [facility]    Discharge Medication List:     Discharge Medications        START taking these medications        Instructions Prescription details   cefadroxil 500 MG Caps  Commonly known as: DURICEF      Take 1 capsule (500 mg total) by mouth 2 (two) times daily for 5 days.   Stop taking on: December 19, 2024  Quantity: 10 capsule  Refills: 0            CHANGE how you take these medications        Instructions Prescription details   losartan 100 MG Tabs  Commonly known as: Cozaar  Start taking on: December 15, 2024  What changed: how much to take      Take 0.5 tablets (50 mg total) by mouth daily.   Refills: 0            CONTINUE taking these medications        Instructions Prescription details   aspirin 81 MG Tbec      Take 1 tablet (81 mg total) by mouth daily.   Refills: 0     carbidopa-levodopa  MG Tabs  Commonly known as: SINEMET      Take 1.5 tablets by mouth 3 (three) times daily.   Refills: 0     Cholecalciferol 50 MCG (2000 UT) Tabs      Take 1 tablet (2,000 Units total) by mouth at bedtime.   Refills: 0     docusate sodium 100 MG Caps  Commonly known as: Colace      Take 1 capsule (100 mg total) by mouth nightly.   Refills: 0     donepezil 10 MG Tabs  Commonly known as: Aricept      Take 1 tablet (10 mg total) by mouth daily.   Refills: 0     folic acid 400 MCG Tabs  Commonly known as: Folvite      Take 1 tablet (400 mcg total) by mouth once a week.   Refills: 0     latanoprost 0.005 % Soln  Commonly known as:  Xalatan      Place 1 drop into both eyes nightly.   Refills: 0     levothyroxine 137 MCG Tabs  Commonly known as: Synthroid      TAKE 1 TABLET BY MOUTH EVERY MORNING ON AN EMPTY STOMACH AND NOTHING TO EAT OR DRINK OTHER THAN WATER FOR 30 TO 60 MINUTES AFTER TAKING   Quantity: 90 tablet  Refills: 0     omega-3 fatty acids 1000 MG Caps  Commonly known as: Fish Oil      Take 1,000 mg by mouth daily.   Refills: 0     Omeprazole 40 MG Cpdr      Take 1 capsule (40 mg total) by mouth daily.   Quantity: 90 capsule  Refills: 3     Oysco 500+D 500-5 MG-MCG Tabs  Generic drug: Calcium Carbonate-Vitamin D      TAKE 1 TABLET BY MOUTH THREE TIMES DAILY   Quantity: 270 tablet  Refills: 1     Potassium Chloride ER 10 MEQ Tbcr      Take 1 tablet (10 mEq total) by mouth daily.   Quantity: 90 tablet  Refills: 0     QUEtiapine 25 MG Tabs  Commonly known as: SEROquel      Take 1 tablet (25 mg total) by mouth nightly.   Refills: 0     senna 8.8 MG/5ML Syrp  Commonly known as: Senokot      Take 5 mL (8.8 mg total) by mouth nightly.   Refills: 0     simvastatin 20 MG Tabs  Commonly known as: Zocor      Take 1 tablet (20 mg total) by mouth nightly.   Quantity: 90 tablet  Refills: 3     timolol 0.5 % Soln  Commonly known as: Timoptic      Place 1 drop into both eyes nightly.   Refills: 0            STOP taking these medications      cyanocobalamin 1000 MCG Tabs  Commonly known as: Vitamin B12        hydrALAZINE 25 MG Tabs  Commonly known as: Apresoline        hydroCHLOROthiazide 12.5 MG Tabs        pyridoxine 100 MG Tabs  Commonly known as: Vitamin B6                  Where to Get Your Medications        Please  your prescriptions at the location directed by your doctor or nurse    Bring a paper prescription for each of these medications  cefadroxil 500 MG Caps         [place]    Follow-up appointment:   No follow-up provider specified.  Appointments for Next 30 Days 12/14/2024 - 1/13/2025      None            Vital  signs:  Temp:  [98.3 °F (36.8 °C)-98.6 °F (37 °C)] 98.6 °F (37 °C)  Pulse:  [60-68] 68  Resp:  [15-18] 18  BP: (165-166)/(74-88) 165/74  SpO2:  [98 %-99 %] 99 %    Physical Exam:    General: No acute distress   Lungs: clear to auscultation  Cardiovascular: S1, S2  Abdomen: Soft    -----------------------------------------------------------------------------------------------  PATIENT DISCHARGE INSTRUCTIONS: See electronic chart    Gayathri Walton MD    Total time spent on discharge plannin minutes     The  [place] Cures Act makes medical notes like these available to patients in the interest of transparency. Please be advised this is a medical document. Medical documents are intended to carry relevant information, facts as evident, and the clinical opinion of the practitioner. The medical note is intended as peer to peer communication and may appear blunt or direct. It is written in medical language and may contain abbreviations or verbiage that are unfamiliar.

## 2024-12-14 NOTE — PLAN OF CARE
AOX1 self.   RA,   No c/o pain  Dressing to R back changed today.   Plan to DC to [facility] at 1530 via ambulance.    1435 Call to Sherin Gonzalez, report to Ricco YADAV.

## 2024-12-14 NOTE — CM/SW NOTE
12/14/24 1408   Discharge disposition   Expected discharge disposition subacute   Post Acute Care Provider Jesusita Powers   Discharge transportation [facility]     ESVIN confirmed pt cleared for discharge. Insurance approved [facility] admission. Krystle from [facility], stated they'll be ready for pt at 330pm today. ESVIN spoke w/ Camacho from [facility] and arranged S ambulance for 330pm  to transport to [facility]. PCS form completed.    ESVIN updated pt's spouse, Delores, of DC time. [facility]     [facility] [phone]  [facility] [id number]    Melva Finley, [facility] - [phone]

## 2024-12-16 NOTE — PAYOR COMM NOTE
--------------  ADMISSION REVIEW     Payor: [employer]  Subscriber #:  [id number]  Authorization Number: [id number]    ADMIT TO INPT STATUS 12/13/24  ADMIT TO OBSERVATION 12/9/24 12/9 Patient Seen in: [facility] Emergency Department    History   Stated Complaint: fall.fatigue    Patient is a 79-year-old male presents emergency room with a history of multiple complaints.  The patient is a history of Parkinson's and has been having a \"bad day\" today.  The patient reportedly took a fall as per patient's wife giving independent history at the bedside it is uncertain if the patient hit his head.  The patient complains of pain only to his right elbow at this time.  The patient has been more confused throughout the day today.  Patient has had good appetite at home as per patient and patient's family at the bedside.  Patient denies any chest or abdominal pain.  The patient denies history of any headache or neck pain at this time.  Patient also has a lump to the back which has been there for the last couple of days.  Patient has had no known history of any fever at home.    Past Medical History:    Anemia of chronic disease    Iron studies, B12 level, and folic acid levels normal.    Aneurysm of popliteal artery (HCC)    Arterial disease (HCC)    MRI 9/15/2017: Mild chronic microvascular ischemic changes in the cerebral white matter.    Back problem    Bilateral carotid artery disease (HCC)    Bilateral carotid artery stenosis    Bradycardia    Class 1 obesity due to excess calories with serious comorbidity and body mass index (BMI) of 31.0 to 31.9 in adult    Associated with hypertension and hyperlipidemia    Class 1 obesity due to excess calories with serious comorbidity and body mass index (BMI) of 33.0 to 33.9 in adult    Associated with HTN, Carotid Artery Disease, and Hypercholesterolemia    Class 1 obesity due to excess calories without serious comorbidity with body mass index (BMI) of 33.0 to 33.9 in  adult    Cognitive complaints with normal neuropsychological exam    Normal Neuropsych testing September 2017, results scanned in chart    Cogwheel rigidity    COVID    cough headache. 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    Multiple thyroid nodules    x 2 on Left    Myocarditis (HCC)    Neoplasm of uncertain behavior of skin    Obesity (BMI 30-39.9)    ISAIAS (obstructive sleep apnea)    ISAIAS on CPAP    Dx 2009 cpap    Osteoarthritis    Other specified glaucoma    Overflow incontinence of urine    Parkinsonian tremor (HCC)    PD (Parkinson's disease) (HCC)    Postsurgical hypothyroidism    6/27/2022 total thyroidectomy with Dr. Perry Soriano.    Prediabetes    Primary open angle glaucoma (POAG) of both eyes, mild stage    Prostate CA ([facility])    RBD (REM behavioral disorder)    Last Assessment & Plan:  Formatting of this note might be different from the original. Only diagnosed with RBD, but behavior does seem classic for it.  He is taking melatonin 10 mg nightly so it is possible he is treating his RBD.  We will do an in lab diagnostic PSG with extra EMG leads on arms to assess for REM sleep without atonia.    Sleep apnea    Thrombocytopenia (HCC)    Torn ligament    right    Visual impairment    Vocal cord dysfunction    errosion     Past Surgical History:   Procedure Laterality Date    Ankle fracture surgery      Hdr elect brachytherapy  2006    Incision and  drainage Left 04/05/2022    left neck     Knee replacement surgery Left 2014    Other      Botox injections into bladder    Thyroidectomy Bilateral 06/27/2022    Dr. Soriano     Physical Exam     ED Triage Vitals [12/09/24 1411]   /79   Pulse 79   Resp 18   Temp 98.2 °F (36.8 °C)   Temp src Oral   SpO2 96 %   O2 Device None (Room air)     Current Vitals:   Vital Signs  BP: (!) 143/98 (RN notified)  Pulse: 92  Resp: 18  Temp: 99.1 °F (37.3 °C)  Temp src: Oral  MAP (mmHg): (!) 104 (RN notified)    Physical Exam  GENERAL: Well-developed, well-nourished male sitting up breathing easily in no apparent distress.  Patient is nontoxic appearance.  HEENT: Head is normocephalic, atraumatic. Pupils are 4 mm equally round and reactive to light. Oropharynx is clear. Mucous membranes are moist.  NECK: There is no focal tenderness to palpation appreciated. No stridor.  LUNGS: Clear at the apices with diminished breath sounds at both bases.    HEART: Regular rate and rhythm. Normal S1, S2 no S3, or S4. No murmur.  ABDOMEN: There is no focal tenderness to palpation appreciated anywhere throughout the abdomen. There is no guarding, no rebound, no mass, and no organomegaly appreciated. There is normoactive bowel sounds. There is no hernia.  BACK: There is an area of erythema and mild fluctuance and induration measuring approximately 5 cm in diameter in the right paraspinal thoracic area consistent with abscess and cellulitis.  EXTREMITIES: There is no cyanosis, clubbing, or edema appreciated. Pulses are 2+ and equal in all 4 extremities.  There is no focal tenderness to palpation throughout the upper or lower extremities appreciated except for some mild tenderness to palpation along the extensor aspect of the right elbow.  There is no obvious deformity noted.  NEURO: Patient is awake, alert and answering questions appropriately. Motor strength is 5 over 5 in all 4 extremities. There are no gross motor or sensory deficits  appreciated. Cranial nerves II through XII are grossly intact.  Patient is generally tremulous here in the ER.    Labs Reviewed   CBC WITH DIFFERENTIAL WITH PLATELET - Abnormal; Notable for the following components:       Result Value    HGB 12.4 (*)     HCT 37.4 (*)     All other components within normal limits   COMP METABOLIC PANEL (14) - Abnormal; Notable for the following components:    BUN 24 (*)     ALT <7 (*)     All other components within normal limits   PTT, ACTIVATED - Abnormal; Notable for the following components:    PTT 20.2 (*)     All other components within normal limits   URINALYSIS WITH CULTURE REFLEX - Abnormal; Notable for the following components:    Ketones Urine Trace (*)     Protein Urine Trace (*)     All other components within normal limits   TROPONIN I HIGH SENSITIVITY - Normal   PROTHROMBIN TIME (PT) - Normal   LACTIC ACID, PLASMA - Normal   SARS-COV-2/FLU A AND B/RSV BY PCR (GENEXPERT) - Normal   BLOOD CULTURE   BLOOD CULTURE     EKG 65 Sinus Rhythm  Reading: Evidence of first-degree AV block nonspecific ST change no change from previous EKG from March 2024.    CT BRAIN OR HEAD    1. No acute intracranial hemorrhage or hydrocephalus. 2. Mild chronic small vessel ischemic disease with a small chronic infarct at the left frontal lobe. If there is clinical concern for acute ischemia/infarction, an MRI of the brain would be recommended for further evaluation.       XR ELBOW, COMPLETE    No acute process.      XR CHEST AP PORTABLE   Stable mild atelectasis within the left costophrenic angle.  No acute process or significant interval changes.  MDM      17:18 patient sitting back and breathing easily in no apparent distress.  Patient states he took all his blood pressure medication this morning.  The patient denies chest pain or headache at this time.  Will continue to monitor blood pressure at this time.  Patient made aware of the need for incision and drainage of the abscess on his back  which will be completed at some point during his ER stay.      Medical Decision Making  Patient had an IV line established blood work drawn including a CBC, chemistries, BUN/creatinine, and blood sugar all of which are unremarkable.  Liver function test and troponin found to be negative.  COVID, flu, and RSV are all negative.  Lactic acid is unremarkable.  The patient's urinalysis is unremarkable.  The patient did have an abscess to the back which was incised and drained   Patient had blood and urine culture obtained here in the emergency room.  The patient will be given IV Ancef after discussion with Dr. Velasquez came and saw the patient emergency room.  Patient will be admitted to the hospital for observation this evening.  Patient has had increasing weakness secondary to his Parkinson's disease.  Patient with abscess to his back as noted above.  Patient was admitted for further care and IV antibiotics this time.  Patient admitted with no further new complaints.    Disposition and Plan     Clinical Impression:  1. Weakness generalized    2. Abscess    3. Fall, initial encounter          History and Physical     Lawrence Daniels is a 79 year old male with history of hypertension carotid artery disease hyperlipidemia CKD 3 Parkinson's disease with dyskinesia who lives at home with his wife and he is using either a wheelchair or a walker.  Patient suffered a mechanical fall at home out of his wheelchair without losing consciousness or hitting his head but he was unable to get up by himself.  Upon coming to emergency room he was found to be significantly weak without any focal deficit.  He has been treated multiple times this year for recurrent UTIs by his primary care physician.  Patient [facility] up with neurology at [facility].     BP (!) 163/105   Pulse 86   Temp 98.2 °F (36.8 °C) (Oral)   Resp 17   Ht 5' 9\" (1.753 m)   Wt 205 lb (93 kg)   SpO2 98%   BMI 30.27 kg/m²   General: No acute distress,  Alert  Respiratory: No rhonchi, no wheezes  Cardiovascular: S1, S2. Regular rate and rhythm  Abdomen: Soft, Non-tender, non-distended, positive bowel sounds  Neuro: No new focal deficits  Extremities: No edema       Lab 12/09/24  1656   RBC 3.89   HGB 12.4*   HCT 37.4*   MCV 96.1   MCH 31.9   MCHC 33.2   RDW 12.5   NEPRELIM 6.15   WBC 8.5   .0       GLU 91   BUN 24*   CREATSERUM 1.16   EGFRCR 64   CA 9.8   ALB 4.1      K 3.9      CO2 28.0   ALKPHO 65   AST 14   ALT <7*   BILT 0.9   TP 7.6    Assessment & Plan:  # Parkinson's disease with mild dementia  -Continue home medicines  -Will need placement     # Cellulitis on the back status post I&D of cyst  -Ancef started in emergency room     # Hypothyroidism on Synthroid and     # Hyperlipidemia     # History of prostate cancer       12/10:    HOSPITALIST:    Patient BP low, nurses at the bedside, pt denies dizziness, lightheadedness, placed in Trendelenburg, BP improving.     Vital signs:  Temp:  [98.2 °F (36.8 °C)-99.1 °F (37.3 °C)] 98.7 °F (37.1 °C)  Pulse:  [66-92] 75  Resp:  [13-18] 16  BP: (102-211)/() 128/82  SpO2:  [94 %-100 %] 97 %     Physical Exam:    General: No acute distress  Respiratory: No wheezes, no rhonchi  Cardiovascular: S1, S2, regular rate and rhythm  Abdomen: Soft, Non-tender, non-distended, positive bowel sounds  Neuro: No new focal deficits.   Extremities: No edema     Scheduled Medications    carbidopa-levodopa  1.5 tablet Oral TID    docusate sodium  100 mg Oral Nightly    donepezil  10 mg Oral Daily    folic acid  400 mcg Oral Weekly    hydrALAZINE  25 mg Oral BID    hydroCHLOROthiazide  12.5 mg Oral Daily    latanoprost  1 drop Both Eyes Nightly    levothyroxine  137 mcg Oral Daily @ 0700    losartan  100 mg Oral Daily    potassium chloride  10 mEq Oral Daily    QUEtiapine  25 mg Oral Nightly    atorvastatin  10 mg Oral Nightly    timolol  1 drop Both Eyes Nightly    ceFAZolin  2 g Intravenous Q8H         Assessment  & Plan:  #s/p recurrent falls, likely due to Parkinsons (autonomic dysfunction)  PT/OT eval     #Parkinson's disease with dementia  Resume home meds, on Seroquel nightly for sundowning, needs an additional 1/2 dose (12.5 mg at around 5 pm if worsening confusion)  PT/OT eval  Do not think he needs to see neurology here at this time     #Hypotension, likely due to autonomic dysfunction vs sepsis  IVF as needed  LA pending     #Cellulitis/abscess on back s/p I&D in ED  IV abx  Wound care to see     NURSING:    Patient became hypotensive, MD notified 500 mL bolus NS ordered.     12/11:    HOSPITALIST:    Patient noted to be very sleepy today. He was awake enough to be fed. He normally feeds himself. He hasn't gotten out of bed today. He denies any specific complaints.     Vital signs:  Temp:  [97.9 °F (36.6 °C)-98.6 °F (37 °C)] 98.6 °F (37 °C)  Pulse:  [56-76] 56  Resp:  [15-20] 15  BP: (136-163)/(63-94) 149/70  SpO2:  [96 %-99 %] 98 %     Physical Exam:    General: No acute distress  Respiratory: No wheezes, no rhonchi  Cardiovascular: S1, S2, regular rate and rhythm  Abdomen: Soft, Non-tender, non-distended, positive bowel sounds  Neuro: oriented to self only. Not to place or time. He is moving all 4 extremities to command. NO facial asymmetry. Speech is intact.   Extremities: No edema     Scheduled Medications    carbidopa-levodopa  1.5 tablet Oral TID    docusate sodium  100 mg Oral Nightly    donepezil  10 mg Oral Daily    folic acid  400 mcg Oral Weekly    hydrALAZINE  25 mg Oral BID    hydroCHLOROthiazide  12.5 mg Oral Daily    latanoprost  1 drop Both Eyes Nightly    levothyroxine  137 mcg Oral Daily @ 0700    losartan  100 mg Oral Daily    potassium chloride  10 mEq Oral Daily    QUEtiapine  25 mg Oral Nightly    atorvastatin  10 mg Oral Nightly    timolol  1 drop Both Eyes Nightly    ceFAZolin  2 g Intravenous Q8H          Assessment & Plan:  #Fall  #generalized weakness  #Recurrent falls, likely due to  Parkinsons (autonomic dysfunction)  PT/OT eval  Check orthostatic vitals     #Parkinson's disease with dementia  Resume home meds, on Seroquel nightly for sundowning, needs an additional 1/2 dose (12.5 mg at around 5 pm if worsening confusion)  PT/OT eval     #Hypotension, likely due to autonomic dysfunction. Low suspicion for sepsis   IVF as needed  Orthostatic vitals     #Cellulitis/abscess on back s/p I&D in ED  IV abx  Wound care to see     #Hypothyroidism, Synthroid     #DL, statin    #H/o prostate cancer  #ISAIAS on CPAP   #Obesity, BMI 30.27     NURSING:  Dressing changes to abscess site was done, skin surrounding the I and D site is pinkish in color and tender to touch, no drainage noted. Up on the side of the bed for orthostatic vital signs, dropped blood pressure to 100/80 standing from 163/90 lying, denied any dizziness or lightheadedness     12/12:    HOSPITALIST:    Orthostatic vitals positive yesterday. Unfortunately patient's wife suffered a fall and back home, but difficult to provide care for patient at this time. Patient is awake. He is eating better today per nursing.       Vital signs:  Temp:  [96.8 °F (36 °C)-99 °F (37.2 °C)] 99 °F (37.2 °C)  Pulse:  [50-63] 60  Resp:  [12-20] 12  BP: (100-168)/(62-90) 146/62  SpO2:  [96 %-99 %] 98 %     Physical Exam:    General: No acute distress  Respiratory: No wheezes, no rhonchi  Cardiovascular: S1, S2, regular rate and rhythm  Abdomen: Soft, Non-tender, non-distended, positive bowel sounds  Neuro: oriented to self only. Not to place or time. He is moving all 4 extremities to command. NO facial asymmetry. Speech is intact.   Extremities: No edema  Skin: Back abscess with surrounding erythema. Continues to have purulent drainage.       Scheduled Medications    [START ON 12/13/2024] losartan  50 mg Oral Daily    carbidopa-levodopa  1.5 tablet Oral TID    docusate sodium  100 mg Oral Nightly    donepezil  10 mg Oral Daily    folic acid  400 mcg Oral Weekly     latanoprost  1 drop Both Eyes Nightly    levothyroxine  137 mcg Oral Daily @ 0700    potassium chloride  10 mEq Oral Daily    QUEtiapine  25 mg Oral Nightly    atorvastatin  10 mg Oral Nightly    timolol  1 drop Both Eyes Nightly    ceFAZolin  2 g Intravenous Q8H          Assessment & Plan:  #Fall  #generalized weakness  #Recurrent falls, likely due to Parkinsons (autonomic dysfunction)  PT/OT eval - family would like patient to go to [facility]     #Parkinson's disease with dementia  Resume home meds, on Seroquel nightly for sundowning, needs an additional 1/2 dose (12.5 mg at around 5 pm if worsening confusion)     #Orthostatic hypotension suspect autonomic dysfunction. Low suspicion for sepsis   -Check AM cortisol  -TTE  -Discontinue hydrochlorothiazide, hydralazine   -Decrease losartan to 50 mg daily   -Stuart hose      #Cellulitis/abscess on back s/p I&D in ED  -Send drainage to cultures  -Check US  -It will need to be further drained    CM/ SW:  SW called patient's wife Delores to discuss DC planning. ESVIN reviewed information for [facility] choice list and Delores said that driving far distances is not an option right now but that if patient could be closer to home she would really like for him to go to rehab. ESVIN reviewed choice list and Delores selected [facility]. ESVIN reserved facility and requested that they submit for auth. Delores is aware that if auth is denied that patient will need to return home with  services.     12/13:    HOSPITALIST:    Patient is oriented to self and DWYER. He denies pain. He finished may be 25% of his breakfast.       Vital signs:  Temp:  [98 °F (36.7 °C)-98.9 °F (37.2 °C)] 98 °F (36.7 °C)  Pulse:  [58-65] 62  Resp:  [14-19] 19  BP: (140-176)/(77-92) 140/83  SpO2:  [100 %] 100 %     Physical Exam:    General: No acute distress  Respiratory: No wheezes, no rhonchi  Cardiovascular: S1, S2, regular rate and rhythm  Abdomen: Soft, Non-tender, non-distended, positive bowel  sounds  Neuro: oriented to self only. Not to place or time. He is moving all 4 extremities to command. NO facial asymmetry. Speech is intact.   Extremities: No edema  Skin: Back abscess with surrounding erythema. Continues to have purulent drainage.      Lab 12/09/24  1656 12/12/24  1036   WBC 8.5 6.2   HGB 12.4* 12.7*   MCV 96.1 99.7   .0 167.0   INR 1.03  --       GLU 91 107*   BUN 24* 20   CREATSERUM 1.16 1.05   CA 9.8 9.7   ALB 4.1 4.0    139   K 3.9 3.6    103   CO2 28.0 28.0   ALKPHO 65 60   AST 14 12   ALT <7* <7*   BILT 0.9 0.6   TP 7.6 7.6          Scheduled Medications    losartan  50 mg Oral Daily    carbidopa-levodopa  1.5 tablet Oral TID    docusate sodium  100 mg Oral Nightly    donepezil  10 mg Oral Daily    folic acid  400 mcg Oral Weekly    latanoprost  1 drop Both Eyes Nightly    levothyroxine  137 mcg Oral Daily @ 0700    potassium chloride  10 mEq Oral Daily    QUEtiapine  25 mg Oral Nightly    atorvastatin  10 mg Oral Nightly    timolol  1 drop Both Eyes Nightly    ceFAZolin  2 g Intravenous Q8H               Assessment & Plan:  #Fall  #generalized weakness  #Recurrent falls, likely due to Parkinsons (autonomic dysfunction)  PT/OT eval - family would like patient to go to [facility]     #Parkinson's disease with dementia  Resume home meds, on Seroquel nightly for sundowning, needs an additional 1/2 dose (12.5 mg at around 5 pm if worsening confusion)     #Orthostatic hypotension suspect autonomic dysfunction. Low suspicion for sepsis   AM cortisol wnl. TTE with normal EF and no significant valvular disease  -Discontinue hydrochlorothiazide, hydralazine   -Decrease losartan to 50 mg daily   -Stuart hose when out of bed      #Cellulitis/abscess on back s/p I&D in ED  #H of lipoma of back   -Aerobic cultures pending   -Drained more purulent material today  -Obtain CT chest without contrast to evaluate the skin soft tissue, if the infection involves lipoma than will have general surgery  evaluate patient     CM/ SW:    SW received update from [facility] that patient's insurance authorization is approved thru Tuesday 12/17 for admission.     CT:     1. Moderate skin thickening and subcutaneous edema/inflammation localized to the right mid posterior thorax with more focal induration within the subcutaneous tissues just posterior to the right 9th rib.  At minimum this represents localized   inflammation/phlegmon, though this could represent a developing abscess, suboptimally assessed in the absence of IV contrast.      2. No acute intrathoracic process identified     12/14/24 1214 98.6 °F (37 °C) 68 18 165/74 Abnormal  99 % -- None (Room air) -- CS   12/14/24 0412 98.3 °F (36.8 °C) 60 15 166/88 Abnormal  98 % -- None (Room air) -- KL   12/13/24 2000 100 °F (37.8 °C) 72 16 114/59 95 % -- None (Room air) -- KL   12/13/24 1124 98.6 °F (37 °C) 64 14 178/78 Abnormal  97 % -- None (Room air) -- HA   12/13/24 0320 98 °F (36.7 °C) 62 19 140/83 -- -- None (Room air) -- T   12/13/24 0030 98.4 °F (36.9 °C) 65 18 176/82 Abnormal  -- -- None (Room air) -- JT   12/12/24 2020 98 °F (36.7 °C) 61 17 166/77 Abnormal  -- -- None (Room air) -- JT   12/12/24 1624 98.9 °F (37.2 °C) 60 16 174/92 Abnormal  100 % -- None (Room air) -- KS   12/12/24 1135 98.8 °F (37.1 °C) 58 14 156/88 100 % -- None (Room air) -- KS   12/12/24 0846 99 °F (37.2 °C) 60 12 146/62 98 % -- None (Room air) -- KS   12/12/24 0508 98.1 °F (36.7 °C) 63 19 140/88 97 % -- None (Room air) -- LM   12/12/24 0051 96.8 °F (36 °C) 54 20 109/67 99 % -- None (Room air) -- LM   12/11/24 2034 98.2 °F (36.8 °C) 50 17 145/80 96 % -- None (Room air) -- LM   12/11/24 1700 -- -- -- 100/80 -- -- -- -- TT   12/11/24 1645 -- -- -- 140/90 -- -- -- -- TT   12/11/24 1630 98.8 °F (37.1 °C) 58 -- 168/85 Abnormal  -- -- -- -- TT   12/11/24 1200 98.6 °F (37 °C) 56 15 149/70 98 % -- None (Room air) -- TT   12/11/24 0810 97.9 °F (36.6 °C) 62 18 154/63 99 % --  None (Room air) -- TT   12/11/24 0455 98.4 °F (36.9 °C) 66 20 163/85 Abnormal  97 % -- None (Room air) -- LM   12/11/24 0025 98.6 °F (37 °C) 71 18 149/87 96 % -- None (Room air) --    12/10/24 2040 98.5 °F (36.9 °C) 72 18 136/94 Abnormal  98 % -- None (Room air) --    12/10/24 1611 -- 76 -- 147/86 -- -- -- --    12/10/24 1611 98.5 °F (36.9 °C) -- 16 -- 97 % -- None (Room air) -- KS   12/10/24 1418 -- 67 -- 139/79 -- -- -- --    12/10/24 1346 -- 68 -- 131/74 -- -- -- --    12/10/24 1240 -- 54 -- 99/58 98 % -- -- --    12/10/24 1235 -- 57 -- 77/47 Abnormal  98 % -- -- --    12/10/24 1231 97.2 °F (36.2 °C) -- 16 -- -- -- -- -- HA   12/10/24 1231 -- 52 -- 79/46 Abnormal  98 % -- -- --    12/10/24 1231 -- -- -- -- -- -- None (Room air) -- KS   12/10/24 1230 -- 52 -- 79/46 Abnormal  97 % -- -- --    12/10/24 1225 -- 50 -- 45/30 Abnormal  97 % -- -- --    12/10/24 1045 -- 61 -- -- -- -- -- -- NL   12/10/24 0750 98.7 °F (37.1 °C) 75 16 128/82 97 % -- None (Room air) -- KS   12/10/24 0430 99 °F (37.2 °C) 85 18 102/64 94 % -- None (Room air) -- ZA   12/09/24 2229 99.1 °F (37.3 °C) 92 18 143/98 Abnormal  97 % -- None (Room air) --    12/09/24 1959 -- -- -- 143/101 Abnormal  -- -- -- --    12/09/24 1930 -- 86 17 163/105 Abnormal  98 % -- None (Room air) --    12/09/24 1915 -- 66 16 183/109 Abnormal  99 % -- None (Room air) --    12/09/24 1900 -- 77 14 146/112 Abnormal  98 % -- None (Room air) --    12/09/24 1830 -- 82 13 129/97 Abnormal  100 % -- None (Room air) --    12/09/24 1800 -- 80 14 -- 100 % -- None (Room air) --    12/09/24 1700 -- 75 15 211/109 Abnormal  100 % -- -- --    12/09/24 1630 -- 71 17 176/93 Abnormal  98 % -- None (Room air) --    12/09/24 1615 -- 66 -- 153/106 Abnormal  96 % -- None (Room air) --    12/09/24 1411 98.2 °F (36.8 °C) 79 18 119/79 96 % 205 lb (93 kg) None (Room air) --

## 2024-12-16 NOTE — PAYOR COMM NOTE
--------------  DISCHARGE REVIEW    Payor: [employer]  Subscriber #:  [id number]  Authorization Number: [id number]    Admit date: 24  Admit time:   2:27 PM  Discharge Date: 2024  3:33 PM       [facility]     Lawrence Daniels Patient Status:  Inpatient    9/3/1945 MRN [id number]   Location [facility] 4NW-A Attending Gayathri Walton MD   Hosp Day # 1 PCP Jennifer Chandra DO     Date of Admission: 2024  Date of Discharge:   2024    Discharge Disposition: Home or Self Care    Discharge Diagnosis:  Fall  Generalized weakness  Recurrent falls due to Parkinson's  Suspected autonomic dysfunction  Orthostatic hypotension  Parkinson's dementia  Cellulitis/abscess on the back status post I&D in the ER  Hypothyroidism  Dyslipidemia  History of prostate cancer  ISAIAS on CPAP  Obesity, BMI 30.27    History of Present Illness:   79 year old male with history of hypertension carotid artery disease hyperlipidemia CKD 3 Parkinson's disease with dyskinesia who lives at home with his wife and he is using either a wheelchair or a walker.  Patient suffered a mechanical fall at home out of his wheelchair without losing consciousness or hitting his head but he was unable to get up by himself.  Upon coming to emergency room he was found to be significantly weak without any focal deficit.  He has been treated multiple times this year for recurrent UTIs by his primary care physician.  Patient follows up with neurology at [facility].     Brief Synopsis:   Patient was admitted after a fall.  He was found to be orthostatic.  He is on 3 different blood pressure medications.  He was continued on losartan at half his home dose of 50 mg daily.  Hydralazine, hydrochlorothiazide was discontinued.  He was found to have an abscess on his back that was I&D in the ER.  He was treated with IV cefazolin.  He will finish a course of Keflex with close wound follow-up.  He was discharged to  rehab.  Advised his family to have him follow-up with cardiology after discharge from [facility].    Lace+ Score: 72  59-90 High Risk  29-58 Medium Risk  0-28   Low Risk       TCM Follow-Up Recommendation:  LACE > 58: High Risk of readmission after discharge from the hospital.  **Certification    Admission date was 12/9/2024.  Inpatient stay was shorter than expected.  Patient's Weakness generalized was initially serious enough to expect a more lengthy hospitalization but patient improved faster than expected.                 Procedures during hospitalization:   NA    Incidental or significant findings and recommendations (brief descriptions):  NA    Lab/Test results pending at Discharge:   NA    Consultants:  NA    Discharge Medication List:     Discharge Medications        START taking these medications        Instructions Prescription details   cefadroxil 500 MG Caps  Commonly known as: DURICEF      Take 1 capsule (500 mg total) by mouth 2 (two) times daily for 5 days.   Stop taking on: December 19, 2024  Quantity: 10 capsule  Refills: 0            CHANGE how you take these medications        Instructions Prescription details   losartan 100 MG Tabs  Commonly known as: Cozaar  Start taking on: December 15, 2024  What changed: how much to take      Take 0.5 tablets (50 mg total) by mouth daily.   Refills: 0            CONTINUE taking these medications        Instructions Prescription details   aspirin 81 MG Tbec      Take 1 tablet (81 mg total) by mouth daily.   Refills: 0     carbidopa-levodopa  MG Tabs  Commonly known as: SINEMET      Take 1.5 tablets by mouth 3 (three) times daily.   Refills: 0     Cholecalciferol 50 MCG (2000 UT) Tabs      Take 1 tablet (2,000 Units total) by mouth at bedtime.   Refills: 0     docusate sodium 100 MG Caps  Commonly known as: Colace      Take 1 capsule (100 mg total) by mouth nightly.   Refills: 0     donepezil 10 MG Tabs  Commonly known as: Aricept      Take 1 tablet (10 mg  total) by mouth daily.   Refills: 0     folic acid 400 MCG Tabs  Commonly known as: Folvite      Take 1 tablet (400 mcg total) by mouth once a week.   Refills: 0     latanoprost 0.005 % Soln  Commonly known as: Xalatan      Place 1 drop into both eyes nightly.   Refills: 0     levothyroxine 137 MCG Tabs  Commonly known as: Synthroid      TAKE 1 TABLET BY MOUTH EVERY MORNING ON AN EMPTY STOMACH AND NOTHING TO EAT OR DRINK OTHER THAN WATER FOR 30 TO 60 MINUTES AFTER TAKING   Quantity: 90 tablet  Refills: 0     omega-3 fatty acids 1000 MG Caps  Commonly known as: Fish Oil      Take 1,000 mg by mouth daily.   Refills: 0     Omeprazole 40 MG Cpdr      Take 1 capsule (40 mg total) by mouth daily.   Quantity: 90 capsule  Refills: 3     Oysco 500+D 500-5 MG-MCG Tabs  Generic drug: Calcium Carbonate-Vitamin D      TAKE 1 TABLET BY MOUTH THREE TIMES DAILY   Quantity: 270 tablet  Refills: 1     Potassium Chloride ER 10 MEQ Tbcr      Take 1 tablet (10 mEq total) by mouth daily.   Quantity: 90 tablet  Refills: 0     QUEtiapine 25 MG Tabs  Commonly known as: SEROquel      Take 1 tablet (25 mg total) by mouth nightly.   Refills: 0     senna 8.8 MG/5ML Syrp  Commonly known as: Senokot      Take 5 mL (8.8 mg total) by mouth nightly.   Refills: 0     simvastatin 20 MG Tabs  Commonly known as: Zocor      Take 1 tablet (20 mg total) by mouth nightly.   Quantity: 90 tablet  Refills: 3     timolol 0.5 % Soln  Commonly known as: Timoptic      Place 1 drop into both eyes nightly.   Refills: 0            STOP taking these medications      cyanocobalamin 1000 MCG Tabs  Commonly known as: Vitamin B12        hydrALAZINE 25 MG Tabs  Commonly known as: Apresoline        hydroCHLOROthiazide 12.5 MG Tabs        pyridoxine 100 MG Tabs  Commonly known as: Vitamin B6                  Where to Get Your Medications        Please  your prescriptions at the location directed by your doctor or nurse    Bring a paper prescription for each of these  medications  cefadroxil 500 MG Caps         [place]    Follow-up appointment:   No follow-up provider specified.  Appointments for Next 30 Days 2024 - 2025      None            Vital signs:  Temp:  [98.3 °F (36.8 °C)-98.6 °F (37 °C)] 98.6 °F (37 °C)  Pulse:  [60-68] 68  Resp:  [15-18] 18  BP: (165-166)/(74-88) 165/74  SpO2:  [98 %-99 %] 99 %    Physical Exam:    General: No acute distress   Lungs: clear to auscultation  Cardiovascular: S1, S2  Abdomen: Soft    -----------------------------------------------------------------------------------------------  PATIENT DISCHARGE INSTRUCTIONS: See electronic chart    Gayathri Walton MD    Total time spent on discharge plannin minutes     The  [employer] C[employer] Act makes medical notes like these available to patients in the interest of transparency. Please be advised this is a medical document. Medical documents are intended to carry relevant information, facts as evident, and the clinical opinion of the practitioner. The medical note is intended as peer to peer communication and may appear blunt or direct. It is written in medical language and may contain abbreviations or verbiage that are unfamiliar.       Electronically signed by Gayathri Walton MD on 2024  9:33 PM         REVIEWER COMMENTS

## 2024-12-26 DIAGNOSIS — E89.0 POSTSURGICAL HYPOTHYROIDISM: ICD-10-CM

## 2024-12-26 DIAGNOSIS — E78.00 HYPERCHOLESTEROLEMIA: Primary | ICD-10-CM

## 2024-12-26 RX ORDER — LEVOTHYROXINE SODIUM 137 UG/1
TABLET ORAL
Qty: 90 TABLET | Refills: 0 | Status: SHIPPED | OUTPATIENT
Start: 2024-12-26

## 2024-12-27 NOTE — TELEPHONE ENCOUNTER
Please call patient and inform him that his levothyroxine was refilled for 3-month supply.  Please instruct patient to complete fasting labs lipid panel as well as thyroid function tests ordered in this encounter at his earliest convenience.      Encounter Diagnoses   Name Primary?    Postsurgical hypothyroidism     Hypercholesterolemia Yes       Orders Placed This Encounter   Procedures    Assay, Thyroid Stim Hormone    Free T4, (Free Thyroxine)    Lipid Panel         Requested Prescriptions     Signed Prescriptions Disp Refills    LEVOTHYROXINE 137 MCG Oral Tab 90 tablet 0     Sig: TAKE 1 TABLET BY MOUTH EVERY MORNING ON AN EMPTY STOMACH AND NOTHING TO EAT OR DRINK OTHER THAN WATER FOR 30 TO 60 MINUTES AFTER TAKING     Authorizing Provider: CHIKI MARIA

## 2025-01-05 RX ORDER — POTASSIUM CHLORIDE 750 MG/1
10 TABLET, EXTENDED RELEASE ORAL DAILY
Qty: 90 TABLET | Refills: 0 | Status: SHIPPED | OUTPATIENT
Start: 2025-01-05

## 2025-01-07 PROBLEM — D61.818 PANCYTOPENIA (HCC): Status: RESOLVED | Noted: 2021-06-12 | Resolved: 2025-01-07

## 2025-01-14 ENCOUNTER — TELEPHONE (OUTPATIENT)
Dept: FAMILY MEDICINE CLINIC | Facility: CLINIC | Age: 80
End: 2025-01-14

## 2025-01-14 NOTE — TELEPHONE ENCOUNTER
Spoke with patient's wife. Patient fell x 1 month ago and with sore on his back. Patient was in hospital x 4 days then to [facility] in [facility]. Mentally out of it, knows who his wife is, talks about the past not present. Wound on back is healing well with wound care. Wound was lanced and treated now. Unsure of when patient is coming home. Patient is okayed to stay at rehab till 1/18. Wife will need an order/script for more help at home. Patient currently has home health but will need some more. His wife is unsure if his thyroid medications are balanced as well as he needs. Patient's wife is concerned with his mentality. She is asking the home to test for UTI. Wife is would like to know if Dr Chandra would still keep patient's appointment but speak with her via phone.     Currently has help from 8a-12p and  5p-9p at home, unsure if will require more.      Please review and advise

## 2025-01-14 NOTE — TELEPHONE ENCOUNTER
Spouse called states patient is in a rehab facility at the moment. Unsure if Dr. Jennifer Chandra is aware. Spouse would like to speak to Dr. Jennifer Chandra has a few questions regarding patient. Patient does have an appointment scheduled for Friday most likely will not make it. Spouse would like to keep appointment and have Dr. Jennifer Chandra call her for a phone visit if possible. Spouse unsure, would like to be advised     Please advise

## 2025-01-16 NOTE — TELEPHONE ENCOUNTER
Please call patient's wife:     -Please inform her that Medicare no longer covers telehealth visits.    -When patient is going to be discharged from rehab, please let us know and please request that they send us a discharge summary.  -An order for home health can be placed if it is indicated at the time of patient's discharge.    -There is an outstanding order for blood tests to reevaluate thyroid function tests, she could ask if he would be able to be drawn and have them send the blood to [facility].    -Also, she may want to call his neurologist, Dr. Morgan, to let Dr. Morgan know how he is doing.

## 2025-01-16 NOTE — TELEPHONE ENCOUNTER
Appointment for tomorrow 1/17 was canceled due to patient still being assisted living.  Spoke to patient's wife.

## 2025-01-16 NOTE — TELEPHONE ENCOUNTER
Spoke with Delores, patient's wife. Discussed labs, telehealth, home health. She has updated patient's neuro MD, Dr Morgan. Patient will ask if patient can have labs done. She will keep us updated with Lawrence's progress.

## 2025-01-29 DIAGNOSIS — E89.0 POSTSURGICAL HYPOTHYROIDISM: ICD-10-CM

## 2025-01-30 RX ORDER — LEVOTHYROXINE SODIUM 137 UG/1
TABLET ORAL
Qty: 90 TABLET | Refills: 0 | Status: SHIPPED | OUTPATIENT
Start: 2025-01-30

## 2025-01-31 NOTE — TELEPHONE ENCOUNTER
Spoke to patient's wife with provider's instructions and she verbalized understanding.  Fasting instructions given

## 2025-01-31 NOTE — TELEPHONE ENCOUNTER
Please call patient's wife and inform her that patient's refill request for levothyroxine has been approved.  Recommend try to complete outstanding thyroid function test labs and lipid panel.

## 2025-02-07 ENCOUNTER — TELEPHONE (OUTPATIENT)
Dept: FAMILY MEDICINE CLINIC | Facility: CLINIC | Age: 80
End: 2025-02-07

## 2025-02-07 NOTE — TELEPHONE ENCOUNTER
On call- Rachel home health RN.   At home with wife, care giver, has Parkinson's.    RN Says he's been sleeping a lot and unable to respond/wake him up.   Pulse 44, /50   He hasn't woken up to take any of his meds or to eat today either.  Pupils are reactive to light.   Wife says he has episodes like this where he doesn't respond, but not usually this long.  Had a fall yesterday when trying to get out of chair. Paramedics came.       Rec- go to ER for further eval.  Rachel agrees to plan.

## 2025-02-18 ENCOUNTER — TELEPHONE (OUTPATIENT)
Dept: FAMILY MEDICINE CLINIC | Facility: CLINIC | Age: 80
End: 2025-02-18

## 2025-02-18 NOTE — TELEPHONE ENCOUNTER
Called and spoke to patients wife and patient. Patient has a lump on his back that has been there for years. It recently started to get dark red and grow in size. No drainage from the lump. Patients wife left the lump and it does not feel warm to touch. Patient does not have fever. Patient states the lump does not hurt. Due to patients history of cellulitis with previous lump, recommend patient go to [address] [place] [address] to be assessed. Advised patients wife that it is possible they could send him to the ER form there. Patients wife agrees and understands.

## 2025-02-18 NOTE — TELEPHONE ENCOUNTER
Lawrence Daniels spouse calling,   LOV: 8/12/2024       Calling for New condition  Patient experiencing: new lump on back that's been there for a long time, red/ swollen looks like the lump that he had a while back that turned in cellulitis, spouse does not want to take patient to ER. Unless she has too, Would it be possible to take him to [address] [place] [address]?   Duration/Onset of symptom: a long time

## 2025-02-19 ENCOUNTER — HOSPITAL ENCOUNTER (EMERGENCY)
Age: 80
Discharge: HOME | End: 2025-02-19
Payer: MEDICARE

## 2025-02-19 VITALS
HEIGHT: 69 IN | BODY MASS INDEX: 29.62 KG/M2 | OXYGEN SATURATION: 98 % | RESPIRATION RATE: 18 BRPM | HEART RATE: 64 BPM | DIASTOLIC BLOOD PRESSURE: 82 MMHG | WEIGHT: 200 LBS | TEMPERATURE: 98 F | SYSTOLIC BLOOD PRESSURE: 137 MMHG

## 2025-02-19 DIAGNOSIS — L08.9 INFECTED SEBACEOUS CYST: Primary | ICD-10-CM

## 2025-02-19 DIAGNOSIS — L72.3 INFECTED SEBACEOUS CYST: Primary | ICD-10-CM

## 2025-02-19 PROCEDURE — 87070 CULTURE OTHR SPECIMN AEROBIC: CPT | Performed by: PHYSICIAN ASSISTANT

## 2025-02-19 PROCEDURE — 10061 I&D ABSCESS COMP/MULTIPLE: CPT

## 2025-02-19 PROCEDURE — 99284 EMERGENCY DEPT VISIT MOD MDM: CPT

## 2025-02-19 PROCEDURE — 87205 SMEAR GRAM STAIN: CPT | Performed by: PHYSICIAN ASSISTANT

## 2025-02-19 RX ORDER — SULFAMETHOXAZOLE AND TRIMETHOPRIM 800; 160 MG/1; MG/1
1 TABLET ORAL 2 TIMES DAILY
Qty: 14 TABLET | Refills: 0 | Status: SHIPPED | OUTPATIENT
Start: 2025-02-19 | End: 2025-02-26

## 2025-02-19 NOTE — TELEPHONE ENCOUNTER
Spoke with Delores and patient is getting worse. She called transportation companies with no availability until Sunday. Advised to call 911 to get him to emergency department. Voiced understanding and agreeable.

## 2025-02-19 NOTE — TELEPHONE ENCOUNTER
Patients spouse calling and is not able to get any transportation to the ER until next Sunday. She would like to know what she can do for him in the meantime as the lump is getting worse, possible antibiotic?    Please advise.

## 2025-02-19 NOTE — ED INITIAL ASSESSMENT (HPI)
Pt arrives with abscess to right upper back. States has been the for about 18 months. Was opened and drained about 6 months ago. Is red, swollen and has a small amount of drainage. Denies fever or pain.

## 2025-02-20 ENCOUNTER — PATIENT OUTREACH (OUTPATIENT)
Dept: CASE MANAGEMENT | Age: 80
End: 2025-02-20

## 2025-02-20 ENCOUNTER — TELEPHONE (OUTPATIENT)
Dept: FAMILY MEDICINE CLINIC | Facility: CLINIC | Age: 80
End: 2025-02-20

## 2025-02-20 NOTE — TELEPHONE ENCOUNTER
Margaret, an occupational therapist from [facility], called to inform Dr. Chandra about the rescheduling of the therapy session for patient Lawrence Daniels.    The patient underwent a procedure yesterday for his wound and is feeling completely exhausted. He was still sleeping at the time of visit. As a result, the therapy session has been rescheduled for next week.    Routing to Dr. Chandra as CARL.

## 2025-02-20 NOTE — ED PROVIDER NOTES
Patient Seen in: [facility] In [place]      History     Chief Complaint   Patient presents with    Abscess     Stated Complaint: right flank abcess    Subjective:   HPI      79-year-old gentleman.  Moderate medical history as listed below.  History and physical assisted by his son.  Patient has had a dome-shaped protuberant lesion to his right trapezial region for many months.  Acutely, these last 2 days areas become erythematous and more protuberant.  Painful.  No systemic fever, chills or malaise    Objective:     Past Medical History:    Anemia of chronic disease    Iron studies, B12 level, and folic acid levels normal.    Aneurysm of popliteal artery    Arterial disease    MRI 9/15/2017: Mild chronic microvascular ischemic changes in the cerebral white matter.    Back problem    Bilateral carotid artery disease    Bilateral carotid artery stenosis    Bradycardia    Class 1 obesity due to excess calories with serious comorbidity and body mass index (BMI) of 31.0 to 31.9 in adult    Associated with hypertension and hyperlipidemia    Class 1 obesity due to excess calories with serious comorbidity and body mass index (BMI) of 33.0 to 33.9 in adult    Associated with HTN, Carotid Artery Disease, and Hypercholesterolemia    Class 1 obesity due to excess calories without serious comorbidity with body mass index (BMI) of 33.0 to 33.9 in adult    Cognitive complaints with normal neuropsychological exam    Normal Neuropsych testing September 2017, results scanned in chart    Cogwheel rigidity    COVID    cough headache. No fever hospitalization or residule    Disorder of thyroid    Dyskinesia due to Parkinson's disease (HCC)    Dysphagia    Esophageal reflux    Essential hypertension    Exposure to medical diagnostic radiation    last dose 2005    Former smoker    Per history    Glaucoma    Glaucoma of both eyes    Globus sensation    High blood pressure    High cholesterol    Hypercholesterolemia     Hyperlipidemia    Hypervitaminosis    Elevated B12,  B6    Hypoalbuminemia due to protein-calorie malnutrition (HCC)    Hypotension    Noted 7/14/2022, asymptomatic, patient status post total thyroidectomy 6/27/2022.    Multiple thyroid nodules    x 2 on Left    Myocarditis (HCC)    Neoplasm of uncertain behavior of skin    Obesity (BMI 30-39.9)    ISAIAS (obstructive sleep apnea)    ISAIAS on CPAP    Dx 2009 cpap    Osteoarthritis    Other specified glaucoma    Overflow incontinence of urine    Parkinsonian tremor (HCC)    PD (Parkinson's disease) (HCC)    Postsurgical hypothyroidism    6/27/2022 total thyroidectomy with Dr. Perry Soriano.    Prediabetes    Primary open angle glaucoma (POAG) of both eyes, mild stage    Prostate CA (HCC)    RBD (REM behavioral disorder)    Last Assessment & Plan:  Formatting of this note might be different from the original. Only diagnosed with RBD, but behavior does seem classic for it.  He is taking melatonin 10 mg nightly so it is possible he is treating his RBD.  We will do an in lab diagnostic PSG with extra EMG leads on arms to assess for REM sleep without atonia.    Sleep apnea    Thrombocytopenia    Torn ligament    right    Visual impairment    Vocal cord dysfunction    errosion              Past Surgical History:   Procedure Laterality Date    Ankle fracture surgery      Hdr elect brachytherapy  2006    Incision and drainage Left 04/05/2022    left neck     Knee replacement surgery Left 2014    Other      Botox injections into bladder    Thyroidectomy Bilateral 06/27/2022    Dr. Soriano                Social History     Socioeconomic History    Marital status:    Tobacco Use    Smoking status: Former     Types: Cigars    Smokeless tobacco: Never   Vaping Use    Vaping status: Never Used   Substance and Sexual Activity    Alcohol use: Yes     Alcohol/week: 2.0 standard drinks of alcohol     Types: 2 Standard drinks or equivalent per week     Comment: 2 servings a week     Drug use: No    Sexual activity: Not Currently   Other Topics Concern    Caffeine Concern No     Comment: 1 cup of coffee daily    Exercise Yes     Comment: some walking    Seat Belt Yes    Special Diet No    Stress Concern No    Weight Concern No     Service No    Blood Transfusions No    Occupational Exposure No    Hobby Hazards No    Sleep Concern No    Back Care No    Bike Helmet No    Self-Exams No     Social Drivers of Health     Food Insecurity: No Food Insecurity (12/9/2024)    Food Insecurity     Food Insecurity: Never true   Transportation Needs: No Transportation Needs (12/9/2024)    Transportation Needs     Lack of Transportation: No   Housing Stability: Low Risk  (12/9/2024)    Housing Stability     Housing Instability: No                  Physical Exam     ED Triage Vitals [02/19/25 1735]   /65   Pulse 63   Resp 18   Temp 98.1 °F (36.7 °C)   Temp src Oral   SpO2 98 %   O2 Device None (Room air)       Current Vitals:   Vital Signs  BP: 112/65  Pulse: 63  Resp: 18  Temp: 98.1 °F (36.7 °C)  Temp src: Oral    Oxygen Therapy  SpO2: 98 %  O2 Device: None (Room air)        Physical Exam  Gen: Well appearing, well groomed, alert and aware x 3  Lung: No distress, RR, no retraction  Extremities: Full ROM, no deformity, NVI  Skin: To the right trapezial region/shoulder there is a 1.5 and slightly mobile fluctuant protuberant erythematous lesion  Neuro:  Normal Gait    ED Course     Labs Reviewed   AEROBIC BACTERIAL CULTURE                MDM          The lesion is highly suggestive of an infected sebaceous cyst.  Roughly 1.5 inch in circumference.    Verbal consent for the following procedure was obtained.  We discussed the inherent risks of procedure.  We discussed benefits.  Patient agrees to proceed with the procedure and verbalizes understanding of potential complications.    Using lidocaine with epinephrine, local injections were performed.  Site was then sterilized.  Using a 11 blade on handle  a 1 cm Filiberto incision was performed.  Immediate purulent drainage.  With a blunt instrument the cyst wall was ruptured and the site was thoroughly explored and expressed and irrigated    Site packed.  Aerobic culture obtained.    Pack removal in next 48 to 72 hours    Medical Decision Making      Disposition and Plan     Clinical Impression:  1. Infected sebaceous cyst         Disposition:  Discharge  2/19/2025  8:00 pm    Follow-up:  Jennifer Chandra DO  [id number] [id number] [phone]    Follow up            Medications Prescribed:  Current Discharge Medication List        START taking these medications    Details   sulfamethoxazole-trimethoprim -160 MG Oral Tab per tablet Take 1 tablet by mouth 2 (two) times daily for 7 days.  Qty: 14 tablet, Refills: 0                 Supplementary Documentation:

## 2025-02-20 NOTE — PROGRESS NOTES
ED follow up.    Jennifer Chandra DO  [facility]  [address]  [place] [address]  [phone]    Attempt #1:  Left message on voicemail for patient to call transitions specialist back to schedule follow up appointments. Provided Transitions specialist scheduling phone number [phone].

## 2025-02-20 NOTE — DISCHARGE INSTRUCTIONS
Bandage to the site.  Expect further drainage.  Recommend packing removal in next 48 to 72 hours.  May follow-up at our urgent care at [address] in [place]    Aerobic culture pending.  If this dictates a change in therapy, you will be contacted

## 2025-02-21 ENCOUNTER — HOSPITAL ENCOUNTER (OUTPATIENT)
Age: 80
Discharge: HOME | End: 2025-02-21
Payer: MEDICARE

## 2025-02-21 VITALS
DIASTOLIC BLOOD PRESSURE: 75 MMHG | OXYGEN SATURATION: 100 % | HEART RATE: 59 BPM | SYSTOLIC BLOOD PRESSURE: 140 MMHG | WEIGHT: 200 LBS | TEMPERATURE: 98 F | RESPIRATION RATE: 20 BRPM | BODY MASS INDEX: 29.62 KG/M2 | HEIGHT: 69 IN

## 2025-02-21 DIAGNOSIS — L08.9 INFECTED SEBACEOUS CYST OF SKIN: ICD-10-CM

## 2025-02-21 DIAGNOSIS — Z48.00 ABSCESS PACKING REMOVAL: Primary | ICD-10-CM

## 2025-02-21 DIAGNOSIS — L72.3 INFECTED SEBACEOUS CYST OF SKIN: ICD-10-CM

## 2025-02-21 PROCEDURE — 99211 OFF/OP EST MAY X REQ PHY/QHP: CPT

## 2025-02-21 NOTE — PROGRESS NOTES
ED Hospital Follow up for PCP (Discharge 2/19 edw )       PCP  Jennifer Chandra,   [facility]  [address]  [address]  [place] [address]  [phone]  unable to contact pt after multiple attempts    Attempt #2:  Left message on voicemail for patient to call transitions specialist back to schedule follow up appointments. Provided Transitions specialist scheduling phone number [phone]. Closing encounter. Will re-open if patient returns call.

## 2025-02-22 NOTE — ED PROVIDER NOTES
Patient Seen in: [facility]      History     Chief Complaint   Patient presents with    Follow - Up     Stated Complaint: Cyst/Abscess    Subjective:   80 yo male presents with wife and son for packing removal from infected sebaceous cyst that was drained 2 days ago in the  ED.   The dressing and packing has not been removed.   Pt denies fever, chills, malaise.         The history is provided by the patient, the spouse and a relative (son).         Objective:     Past Medical History:    Anemia of chronic disease    Iron studies, B12 level, and folic acid levels normal.    Aneurysm of popliteal artery    Arterial disease    MRI 9/15/2017: Mild chronic microvascular ischemic changes in the cerebral white matter.    Back problem    Bilateral carotid artery disease    Bilateral carotid artery stenosis    Bradycardia    Class 1 obesity due to excess calories with serious comorbidity and body mass index (BMI) of 31.0 to 31.9 in adult    Associated with hypertension and hyperlipidemia    Class 1 obesity due to excess calories with serious comorbidity and body mass index (BMI) of 33.0 to 33.9 in adult    Associated with HTN, Carotid Artery Disease, and Hypercholesterolemia    Class 1 obesity due to excess calories without serious comorbidity with body mass index (BMI) of 33.0 to 33.9 in adult    Cognitive complaints with normal neuropsychological exam    Normal Neuropsych testing September 2017, results scanned in chart    Cogwheel rigidity    COVID    cough headache. 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    Multiple thyroid nodules    x 2 on Left    Myocarditis (HCC)    Neoplasm of uncertain behavior of skin    Obesity (BMI 30-39.9)    ISAIAS (obstructive sleep apnea)    ISAIAS on CPAP    Dx 2009 cpap    Osteoarthritis    Other specified glaucoma    Overflow incontinence of urine    Parkinsonian tremor (HCC)    PD (Parkinson's disease) (HCC)    Postsurgical hypothyroidism    6/27/2022 total thyroidectomy with Dr. Perry Soriano.    Prediabetes    Primary open angle glaucoma (POAG) of both eyes, mild stage    Prostate CA (HCC)    RBD (REM behavioral disorder)    Last Assessment & Plan:  Formatting of this note might be different from the original. Only diagnosed with RBD, but behavior does seem classic for it.  He is taking melatonin 10 mg nightly so it is possible he is treating his RBD.  We will do an in lab diagnostic PSG with extra EMG leads on arms to assess for REM sleep without atonia.    Sleep apnea    Thrombocytopenia    Torn ligament    right    Visual impairment    Vocal cord dysfunction    errosion              Past Surgical History:   Procedure Laterality Date    Ankle fracture surgery      Hdr elect brachytherapy  2006    Incision and drainage Left 04/05/2022    left neck     Knee replacement surgery Left 2014    Other      Botox injections into bladder    Thyroidectomy Bilateral 06/27/2022    Dr. Soriano                Social History     Socioeconomic History    Marital status:    Tobacco Use    Smoking status: Former     Types: Cigars    Smokeless tobacco: Never   Vaping Use    Vaping status: Never Used   Substance and Sexual Activity    Alcohol use: Yes     Alcohol/week: 2.0 standard drinks of alcohol     Types: 2 Standard drinks or equivalent per week     Comment: 2 servings a week    Drug use: No    Sexual activity: Not Currently   Other  Topics Concern    Caffeine Concern No     Comment: 1 cup of coffee daily    Exercise Yes     Comment: some walking    Seat Belt Yes    Special Diet No    Stress Concern No    Weight Concern No     Service No    Blood Transfusions No    Occupational Exposure No    Hobby Hazards No    Sleep Concern No    Back Care No    Bike Helmet No    Self-Exams No     Social Drivers of Health     Food Insecurity: No Food Insecurity (12/9/2024)    Food Insecurity     Food Insecurity: Never true   Transportation Needs: No Transportation Needs (12/9/2024)    Transportation Needs     Lack of Transportation: No   Housing Stability: Low Risk  (12/9/2024)    Housing Stability     Housing Instability: No              Review of Systems   Constitutional:  Negative for chills, diaphoresis and fever.   Skin:  Positive for wound.       Positive for stated complaint: Cyst/Abscess  Other systems are as noted in HPI.  Constitutional and vital signs reviewed.      All other systems reviewed and negative except as noted above.    Physical Exam     ED Triage Vitals [02/21/25 1812]   /75   Pulse 59   Resp 20   Temp 98 °F (36.7 °C)   Temp src Oral   SpO2 100 %   O2 Device None (Room air)       Current Vitals:   Vital Signs  BP: 140/75  Pulse: 59  Resp: 20  Temp: 98 °F (36.7 °C)  Temp src: Oral    Oxygen Therapy  SpO2: 100 %  O2 Device: None (Room air)        Physical Exam  Vitals and nursing note reviewed.   Constitutional:       General: He is not in acute distress.     Appearance: Normal appearance. He is not ill-appearing, toxic-appearing or diaphoretic.   HENT:      Head: Normocephalic and atraumatic.   Cardiovascular:      Rate and Rhythm: Normal rate and regular rhythm.   Pulmonary:      Effort: Pulmonary effort is normal.      Breath sounds: Normal breath sounds.   Musculoskeletal:      Cervical back: Normal range of motion and neck supple.   Skin:     General: Skin is warm and dry.      Findings: Abscess (right posterior shoulder  area there is a 1.5 erythematous lesion. No proximal streaking, no warmth.) present.   Neurological:      Mental Status: He is alert.   Psychiatric:         Mood and Affect: Mood normal.            ED Course   Labs Reviewed - No data to display     MDM      Medical Decision Making  78 yo male presents with wife and son for packing removal from infected sebaceous cyst that was drained 2 days ago in the  ED.   Well appearing on exam. Vitals normal.   Packing removed; more drainage expressed. Wound was cleansed and dressed.   Continue Bactrim as previously prescribed until completed. Wound care discussed.   PCP follow up.   Dermatology referral provided- wife states he has had these in the past.   Urgent return precautions discussed with pt and spouse- fever, chills, malaise, proximal streaking.  Pt/spouse agreeable to plan.      Amount and/or Complexity of Data Reviewed  External Data Reviewed: notes.    Risk  OTC drugs.        Disposition and Plan     Clinical Impression:  1. Abscess packing removal    2. Infected sebaceous cyst of skin         Disposition:  Discharge  2/21/2025  6:54 pm    Follow-up:  [facility] [address]  [facility] [address] [phone]  Schedule an appointment as soon as possible for a visit       Jennifer Chandra DO  [address]  [facility] [address] [phone]      As needed          Medications Prescribed:  Discharge Medication List as of 2/21/2025  6:55 PM              Supplementary Documentation:

## 2025-02-22 NOTE — ED INITIAL ASSESSMENT (HPI)
Son states patient had a cyst lanced on Wednesday and was told to follow-up with the [facility].

## 2025-02-22 NOTE — DISCHARGE INSTRUCTIONS
We recommend the following for your condition:     Continue Bactrim as previously prescribed until completed.     May need to change dressing often depending on how much is draining from the wound     Can place warm compress to area, 3-5 times a day for 15-20 min. This will promote drainage.     Monitor for signs and symptoms of worsening infection including fever,  increasing redness, swelling, warmth, and pain if any of these develop please go to the Emergency room.     Dermatology referral provided in case of reoccurrence- call to schedule appt.

## 2025-02-24 ENCOUNTER — HOME HEALTH CHARGES (OUTPATIENT)
Dept: FAMILY MEDICINE CLINIC | Facility: CLINIC | Age: 80
End: 2025-02-24

## 2025-02-24 DIAGNOSIS — G20.B1 DYSKINESIA DUE TO PARKINSON'S DISEASE (HCC): Primary | ICD-10-CM

## 2025-03-20 ENCOUNTER — TELEPHONE (OUTPATIENT)
Dept: FAMILY MEDICINE CLINIC | Facility: CLINIC | Age: 80
End: 2025-03-20

## 2025-03-20 NOTE — TELEPHONE ENCOUNTER
Prince Emanuel Salcido Cynthia, DO Hello,    We wanted to report that patient is scheduled for start of care on 03/21/25.    Thank you for trusting us with your patients care needs. Please let us know if we can further be of assistance for you.    Jhoana can be reached at:  P: [phone]  E: [email or web address]

## 2025-03-24 ENCOUNTER — TELEPHONE (OUTPATIENT)
Dept: FAMILY MEDICINE CLINIC | Facility: CLINIC | Age: 80
End: 2025-03-24

## 2025-03-24 NOTE — TELEPHONE ENCOUNTER
Emilia from [facility] called regarding patient Lawrence Daniels. They wanted to inform Dr. Jennifer Chandra about home care services with be discharged. Patient started Hospice on Friday.

## 2025-03-31 ENCOUNTER — MED REC SCAN ONLY (OUTPATIENT)
Dept: FAMILY MEDICINE CLINIC | Facility: CLINIC | Age: 80
End: 2025-03-31

## 2025-04-29 RX ORDER — CALCIUM CARBONATE/VITAMIN D3 500MG-5MCG
1 TABLET ORAL 3 TIMES DAILY
Qty: 270 TABLET | Refills: 1 | OUTPATIENT
Start: 2025-04-29

## 2025-04-29 NOTE — TELEPHONE ENCOUNTER
Requested Prescriptions     Pending Prescriptions Disp Refills    OYSCO 500+D 500-5 MG-MCG Oral Tab [Pharmacy Med Name: OYSCO 500/D TABLETS] 270 tablet 1     Sig: TAKE 1 TABLET BY MOUTH THREE TIMES DAILY       Last Refill: 8/28/24    Last OV: 8/12/24    Next OV:

## 2025-05-12 DIAGNOSIS — E89.0 POSTSURGICAL HYPOTHYROIDISM: ICD-10-CM

## 2025-05-12 RX ORDER — LEVOTHYROXINE SODIUM 137 UG/1
TABLET ORAL
Qty: 90 TABLET | Refills: 0 | Status: SHIPPED | OUTPATIENT
Start: 2025-05-12

## 2025-05-12 RX ORDER — CALCIUM CARBONATE/VITAMIN D3 500MG-5MCG
1 TABLET ORAL 3 TIMES DAILY
Qty: 270 TABLET | Refills: 1 | OUTPATIENT
Start: 2025-05-12

## 2025-05-13 RX ORDER — CALCIUM CARBONATE/VITAMIN D3 500MG-5MCG
1 TABLET ORAL 3 TIMES DAILY
Qty: 270 TABLET | Refills: 1 | OUTPATIENT
Start: 2025-05-13

## 2025-07-07 DIAGNOSIS — I77.9 ARTERIAL DISEASE: ICD-10-CM

## 2025-07-07 DIAGNOSIS — I77.9 BILATERAL CAROTID ARTERY DISEASE, UNSPECIFIED TYPE: ICD-10-CM

## 2025-07-07 DIAGNOSIS — E78.00 HYPERCHOLESTEROLEMIA: ICD-10-CM

## 2025-07-07 DIAGNOSIS — Z79.899 ENCOUNTER FOR LONG-TERM CURRENT USE OF MEDICATION: ICD-10-CM

## 2025-07-07 DIAGNOSIS — I65.23 BILATERAL CAROTID ARTERY STENOSIS: ICD-10-CM

## 2025-07-08 RX ORDER — SIMVASTATIN 20 MG
20 TABLET ORAL NIGHTLY
Qty: 90 TABLET | Refills: 0 | Status: SHIPPED | OUTPATIENT
Start: 2025-07-08

## 2025-07-08 NOTE — TELEPHONE ENCOUNTER
Requested Prescriptions     Pending Prescriptions Disp Refills    SIMVASTATIN 20 MG Oral Tab [Pharmacy Med Name: SIMVASTATIN 20MG TABLETS] 90 tablet 3     Sig: TAKE 1 TABLET(20 MG) BY MOUTH EVERY NIGHT       Last Refill: 2/3/24    Last OV: 8/12/24    PSR: Please call patient to schedule Annual Wellness Visit/MA supervisit

## 2025-07-08 NOTE — TELEPHONE ENCOUNTER
Left voicemail reminding patient that they are due for physical with Dr. Jennifer Chandra . Instructed the patient to return the call at [phone] or to schedule through [employer].

## (undated) DIAGNOSIS — Z01.818 PREPROCEDURAL EXAMINATION: Primary | ICD-10-CM

## (undated) DIAGNOSIS — Z11.59 ENCOUNTER FOR SCREENING FOR OTHER VIRAL DISEASES: ICD-10-CM

## (undated) DIAGNOSIS — E04.9 SUBSTERNAL THYROID GOITER: Primary | ICD-10-CM

## (undated) DEVICE — GAUZE SPONGES,USP TYPE VII GAUZE, 12 PLY: Brand: CURITY

## (undated) DEVICE — PREMIUM WET SKIN PREP TRAY: Brand: MEDLINE INDUSTRIES, INC.

## (undated) DEVICE — SUT SILK 2-0 A185H

## (undated) DEVICE — HEAD AND NECK CDS-LF: Brand: MEDLINE INDUSTRIES, INC.

## (undated) DEVICE — SOLUTION  .9 1000ML BTL

## (undated) DEVICE — SUT SILK 3-0 A184H

## (undated) DEVICE — 3M™ STERI-STRIP™ REINFORCED ADHESIVE SKIN CLOSURES, R1547, 1/2 IN X 4 IN (12 MM X 100 MM), 6 STRIPS/ENVELOPE: Brand: 3M™ STERI-STRIP™

## (undated) DEVICE — SUT VICRYL 3-0 SH J416H

## (undated) DEVICE — SUT PROLENE 4-0 PS-2 8682G

## (undated) DEVICE — SPONGE RAYTEC 4X4 RF DETECT

## (undated) DEVICE — SUT SILK 2-0 FS 685G

## (undated) DEVICE — UNDYED BRAIDED (POLYGLACTIN 910), SYNTHETIC ABSORBABLE SUTURE: Brand: COATED VICRYL

## (undated) DEVICE — STERILE POLYISOPRENE POWDER-FREE SURGICAL GLOVES: Brand: PROTEXIS

## (undated) DEVICE — PROBE 8225101 5PK STD PRASS FL TIP ROHS

## (undated) DEVICE — SLEEVE KENDALL SCD EXPRESS MED

## (undated) DEVICE — HARMONIC FOCUS SHEARS 9CM LENGTH + ADAPTIVE TISSUE TECHNOLOGY FOR USE WITH BLUE HAND PIECE ONLY: Brand: HARMONIC FOCUS

## (undated) DEVICE — ELECTRODE ESURG 2.75IN EZ CLN

## (undated) DEVICE — AIRWAY ENDO  TRIVANTAGE 8MM

## (undated) DEVICE — SPONGE: SPECIALTY PEANUT XR 100/CS: Brand: MEDICAL ACTION INDUSTRIES

## (undated) DEVICE — PAD SACRAL PREMIUM 12X12X1

## (undated) DEVICE — 3M™ TEGADERM™ TRANSPARENT FILM DRESSING, 1626W, 4 IN X 4-3/4 IN (10 CM X 12 CM), 50 EACH/CARTON, 4 CARTON/CASE: Brand: 3M™ TEGADERM™

## (undated) NOTE — IP AVS SNAPSHOT
Patient Demographics     Address  [address]  [facility] [address] Phone  [phone] (Home)  [phone] (Mobile) *Preferred* E-mail Address  [email or web address]      Patient Contacts     Name Relation Home Work Mobile    Delores Polo Spouse   [phone]    Hallie Finley Daughter [phone]        Allergies as of 3/21/2024  Review status set to In Progress on 3/16/2024       Noted Reaction Type Reactions    DELETED: Seasonal 08/22/2017    Runny nose    Zoledronic Acid 08/22/2017    OTHER (SEE COMMENTS)    Flu like symptoms.      Code Status Information     Code Status    Not on file      Patient Instructions    None      Follow-up Information     Jennifer Chandra, DO Follow up in 2 week(s).    Specialties: Family Medicine, IP Consult to Primary Care  Why: Follow up  Contact information:  [address]  [facility] [address]  [phone]                        Your Home Meds List      TAKE these medications       Instructions Authorizing Provider Morning Afternoon Evening As Needed   amLODIPine 5 MG Tabs  Commonly known as: Norvasc  Next dose due: Tomorrow morning       Take 1 tablet (5 mg total) by mouth daily.   Hardeep Maria Ines         carbidopa-levodopa  MG Tabs  Commonly known as: SINEMET  Next dose due: Tonight       Take by mouth 3 (three) times daily. Taking 1 1/2 tablets 3 times daily          Cholecalciferol 50 MCG (2000 UT) Tabs  Next dose due: Tonight       Take 1 tablet (2,000 Units total) by mouth at bedtime.          cyanocobalamin 1000 MCG Tabs  Commonly known as: Vitamin B12      Take 1 tablet (1,000 mcg total) by mouth once a week.          docusate sodium 100 MG Caps  Commonly known as: Colace  Next dose due: Tonight       Take 1 capsule (100 mg total) by mouth nightly.          donepezil 10 MG Tabs  Commonly known as: Aricept  Next dose due: Tomorrow morning       Take 1 tablet (10 mg total) by mouth daily.          folic acid 400 MCG Tabs  Commonly known as: Folvite       Take 1 tablet (400 mcg total) by mouth once a week.          hydrALAZINE 25 MG Tabs  Commonly known as: Apresoline  Next dose due: Tonight       Take 1 tablet (25 mg total) by mouth 2 (two) times daily.          latanoprost 0.005 % Soln  Commonly known as: Xalatan  Next dose due: Tonight       Place 1 drop into both eyes nightly.          levothyroxine 125 MCG Tabs  Commonly known as: Synthroid  Next dose due: Tomorrow morning       TAKE 1 TABLET(125 MCG) BY MOUTH BEFORE BREAKFAST   Jennifer Chandra         losartan 50 MG Tabs  Commonly known as: Cozaar  Next dose due: Tonight       Take 1 tablet (50 mg total) by mouth 2 (two) times daily.   Hardeep Spann         Melatonin 10 MG Tabs  Next dose due: Tonight       Take 10 mg by mouth nightly.          omega-3 fatty acids 1000 MG Caps  Commonly known as: Fish Oil  Next dose due: Tomorrow morning       Take 1,000 mg by mouth daily.          Omeprazole 40 MG Cpdr  Next dose due: Tomorrow morning       Take 1 capsule (40 mg total) by mouth daily.   Johan Centeno         Oysco 500+D 500-5 MG-MCG Tabs  Generic drug: Calcium Carbonate-Vitamin D  Next dose due: Tonight       Take 1 tablet by mouth 3 (three) times daily.   Jennifer Chandra         pyridoxine 100 MG Tabs  Commonly known as: Vitamin B6      Take 1 tablet (100 mg total) by mouth once a week.          simvastatin 20 MG Tabs  Commonly known as: Zocor  Next dose due: Tonight       Take 1 tablet (20 mg total) by mouth nightly.   Jennifer Chandra         timolol 0.5 % Soln  Commonly known as: Timoptic  Next dose due: Tonight       Place 1 drop into both eyes nightly.          tobramycin 0.3 % Oint  Commonly known as: Tobrex  Next dose due: Tonight       Place into both eyes 4 (four) times daily.  Stop taking on: March 22, 2024   Hardeep Spann                  [phone] - MAR ACTION REPORT  (last 48 hrs)    ** SITE UNKNOWN **     Order ID Medication Name Action Time Action Reason Comments    [id number] amLODIPine  (Norvasc) tab 5 mg 03/20/24 1419 Given      [id number] amLODIPine (Norvasc) tab 5 mg 03/21/24 0930 Given      [id number] atorvastatin (Lipitor) tab 10 mg 03/19/24 2202 Given      [id number] atorvastatin (Lipitor) tab 10 mg 03/20/24 2240 Given      [id number] carbidopa-levodopa (SINEMET)  MG per tab 1.5 tablet 03/19/24 1639 Given      [id number] carbidopa-levodopa (SINEMET)  MG per tab 1.5 tablet 03/19/24 2203 Given      [id number] carbidopa-levodopa (SINEMET)  MG per tab 1.5 tablet 03/20/24 1600 Given      [id number] carbidopa-levodopa (SINEMET)  MG per tab 1.5 tablet 03/20/24 2240 Given      [id number] carbidopa-levodopa (SINEMET)  MG per tab 1.5 tablet 03/21/24 0930 Given      [id number] carbidopa-levodopa (SINEMET)  MG per tab 1.5 tablet 03/21/24 1552 Given      [id number] docusate sodium (Colace) cap 100 mg 03/19/24 2202 Given      [id number] docusate sodium (Colace) cap 100 mg 03/20/24 2240 Given      [id number] donepezil (Aricept) tab 10 mg 03/20/24 1419 Given      [id number] donepezil (Aricept) tab 10 mg 03/21/24 0930 Given      [id number] hydrALAZINE (Apresoline) tab 50 mg 03/19/24 2202 Given      [id number] hydrALAZINE (Apresoline) tab 50 mg 03/20/24 1418 Given      [id number] hydrALAZINE (Apresoline) tab 50 mg 03/20/24 2240 Given      [id number] hydrALAZINE (Apresoline) tab 50 mg 03/21/24 0930 Given      [id number] latanoprost (Xalatan) 0.005 % ophthalmic solution 1 drop 03/19/24 2202 Given      [id number] latanoprost (Xalatan) 0.005 % ophthalmic solution 1 drop 03/20/24 2239 Given      [id number] levothyroxine (Synthroid) tab 125 mcg 03/20/24 0550 Given      [id number] levothyroxine (Synthroid) tab 125 mcg 03/21/24 0559 Given      [id number] losartan (Cozaar) tab 50 mg 03/19/24 2202 Given      [id number] losartan (Cozaar) tab 50 mg 03/20/24 1418 Given      [id number] losartan (Cozaar) tab 50 mg 03/20/24 2240 Given      [id number] losartan (Cozaar) tab 50 mg 03/21/24 0930 Given      [id number]  melatonin cap/tab 10 mg 03/19/24 2202 Given      [id number] pantoprazole (Protonix) DR tab 40 mg 03/20/24 0550 Given      [id number] pantoprazole (Protonix) DR tab 40 mg 03/21/24 0559 Given      [id number] polyethylene glycol (PEG 3350) (Miralax) 17 g oral packet 17 g 03/21/24 0946 Given      [id number] potassium chloride (K-Dur) tab 40 mEq 03/20/24 0922 Given      [id number] timolol (Timoptic) 0.5 % ophthalmic solution 1 drop 03/19/24 2202 Given      [id number] timolol (Timoptic) 0.5 % ophthalmic solution 1 drop 03/20/24 2239 Given      [id number] tobramycin (Tobrex) 0.3 % ophthalmic ointment 03/19/24 1639 Given      [id number] tobramycin (Tobrex) 0.3 % ophthalmic ointment 03/19/24 2202 Given      [id number] tobramycin (Tobrex) 0.3 % ophthalmic ointment 03/20/24 0918 Given      [id number] tobramycin (Tobrex) 0.3 % ophthalmic ointment 03/20/24 1300 Given      [id number] tobramycin (Tobrex) 0.3 % ophthalmic ointment 03/20/24 1700 Given      [id number] tobramycin (Tobrex) 0.3 % ophthalmic ointment 03/20/24 2239 Given      [id number] tobramycin (Tobrex) 0.3 % ophthalmic ointment 03/21/24 0931 Given      [id number] tobramycin (Tobrex) 0.3 % ophthalmic ointment 03/21/24 1236 Given      [id number] tobramycin (Tobrex) 0.3 % ophthalmic ointment 03/21/24 1553 Given            LEFT LOWER ABDOMEN     Order ID Medication Name Action Time Action Reason Comments    [id number] enoxaparin (Lovenox) 40 MG/0.4ML SUBQ injection 40 mg 03/21/24 0930 Given            RIGHT LOWER ABDOMEN     Order ID Medication Name Action Time Action Reason Comments    [id number] enoxaparin (Lovenox) 40 MG/0.4ML SUBQ injection 40 mg 03/20/24 0918 Given              Recent Vital Signs    Flowsheet Row Most Recent Value   /71 Filed at 03/21/2024 1553   Pulse 64 Filed at 03/21/2024 1553   Resp 18 Filed at 03/21/2024 1235   Temp 98.3 °F (36.8 °C) Filed at 03/21/2024 1553   SpO2 95 % Filed at 03/21/2024 1553      Patient's Most Recent Weight    Flowsheet Row Most Recent  Value   Patient Weight 91.2 kg (201 lb)         Lab Results Last 24 Hours      Potassium [[id number]] (Normal)  Resulted: 03/21/24 0829, Result status: Final result   Ordering provider: Lucretia Garcia DO  03/20/24 2300 Resulting lab: [facility] ([facility])    Specimen Information    Type Source Collected On   Blood — 03/21/24 0733          Components    Component Value Reference Range Flag Lab   Potassium 3.8 3.5 - 5.1 mmol/L — [facility] ([facility])            ABG PANEL W ELECT AND LACTATE [[id number]] (Abnormal)  Resulted: 03/20/24 1719, Result status: Final result   Ordering provider: Lucretia Garcia DO  03/20/24 1708 Resulting lab: [place] [facility] ([facility])    Specimen Information    Type Source Collected On   Blood — 03/20/24 1717          Components    Component Value Reference Range Flag Lab   ABG pH 7.45 7.35 - 7.45 — [facility] ([facility])   ABG pCO2 38 35 - 45 mm Hg — [facility] ([facility])   ABG pO2 78 80 - 100 mm Hg L [facility] ([facility])   ABG HCO3 26.8 21.0 - 27.0 mEq/L — [facility] ([facility])   ABG Base Excess 2.4 -2.0 - 2.0 mmol/L H [facility] ([facility])   Arterial Blood Gas O2Hb 95.8 92.0 - 100.0 % — [facility] ([facility])   Patient Temperature 98.6 F — [facility] ([facility])   Total Hemoglobin 11.5 13.0 - 17.5 g/dL L [facility] ([facility])   Carboxyhemoglobin 1.9 0.0 - 3.0 % SAT — [facility] ([facility])   Comment:  NORMALS:          NON-SMOKERS 0-3%          SMOKERS     0-10%       Methemoglobin 0.3 0.4 - 1.5 % SAT L [facility] ([facility])   Ionized Calcium 1.22 0.95 - 1.32 mmol/L — [facility] ([facility])   Allens Test Positive — — [facility] ([facility])   Sample Site Left Radial — — [facility] ([facility])   ABG Device Room Air — — [facility] ([facility])   Potassium Blood Gas 3.8 3.6 - 5.1 mmol/L — [facility] ([facility])   Sodium Blood Gas 137 135 - 145 mmol/L — [facility] ([facility])   Lactic Acid (Blood Gas) 0.7 0.5 - 2.0 mmol/L — [facility] ([facility])            Testing Performed By     Lab - Abbreviation Name Director Address Valid Date Range    139 - [place] [facility] ([facility]) [facility] ([facility]) Goldberg, Cathryn A.  [facility] [address] 03/19/20 1441 - Present    294 - [facility] ([facility]) [place] [facility] ([facility]) Eb Lepe M.D. [address] [place] [id number] 02/02/21 1242 - Present            Microbiology Results (All)     Procedure Component Value Units Date/Time    $$$$Respiratory Flu Expanded Panel + Covid-19$$$$ [[id number]]  (Normal) Collected: 03/16/24 2115    Order Status: Completed Lab Status: Final result Updated: 03/16/24 2307    Specimen: Other from Nasopharyngeal swab      SARS-CoV-2 PCR: Not Detected     Adenovirus PCR: Not Detected     Coronavirus 229E PCR: Not Detected     Coronavirus Hku1 PCR: Not Detected     Coronavirus Nl63 PCR: Not Detected     Coronavirus Oc43 PCR: Not Detected     Metapneumovirus PCR: Not Detected     Rhinovirus/Entero PCR: Not Detected     Influenza A PCR: Not Detected     Influenza B PCR: Not Detected     Parainfluenza 1 PCR: Not Detected     Parainfluenza 2 PCR Not Detected     Parainfluenza 3 PCR Not Detected     Parainfluenza 4 PCR Not Detected     Resp Syncytial Virus PCR Not Detected     Bordetella Pertussis PCR Not Detected     Bordetella Parapertussis PCR Not Detected     Chlamydia pneumonia PCR: Not Detected     Mycoplasma pneumonia PCR: Not Detected    Narrative:      This test is intended for the simultaneous qualitative detection and differentiation of nucleic acids from multiple viral and bacterial respiratory organisms, including nucleic acid from Severe Acute Respiratory Syndrome Coronavirus 2 (SARS-CoV-2) in nasopharyngeal swab from  individuals suspected of respiratory viral infection consistent with COVID-19 by their healthcare provider.    Test performed using the [employer] Respiratory Panel 2.1 (RP2.1) assay on the Recurve 2.0 System, [employer], [place], [place] [address].    This test is being used under the Food and Drug Administration's Emergency Use Authorization.    The authorized Fact Sheet for Healthcare Providers for this assay is available upon request from the laboratory.    SARS and MERS coronaviruses are not tested on this assay.    Sputum culture [[id number]] Collected: 03/16/24 2115    Order Status: Sent Lab Status: No result     Specimen: Other from Sputum     SARS-CoV-2/Flu A and B/RSV by PCR (GeneXpert) [[id number]]  (Normal) Collected: 03/16/24 1517    Order Status: Completed Lab Status: Final result Updated: 03/16/24 1617    Specimen: Other from Nares      SARS-CoV-2 (COVID-19) - (GeneXpert) Not Detected     Influenza A by PCR Negative     Influenza B by PCR Negative     RSV by PCR Negative    Narrative:      This test is intended for the qualitative detection and differentiation of SARS-CoV-2, influenza A, influenza B, and respiratory syncytial virus (RSV) viral RNA in nasopharyngeal or nares swabs from individuals suspected of respiratory viral infection consistent with COVID-19 by their healthcare provider. Signs and symptoms of respiratory viral infection due to SARS-CoV-2, influenza, and RSV can be similar.    Test performed using the Xpert Xpress SARS-CoV-2/FLU/RSV (real time RT-PCR)  assay on the GeneXpert instrument, [employer], [place] [address].   This test is being used under the Food and Drug Administration's Emergency Use Authorization.    The authorized Fact Sheet for Healthcare Providers for this assay is available upon request from the laboratory.      Pending Labs     Order Current Status    Sputum culture Collected (03/16/24 2115)         H&P - H&P Note      H&P signed by Denis Giraldo MD at  3/16/2024  8:11 PM  Version 1 of 1    Author: Denis Giraldo MD Service: — Author Type: Physician    Filed: 3/16/2024  8:11 PM Date of Service: 3/16/2024  7:05 PM Status: Signed    : Denis Giraldo MD (Physician)         [facility]     Lawrence Daniels Patient Status:  Emergency    9/3/1945 MRN [id number]   Location [facility] EMERGENCY DEPARTMENT Attending Sav Roldan,    Hosp Day # 0 PCP Jennifer Chandra DO     Chief Complaint: AMS, weakness    Subjective:    History of Present Illness:     Lawrence Daniels is a 78 year old male with past medical history anemia chronic disease, bradycardia, Parkinson's disease, hypothyroidism, GERD, hypertension, hyperlipidemia, ISAIAS who presents with increasing weakness and confusion.  At baseline, patient has difficulty with ambulation due to his Parkinson's disease.  Had a recent admission for encephalopathy thought to be due to dehydration from diarrhea at that time.  Over the past 12 days patient has continued to worsen.  Has increasing confusion and poor p.o. intake.  Unstable on his feet and unable to ambulate. States he's had a cough and productive cough for past 12 days. Also has had b/l conjunctivitis. Wife cannot take care of him at this point.  Patient unable to even stand.  Oriented x 3.      History/Other:    Past Medical History:  Past Medical History:   Diagnosis Date    Anemia of chronic disease 2022    Iron studies, B12 level, and folic acid levels normal.    Aneurysm of popliteal artery (HCC) 2018    Arterial disease (HCC) 2018    MRI 9/15/2017: Mild chronic microvascular ischemic changes in the cerebral white matter.    Back problem     Bilateral carotid artery disease (HCC) 2018    Bilateral carotid artery stenosis 2018    Bradycardia     Class 1 obesity due to excess calories with serious comorbidity and body mass index (BMI) of 31.0 to 31.9 in adult 2022    Associated with  hypertension and hyperlipidemia    Class 1 obesity due to excess calories with serious comorbidity and body mass index (BMI) of 33.0 to 33.9 in adult 06/17/2019    Associated with HTN, Carotid Artery Disease, and Hypercholesterolemia    Class 1 obesity due to excess calories without serious comorbidity with body mass index (BMI) of 33.0 to 33.9 in adult 02/11/2018    Cognitive complaints with normal neuropsychological exam 06/07/2018    Normal Neuropsych testing September 2017, results scanned in chart    Cogwheel rigidity 07/14/2018    COVID 02/2022    cough headache. 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    Multiple thyroid nodules 05/2022    x 2 on Left    Myocarditis (HCC) 1970s    Neoplasm of uncertain behavior of skin 06/07/2018    Obesity (BMI 30-39.9) 08/29/2017    ISAIAS (obstructive sleep apnea)     ISAIAS on CPAP     Dx 2009 cpap    Osteoarthritis     Other specified glaucoma 08/29/2017    Overflow incontinence of urine 08/29/2017    Parkinsonian tremor 07/31/2018    PD (Parkinson's disease) 01/21/2019    Postsurgical hypothyroidism 07/14/2022 6/27/2022 total thyroidectomy with Dr. Perry Soriano.    Prediabetes 06/12/2021    Primary open angle glaucoma (POAG) of both eyes, mild stage 06/07/2018    Prostate CA (HCC)     RBD (REM behavioral disorder)  03/02/2022    Last Assessment & Plan:  Formatting of this note might be different from the original. Only diagnosed with RBD, but behavior does seem classic for it.  He is taking melatonin 10 mg nightly so it is possible he is treating his RBD.  We will do an in lab diagnostic PSG with extra EMG leads on arms to assess for REM sleep without atonia.    Sleep apnea     Thrombocytopenia (HCC) 07/14/2018    Torn ligament     right    Visual impairment     Vocal cord dysfunction     errosion     Past Surgical History:   Past Surgical History:   Procedure Laterality Date    ANKLE FRACTURE SURGERY      HDR ELECT BRACHYTHERAPY  2006    INCISION AND DRAINAGE Left 04/05/2022    left neck     KNEE REPLACEMENT SURGERY Left 2014    OTHER      Botox injections into bladder    THYROIDECTOMY Bilateral 06/27/2022    Dr. Soriano      Family History:   Family History   Problem Relation Age of Onset    Heart Attack Father     Colon Cancer Mother     Heart Disease Paternal Grandmother     Heart Attack Paternal Grandmother     Cancer Paternal Grandfather     Heart Disease Maternal Grandmother     Heart Attack Maternal Grandfather     Heart Disease Maternal Grandfather      Social History:    reports that he has quit smoking. His smoking use included cigars. He has never used smokeless tobacco. He reports current alcohol use of about 2.0 standard drinks of alcohol per week. He reports that he does not use drugs.     Allergies:   Allergies   Allergen Reactions    Zoledronic Acid OTHER (SEE COMMENTS)     Flu like symptoms.       Medications:    Current Facility-Administered Medications on File Prior to Encounter   Medication Dose Route Frequency Provider Last Rate Last Admin    [COMPLETED] sodium chloride 0.9 % IV bolus 1,000 mL  1,000 mL Intravenous Once Andrea Charles MD   Stopped at 02/21/24 1400    [COMPLETED] sodium chloride 0.9% infusion   Intravenous Once Jeremy Cunha DO   Stopped at 02/22/24 0200    [COMPLETED] potassium chloride  (K-Dur) tab 40 mEq  40 mEq Oral Once Jeremy Cunha DO   40 mEq at 02/21/24 1817     Current Outpatient Medications on File Prior to Encounter   Medication Sig Dispense Refill    simvastatin 20 MG Oral Tab Take 1 tablet (20 mg total) by mouth nightly. 90 tablet 3    OYSCO 500+D 500-5 MG-MCG Oral Tab Take 1 tablet by mouth 3 (three) times daily. 270 tablet 1    Omeprazole 40 MG Oral Capsule Delayed Release Take 1 capsule (40 mg total) by mouth daily. 90 capsule 3    LEVOTHYROXINE 125 MCG Oral Tab TAKE 1 TABLET(125 MCG) BY MOUTH BEFORE BREAKFAST 90 tablet 3    donepezil 10 MG Oral Tab Take 1 tablet (10 mg total) by mouth daily.      latanoprost 0.005 % Ophthalmic Solution Place 1 drop into both eyes nightly.      carbidopa-levodopa  MG Oral Tab Take by mouth 3 (three) times daily. Taking 1 1/2 tablets 3 times daily      Timolol Maleate 0.5 % Ophthalmic Solution Place 1 drop into both eyes nightly.      hydrALAzine HCl 25 MG Oral Tab Take 1 tablet (25 mg total) by mouth 2 (two) times daily.      Melatonin 10 MG Oral Tab Take 10 mg by mouth nightly.      omega-3 fatty acids 1000 MG Oral Cap Take 1,000 mg by mouth daily.      docusate sodium 100 MG Oral Cap Take 1 capsule (100 mg total) by mouth nightly.      folic acid 400 MCG Oral Tab Take 1 tablet (400 mcg total) by mouth once a week.      Vitamin B-12 1000 MCG Oral Tab Take 1 tablet (1,000 mcg total) by mouth once a week.      Pyridoxine HCl (VITAMIN B-6) 100 MG Oral Tab Take 1 tablet (100 mg total) by mouth once a week.      Cholecalciferol 50 MCG (2000 UT) Oral Tab Take 1 tablet (2,000 Units total) by mouth at bedtime.      losartan 100 MG Oral Tab Take 1 tablet (100 mg total) by mouth every morning.         Review of Systems:   A comprehensive review of systems was completed.    Pertinent positives and negatives noted in the HPI.    Objective:   Physical Exam:    BP (!) 178/98   Pulse 67   Temp 97.2 °F (36.2 °C) (Temporal)   Resp 16   Ht 5' 8\" (1.727 m)    Wt 202 lb (91.6 kg)   SpO2 100%   BMI 30.71 kg/m²   General: No acute distress, Alert, b/l conjunctivitis  Respiratory: No rhonchi, no wheezes  Cardiovascular: S1, S2. Regular rate and rhythm  Abdomen: Soft, Non-tender, non-distended, positive bowel sounds  Neuro: No new focal deficits  Extremities: No edema      Results:    Labs:      Labs Last 24 Hours:    Recent Labs   Lab 03/16/24  1517   RBC 3.89   HGB 12.7*   HCT 37.7*   MCV 96.9   MCH 32.6   MCHC 33.7   RDW 12.2   NEPRELIM 4.41   WBC 6.3   .0       Recent Labs   Lab 03/16/24  1517   *   BUN 21   CREATSERUM 1.38*   EGFRCR 52*   CA 9.5   ALB 3.5      K 3.8      CO2 29.0   ALKPHO 64   AST 13*   ALT 7*   BILT 0.5   TP 7.3       No results found for: \"PT\", \"INR\"    No results for input(s): \"TROP\", \"TROPHS\", \"CK\" in the last 168 hours.    No results for input(s): \"TROP\", \"PBNP\" in the last 168 hours.    Recent Labs   Lab 03/16/24  1517   PCT <0.05       Imaging: Imaging data reviewed in Epic.    Assessment & Plan:      # Generalized weakness, worsening  #Acute metabolic encephalopathy likely secondary to viral URI  -Likely due to dehydration/LEVI on top of poor baseline from Parkinson's  -PT/OT  -Likely needs placement  -Consult social work    #LEVI  -Secondary to poor p.o. intake  -Nutrition  -IV fluids    #b/l conjunctivitis  #suspected viral URI  -abx eye gtt  -RVP  -sputum cx  -CXR - atelectasis only       #Hypertensive urgency  -Continue home meds  -add amlodipine  -IV hydralazine prn    #Parkinson's disease    #Hypothyroidism  -Normal T4 but TSH is significantly elevated at 24  -Increase Synthroid dose    #Anemia chronic disease    #Hyperlipidemia  #Pancytopenia  #History of prostate cancer  #Glaucoma  #ISAIAS      Plan of care discussed with patient, ED physician    Denis Giraldo MD    Supplementary Documentation:     The 21st Century Cures Act makes medical notes like these available to patients in the interest of transparency.  Please be advised this is a medical document. Medical documents are intended to carry relevant information, facts as evident, and the clinical opinion of the practitioner. The medical note is intended as peer to peer communication and may appear blunt or direct. It is written in medical language and may contain abbreviations or verbiage that are unfamiliar.                                 Electronically signed by Denis Giraldo MD on 3/16/2024  8:11 PM           D/C Summary    No notes of this type exist for this encounter.        Physical Therapy Notes (last 72 hours)      Physical Therapy Note signed by Ivette Meyer PTA at 3/19/2024  3:12 PM  Version 1 of 1    Author: Ivette Meyer PTA Service: Rehab Author Type: Physical Therapy Assistant    Filed: 3/19/2024  3:12 PM Date of Service: 3/19/2024  9:50 AM Status: Signed    : Ivette Meyer PTA (Physical Therapy Assistant)          PHYSICAL THERAPY TREATMENT NOTE - INPATIENT    Room Number: 419/419-A     Session: 1          Presenting Problem: AMS  Co-Morbidities : Parkinson's Anemia, Bradycardia, hypothyroidism, GERD, HTN, hyperlipidemia    ASSESSMENT   Patient demonstrates limited progress this session, goals  remain in progress.    Patient continues to function below baseline with bed mobility, transfers, and gait.  Contributing factors to remaining limitations include decreased functional strength, decreased endurance/aerobic capacity, decreased muscular endurance, and decreased compliance/participation.  Next session anticipate patient to progress bed mobility, transfers, and gait.  Physical Therapy will continue to follow patient for duration of hospitalization.    Patient continues to benefit from continued skilled PT services: to promote return to prior level of function and safety with continuous assistance and gradual rehabilitative therapy .    PLAN  PT Treatment Plan: Bed mobility;Patient education;Gait training;Range of  motion;Strengthening;Transfer training  Rehab Potential : Fair  Frequency (Obs): 3-5x/week    CURRENT GOALS       Goal #1 Patient is able to demonstrate supine - sit EOB @ level: modified independent      Goal #2 Patient is able to demonstrate transfers Sit to/from Stand at assistance level: SBA      Goal #3 Patient is able to ambulate 100 feet with assist device: walker - rolling at assistance level: cga      Goal #4     Goal #5     Goal #6     Goal Comments: Goals established on 3/17/2024  3/19/2024 all goals ongoing     SUBJECTIVE  \"I need to go close the door\"     OBJECTIVE  Precautions: Bed/chair alarm    WEIGHT BEARING RESTRICTION  Weight Bearing Restriction: None                PAIN ASSESSMENT   Ratin          BALANCE                                                                                                                       Static Sitting: Not tested  Dynamic Sitting: Not tested           Static Standing: Not tested  Dynamic Standing: Fair - (with RW)    ACTIVITY TOLERANCE                         O2 WALK         AM-PAC '6-Clicks' INPATIENT SHORT FORM - BASIC MOBILITY  How much difficulty does the patient currently have...  Patient Difficulty: Turning over in bed (including adjusting bedclothes, sheets and blankets)?: A Lot   Patient Difficulty: Sitting down on and standing up from a chair with arms (e.g., wheelchair, bedside commode, etc.): Unable   Patient Difficulty: Moving from lying on back to sitting on the side of the bed?: A Lot   How much help from another person does the patient currently need...   Help from Another: Moving to and from a bed to a chair (including a wheelchair)?: Total   Help from Another: Need to walk in hospital room?: Total   Help from Another: Climbing 3-5 steps with a railing?: Total       AM-PAC Score:  Raw Score: 8   Approx Degree of Impairment: 86.62%   Standardized Score (AM-PAC Scale): 28.58   CMS Modifier (G-Code): CM    FUNCTIONAL ABILITY STATUS  Gait  Assessment   Functional Mobility/Gait Assessment  Gait Assistance: Not tested  Distance (ft): 0  Assistive Device: Rolling walker  Pattern:  (Decr julianna/step length/heel=toe pattern)    Skilled Therapy Provided  RN consulted prior to session  Pt presents in semi sup  Pt follows commands intermittently c verbal, tactile and AAROM for BUE BLE   However, pt with eyes closed throughout and oriented to self only  Pt cued multiple times for eyes open, is unable to tell time of day and returns to sleep between cues     Bed Mobility:  Rolling: nt    Supine<>Sit: max A , pt unable to achieve 2/2 somnolence    Sit<>Supine: nt      Transfer Mobility:  Sit<>Stand: nt    Stand<>Sit: nt    Gait: nt     Therapist's Comments: per chart review, pt received haldol last night 2/2 attempting to get OOB  Writer opened shades and turned on lights to assist with alertness, pt falling asleep between bouts of activity         THERAPEUTIC EXERCISES  Lower Extremity Ankle pumps  Heel slides     Upper Extremity Elbow flex/ext,  - open/close, Shoulder flex/ext, and AAROM      Position Supine     Repetitions   5   Sets   1     Patient End of Session: In bed;Needs met;Call light within reach;RN aware of session/findings;All patient questions and concerns addressed;Alarm set;Restraints (posey vest)    PT Session Time: 23 minutes  Gait Training:  minutes  Therapeutic Activity: 23 minutes  Therapeutic Exercise:  minutes   Neuromuscular Re-education:  minutes                     Occupational Therapy Notes (last 72 hours)  Notes from 3/18/2024  4:27 PM through 3/21/2024  4:27 PM   No notes of this type exist for this encounter.     Video Swallow Study Notes    No notes of this type exist for this encounter.     SLP Notes    No notes of this type exist for this encounter.     Immunizations     Name Date      Covid-19 Pfizer 04/21/22     Covid-19 Pfizer 10/22/21     Covid-19 Pfizer 02/23/21     Covid-19 Pfizer 02/02/21     Covid-19 Pfizer Bivalent  09/19/22     Fluad 0.5ml 09/19/22     Fluad 0.5ml 10/22/21     INFLUENZA 09/29/23     INFLUENZA 09/30/20     INFLUENZA 09/25/19     INFLUENZA 09/25/19     INFLUENZA 10/06/18     INFLUENZA 10/06/18     INFLUENZA 10/23/17     INFLUENZA 10/23/17     INFLUENZA 10/10/16     INFLUENZA 11/07/15     INFLUENZA 09/16/14     INFLUENZA 11/08/13     INFLUENZA 10/31/11     Pfizer Covid-19 Vaccine 30mcg/0.3mL 12yrs+ 09/29/23     Pneumococcal (Prevnar 13) 02/08/18     Pneumovax 23 10/26/22     Pneumovax 23 10/08/11     Pneumovax 23 10/01/10     TD 09/06/20     Zoster Vaccine Live (Zostavax) 03/14/11       Future Appointments        Provider Department Center    7/17/2024 10:00 AM Jennifer Chandra DO [facility], [facility]      Multidisciplinary Problems     Active Goals     Not on file

## (undated) NOTE — LETTER
18        [address]      Dear Racheal Chin,    [facility] records indicate that you have outstanding lab work and or testing that was ordered for you and has not yet been completed:          HCV AB for   thru  (On

## (undated) NOTE — LETTER
23    Patient: Justen Hoyos  : 9/3/1945 Visit date: 2023    Dear  Kyle Phillips,     Thank you for referring Justen Hoyos to my practice. Please find my assessment and plan below. Assessment   Lipoma of back  (primary encounter diagnosis)  Sebaceous cyst  Mass on back  Mass of shoulder region  Essential hypertension  PD (Parkinson's disease) ([facility] [place] [address]  Stage 3a chronic kidney disease ([facility] [place] [address]  Prediabetes  History of prostate cancer      Plan   This patient presents with several skin and subcutaneous nodules. 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 It has been growing. He denies any eruption or drainage. It is soft. He is aware of it. It causes him some symptoms at that site. Most likely it has been growing considerably. He has 3 separate sebaceous cysts. One on the right anterior chest wall near the right clavicle that is 2 cm in length. He has 2 on the right side of the back with a 3.5 cm lesion on the top of the right shoulder blade, and a 4.0 cm lesion on the right mid back at approximately the level of the T12 vertebrae. All of the lesions are getting larger. Currently none are in pain, red, or draining. The patient has no other skin lesions that he is concerned about. The patient's chart states that he has thrombocytopenia. The very last CBC draw was on 2023. He white blood cell count 5.7, hemoglobin 11.9, hematocrit 36.0, platelets 511,458. No evidence of thrombocytopenia on the last draw. Patient has never had heart attack, stroke, heart murmur, heart valve replacement, or cardiac arrhythmia. He does have some significant peripheral vascular disease, hypertension, Parkinson's disease. Clinical exam of the back reveals this greater than 12 cm lipoma in the mid back that goes between T2 and T4 between the shoulder blades. It is soft.   It is oriented horizontally with the width being greater than the height. There are no signs of eruption. It is very consistent with a lipoma. It is fixed to the overlying skin and the underlying fascia. Also on the back are to sebaceous cyst appearing lesions, one above the right scapula 3.5 cm, 1 to the right of the midline in the mid back at approximately the T12 vertebrae that is 4.0 cm in greatest dimension. None show current infection or signs of eruption recently. There are raised above skin level. They are firm. On the anterior chest wall near the right clavicle and its distal aspect is a 2 cm sebaceous cyst again raised off of the skin level, no eruption, no erythema, no current drainage. This patient will require general anesthesia for removal of these lesions. We would start supine on the patient's cart and do the one on the chest wall first.    He will then be flipped onto his abdomen where we will take care of the other 3 lesions. I went over all of this in detail with the patient and his wife. All risks, benefits, complications and alternatives to the proposed procedure(s) were fully discussed with the patient. All questions from the patient were answered in detail. A description of the procedure(s) and possible outcomes was fully discussed. The patient seemed to understand the conversation and its details. Consent for the procedure(s) was confirmed with the patient.       Sincerely,       Sukhwinder Hernandez MD   CC:   No Recipients

## (undated) NOTE — LETTER
Patient Name: Live Brochure  YOB: 1945          MRN :  [id number]  Date:  7/29/2020  Referring Physician:  Meghan Perez  Pt has attended 16 visits in Physical Therapy.    Dx: Chronic JEIMY LBP without sciatica, Parkinson's dis to walk around the grocery store without need for seated rest. -MET  Patient will improve hamstring flexibility to MIN tightness and  Functional strength with 30 second sit to stand score to 12 reps to be able to perform household transfers without LBP -ME

## (undated) NOTE — Clinical Note
TCM call completed. Spouse feels pt is doing better. TE was sent to the office to FU on TCM appt and medication question. Thank you.

## (undated) NOTE — LETTER
Patient Name: Jessi Serna  YOB: 1945          MRN :  [id number]  Date:  7/8/2020  Referring Physician:  Yesica KellySummajose  Pt has attended 10 visits in Physical Therapy.    Dx: Chronic JEIMY LBP without sciatica, Parkinson's disea performing his HEP and has a good understanding of it.      Objective: (6/29/20)  Hamstring Flexibility: R MIN, L MIN (R>L) (7/8/20)  Lumbar AROM  Flexion - 91   Extension - 35  Rotation - R WNL, L WNL    Functional Testing (7/1/20)  30 sec Sit to Stand: 10 Certification From: 6/7/4890  To:10/6/2020